# Patient Record
Sex: MALE | Race: WHITE | NOT HISPANIC OR LATINO | Employment: OTHER | URBAN - METROPOLITAN AREA
[De-identification: names, ages, dates, MRNs, and addresses within clinical notes are randomized per-mention and may not be internally consistent; named-entity substitution may affect disease eponyms.]

---

## 2017-01-24 ENCOUNTER — OFFICE VISIT (OUTPATIENT)
Dept: MEDICAL GROUP | Facility: PHYSICIAN GROUP | Age: 82
End: 2017-01-24
Payer: MEDICARE

## 2017-01-24 VITALS
SYSTOLIC BLOOD PRESSURE: 120 MMHG | WEIGHT: 163 LBS | BODY MASS INDEX: 25.58 KG/M2 | RESPIRATION RATE: 14 BRPM | DIASTOLIC BLOOD PRESSURE: 76 MMHG | TEMPERATURE: 98.6 F | HEIGHT: 67 IN | HEART RATE: 74 BPM | OXYGEN SATURATION: 95 %

## 2017-01-24 DIAGNOSIS — I95.1 ORTHOSTATIC HYPOTENSION: ICD-10-CM

## 2017-01-24 DIAGNOSIS — I48.0 PAROXYSMAL ATRIAL FIBRILLATION (HCC): ICD-10-CM

## 2017-01-24 DIAGNOSIS — E53.8 B12 DEFICIENCY: ICD-10-CM

## 2017-01-24 DIAGNOSIS — E11.9 WELL CONTROLLED TYPE 2 DIABETES MELLITUS (HCC): ICD-10-CM

## 2017-01-24 DIAGNOSIS — F51.01 PRIMARY INSOMNIA: ICD-10-CM

## 2017-01-24 PROCEDURE — 99999 PR NO CHARGE: CPT | Performed by: FAMILY MEDICINE

## 2017-01-24 PROCEDURE — 1101F PT FALLS ASSESS-DOCD LE1/YR: CPT | Performed by: FAMILY MEDICINE

## 2017-01-24 PROCEDURE — 1036F TOBACCO NON-USER: CPT | Performed by: FAMILY MEDICINE

## 2017-01-24 PROCEDURE — G8432 DEP SCR NOT DOC, RNG: HCPCS | Performed by: FAMILY MEDICINE

## 2017-01-24 PROCEDURE — 99214 OFFICE O/P EST MOD 30 MIN: CPT | Performed by: FAMILY MEDICINE

## 2017-01-24 PROCEDURE — G8420 CALC BMI NORM PARAMETERS: HCPCS | Performed by: FAMILY MEDICINE

## 2017-01-24 PROCEDURE — G8482 FLU IMMUNIZE ORDER/ADMIN: HCPCS | Performed by: FAMILY MEDICINE

## 2017-01-24 PROCEDURE — G8598 ASA/ANTIPLAT THER USED: HCPCS | Performed by: FAMILY MEDICINE

## 2017-01-24 PROCEDURE — 4040F PNEUMOC VAC/ADMIN/RCVD: CPT | Performed by: FAMILY MEDICINE

## 2017-01-24 RX ORDER — TRAZODONE HYDROCHLORIDE 50 MG/1
25-50 TABLET ORAL NIGHTLY PRN
Qty: 30 TAB | Refills: 3 | Status: SHIPPED | OUTPATIENT
Start: 2017-01-24 | End: 2017-05-02

## 2017-01-24 RX ADMIN — CYANOCOBALAMIN 1000 MCG: 1000 INJECTION, SOLUTION INTRAMUSCULAR; SUBCUTANEOUS at 14:27

## 2017-01-24 NOTE — PROGRESS NOTES
Chief Complaint   Patient presents with   • Follow-Up       HISTORY OF PRESENT ILLNESS: Patient is a 89 y.o. male established patient here today for the following concerns:      Mr. Wheeler is here today for follow-up. He was last seen in the office for hospital follow-up following a hospitalization for dehydration and orthostatic hypotension with syncopal episode. While he was inpatient there is a brief note of paroxysmal atrial fibrillation as well as some hyponatremia. It was decided to just use aspirin rather than anticoagulating him. He agrees with this plan. Both of these seem to resolve with IV hydration. He reports he is generally good job now of counting how many bottles of water his drinking, and reports at least 5 per day. He reports feeling quite well. His only complaint today is difficulty with insomnia. He reports for many months if not years he's been struggling with insomnia which is become acutely worse. He reports that he will fall asleep but awakes within a few hours. Occasionally he'll have difficulty even falling asleep. He finds that the next day and having to nap in the afternoon which propagates this problem even further.    Patient does have B12 deficiency and is on injectable B12 on a monthly basis. He is due for B12 injection today.    Lastly, he has type II diabetes controlled with metformin alone. Reports that he has not had any difficulties with hypo-glycemia. No polyuria or polydipsia. Last A1c was done over the summer which was at 6.4. No visual changes. He is due for some diabetic measures in the next few months.      Past Medical, Social, and Family history reviewed and updated in EPIC    Allergies:Review of patient's allergies indicates no known allergies.    Current Outpatient Prescriptions   Medication Sig Dispense Refill   • aspirin EC (ECOTRIN) 81 MG Tablet Delayed Response Take 81 mg by mouth every day.     • Probiotic Product (PROBIOTIC PO) Take  by mouth.     • B Complex  Vitamins (VITAMIN B COMPLEX PO) Take  by mouth.     • Multiple Vitamins-Minerals (CENTRUM SILVER PO) Take  by mouth.     • Cholecalciferol (VITAMIN D PO) Take  by mouth.     • Omega-3 Fatty Acids (FISH OIL PO) Take  by mouth.     • Multiple Vitamins-Minerals (OCUVITE) Tab Take 1 Tab by mouth every day.     • trazodone (DESYREL) 50 MG Tab Take 0.5-1 Tabs by mouth at bedtime as needed for Sleep. 30 Tab 3   • metformin (GLUCOPHAGE) 500 MG Tab TAKE TWO TABLETS BY MOUTH TWICE DAILY WITH MEALS 360 Tab 0   • atorvastatin (LIPITOR) 20 MG Tab Take 1 Tab by mouth every bedtime. 30 Tab 11   • finasteride (PROSCAR) 5 MG Tab Take 5 mg by mouth every morning.     • latanoprost (XALATAN) 0.005 % SOLN Place 1 Drop in both eyes every evening.       Current Facility-Administered Medications   Medication Dose Route Frequency Provider Last Rate Last Dose   • cyanocobalamin (VITAMIN B-12) injection 1,000 mcg  1,000 mcg Intramuscular Q30 DAYS Zahra Yañez M.D.   1,000 mcg at 10/21/16 0945         ROS:  Review of Systems   Constitutional: Negative for fever, chills, weight loss and malaise/fatigue.   HENT: Negative for ear pain, nosebleeds, congestion, sore throat and neck pain.    Eyes: Negative for blurred vision.   Respiratory: Negative for cough, sputum production, shortness of breath and wheezing.    Cardiovascular: Negative for chest pain, palpitations,  and leg swelling.   Gastrointestinal: Negative for heartburn, nausea, vomiting, diarrhea and abdominal pain.   Genitourinary: Negative for dysuria, urgency and frequency.   Musculoskeletal: Negative for myalgias, back pain and joint pain.   Skin: Negative for rash and itching.   Neurological: Negative for dizziness, tingling, tremors, sensory change, focal weakness and headaches.   Endo/Heme/Allergies: Does not bruise/bleed easily.   Psychiatric/Behavioral: Negative for depression, anxiety, suicidal ideas, insomnia and memory loss.      Exam:  Blood pressure 120/76, pulse 74,  "temperature 37 °C (98.6 °F), resp. rate 14, height 1.702 m (5' 7.01\"), weight 73.936 kg (163 lb), SpO2 95 %.    General:  Well nourished, well developed in NAD  Head is grossly normal.  Neck: Supple without JVD   Pulmonary:  Normal effort.   Cardiovascular: Regular rate  Extremities: no clubbing, cyanosis, or edema.  Psych: affect appropriate      Please note that this dictation was created using voice recognition software. I have made every reasonable attempt to correct obvious errors, but I expect that there are errors of grammar and possibly content that I did not discover before finalizing the note.    Assessment/Plan:  1. Primary insomnia  Trial of low-dose  - trazodone (DESYREL) 50 MG Tab; Take 0.5-1 Tabs by mouth at bedtime as needed for Sleep.  Dispense: 30 Tab; Refill: 3    2. Paroxysmal atrial fibrillation (CMS-HCC)  Patient could qualify for anticoagulation given history of age and type II diabetes. He struggled with difficulty with easy bruising and bleeding on full aspirin. Discussion on risks versus benefit, patient would like to continue on 81 mg of aspirin. Blood pressure is well controlled.    3. Orthostatic hypotension  Resolved. Continue hydration efforts.    4. B12 deficiency  Continue B12 injections, recheck labs in 3 months  - VITAMIN B12; Future  - CBC WITH DIFFERENTIAL; Future    5. Well controlled type 2 diabetes mellitus (CMS-HCC)  Continue metformin, continue to monitor renal function  - HEMOGLOBIN A1C; Future  - LIPID PROFILE; Future  - MICROALBUMIN CREAT RATIO URINE; Future  - COMP METABOLIC PANEL; Future    Follow up in 3 months sooner when necessary      "

## 2017-01-24 NOTE — MR AVS SNAPSHOT
"        Bulmaro Wheeler   2017 2:00 PM   Office Visit   MRN: [id number]    Department:  [facility]   Dept Phone:  [phone]    Description:  Male : 1927   Provider:  Zahra Yañez M.D.           Reason for Visit     Follow-Up           Allergies as of 2017     No Known Allergies      You were diagnosed with     Primary insomnia   [370318]       Paroxysmal atrial fibrillation (CMS-[facility])   [024506]       Orthostatic hypotension   [458.0.ICD-9-CM]       B12 deficiency   [746711]       Well controlled type 2 diabetes mellitus (CMS-[facility])   [882341]         Vital Signs     Blood Pressure Pulse Temperature Respirations Height Weight    120/76 mmHg 74 37 °C (98.6 °F) 14 1.702 m (5' 7.01\") 73.936 kg (163 lb)    Body Mass Index Oxygen Saturation Smoking Status             25.52 kg/m2 95% Never Smoker          Basic Information     Date Of Birth Sex Race Ethnicity Preferred Language    1927 Male White Non- English      Your appointments     2017  2:00 PM   Established Patient with Zahra Yañez M.D.   [address] [id number] [phone]           You will be receiving a confirmation call a few days before your appointment from our automated call confirmation system.              Problem List              ICD-10-CM Priority Class Noted - Resolved    Chronic back pain M54.9, G89.29   2012 - Present    B12 deficiency E53.8   2012 - Present    Thrombocytopenia (HCC) D69.6   3/15/2013 - Present    Renal cyst N28.1   2013 - Present    Carotid artery stenosis I65.29   2014 - Present    Type II diabetes mellitus, well controlled (HCC) E11.9   2015 - Present    Hernia of abdominal wall K43.9   2015 - Present    BPH (benign prostatic hyperplasia) N40.0 Low  3/24/2016 - Present    Spinal stenosis of lumbosacral region M48.07   2016 - Present    Vitamin D deficiency E55.9   2016 " - Present    Paroxysmal atrial fibrillation (CMS-[facility]) I48.0   11/27/2016 - Present      Health Maintenance        Date Due Completion Dates    IMM ZOSTER VACCINE 4/16/1987 ---    DIABETES MONOFILAMENT / LE EXAM 7/15/2016 7/15/2015, 6/26/2014 (N/S), 6/6/2013 (N/S), 10/4/2011 (N/S)    Override on 6/26/2014: (N/S)    Override on 6/6/2013: (N/S)    Override on 10/4/2011: (N/S)    A1C SCREENING 1/21/2017 7/21/2016, 4/20/2016, 9/11/2015, 4/14/2015, 12/29/2014, 6/23/2014, 2/18/2014, 1/2/2014, 9/4/2013, 5/31/2013, 3/1/2013, 12/20/2012, 4/4/2012, 10/4/2011, 3/30/2011, 10/7/2010, 3/29/2010, 10/9/2009, 4/22/2009, 6/23/2006    RETINAL SCREENING 3/5/2017 3/5/2016 (Done), 6/26/2014 (N/S), 5/21/2013 (N/S), 2/22/2012 (Done)    Override on 3/5/2016: Done (waiting on records)    Override on 6/26/2014: (N/S)    Override on 5/21/2013: (N/S)    Override on 2/22/2012: Done (HD Retina)    FASTING LIPID PROFILE 7/21/2017 7/21/2016, 9/11/2015, 12/29/2014, 6/23/2014, 2/18/2014, 1/2/2014, 10/7/2010, 3/29/2010, 4/22/2009    URINE ACR / MICROALBUMIN 7/21/2017 7/21/2016, 9/11/2015, 12/29/2014, 1/2/2014, 5/31/2013, 4/4/2012, 10/4/2011, 3/30/2011, 10/7/2010    SERUM CREATININE 11/27/2017 11/27/2016, 11/27/2016, 11/26/2016, 9/7/2016, 8/25/2016, 8/22/2016, 8/21/2016, 8/20/2016, 8/19/2016, 7/21/2016, 4/30/2016, 3/27/2016, 3/25/2016, 3/24/2016, 3/23/2016, 1/20/2016, 1/13/2016, 1/11/2016, 1/10/2016, 9/11/2015, 1/7/2015, 1/5/2015, 1/3/2015, 1/2/2015, 12/29/2014, 9/25/2014, 6/23/2014, 3/12/2014, 3/11/2014, 2/18/2014, 2/17/2014, 11/1/2013, 10/31/2013, 9/30/2013, 9/29/2013, 9/28/2013, 9/27/2013, 9/27/2013, 9/4/2013, 3/29/2013, 3/16/2013, 3/14/2013, 9/2/2012, 8/31/2012, 4/4/2012, 11/1/2011, 10/31/2011, 10/30/2011, 10/29/2011, 10/4/2011, 3/30/2011, 2/9/2011, 12/29/2010, 6/6/2010, 10/9/2009, 2/23/2009, 6/24/2006, 6/23/2006, 6/22/2006    IMM DTaP/Tdap/Td Vaccine (2 - Td) 4/8/2023 4/8/2013            Current Immunizations     13-VALENT PCV PREVNAR 12/21/2015     Influenza TIV (IM) 10/9/2013  5:15 PM, 9/28/2013  4:27 AM, 11/1/2011 12:20 PM    Influenza Vaccine Adult HD 10/21/2016, 11/24/2015, 10/2/2014    Pneumococcal polysaccharide vaccine (PPSV-23) 11/1/2011 12:19 PM    Tdap Vaccine 4/8/2013  3:40 PM      Below and/or attached are the medications your provider expects you to take. Review all of your home medications and newly ordered medications with your provider and/or pharmacist. Follow medication instructions as directed by your provider and/or pharmacist. Please keep your medication list with you and share with your provider. Update the information when medications are discontinued, doses are changed, or new medications (including over-the-counter products) are added; and carry medication information at all times in the event of emergency situations     Allergies:  No Known Allergies          Medications  Valid as of: January 24, 2017 -  2:16 PM    Generic Name Brand Name Tablet Size Instructions for use    Aspirin (Tablet Delayed Response) ECOTRIN 81 MG Take 81 mg by mouth every day.        Atorvastatin Calcium (Tab) LIPITOR 20 MG Take 1 Tab by mouth every bedtime.        B Complex Vitamins   Take  by mouth.        Cholecalciferol   Take  by mouth.        Finasteride (Tab) PROSCAR 5 MG Take 5 mg by mouth every morning.        Latanoprost (Solution) XALATAN 0.005 % Place 1 Drop in both eyes every evening.        MetFORMIN HCl (Tab) GLUCOPHAGE 500 MG TAKE TWO TABLETS BY MOUTH TWICE DAILY WITH MEALS        Multiple Vitamins-Minerals   Take  by mouth.        Multiple Vitamins-Minerals (Tab) OCUVITE  Take 1 Tab by mouth every day.        Omega-3 Fatty Acids   Take  by mouth.        Probiotic Product   Take  by mouth.        TraZODone HCl (Tab) DESYREL 50 MG Take 0.5-1 Tabs by mouth at bedtime as needed for Sleep.        .                 Medicines prescribed today were sent to:     [id number] - [place], [place] - [address]    [address] [place] [address]     Phone: [phone] Fax: [phone]    Open 24 Hours?: No      Medication refill instructions:       If your prescription bottle indicates you have medication refills left, it is not necessary to call your provider’s office. Please contact your pharmacy and they will refill your medication.    If your prescription bottle indicates you do not have any refills left, you may request refills at any time through one of the following ways: The online [email or web address] system (except Urgent Care), by calling your provider’s office, or by asking your pharmacy to contact your provider’s office with a refill request. Medication refills are processed only during regular business hours and may not be available until the next business day. Your provider may request additional information or to have a follow-up visit with you prior to refilling your medication.   *Please Note: Medication refills are assigned a new Rx number when refilled electronically. Your pharmacy may indicate that no refills were authorized even though a new prescription for the same medication is available at the pharmacy. Please request the medicine by name with the pharmacy before contacting your provider for a refill.        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

## 2017-03-16 NOTE — TELEPHONE ENCOUNTER
Was the patient seen in the last year in this department? Yes     Does patient have an active prescription for medications requested? No     Received Request Via: Patient      Pt met protocol?: Yes  OV 1/24/17  A1C 7/21/16

## 2017-03-27 ENCOUNTER — TELEPHONE (OUTPATIENT)
Dept: HEALTH INFORMATION MANAGEMENT | Facility: OTHER | Age: 82
End: 2017-03-27

## 2017-03-27 ENCOUNTER — PATIENT OUTREACH (OUTPATIENT)
Dept: HEALTH INFORMATION MANAGEMENT | Facility: OTHER | Age: 82
End: 2017-03-27

## 2017-03-27 DIAGNOSIS — Z13.5 SCREENING FOR DIABETIC RETINOPATHY: ICD-10-CM

## 2017-03-27 NOTE — TELEPHONE ENCOUNTER
Please review the pended orders in  to sign or make adjustments as appropriate.    Close the encounter when complete.  If declining these orders, please document a reason and route to the Outreach MA pool (p AMB  Outreach).  I will call the patient to cancel the appointment.

## 2017-04-04 ENCOUNTER — OFFICE VISIT (OUTPATIENT)
Dept: MEDICAL GROUP | Facility: PHYSICIAN GROUP | Age: 82
End: 2017-04-04
Payer: MEDICARE

## 2017-04-04 VITALS
SYSTOLIC BLOOD PRESSURE: 126 MMHG | TEMPERATURE: 98.6 F | BODY MASS INDEX: 25.58 KG/M2 | RESPIRATION RATE: 16 BRPM | DIASTOLIC BLOOD PRESSURE: 70 MMHG | HEART RATE: 74 BPM | WEIGHT: 163 LBS | OXYGEN SATURATION: 96 % | HEIGHT: 67 IN

## 2017-04-04 DIAGNOSIS — G89.29 CHRONIC LOW BACK PAIN, UNSPECIFIED BACK PAIN LATERALITY, WITH SCIATICA PRESENCE UNSPECIFIED: ICD-10-CM

## 2017-04-04 DIAGNOSIS — E55.9 VITAMIN D DEFICIENCY: ICD-10-CM

## 2017-04-04 DIAGNOSIS — M54.5 CHRONIC LOW BACK PAIN, UNSPECIFIED BACK PAIN LATERALITY, WITH SCIATICA PRESENCE UNSPECIFIED: ICD-10-CM

## 2017-04-04 DIAGNOSIS — I65.29 STENOSIS OF CAROTID ARTERY, UNSPECIFIED LATERALITY: ICD-10-CM

## 2017-04-04 DIAGNOSIS — M48.07 SPINAL STENOSIS OF LUMBOSACRAL REGION: ICD-10-CM

## 2017-04-04 DIAGNOSIS — E53.8 B12 DEFICIENCY: ICD-10-CM

## 2017-04-04 DIAGNOSIS — D69.6 THROMBOCYTOPENIA (HCC): ICD-10-CM

## 2017-04-04 DIAGNOSIS — N40.1 BENIGN PROSTATIC HYPERPLASIA WITH LOWER URINARY TRACT SYMPTOMS, UNSPECIFIED MORPHOLOGY: ICD-10-CM

## 2017-04-04 DIAGNOSIS — I48.0 PAROXYSMAL ATRIAL FIBRILLATION (HCC): ICD-10-CM

## 2017-04-04 DIAGNOSIS — E11.9 TYPE II DIABETES MELLITUS, WELL CONTROLLED (HCC): ICD-10-CM

## 2017-04-04 DIAGNOSIS — N28.1 RENAL CYST: ICD-10-CM

## 2017-04-04 PROCEDURE — G8510 SCR DEP NEG, NO PLAN REQD: HCPCS | Performed by: FAMILY MEDICINE

## 2017-04-04 PROCEDURE — 1036F TOBACCO NON-USER: CPT | Performed by: FAMILY MEDICINE

## 2017-04-04 PROCEDURE — G0439 PPPS, SUBSEQ VISIT: HCPCS | Performed by: FAMILY MEDICINE

## 2017-04-04 ASSESSMENT — PAIN SCALES - GENERAL: PAINLEVEL: NO PAIN

## 2017-04-04 ASSESSMENT — PATIENT HEALTH QUESTIONNAIRE - PHQ9: CLINICAL INTERPRETATION OF PHQ2 SCORE: 0

## 2017-04-04 NOTE — PROGRESS NOTES
Chief Complaint   Patient presents with   • Annual Wellness Visit         HPI:  Bulmaro is a 89 y.o. male here for Medicare Annual Wellness Visit        Patient Active Problem List    Diagnosis Date Noted   • BPH (benign prostatic hyperplasia) 03/24/2016     Priority: Low   • Paroxysmal atrial fibrillation (CMS-HCC) 11/27/2016   • Spinal stenosis of lumbosacral region 09/07/2016   • Vitamin D deficiency 09/07/2016   • Hernia of abdominal wall 09/21/2015   • Type II diabetes mellitus, well controlled ([facility]) 04/14/2015   • Carotid artery stenosis 02/18/2014   • Renal cyst 09/19/2013   • Thrombocytopenia ([facility]) 03/15/2013   • B12 deficiency 09/25/2012   • Chronic back pain 09/01/2012       Current Outpatient Prescriptions   Medication Sig Dispense Refill   • Ibuprofen-Diphenhydramine Cit (ADVIL PM PO) Take  by mouth.     • metformin (GLUCOPHAGE) 500 MG Tab TAKE TWO TABLETS BY MOUTH TWICE DAILY WITH MEALS 360 Tab 0   • aspirin EC (ECOTRIN) 81 MG Tablet Delayed Response Take 81 mg by mouth every day.     • Probiotic Product (PROBIOTIC PO) Take  by mouth.     • B Complex Vitamins (VITAMIN B COMPLEX PO) Take  by mouth.     • Multiple Vitamins-Minerals (CENTRUM SILVER PO) Take  by mouth.     • Cholecalciferol (VITAMIN D PO) Take  by mouth.     • Omega-3 Fatty Acids (FISH OIL PO) Take  by mouth.     • Multiple Vitamins-Minerals (OCUVITE) Tab Take 1 Tab by mouth every day.     • trazodone (DESYREL) 50 MG Tab Take 0.5-1 Tabs by mouth at bedtime as needed for Sleep. 30 Tab 3   • atorvastatin (LIPITOR) 20 MG Tab Take 1 Tab by mouth every bedtime. 30 Tab 11   • finasteride (PROSCAR) 5 MG Tab Take 5 mg by mouth every morning.     • latanoprost (XALATAN) 0.005 % SOLN Place 1 Drop in both eyes every evening.       Current Facility-Administered Medications   Medication Dose Route Frequency Provider Last Rate Last Dose   • cyanocobalamin (VITAMIN B-12) injection 1,000 mcg  1,000 mcg Intramuscular Q30 DAYS Zahra Yañez M.D.   1,000 mcg at  01/24/17 7277        Patient is taking medications as noted in medication list.  Current supplements as per medication list.   Chronic narcotic pain medicines: no    Allergies: Review of patient's allergies indicates no known allergies.    Current social contact/activities: none at this time     Is patient current with immunizations?  No, due for ZOSTAVAX (Shingles). Patient is interested in receiving NONE today.     He  reports that he has never smoked. He has never used smokeless tobacco. He reports that he does not drink alcohol or use illicit drugs.  Counseling given: Yes        DPA/Advanced Directive:  Patient does not have an Advanced Directive.  A packet and workshop information was given on Advanced Directives.    ROS:    Gait: Uses a wheelchair   Ostomy: no   Other tubes: no   Amputations: no   Chronic oxygen use no   Last eye exam 04/03/2017   Wears hearing aids: no   : Reports incontinence.       Screening:    DIABETES  1. Records requested for overdue  topics specifically for diabetes? yes      a. If yes, specifically requested: monofilament    2. Has patient ever had diabetes education? Yes      a. If so, when? 35 years ago      b. If not, is patient interested? no        Depression Screening    Little interest or pleasure in doing things?  0 - not at all  Feeling down, depressed, or hopeless?  0 - not at all  Patient Health Questionnaire Score: 0  If depressive symptoms identified deferred to follow up visit unless specifically addressed in assessment and plan.    Screening for Cognitive Impairment    Three Minute Recall (banana, sunrise, fence)  2/3    Draw clock face with all 12 numbers set to the hand to show 10 minutes past 11 o'clock  0 2/5  Due to macular degeneration   If cognitive concerns identified deferred to follow up visit unless specifically addressed in assessment and plan.    Fall Risk Assessment    Has the patient had two or more falls in the last year or any fall with injury in  the last year?  Yes  If Fall Risk identified deferred to follow up visit unless specifically addressed in assessment and plan.    Safety Assessment    Throw rugs on floor.  Yes  Handrails on all stairs.  Yes  Good lighting in all hallways.  Yes  Difficulty hearing.  Yes  Patient counseled about all safety risks that were identified.    Functional Assessment ADLs    Are there any barriers preventing you from cooking for yourself or meeting nutritional needs?  No.    Are there any barriers preventing you from driving safely or obtaining transportation?  No.    Are there any barriers preventing you from using a telephone or calling for help?  No.    Are there any barriers preventing you from shopping?  No.    Are there any barriers preventing you from taking care of your own finances?  No.    Are there any barriers preventing you from managing your medications?  No.    Are currently engaging any exercise or physical activity?  No.       Health Maintenance Summary                Annual Wellness Visit Overdue 4/16/1927     DIABETES MONOFILAMENT / LE EXAM Overdue 7/15/2016      Done 7/15/2015 AMB DIABETIC MONOFILAMENT LOWER EXTREMITY EXAM     Patient has more history with this topic...    A1C SCREENING Overdue 1/21/2017      Done 7/21/2016 HEMOGLOBIN A1C (A)     Patient has more history with this topic...    RETINAL SCREENING Overdue 3/5/2017      Done 3/5/2016 waiting on records     Patient has more history with this topic...    IMM ZOSTER VACCINE Postponed 5/25/2018 Originally 4/16/1987. Insurance/Financial    FASTING LIPID PROFILE Next Due 7/21/2017      Done 7/21/2016 LIPID PROFILE     Patient has more history with this topic...    URINE ACR / MICROALBUMIN Next Due 7/21/2017      Done 7/21/2016 MICROALBUMIN CREAT RATIO URINE (A)     Patient has more history with this topic...    SERUM CREATININE Next Due 11/27/2017      Done 11/27/2016 BASIC METABOLIC PANEL     Patient has more history with this topic...    IMM  DTaP/Tdap/Td Vaccine Next Due 4/8/2023      Done 4/8/2013 Imm Admin: Tdap Vaccine          Patient Care Team:  Zahra Yañez M.D. as PCP - General (Family Medicine)  Stew Damon M.D. as Consulting Physician (Ophthalmology)  Gertrudis Monte D.N.P. as Consulting Physician (Family Medicine)  [facility] as Consulting Physician    Social History   Substance Use Topics   • Smoking status: Never Smoker    • Smokeless tobacco: Never Used   • Alcohol Use: No      Comment: hasnt drank since 1979     Family History   Problem Relation Age of Onset   • Heart Disease Father    • Diabetes Father    • Alcohol/Drug Father    • Cancer Sister      ovarian   • Stroke Brother      age 90     He  has a past medical history of Diabetes; Hypertension; Arthritis; Pneumonia; Backpain; Glaucoma; Indigestion; CATARACT; Arrhythmia; Urinary tract infection, site not specified (9/27/2013); Sepsis (9/27/2013); UTI (lower urinary tract infection) (9/1/2012); Heart burn; Pyelonephritis (October 2010); Acute kidney failure, unspecified (CMS-HCC) (9/27/2013); Renal cyst (9/19/2013); Pyelonephritis; Carotid artery stenosis (2/18/2014); and Occlusion and stenosis of carotid artery without mention of cerebral infarction (3/11/2014). He also has no past medical history of Unspecified hemorrhagic conditions (CMS-[facility]), Psychiatric problem, Myocardial infarct (CMS-[facility]), Pacemaker, Heart murmur, ASTHMA, Jaundice, Personal history of venous thrombosis and embolism, Seizure (CMS-[facility]), Angina, Rheumatic fever, Dialysis, Cancer (CMS-HCC), Unspecified disorder of thyroid, Congestive heart failure (CMS-HCC), Heart valve disease, Bronchitis, COPD, Unspecified urinary incontinence, or Fall.   Past Surgical History   Procedure Laterality Date   • Ercp w/removal calculus  2009   • Eye surgery  1980's     cataracts OU   • Colonoscopy  1999   • Cholecystectomy  1999   • Carotid endarterectomy  3/11/2014     Performed by Bob Antonio M.D.  "at [facility]           Exam:     Blood pressure 126/70, pulse 74, temperature 37 °C (98.6 °F), resp. rate 16, height 1.702 m (5' 7\"), weight 73.936 kg (163 lb), SpO2 96 %. Body mass index is 25.52 kg/(m^2).    Hearing good.    Dentition fair  Alert, oriented in no acute distress.  Eye contact is good, speech goal directed, affect calm      Assessment and Plan. The following treatment and monitoring plan is recommended:    1. Thrombocytopenia (HCC)  Recheck.   - Annual Wellness Visit - Includes PPPS Subsequent ()  - CBC WITH DIFFERENTIAL; Future    2. Type II diabetes mellitus, well controlled ([facility])  Check labs   - Annual Wellness Visit - Includes PPPS Subsequent ()  - HEMOGLOBIN A1C; Future  - LIPID PROFILE; Future  - MICROALBUMIN CREAT RATIO URINE; Future  - COMP METABOLIC PANEL; Future  - Diabetic Monofilament Lower Extremity Exam    3. Vitamin D deficiency  Continue Vit D  - Annual Wellness Visit - Includes PPPS Subsequent ()  - VITAMIN D,25 HYDROXY; Future    4. Benign prostatic hyperplasia with lower urinary tract symptoms, unspecified morphology  Advised against OAB medications as he does retain urine, declines surgical intervention.   - Annual Wellness Visit - Includes PPPS Subsequent ()    5. Spinal stenosis of lumbosacral region  Stable with current regimen.  Appropriate medications and lab monitored routinely.    - Annual Wellness Visit - Includes PPPS Subsequent ()    6. Paroxysmal atrial fibrillation (CMS-HCC)  Stable with current regimen.  Appropriate medications and lab monitored routinely.    - Annual Wellness Visit - Includes PPPS Subsequent ()    7. Chronic low back pain, unspecified back pain laterality, with sciatica presence unspecified  Stable with current regimen.  Appropriate medications and lab monitored routinely.    - Annual Wellness Visit - Includes PPPS Subsequent ()    8. Stenosis of carotid artery, unspecified laterality  Continue statin " and BP control  - Annual Wellness Visit - Includes PPPS Subsequent ()    9. Renal cyst  Noted.   - Annual Wellness Visit - Includes PPPS Subsequent ()    10. B12 deficiency  Continue B12 replacement.   - VITAMIN B12; Future        Services suggested: No services needed at this time  Health Care Screening recommendations as per orders if indicated.  Referrals offered: PT/OT/Nutrition counseling/Behavioral Health/Smoking cessation as per orders if indicated.    Discussion today about general wellness and lifestyle habits:    · Prevent falls and reduce trip hazards; Cautioned about securing or removing rugs.  · Have a working fire alarm and carbon monoxide detector;   · Engage in regular physical activity and social activities       Follow-up: 4 weeks.

## 2017-04-04 NOTE — Clinical Note
[facility]  Zahra Yañez M.D.  [address]  [place] [address]  Fax: [phone]   Authorization for Release/Disclosure of   Protected Health Information   Name: JT WHEELER : 1927 SSN: [id number]   Address: [address] [facility] [place] [address] Phone:    [phone] (home)    I authorize the entity listed below to release/disclose the PHI below to:   [facility]/Zahra Yañez M.D. and Zahra Yañez M.D.   Provider or Entity Name:  Dr. Damon   Address   City, State, Zip   Phone:      Fax:  [phone]   Reason for request: continuity of care   Information to be released:    [  ] LAST COLONOSCOPY,  including any PATH REPORT and follow-up  [  ] LAST FIT/COLOGUARD RESULT [  ] LAST DEXA  [  ] LAST MAMMOGRAM  [  ] LAST PAP  [  ] LAST LABS [X  ] RETINA EXAM REPORT  [  ] IMMUNIZATION RECORDS  [  ] Release all info      [  ] Check here and initial the line next to each item to release ALL health information INCLUDING  _____ Care and treatment for drug and / or alcohol abuse  _____ HIV testing, infection status, or AIDS  _____ Genetic Testing    DATES OF SERVICE OR TIME PERIOD TO BE DISCLOSED: _____________  I understand and acknowledge that:  * This Authorization may be revoked at any time by you in writing, except if your health information has already been used or disclosed.  * Your health information that will be used or disclosed as a result of you signing this authorization could be re-disclosed by the recipient. If this occurs, your re-disclosed health information may no longer be protected by State or Federal laws.  * You may refuse to sign this Authorization. Your refusal will not affect your ability to obtain treatment.  * This Authorization becomes effective upon signing and will  on (date) __________.      If no date is indicated, this Authorization will  one (1) year from the signature date.    Name: Jt Wheeler    Signature:   Date:          4/4/2017       PLEASE FAX REQUESTED RECORDS BACK TO: [phone]

## 2017-04-04 NOTE — MR AVS SNAPSHOT
"        Bulmaro Wheeler   2017 1:00 PM   Office Visit   MRN: [id number]    Department:  [facility]   Dept Phone:  [phone]    Description:  Male : 1927   Provider:  Zahra Yañez M.D.; [facility]            Reason for Visit     Annual Wellness Visit           Allergies as of 2017     No Known Allergies      You were diagnosed with     Thrombocytopenia (CMS-HCC)   [620206]       Type II diabetes mellitus, well controlled (CMS-HCC)   [606876]       Vitamin D deficiency   [7408490]       Benign prostatic hyperplasia with lower urinary tract symptoms, unspecified morphology   [7342704]       Spinal stenosis of lumbosacral region   [230218]       Paroxysmal atrial fibrillation (CMS-HCC)   [789639]       Chronic low back pain, unspecified back pain laterality, with sciatica presence unspecified   [1916801]       Stenosis of carotid artery, unspecified laterality   [750800]       Renal cyst   [348667]       B12 deficiency   [576610]         Vital Signs     Blood Pressure Pulse Temperature Respirations Height Weight    126/70 mmHg 74 37 °C (98.6 °F) 16 1.702 m (5' 7\") 73.936 kg (163 lb)    Body Mass Index Oxygen Saturation Smoking Status             25.52 kg/m2 96% Never Smoker          Basic Information     Date Of Birth Sex Race Ethnicity Preferred Language    1927 Male White Non- English      Your appointments     2017  2:00 PM   Established Patient with Zahra Yañez M.D.   [address] [address]   [phone]           You will be receiving a confirmation call a few days before your appointment from our automated call confirmation system.              Problem List              ICD-10-CM Priority Class Noted - Resolved    Chronic back pain M54.9, G89.29   2012 - Present    B12 deficiency E53.8   2012 - Present    Thrombocytopenia (HCC) D69.6   3/15/2013 - Present    Renal cyst " N28.1   9/19/2013 - Present    Carotid artery stenosis I65.29   2/18/2014 - Present    Type II diabetes mellitus, well controlled (HCC) E11.9   4/14/2015 - Present    Hernia of abdominal wall K43.9   9/21/2015 - Present    BPH (benign prostatic hyperplasia) N40.0 Low  3/24/2016 - Present    Spinal stenosis of lumbosacral region M48.07   9/7/2016 - Present    Vitamin D deficiency E55.9   9/7/2016 - Present    Paroxysmal atrial fibrillation (CMS-HCC) I48.0   11/27/2016 - Present      Health Maintenance        Date Due Completion Dates    DIABETES MONOFILAMENT / LE EXAM 7/15/2016 7/15/2015, 6/26/2014 (N/S), 6/6/2013 (N/S), 10/4/2011 (N/S)    Override on 6/26/2014: (N/S)    Override on 6/6/2013: (N/S)    Override on 10/4/2011: (N/S)    A1C SCREENING 1/21/2017 7/21/2016, 4/20/2016, 9/11/2015, 4/14/2015, 12/29/2014, 6/23/2014, 2/18/2014, 1/2/2014, 9/4/2013, 5/31/2013, 3/1/2013, 12/20/2012, 4/4/2012, 10/4/2011, 3/30/2011, 10/7/2010, 3/29/2010, 10/9/2009, 4/22/2009, 6/23/2006    RETINAL SCREENING 3/5/2017 3/5/2016 (Done), 6/26/2014 (N/S), 5/21/2013 (N/S), 2/22/2012 (Done)    Override on 3/5/2016: Done (waiting on records)    Override on 6/26/2014: (N/S)    Override on 5/21/2013: (N/S)    Override on 2/22/2012: Done (HD Retina)    IMM ZOSTER VACCINE 5/25/2018 (Originally 4/16/1987) ---    FASTING LIPID PROFILE 7/21/2017 7/21/2016, 9/11/2015, 12/29/2014, 6/23/2014, 2/18/2014, 1/2/2014, 10/7/2010, 3/29/2010, 4/22/2009    URINE ACR / MICROALBUMIN 7/21/2017 7/21/2016, 9/11/2015, 12/29/2014, 1/2/2014, 5/31/2013, 4/4/2012, 10/4/2011, 3/30/2011, 10/7/2010    SERUM CREATININE 11/27/2017 11/27/2016, 11/27/2016, 11/26/2016, 9/7/2016, 8/25/2016, 8/22/2016, 8/21/2016, 8/20/2016, 8/19/2016, 7/21/2016, 4/30/2016, 3/27/2016, 3/25/2016, 3/24/2016, 3/23/2016, 1/20/2016, 1/13/2016, 1/11/2016, 1/10/2016, 9/11/2015, 1/7/2015, 1/5/2015, 1/3/2015, 1/2/2015, 12/29/2014, 9/25/2014, 6/23/2014, 3/12/2014, 3/11/2014, 2/18/2014, 2/17/2014, 11/1/2013,  10/31/2013, 9/30/2013, 9/29/2013, 9/28/2013, 9/27/2013, 9/27/2013, 9/4/2013, 3/29/2013, 3/16/2013, 3/14/2013, 9/2/2012, 8/31/2012, 4/4/2012, 11/1/2011, 10/31/2011, 10/30/2011, 10/29/2011, 10/4/2011, 3/30/2011, 2/9/2011, 12/29/2010, 6/6/2010, 10/9/2009, 2/23/2009, 6/24/2006, 6/23/2006, 6/22/2006    IMM DTaP/Tdap/Td Vaccine (2 - Td) 4/8/2023 4/8/2013            Current Immunizations     13-VALENT PCV PREVNAR 12/21/2015    Influenza TIV (IM) 10/9/2013  5:15 PM, 9/28/2013  4:27 AM, 11/1/2011 12:20 PM    Influenza Vaccine Adult HD 10/21/2016, 11/24/2015, 10/2/2014    Pneumococcal polysaccharide vaccine (PPSV-23) 11/1/2011 12:19 PM    Tdap Vaccine 4/8/2013  3:40 PM      Below and/or attached are the medications your provider expects you to take. Review all of your home medications and newly ordered medications with your provider and/or pharmacist. Follow medication instructions as directed by your provider and/or pharmacist. Please keep your medication list with you and share with your provider. Update the information when medications are discontinued, doses are changed, or new medications (including over-the-counter products) are added; and carry medication information at all times in the event of emergency situations     Allergies:  No Known Allergies          Medications  Valid as of: April 04, 2017 -  2:28 PM    Generic Name Brand Name Tablet Size Instructions for use    Aspirin (Tablet Delayed Response) ECOTRIN 81 MG Take 81 mg by mouth every day.        Atorvastatin Calcium (Tab) LIPITOR 20 MG Take 1 Tab by mouth every bedtime.        B Complex Vitamins   Take  by mouth.        Cholecalciferol   Take  by mouth.        Finasteride (Tab) PROSCAR 5 MG Take 5 mg by mouth every morning.        Ibuprofen-Diphenhydramine Cit   Take  by mouth.        Latanoprost (Solution) XALATAN 0.005 % Place 1 Drop in both eyes every evening.        MetFORMIN HCl (Tab) GLUCOPHAGE 500 MG TAKE TWO TABLETS BY MOUTH TWICE DAILY WITH MEALS         Multiple Vitamins-Minerals   Take  by mouth.        Multiple Vitamins-Minerals (Tab) OCUVITE  Take 1 Tab by mouth every day.        Omega-3 Fatty Acids   Take  by mouth.        Probiotic Product   Take  by mouth.        TraZODone HCl (Tab) DESYREL 50 MG Take 0.5-1 Tabs by mouth at bedtime as needed for Sleep.        .                 Medicines prescribed today were sent to:     [id number] - [place], [place] - [address]    [address] [place] [address]    Phone: [phone] Fax: [phone]    Open 24 Hours?: No      Medication refill instructions:       If your prescription bottle indicates you have medication refills left, it is not necessary to call your provider’s office. Please contact your pharmacy and they will refill your medication.    If your prescription bottle indicates you do not have any refills left, you may request refills at any time through one of the following ways: The online [employer] system (except Urgent Care), by calling your provider’s office, or by asking your pharmacy to contact your provider’s office with a refill request. Medication refills are processed only during regular business hours and may not be available until the next business day. Your provider may request additional information or to have a follow-up visit with you prior to refilling your medication.   *Please Note: Medication refills are assigned a new Rx number when refilled electronically. Your pharmacy may indicate that no refills were authorized even though a new prescription for the same medication is available at the pharmacy. Please request the medicine by name with the pharmacy before contacting your provider for a refill.        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

## 2017-04-05 RX ORDER — FINASTERIDE 5 MG/1
5 TABLET, FILM COATED ORAL EVERY MORNING
Qty: 90 TAB | Refills: 1 | Status: SHIPPED | OUTPATIENT
Start: 2017-04-05 | End: 2017-04-25 | Stop reason: SDUPTHER

## 2017-04-07 ENCOUNTER — HOSPITAL ENCOUNTER (OUTPATIENT)
Dept: LAB | Facility: MEDICAL CENTER | Age: 82
End: 2017-04-07
Attending: FAMILY MEDICINE
Payer: MEDICARE

## 2017-04-07 DIAGNOSIS — E53.8 B12 DEFICIENCY: ICD-10-CM

## 2017-04-07 DIAGNOSIS — E11.9 TYPE II DIABETES MELLITUS, WELL CONTROLLED (HCC): ICD-10-CM

## 2017-04-07 DIAGNOSIS — E55.9 VITAMIN D DEFICIENCY: ICD-10-CM

## 2017-04-07 DIAGNOSIS — D69.6 THROMBOCYTOPENIA (HCC): ICD-10-CM

## 2017-04-07 LAB
25(OH)D3 SERPL-MCNC: 28 NG/ML (ref 30–100)
ALBUMIN SERPL BCP-MCNC: 4.1 G/DL (ref 3.2–4.9)
ALBUMIN/GLOB SERPL: 1.4 G/DL
ALP SERPL-CCNC: 59 U/L (ref 30–99)
ALT SERPL-CCNC: 16 U/L (ref 2–50)
ANION GAP SERPL CALC-SCNC: 6 MMOL/L (ref 0–11.9)
AST SERPL-CCNC: 21 U/L (ref 12–45)
BASOPHILS # BLD AUTO: 0.6 % (ref 0–1.8)
BASOPHILS # BLD: 0.04 K/UL (ref 0–0.12)
BILIRUB SERPL-MCNC: 0.9 MG/DL (ref 0.1–1.5)
BUN SERPL-MCNC: 24 MG/DL (ref 8–22)
CALCIUM SERPL-MCNC: 9.6 MG/DL (ref 8.5–10.5)
CHLORIDE SERPL-SCNC: 100 MMOL/L (ref 96–112)
CHOLEST SERPL-MCNC: 76 MG/DL (ref 100–199)
CO2 SERPL-SCNC: 29 MMOL/L (ref 20–33)
CREAT SERPL-MCNC: 0.86 MG/DL (ref 0.5–1.4)
CREAT UR-MCNC: 24.8 MG/DL
EOSINOPHIL # BLD AUTO: 0.06 K/UL (ref 0–0.51)
EOSINOPHIL NFR BLD: 0.9 % (ref 0–6.9)
ERYTHROCYTE [DISTWIDTH] IN BLOOD BY AUTOMATED COUNT: 42.7 FL (ref 35.9–50)
EST. AVERAGE GLUCOSE BLD GHB EST-MCNC: 171 MG/DL
GFR SERPL CREATININE-BSD FRML MDRD: >60 ML/MIN/1.73 M 2
GLOBULIN SER CALC-MCNC: 2.9 G/DL (ref 1.9–3.5)
GLUCOSE SERPL-MCNC: 162 MG/DL (ref 65–99)
HBA1C MFR BLD: 7.6 % (ref 0–5.6)
HCT VFR BLD AUTO: 41.1 % (ref 42–52)
HDLC SERPL-MCNC: 39 MG/DL
HGB BLD-MCNC: 13.9 G/DL (ref 14–18)
IMM GRANULOCYTES # BLD AUTO: 0.01 K/UL (ref 0–0.11)
IMM GRANULOCYTES NFR BLD AUTO: 0.1 % (ref 0–0.9)
LDLC SERPL CALC-MCNC: 18 MG/DL
LYMPHOCYTES # BLD AUTO: 1.05 K/UL (ref 1–4.8)
LYMPHOCYTES NFR BLD: 15.3 % (ref 22–41)
MCH RBC QN AUTO: 30.6 PG (ref 27–33)
MCHC RBC AUTO-ENTMCNC: 33.8 G/DL (ref 33.7–35.3)
MCV RBC AUTO: 90.5 FL (ref 81.4–97.8)
MICROALBUMIN UR-MCNC: 15.6 MG/DL
MICROALBUMIN/CREAT UR: 629 MG/G (ref 0–30)
MONOCYTES # BLD AUTO: 0.61 K/UL (ref 0–0.85)
MONOCYTES NFR BLD AUTO: 8.9 % (ref 0–13.4)
NEUTROPHILS # BLD AUTO: 5.1 K/UL (ref 1.82–7.42)
NEUTROPHILS NFR BLD: 74.2 % (ref 44–72)
NRBC # BLD AUTO: 0 K/UL
NRBC BLD AUTO-RTO: 0 /100 WBC
PLATELET # BLD AUTO: 159 K/UL (ref 164–446)
PMV BLD AUTO: 11.1 FL (ref 9–12.9)
POTASSIUM SERPL-SCNC: 4.3 MMOL/L (ref 3.6–5.5)
PROT SERPL-MCNC: 7 G/DL (ref 6–8.2)
RBC # BLD AUTO: 4.54 M/UL (ref 4.7–6.1)
SODIUM SERPL-SCNC: 135 MMOL/L (ref 135–145)
TRIGL SERPL-MCNC: 94 MG/DL (ref 0–149)
VIT B12 SERPL-MCNC: 493 PG/ML (ref 211–911)
WBC # BLD AUTO: 6.9 K/UL (ref 4.8–10.8)

## 2017-04-07 PROCEDURE — 82043 UR ALBUMIN QUANTITATIVE: CPT

## 2017-04-07 PROCEDURE — 82306 VITAMIN D 25 HYDROXY: CPT

## 2017-04-07 PROCEDURE — 85025 COMPLETE CBC W/AUTO DIFF WBC: CPT

## 2017-04-07 PROCEDURE — 82570 ASSAY OF URINE CREATININE: CPT

## 2017-04-07 PROCEDURE — 83036 HEMOGLOBIN GLYCOSYLATED A1C: CPT

## 2017-04-07 PROCEDURE — 36415 COLL VENOUS BLD VENIPUNCTURE: CPT

## 2017-04-07 PROCEDURE — 82607 VITAMIN B-12: CPT

## 2017-04-07 PROCEDURE — 80053 COMPREHEN METABOLIC PANEL: CPT

## 2017-04-07 PROCEDURE — 80061 LIPID PANEL: CPT

## 2017-04-11 ENCOUNTER — TELEPHONE (OUTPATIENT)
Dept: MEDICAL GROUP | Facility: PHYSICIAN GROUP | Age: 82
End: 2017-04-11

## 2017-04-11 NOTE — TELEPHONE ENCOUNTER
----- Message from Zahra Yañez M.D. sent at 4/11/2017 12:08 PM PDT -----  Please call patient to ensure they received their results through My Chart.  The patient requires follow up office visit.

## 2017-04-25 ENCOUNTER — HOSPITAL ENCOUNTER (OUTPATIENT)
Facility: MEDICAL CENTER | Age: 82
End: 2017-04-25
Attending: FAMILY MEDICINE
Payer: MEDICARE

## 2017-04-25 ENCOUNTER — OFFICE VISIT (OUTPATIENT)
Dept: MEDICAL GROUP | Facility: PHYSICIAN GROUP | Age: 82
End: 2017-04-25
Payer: MEDICARE

## 2017-04-25 VITALS
BODY MASS INDEX: 25.43 KG/M2 | OXYGEN SATURATION: 93 % | WEIGHT: 162 LBS | TEMPERATURE: 97.8 F | DIASTOLIC BLOOD PRESSURE: 80 MMHG | RESPIRATION RATE: 18 BRPM | SYSTOLIC BLOOD PRESSURE: 132 MMHG | HEART RATE: 66 BPM | HEIGHT: 67 IN

## 2017-04-25 DIAGNOSIS — E11.9 TYPE II DIABETES MELLITUS, WELL CONTROLLED (HCC): ICD-10-CM

## 2017-04-25 DIAGNOSIS — N40.1 BENIGN PROSTATIC HYPERPLASIA WITH LOWER URINARY TRACT SYMPTOMS, UNSPECIFIED MORPHOLOGY: ICD-10-CM

## 2017-04-25 DIAGNOSIS — R35.0 URINARY FREQUENCY: ICD-10-CM

## 2017-04-25 DIAGNOSIS — R31.9 HEMATURIA: ICD-10-CM

## 2017-04-25 LAB
APPEARANCE UR: CLEAR
BILIRUB UR STRIP-MCNC: NORMAL MG/DL
COLOR UR AUTO: NORMAL
GLUCOSE UR STRIP.AUTO-MCNC: NORMAL MG/DL
KETONES UR STRIP.AUTO-MCNC: NORMAL MG/DL
LEUKOCYTE ESTERASE UR QL STRIP.AUTO: NORMAL
NITRITE UR QL STRIP.AUTO: NORMAL
PH UR STRIP.AUTO: 6.5 [PH] (ref 5–8)
PROT UR QL STRIP: NORMAL MG/DL
RBC UR QL AUTO: NORMAL
SP GR UR STRIP.AUTO: 1.01
UROBILINOGEN UR STRIP-MCNC: NORMAL MG/DL

## 2017-04-25 PROCEDURE — 87086 URINE CULTURE/COLONY COUNT: CPT

## 2017-04-25 PROCEDURE — 0518F FALL PLAN OF CARE DOCD: CPT | Mod: 8P | Performed by: FAMILY MEDICINE

## 2017-04-25 PROCEDURE — 1100F PTFALLS ASSESS-DOCD GE2>/YR: CPT | Performed by: FAMILY MEDICINE

## 2017-04-25 PROCEDURE — 81002 URINALYSIS NONAUTO W/O SCOPE: CPT | Performed by: FAMILY MEDICINE

## 2017-04-25 PROCEDURE — 3288F FALL RISK ASSESSMENT DOCD: CPT | Performed by: FAMILY MEDICINE

## 2017-04-25 PROCEDURE — G8598 ASA/ANTIPLAT THER USED: HCPCS | Performed by: FAMILY MEDICINE

## 2017-04-25 PROCEDURE — G8417 CALC BMI ABV UP PARAM F/U: HCPCS | Performed by: FAMILY MEDICINE

## 2017-04-25 PROCEDURE — 4040F PNEUMOC VAC/ADMIN/RCVD: CPT | Performed by: FAMILY MEDICINE

## 2017-04-25 PROCEDURE — 99214 OFFICE O/P EST MOD 30 MIN: CPT | Performed by: FAMILY MEDICINE

## 2017-04-25 PROCEDURE — G8432 DEP SCR NOT DOC, RNG: HCPCS | Performed by: FAMILY MEDICINE

## 2017-04-25 PROCEDURE — 1036F TOBACCO NON-USER: CPT | Performed by: FAMILY MEDICINE

## 2017-04-25 RX ORDER — FINASTERIDE 5 MG/1
5 TABLET, FILM COATED ORAL EVERY MORNING
Qty: 90 TAB | Refills: 3 | Status: SHIPPED | OUTPATIENT
Start: 2017-04-25 | End: 2017-05-02

## 2017-04-25 RX ORDER — ATORVASTATIN CALCIUM 20 MG/1
10 TABLET, FILM COATED ORAL
Qty: 30 TAB | Refills: 11
Start: 2017-04-25 | End: 2017-05-01

## 2017-04-25 NOTE — Clinical Note
[facility]  Zahra Yañez M.D.  [address]  [place] [address]  Fax: [phone]   Authorization for Release/Disclosure of   Protected Health Information   Name: JT WHEELER : 1927 SSN: [id number]   Address: [address] [facility] [place] [address] Phone:    [phone] (home)    I authorize the entity listed below to release/disclose the PHI below to:   [facility]/Zahra Yañez M.D. and Zahra Yañez M.D.   Provider or Entity Name:Dr. Damon     Address   City, State, [facility]   Phone:      Fax:     Reason for request: continuity of care   Information to be released:    [  ] LAST COLONOSCOPY,  including any PATH REPORT and follow-up  [  ] LAST FIT/COLOGUARD RESULT [  ] LAST DEXA  [  ] LAST MAMMOGRAM  [  ] LAST PAP  [  ] LAST LABS [ X ] RETINA EXAM REPORT  [  ] IMMUNIZATION RECORDS  [  ] Release all info        DATES OF SERVICE OR TIME PERIOD TO BE DISCLOSED: _____________  I understand and acknowledge that:  * This Authorization may be revoked at any time by you in writing, except if your health information has already been used or disclosed.  * Your health information that will be used or disclosed as a result of you signing this authorization could be re-disclosed by the recipient. If this occurs, your re-disclosed health information may no longer be protected by State or Federal laws.  * You may refuse to sign this Authorization. Your refusal will not affect your ability to obtain treatment.  * This Authorization becomes effective upon signing and will  on (date) __________.      If no date is indicated, this Authorization will  one (1) year from the signature date.    Name: Jt Wheeler Date:     2017       PLEASE FAX REQUESTED RECORDS BACK TO: [phone]

## 2017-04-25 NOTE — PROGRESS NOTES
Chief Complaint   Patient presents with   • Follow-Up     labs   • Urinary Frequency       HISTORY OF PRESENT ILLNESS: Patient is a 89 y.o. male established patient here today for the following concerns:    1. Urinary frequency  2. Hematuria  Here today with concern for possible UTI.  Most recent labs showed elevated microalbuminuria and elevated BUN.  He reports urinary frequency which has been the halmark for UTIs in the past.  He has had klebsiella, morgella and Pseudomonas infections.  Reports usually he is able to control them with hydrogen peroxide on the foreskin daily, but has not done this in a few months now.  No dizziness or light headedness.  He has been fluid restricting since he had some hyponatremia.      3. Type II diabetes mellitus, well controlled ([facility])  Continues on Metformin daily.  A1c 7.6.  Lipids are a bit over treated.  No myalgias.      4. Benign prostatic hyperplasia with lower urinary tract symptoms, unspecified morphology  Needs refill on proscar.  Not interested in surgical intervention.        Past Medical, Social, and Family history reviewed and updated in EPIC    Allergies:Review of patient's allergies indicates no known allergies.    Current Outpatient Prescriptions   Medication Sig Dispense Refill   • atorvastatin (LIPITOR) 20 MG Tab Take 1 Tab by mouth every bedtime. 30 Tab 11   • metformin (GLUCOPHAGE) 500 MG Tab Take 2 Tabs by mouth 2 times a day, with meals. 360 Tab 3   • finasteride (PROSCAR) 5 MG Tab Take 1 Tab by mouth every morning. 90 Tab 3   • Ibuprofen-Diphenhydramine Cit (ADVIL PM PO) Take  by mouth.     • aspirin EC (ECOTRIN) 81 MG Tablet Delayed Response Take 81 mg by mouth every day.     • Probiotic Product (PROBIOTIC PO) Take  by mouth.     • B Complex Vitamins (VITAMIN B COMPLEX PO) Take  by mouth.     • Multiple Vitamins-Minerals (CENTRUM SILVER PO) Take  by mouth.     • Cholecalciferol (VITAMIN D PO) Take  by mouth.     • Omega-3 Fatty Acids (FISH OIL PO) Take  by  "mouth.     • Multiple Vitamins-Minerals (OCUVITE) Tab Take 1 Tab by mouth every day.     • trazodone (DESYREL) 50 MG Tab Take 0.5-1 Tabs by mouth at bedtime as needed for Sleep. 30 Tab 3   • latanoprost (XALATAN) 0.005 % SOLN Place 1 Drop in both eyes every evening.       Current Facility-Administered Medications   Medication Dose Route Frequency Provider Last Rate Last Dose   • cyanocobalamin (VITAMIN B-12) injection 1,000 mcg  1,000 mcg Intramuscular Q30 DAYS Zahra Yañez M.D.   1,000 mcg at 01/24/17 1427         ROS:  Review of Systems   Constitutional: Negative for fever, chills, weight loss and malaise/fatigue.   HENT: Negative for ear pain, nosebleeds, congestion, sore throat and neck pain.    Eyes: Negative for blurred vision.   Respiratory: Negative for cough, sputum production, shortness of breath and wheezing.    Cardiovascular: Negative for chest pain, palpitations,  and leg swelling.   Gastrointestinal: Negative for heartburn, nausea, vomiting, diarrhea and abdominal pain.   Genitourinary: Negative for dysuria, urgency and +frequency.   Musculoskeletal: Negative for myalgias, back pain and joint pain.   Skin: Negative for rash and itching.   Neurological: Negative for dizziness, tingling, tremors, sensory change, focal weakness and headaches.   Endo/Heme/Allergies: Does not bruise/bleed easily.   Psychiatric/Behavioral: Negative for depression, anxiety, suicidal ideas, insomnia and memory loss.      Exam:  Blood pressure 132/80, pulse 66, temperature 36.6 °C (97.8 °F), resp. rate 18, height 1.702 m (5' 7.01\"), weight 73.483 kg (162 lb), SpO2 93 %.    General:  Well nourished, well developed in NAD  Head is grossly normal.  Neck: Supple without JVD   Pulmonary:  Normal effort.   Cardiovascular: Regular rate  Extremities: no clubbing, cyanosis, or edema.  Psych: affect appropriate      Please note that this dictation was created using voice recognition software. I have made every reasonable attempt to " correct obvious errors, but I expect that there are errors of grammar and possibly content that I did not discover before finalizing the note.    Assessment/Plan:  1. Urinary frequency    - POCT Urinalysis  - URINE CULTURE(NEW); Future    2. Hematuria  Follow urine culture, tailor antibiotics as needed.     3. Type II diabetes mellitus, well controlled (HCC)  Continue metformin   - HEMOGLOBIN A1C; Future  - LIPID PROFILE; Future  - COMP METABOLIC PANEL; Future  - MICROALBUMIN CREAT RATIO URINE; Future    4. Benign prostatic hyperplasia with lower urinary tract symptoms, unspecified morphology  Continue Proscar.      3 month follow up, sooner prn.

## 2017-04-25 NOTE — MR AVS SNAPSHOT
"        Bulmaro Wheeler   2017 2:00 PM   Office Visit   MRN: [id number]    Department:  [facility]   Dept Phone:  [phone]    Description:  Male : 1927   Provider:  Zahra Yañez M.D.           Reason for Visit     Follow-Up labs    Urinary Frequency           Allergies as of 2017     No Known Allergies      You were diagnosed with     Urinary frequency   [788.41.ICD-9-CM]       Hematuria   [8045223]       Type II diabetes mellitus, well controlled ([facility])   [354972]       Benign prostatic hyperplasia with lower urinary tract symptoms, unspecified morphology   [9304756]         Vital Signs     Blood Pressure Pulse Temperature Respirations Height Weight    132/80 mmHg 66 36.6 °C (97.8 °F) 18 1.702 m (5' 7.01\") 73.483 kg (162 lb)    Body Mass Index Oxygen Saturation Smoking Status             25.37 kg/m2 93% Never Smoker          Basic Information     Date Of Birth Sex Race Ethnicity Preferred Language    1927 Male White Non- English      Problem List              ICD-10-CM Priority Class Noted - Resolved    Chronic back pain M54.9, G89.29   2012 - Present    B12 deficiency E53.8   2012 - Present    Thrombocytopenia (HCC) D69.6   3/15/2013 - Present    Renal cyst N28.1   2013 - Present    Carotid artery stenosis I65.29   2014 - Present    Type II diabetes mellitus, well controlled (HCC) E11.9   2015 - Present    Hernia of abdominal wall K43.9   2015 - Present    BPH (benign prostatic hyperplasia) N40.0 Low  3/24/2016 - Present    Spinal stenosis of lumbosacral region M48.07   2016 - Present    Vitamin D deficiency E55.9   2016 - Present    Paroxysmal atrial fibrillation (CMS-HCC) I48.0   2016 - Present      Health Maintenance        Date Due Completion Dates    RETINAL SCREENING 3/5/2017 3/5/2016 (Done), 2014 (N/S), 2013 (N/S), 2012 (Done)    Override on 3/5/2016: Done (waiting on records)    Override on " 6/26/2014: (N/S)    Override on 5/21/2013: (N/S)    Override on 2/22/2012: Done (HD Retina)    IMM ZOSTER VACCINE 5/25/2018 (Originally 4/16/1987) ---    A1C SCREENING 10/7/2017 4/7/2017, 7/21/2016, 4/20/2016, 9/11/2015, 4/14/2015, 12/29/2014, 6/23/2014, 2/18/2014, 1/2/2014, 9/4/2013, 5/31/2013, 3/1/2013, 12/20/2012, 4/4/2012, 10/4/2011, 3/30/2011, 10/7/2010, 3/29/2010, 10/9/2009, 4/22/2009, 6/23/2006    DIABETES MONOFILAMENT / LE EXAM 4/4/2018 4/4/2017, 7/15/2015, 6/26/2014 (N/S), 6/6/2013 (N/S), 10/4/2011 (N/S)    Override on 6/26/2014: (N/S)    Override on 6/6/2013: (N/S)    Override on 10/4/2011: (N/S)    FASTING LIPID PROFILE 4/7/2018 4/7/2017, 7/21/2016, 9/11/2015, 12/29/2014, 6/23/2014, 2/18/2014, 1/2/2014, 10/7/2010, 3/29/2010, 4/22/2009    URINE ACR / MICROALBUMIN 4/7/2018 4/7/2017, 7/21/2016, 9/11/2015, 12/29/2014, 1/2/2014, 5/31/2013, 4/4/2012, 10/4/2011, 3/30/2011, 10/7/2010    SERUM CREATININE 4/7/2018 4/7/2017, 11/27/2016, 11/27/2016, 11/26/2016, 9/7/2016, 8/25/2016, 8/22/2016, 8/21/2016, 8/20/2016, 8/19/2016, 7/21/2016, 4/30/2016, 3/27/2016, 3/25/2016, 3/24/2016, 3/23/2016, 1/20/2016, 1/13/2016, 1/11/2016, 1/10/2016, 9/11/2015, 1/7/2015, 1/5/2015, 1/3/2015, 1/2/2015, 12/29/2014, 9/25/2014, 6/23/2014, 3/12/2014, 3/11/2014, 2/18/2014, 2/17/2014, 11/1/2013, 10/31/2013, 9/30/2013, 9/29/2013, 9/28/2013, 9/27/2013, 9/27/2013, 9/4/2013, 3/29/2013, 3/16/2013, 3/14/2013, 9/2/2012, 8/31/2012, 4/4/2012, 11/1/2011, 10/31/2011, 10/30/2011, 10/29/2011, 10/4/2011, 3/30/2011, 2/9/2011, 12/29/2010, 6/6/2010, 10/9/2009, 2/23/2009, 6/24/2006, 6/23/2006, 6/22/2006    IMM DTaP/Tdap/Td Vaccine (2 - Td) 4/8/2023 4/8/2013            Results     POCT Urinalysis      Component Value Standard Range & Units    POC Color wong Negative    POC Appearance clear Negative    POC Leukocyte Esterase neg Negative    POC Nitrites neg Negative    POC Urobiligen neg Negative (0.2) mg/dL    POC Protein trace Negative mg/dL    POC Urine PH  6.5 5.0 - 8.0    POC Blood trace Negative    POC Specific Gravity 1.010 <1.005 - >1.030    POC Ketones neg Negative mg/dL    POC Biliruben neg Negative mg/dL    POC Glucose neg Negative mg/dL                        Current Immunizations     13-VALENT PCV PREVNAR 12/21/2015    Influenza TIV (IM) 10/9/2013  5:15 PM, 9/28/2013  4:27 AM, 11/1/2011 12:20 PM    Influenza Vaccine Adult HD 10/21/2016, 11/24/2015, 10/2/2014    Pneumococcal polysaccharide vaccine (PPSV-23) 11/1/2011 12:19 PM    Tdap Vaccine 4/8/2013  3:40 PM      Below and/or attached are the medications your provider expects you to take. Review all of your home medications and newly ordered medications with your provider and/or pharmacist. Follow medication instructions as directed by your provider and/or pharmacist. Please keep your medication list with you and share with your provider. Update the information when medications are discontinued, doses are changed, or new medications (including over-the-counter products) are added; and carry medication information at all times in the event of emergency situations     Allergies:  No Known Allergies          Medications  Valid as of: April 25, 2017 -  2:29 PM    Generic Name Brand Name Tablet Size Instructions for use    Aspirin (Tablet Delayed Response) ECOTRIN 81 MG Take 81 mg by mouth every day.        Atorvastatin Calcium (Tab) LIPITOR 20 MG Take 1 Tab by mouth every bedtime.        B Complex Vitamins   Take  by mouth.        Cholecalciferol   Take  by mouth.        Finasteride (Tab) PROSCAR 5 MG Take 1 Tab by mouth every morning.        Ibuprofen-Diphenhydramine Cit   Take  by mouth.        Latanoprost (Solution) XALATAN 0.005 % Place 1 Drop in both eyes every evening.        MetFORMIN HCl (Tab) GLUCOPHAGE 500 MG Take 2 Tabs by mouth 2 times a day, with meals.        Multiple Vitamins-Minerals   Take  by mouth.        Multiple Vitamins-Minerals (Tab) OCUVITE  Take 1 Tab by mouth every day.        Omega-3  Fatty Acids   Take  by mouth.        Probiotic Product   Take  by mouth.        TraZODone HCl (Tab) DESYREL 50 MG Take 0.5-1 Tabs by mouth at bedtime as needed for Sleep.        .                 Medicines prescribed today were sent to:     [id number] - [place], [place] - [address]    [address] [place] [address]    Phone: [phone] Fax: [phone]    Open 24 Hours?: No      Medication refill instructions:       If your prescription bottle indicates you have medication refills left, it is not necessary to call your provider’s office. Please contact your pharmacy and they will refill your medication.    If your prescription bottle indicates you do not have any refills left, you may request refills at any time through one of the following ways: The online [employer] system (except Urgent Care), by calling your provider’s office, or by asking your pharmacy to contact your provider’s office with a refill request. Medication refills are processed only during regular business hours and may not be available until the next business day. Your provider may request additional information or to have a follow-up visit with you prior to refilling your medication.   *Please Note: Medication refills are assigned a new Rx number when refilled electronically. Your pharmacy may indicate that no refills were authorized even though a new prescription for the same medication is available at the pharmacy. Please request the medicine by name with the pharmacy before contacting your provider for a refill.        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

## 2017-04-27 ENCOUNTER — TELEPHONE (OUTPATIENT)
Dept: MEDICAL GROUP | Facility: PHYSICIAN GROUP | Age: 82
End: 2017-04-27

## 2017-04-27 DIAGNOSIS — R10.9 FLANK PAIN: ICD-10-CM

## 2017-04-27 LAB
BACTERIA UR CULT: NORMAL
SIGNIFICANT IND 70042: NORMAL
SOURCE SOURCE: NORMAL

## 2017-04-28 RX ORDER — NITROFURANTOIN 25; 75 MG/1; MG/1
100 CAPSULE ORAL 2 TIMES DAILY
Qty: 20 CAP | Refills: 0 | Status: SHIPPED | OUTPATIENT
Start: 2017-04-28 | End: 2017-05-02

## 2017-04-28 NOTE — TELEPHONE ENCOUNTER
Ok orders for a CBC and UA are in.  After he submits these he may start empiric antibiotics for the weekend.

## 2017-04-28 NOTE — TELEPHONE ENCOUNTER
----- Message from Zahra Yañez M.D. sent at 4/27/2017  5:38 PM PDT -----  Urine culture is negative for growth of any bacteria.  How is he feeling?

## 2017-04-28 NOTE — TELEPHONE ENCOUNTER
Pt states his back still hurts and believes he has a kidney infection.  He is asking for more blood work

## 2017-04-29 ENCOUNTER — HOSPITAL ENCOUNTER (OUTPATIENT)
Dept: LAB | Facility: MEDICAL CENTER | Age: 82
End: 2017-04-29
Attending: FAMILY MEDICINE
Payer: MEDICARE

## 2017-04-29 DIAGNOSIS — E11.9 TYPE II DIABETES MELLITUS, WELL CONTROLLED (HCC): ICD-10-CM

## 2017-04-29 DIAGNOSIS — R10.9 FLANK PAIN: ICD-10-CM

## 2017-04-29 LAB
AMORPH CRY #/AREA URNS HPF: PRESENT /HPF
APPEARANCE UR: CLEAR
BILIRUB UR QL STRIP.AUTO: NEGATIVE
COLOR UR: YELLOW
CREAT UR-MCNC: 75.6 MG/DL
CULTURE IF INDICATED INDCX: NO UA CULTURE
EPI CELLS #/AREA URNS HPF: NORMAL /HPF
GFR SERPL CREATININE-BSD FRML MDRD: >60 ML/MIN/1.73 M 2
GLUCOSE UR STRIP.AUTO-MCNC: NEGATIVE MG/DL
HYALINE CASTS #/AREA URNS LPF: NORMAL /LPF
KETONES UR STRIP.AUTO-MCNC: ABNORMAL MG/DL
LEUKOCYTE ESTERASE UR QL STRIP.AUTO: NEGATIVE
MICRO URNS: ABNORMAL
MICROALBUMIN UR-MCNC: 30.3 MG/DL
MICROALBUMIN/CREAT UR: 401 MG/G (ref 0–30)
NITRITE UR QL STRIP.AUTO: NEGATIVE
PH UR STRIP.AUTO: 7 [PH]
PROT UR QL STRIP: 50 MG/DL
RBC # URNS HPF: NORMAL /HPF
RBC UR QL AUTO: NEGATIVE
SP GR UR STRIP.AUTO: 1.01

## 2017-04-29 PROCEDURE — 99284 EMERGENCY DEPT VISIT MOD MDM: CPT

## 2017-04-29 PROCEDURE — 96361 HYDRATE IV INFUSION ADD-ON: CPT

## 2017-04-29 PROCEDURE — 96365 THER/PROPH/DIAG IV INF INIT: CPT

## 2017-04-30 ENCOUNTER — HOSPITAL ENCOUNTER (EMERGENCY)
Facility: MEDICAL CENTER | Age: 82
End: 2017-04-30
Attending: EMERGENCY MEDICINE
Payer: MEDICARE

## 2017-04-30 VITALS
WEIGHT: 161.16 LBS | SYSTOLIC BLOOD PRESSURE: 179 MMHG | OXYGEN SATURATION: 96 % | TEMPERATURE: 98 F | BODY MASS INDEX: 23.87 KG/M2 | RESPIRATION RATE: 17 BRPM | DIASTOLIC BLOOD PRESSURE: 73 MMHG | HEIGHT: 69 IN | HEART RATE: 76 BPM

## 2017-04-30 DIAGNOSIS — R33.9 URINARY RETENTION: ICD-10-CM

## 2017-04-30 LAB
ALBUMIN SERPL BCP-MCNC: 4.2 G/DL (ref 3.2–4.9)
ALBUMIN SERPL BCP-MCNC: 4.2 G/DL (ref 3.2–4.9)
ALBUMIN/GLOB SERPL: 1.6 G/DL
ALBUMIN/GLOB SERPL: 1.6 G/DL
ALP SERPL-CCNC: 74 U/L (ref 30–99)
ALP SERPL-CCNC: 84 U/L (ref 30–99)
ALT SERPL-CCNC: 19 U/L (ref 2–50)
ALT SERPL-CCNC: 22 U/L (ref 2–50)
ANION GAP SERPL CALC-SCNC: 10 MMOL/L (ref 0–11.9)
ANION GAP SERPL CALC-SCNC: 8 MMOL/L (ref 0–11.9)
APPEARANCE UR: CLEAR
AST SERPL-CCNC: 25 U/L (ref 12–45)
AST SERPL-CCNC: 27 U/L (ref 12–45)
BASOPHILS # BLD AUTO: 0.3 % (ref 0–1.8)
BASOPHILS # BLD AUTO: 0.7 % (ref 0–1.8)
BASOPHILS # BLD: 0.02 K/UL (ref 0–0.12)
BASOPHILS # BLD: 0.04 K/UL (ref 0–0.12)
BILIRUB SERPL-MCNC: 0.7 MG/DL (ref 0.1–1.5)
BILIRUB SERPL-MCNC: 0.7 MG/DL (ref 0.1–1.5)
BILIRUB UR QL STRIP.AUTO: NEGATIVE
BUN SERPL-MCNC: 20 MG/DL (ref 8–22)
BUN SERPL-MCNC: 27 MG/DL (ref 8–22)
CALCIUM SERPL-MCNC: 9.2 MG/DL (ref 8.5–10.5)
CALCIUM SERPL-MCNC: 9.4 MG/DL (ref 8.5–10.5)
CHLORIDE SERPL-SCNC: 101 MMOL/L (ref 96–112)
CHLORIDE SERPL-SCNC: 102 MMOL/L (ref 96–112)
CHOLEST SERPL-MCNC: 81 MG/DL (ref 100–199)
CO2 SERPL-SCNC: 24 MMOL/L (ref 20–33)
CO2 SERPL-SCNC: 27 MMOL/L (ref 20–33)
COLOR UR: YELLOW
CREAT SERPL-MCNC: 0.87 MG/DL (ref 0.5–1.4)
CREAT SERPL-MCNC: 0.88 MG/DL (ref 0.5–1.4)
CULTURE IF INDICATED INDCX: NO UA CULTURE
EOSINOPHIL # BLD AUTO: 0.04 K/UL (ref 0–0.51)
EOSINOPHIL # BLD AUTO: 0.06 K/UL (ref 0–0.51)
EOSINOPHIL NFR BLD: 0.5 % (ref 0–6.9)
EOSINOPHIL NFR BLD: 1 % (ref 0–6.9)
EPI CELLS #/AREA URNS HPF: ABNORMAL /HPF
ERYTHROCYTE [DISTWIDTH] IN BLOOD BY AUTOMATED COUNT: 43.1 FL (ref 35.9–50)
ERYTHROCYTE [DISTWIDTH] IN BLOOD BY AUTOMATED COUNT: 43.4 FL (ref 35.9–50)
EST. AVERAGE GLUCOSE BLD GHB EST-MCNC: 160 MG/DL
GFR SERPL CREATININE-BSD FRML MDRD: >60 ML/MIN/1.73 M 2
GLOBULIN SER CALC-MCNC: 2.7 G/DL (ref 1.9–3.5)
GLOBULIN SER CALC-MCNC: 2.7 G/DL (ref 1.9–3.5)
GLUCOSE SERPL-MCNC: 136 MG/DL (ref 65–99)
GLUCOSE SERPL-MCNC: 189 MG/DL (ref 65–99)
GLUCOSE UR STRIP.AUTO-MCNC: NEGATIVE MG/DL
HBA1C MFR BLD: 7.2 % (ref 0–5.6)
HCT VFR BLD AUTO: 36.9 % (ref 42–52)
HCT VFR BLD AUTO: 40.6 % (ref 42–52)
HDLC SERPL-MCNC: 39 MG/DL
HGB BLD-MCNC: 12.5 G/DL (ref 14–18)
HGB BLD-MCNC: 13.9 G/DL (ref 14–18)
IMM GRANULOCYTES # BLD AUTO: 0.02 K/UL (ref 0–0.11)
IMM GRANULOCYTES # BLD AUTO: 0.03 K/UL (ref 0–0.11)
IMM GRANULOCYTES NFR BLD AUTO: 0.3 % (ref 0–0.9)
IMM GRANULOCYTES NFR BLD AUTO: 0.4 % (ref 0–0.9)
KETONES UR STRIP.AUTO-MCNC: NEGATIVE MG/DL
LACTATE BLD-SCNC: 1.5 MMOL/L (ref 0.5–2)
LDLC SERPL CALC-MCNC: 26 MG/DL
LEUKOCYTE ESTERASE UR QL STRIP.AUTO: NEGATIVE
LYMPHOCYTES # BLD AUTO: 1.2 K/UL (ref 1–4.8)
LYMPHOCYTES # BLD AUTO: 1.3 K/UL (ref 1–4.8)
LYMPHOCYTES NFR BLD: 17 % (ref 22–41)
LYMPHOCYTES NFR BLD: 19.5 % (ref 22–41)
MCH RBC QN AUTO: 30.6 PG (ref 27–33)
MCH RBC QN AUTO: 31.2 PG (ref 27–33)
MCHC RBC AUTO-ENTMCNC: 33.9 G/DL (ref 33.7–35.3)
MCHC RBC AUTO-ENTMCNC: 34.2 G/DL (ref 33.7–35.3)
MCV RBC AUTO: 90.2 FL (ref 81.4–97.8)
MCV RBC AUTO: 91.2 FL (ref 81.4–97.8)
MICRO URNS: ABNORMAL
MONOCYTES # BLD AUTO: 0.64 K/UL (ref 0–0.85)
MONOCYTES # BLD AUTO: 0.77 K/UL (ref 0–0.85)
MONOCYTES NFR BLD AUTO: 10.1 % (ref 0–13.4)
MONOCYTES NFR BLD AUTO: 10.4 % (ref 0–13.4)
NEUTROPHILS # BLD AUTO: 4.19 K/UL (ref 1.82–7.42)
NEUTROPHILS # BLD AUTO: 5.48 K/UL (ref 1.82–7.42)
NEUTROPHILS NFR BLD: 68.1 % (ref 44–72)
NEUTROPHILS NFR BLD: 71.7 % (ref 44–72)
NITRITE UR QL STRIP.AUTO: NEGATIVE
NRBC # BLD AUTO: 0 K/UL
NRBC # BLD AUTO: 0 K/UL
NRBC BLD AUTO-RTO: 0 /100 WBC
NRBC BLD AUTO-RTO: 0 /100 WBC
PH UR STRIP.AUTO: 6.5 [PH]
PLATELET # BLD AUTO: 147 K/UL (ref 164–446)
PLATELET # BLD AUTO: 149 K/UL (ref 164–446)
PMV BLD AUTO: 10.1 FL (ref 9–12.9)
PMV BLD AUTO: 10.9 FL (ref 9–12.9)
POTASSIUM SERPL-SCNC: 4.1 MMOL/L (ref 3.6–5.5)
POTASSIUM SERPL-SCNC: 4.5 MMOL/L (ref 3.6–5.5)
PROT SERPL-MCNC: 6.9 G/DL (ref 6–8.2)
PROT SERPL-MCNC: 6.9 G/DL (ref 6–8.2)
PROT UR QL STRIP: 50 MG/DL
RBC # BLD AUTO: 4.09 M/UL (ref 4.7–6.1)
RBC # BLD AUTO: 4.45 M/UL (ref 4.7–6.1)
RBC # URNS HPF: ABNORMAL /HPF
RBC UR QL AUTO: NEGATIVE
SODIUM SERPL-SCNC: 136 MMOL/L (ref 135–145)
SODIUM SERPL-SCNC: 136 MMOL/L (ref 135–145)
SP GR UR STRIP.AUTO: 1.01
TRIGL SERPL-MCNC: 82 MG/DL (ref 0–149)
WBC # BLD AUTO: 6.2 K/UL (ref 4.8–10.8)
WBC # BLD AUTO: 7.6 K/UL (ref 4.8–10.8)
WBC #/AREA URNS HPF: ABNORMAL /HPF

## 2017-04-30 PROCEDURE — 83605 ASSAY OF LACTIC ACID: CPT

## 2017-04-30 PROCEDURE — 87040 BLOOD CULTURE FOR BACTERIA: CPT | Mod: 91

## 2017-04-30 PROCEDURE — 700105 HCHG RX REV CODE 258: Performed by: EMERGENCY MEDICINE

## 2017-04-30 PROCEDURE — 80053 COMPREHEN METABOLIC PANEL: CPT

## 2017-04-30 PROCEDURE — 85025 COMPLETE CBC W/AUTO DIFF WBC: CPT

## 2017-04-30 PROCEDURE — 81001 URINALYSIS AUTO W/SCOPE: CPT

## 2017-04-30 PROCEDURE — 700111 HCHG RX REV CODE 636 W/ 250 OVERRIDE (IP): Performed by: EMERGENCY MEDICINE

## 2017-04-30 RX ORDER — CIPROFLOXACIN 2 MG/ML
400 INJECTION, SOLUTION INTRAVENOUS ONCE
Status: COMPLETED | OUTPATIENT
Start: 2017-04-30 | End: 2017-04-30

## 2017-04-30 RX ORDER — SODIUM CHLORIDE 9 MG/ML
INJECTION, SOLUTION INTRAVENOUS CONTINUOUS
Status: DISCONTINUED | OUTPATIENT
Start: 2017-04-30 | End: 2017-04-30 | Stop reason: HOSPADM

## 2017-04-30 RX ADMIN — SODIUM CHLORIDE: 9 INJECTION, SOLUTION INTRAVENOUS at 01:12

## 2017-04-30 RX ADMIN — CIPROFLOXACIN 400 MG: 2 INJECTION, SOLUTION INTRAVENOUS at 02:13

## 2017-04-30 ASSESSMENT — LIFESTYLE VARIABLES: DO YOU DRINK ALCOHOL: NO

## 2017-04-30 NOTE — ED NOTES
Pt stood at edge of bed with urinal, steady gait. Urinal sample sent to lab. Warm blankets provided.

## 2017-04-30 NOTE — ED AVS SNAPSHOT
Home Care Instructions                                                                                                                Bulmaro Wheeler   MRN: [id number]    Department:  [facility], Emergency Dept   Date of Visit:  4/29/2017            [facility], Emergency Dept    [address] [facility] [address]    Phone:  [phone]      You were seen by     Wale Duke M.D.      Your Diagnosis Was     Urinary retention     R33.9       These are the medications you received during your hospitalization from 04/29/2017 2124 to 04/30/2017 0404     Date/Time Order Dose Route Action    04/30/2017 0112 NS infusion   Intravenous New Bag    04/30/2017 0213 ciprofloxacin (CIPRO) ivpb 400 mg 400 mg Intravenous New Bag      Follow-up Information     1. Follow up with [facility], Emergency Dept.    Specialty:  Emergency Medicine    Why:  for fever, increased back pain, or vomiting    Contact information    [address] [address] [phone]        2. Follow up with Caleb Rodríguez M.D. In 3 days.    Specialty:  Urology    Contact information    [address]  [address]  [place] [facility] [address] [phone]        Medication Information     Review all of your home medications and newly ordered medications with your primary doctor and/or pharmacist as soon as possible. Follow medication instructions as directed by your doctor and/or pharmacist.     Please keep your complete medication list with you and share with your physician. Update the information when medications are discontinued, doses are changed, or new medications (including over-the-counter products) are added; and carry medication information at all times in the event of emergency situations.               Medication List      ASK your doctor about these medications        Instructions    Morning Afternoon Evening Bedtime    ADVIL PM PO        Take  by mouth.                        aspirin EC 81 MG Tbec   Commonly known as:  ECOTRIN        Take 81 mg by mouth every day.   Dose:  81 mg                        atorvastatin 20 MG Tabs   Commonly known as:  LIPITOR        Take 1 Tab by mouth every bedtime.   Dose:  10 mg                        * CENTRUM SILVER PO        Take  by mouth.                        * OCUVITE Tabs        Take 1 Tab by mouth every day.   Dose:  1 Tab                        finasteride 5 MG Tabs   Commonly known as:  PROSCAR        Take 1 Tab by mouth every morning.   Dose:  5 mg                        FISH OIL PO        Take  by mouth.                        latanoprost 0.005 % Soln   Commonly known as:  XALATAN        Place 1 Drop in both eyes every evening.   Dose:  1 Drop                        metformin 500 MG Tabs   Commonly known as:  GLUCOPHAGE        Doctor's comments:  Authorization of a refill request.   Take 2 Tabs by mouth 2 times a day, with meals.   Dose:  1000 mg                        nitrofurantoin monohydr macro 100 MG Caps   Commonly known as:  MACROBID        Take 1 Cap by mouth 2 times a day for 10 days.   Dose:  100 mg                        PROBIOTIC PO        Take  by mouth.                        trazodone 50 MG Tabs   Commonly known as:  DESYREL        Take 0.5-1 Tabs by mouth at bedtime as needed for Sleep.   Dose:  25-50 mg                        VITAMIN B COMPLEX PO        Take  by mouth.                        VITAMIN D PO        Take  by mouth.                        * Notice:  This list has 2 medication(s) that are the same as other medications prescribed for you. Read the directions carefully, and ask your doctor or other care provider to review them with you.            Procedures and tests performed during your visit     Procedure/Test Number of Times Performed    BLOOD CULTURE 2    CARDIAC MONITORING 1    CBC WITH DIFFERENTIAL 1    COMP METABOLIC PANEL 1    ESTIMATED GFR 1    IV Saline Lock 1    LACTIC ACID 1    O2 Protocol 1    SALINE  LOCK 1    URINALYSIS CULTURE, IF INDICATED 1    URINE MICROSCOPIC (W/UA) 1        Discharge Instructions       Acute Urinary Retention, Male  Acute urinary retention is the temporary inability to urinate.  This is a common problem in older men. As men age their prostates become larger and block the flow of urine from the bladder. This is usually a problem that has come on gradually.   HOME CARE INSTRUCTIONS  If you are sent home with a Aj catheter and a drainage system, you will need to discuss the best course of action with your health care provider. While the catheter is in, maintain a good intake of fluids. Keep the drainage bag emptied and lower than your catheter. This is so that contaminated urine will not flow back into your bladder, which could lead to a urinary tract infection.  There are two main types of drainage bags. One is a large bag that usually is used at night. It has a good capacity that will allow you to sleep through the night without having to empty it. The second type is called a leg bag. It has a smaller capacity, so it needs to be emptied more frequently. However, the main advantage is that it can be attached by a leg strap and can go underneath your clothing, allowing you the freedom to move about or leave your home.  Only take over-the-counter or prescription medicines for pain, discomfort, or fever as directed by your health care provider.   SEEK MEDICAL CARE IF:  · You develop a low-grade fever.  · You experience spasms or leakage of urine with the spasms.  SEEK IMMEDIATE MEDICAL CARE IF:   · You develop chills or fever.  · Your catheter stops draining urine.  · Your catheter falls out.  · You start to develop increased bleeding that does not respond to rest and increased fluid intake.  MAKE SURE YOU:  · Understand these instructions.  · Will watch your condition.  · Will get help right away if you are not doing well or get worse.     This information is not intended to replace advice  given to you by your health care provider. Make sure you discuss any questions you have with your health care provider.     Document Released: 03/26/2002 Document Revised: 05/03/2016 Document Reviewed: 05/29/2014  Elsevier Interactive Patient Education ©2016 [employer] Inc[employer]            Patient Information     Patient Information    Following emergency treatment: all patient requiring follow-up care must return either to a private physician or a clinic if your condition worsens before you are able to obtain further medical attention, please return to the emergency room.     Billing Information    At [facility], we work to make the billing process streamlined for our patients.  Our Representatives are here to answer any questions you may have regarding your hospital bill.  If you have insurance coverage and have supplied your insurance information to us, we will submit a claim to your insurer on your behalf.  Should you have any questions regarding your bill, we can be reached online or by phone as follows:  Online: You are able pay your bills online or live chat with our representatives about any billing questions you may have. We are here to help Monday - Friday from 8:00am to 7:30pm and 9:00am - 12:00pm on Saturdays.  Please visit [email or web address]  for more information.   Phone:  [phone] or [phone]    Please note that your emergency physician, surgeon, pathologist, radiologist, anesthesiologist, and other specialists are not employed by [facility] and will therefore bill separately for their services.  Please contact them directly for any questions concerning their bills at the numbers below:     Emergency Physician Services:  [phone]  [facility]:  [phone]  [facility]:  [phone]  [facility]:  [phone]    1. Your final bill may vary from the amount quoted upon discharge if all procedures are not  complete at that time, or if your doctor has additional procedures of which we are not aware. You will receive an additional bill if you return to the Emergency Department at [facility] for suture removal regardless of the facility of which the sutures were placed.     2. Please arrange for settlement of this account at the emergency registration.    3. All self-pay accounts are due in full at the time of treatment.  If you are unable to meet this obligation then payment is expected within 4-5 days.     4. If you have had radiology studies (CT, X-ray, Ultrasound, MRI), you have received a preliminary result during your emergency department visit. Please contact the radiology department [phone] to receive a copy of your final result. Please discuss the Final result with your primary physician or with the follow up physician provided.     Crisis Hotline:  [place] Crisis Hotline:  2-089-MHQSEWA or 1-713.134.5359  [place] Crisis Hotline:    1-971.764.9665 or 761-897-1470         ED Discharge Follow Up Questions    1. In order to provide you with very good care, we would like to follow up with a phone call in the next few days.  May we have your permission to contact you?     YES /  NO    2. What is the best phone number to call you? (       )_____-__________    3. What is the best time to call you?      Morning  /  Afternoon  /  Evening                   Patient Signature:  ____________________________________________________________    Date:  ____________________________________________________________      Your appointments     Jul 20, 2017  2:00 PM   Established Patient with Zahra Yañez M.D.   [address] [id number] [phone]           You will be receiving a confirmation call a few days before your appointment from our automated call confirmation system.

## 2017-04-30 NOTE — ED PROVIDER NOTES
"ED Provider Note    CHIEF COMPLAINT  Chief Complaint   Patient presents with   • Flank Pain     bilateral; hx of \"kidney infections\" x3 w/ urosepsis    • Polyuria     \"every fifteen minutes\"        HPI  Bulmaro Wheeler is a 89 y.o. male who presents to the emergency department with bilateral flank discomfort. The patient states he started having urinary frequency and dysuria about 4-5 days ago. On the 20 eighth he was started on Macrobid by his primary care provider. Over the last 12-24 hours the patient is a bilateral flank pain with continued frequency and dysuria. He does not have any vomiting but has had some slight nausea. He is unaware of any fevers. The patient has a history of recurrent urinary tract infections as well as sepsis from pyelonephritis.    REVIEW OF SYSTEMS  See HPI for further details. All other systems are negative.     PAST MEDICAL HISTORY  Past Medical History   Diagnosis Date   • Diabetes    • Hypertension    • Arthritis    • Pneumonia    • Backpain    • Glaucoma      Interocular lenses placed in 1985   • Indigestion    • CATARACT      surgery in 1985   • Arrhythmia      in 80's, not recent   • Urinary tract infection, site not specified 9/27/2013   • Sepsis 9/27/2013   • UTI (lower urinary tract infection) 9/1/2012   • Heart burn    • Pyelonephritis October 2010   • Acute kidney failure, unspecified (CMS-HCC) 9/27/2013   • Renal cyst 9/19/2013   • Pyelonephritis    • Carotid artery stenosis 2/18/2014   • Occlusion and stenosis of carotid artery without mention of cerebral infarction 3/11/2014       SOCIAL HISTORY  Social History     Social History   • Marital Status:      Spouse Name: N/A   • Number of Children: N/A   • Years of Education: N/A     Social History Main Topics   • Smoking status: Never Smoker    • Smokeless tobacco: Never Used   • Alcohol Use: No      Comment: hasnt drank since 1979   • Drug Use: No   • Sexual Activity: No      Comment:  retired     Other " "Topics Concern   • Not on file     Social History Narrative           PHYSICAL EXAM  VITAL SIGNS: /73 mmHg  Pulse 84  Temp(Src) 36.7 °C (98 °F)  Resp 18  Ht 1.753 m (5' 9\")  Wt 73.1 kg (161 lb 2.5 oz)  BMI 23.79 kg/m2  SpO2 97%  Constitutional: Ill in appearance.   HENT: Normocephalic, Atraumatic, tympanic membranes are intact and nonerythematous bilaterally, Oropharynx dry without exudates or erythema, Nose normal.   Eyes: PERRLA, EOMI, Conjunctiva normal.  Neck: Supple without meningismus  Lymphatic: No lymphadenopathy noted.   Cardiovascular: Normal heart rate, Normal rhythm, No murmurs, No rubs, No gallops.   Thorax & Lungs: Normal breath sounds, No respiratory distress, No wheezing, No chest tenderness.   Abdomen: Bowel sounds normal, Soft, No tenderness, no rebound, no guarding, no distention, No masses, No pulsatile masses.   Skin: Warm, Dry, No erythema, No rash.   Back: No tenderness, No CVA tenderness.   Extremities: Atraumatic with symmetric distal pulses, No edema, No tenderness, No cyanosis, No clubbing.   Neurologic: Alert & oriented x 3, cranial nerves II through XII are intact, Normal motor function, Normal sensory function, No focal deficits noted.   Psychiatric: Affect normal, Judgment normal, Mood normal.     COURSE & MEDICAL DECISION MAKING  Pertinent Labs & Imaging studies reviewed. (See chart for details)  This a 89-year-old male who presents the emergency department with urinary frequency and flank pain. The patient is unable to decompress his bladder before for a catheter replaced. The patient will be discharged with a leg bag. He'll continue his antibiotics and follow-up with urology. The blood work does not show any evidence of a leukocytosis therefore do not suspect he has pyelonephritis. The patient's urine appears to be treated effectively with the Macrobid he'll continue this antibiotic.    FINAL IMPRESSION  1. Urinary retention       Disposition  The patient will be " discharged in stable condition    Electronically signed by: Wale Duke, 4/30/2017 12:40 AM

## 2017-04-30 NOTE — ED NOTES
Indwelling allen placed MD request. Complete linen and gown change provided. Antibiotics infusing. Pt updated on POC.

## 2017-04-30 NOTE — ED NOTES
Pt other son arrived to pick him up. Pt discharged home. Assessment complete. Pt ambulates self, pt wheeled out by wheelchair. VS stable. Pt verbalized discharge instructions. Pt to follow up with urology. Pt educated on allen care.

## 2017-04-30 NOTE — ED NOTES
"Chief Complaint   Patient presents with   • Flank Pain     bilateral; hx of \"kidney infections\" x3 w/ urosepsis    • Polyuria     \"every fifteen minutes\"      Pt states his symptoms are similar to when he has had kidney and bladder infections in the past, denies hematuria at this time. Pt states in the past he has had episodes of hyponatremia w/ his kidney and bladder infections. Pt also describes lower abdominal pain that is centrally located. Pt does not appear to be in distress. Pt placed back in lobby at this time, provided w/ sample cup, dysuria protocol ordered.     /73 mmHg  Pulse 84  Temp(Src) 36.7 °C (98 °F)  Resp 18  Ht 1.753 m (5' 9\")  Wt 73.1 kg (161 lb 2.5 oz)  BMI 23.79 kg/m2  SpO2 97%    "

## 2017-04-30 NOTE — ED AVS SNAPSHOT
Zulahoo Access Code: Activation code not generated  Current Zulahoo Status: Active    FitStar[employer]  A secure, online tool to manage your health information     [employer]’s Zulahoo® is a secure, online tool that connects you to your personalized health information from the privacy of your home -- day or night - making it very easy for you to manage your healthcare. Once the activation process is completed, you can even access your medical information using the Zulahoo miriam, which is available for free in the Apple Miriam store or Google Play store.     Zulahoo provides the following levels of access (as shown below):   My Chart Features   [facility] Primary Care Doctor [facility]  Specialists [facility]  Urgent  Care Non-[facility]  Primary Care  Doctor   Email your healthcare team securely and privately 24/7 X X X X   Manage appointments: schedule your next appointment; view details of past/upcoming appointments X      Request prescription refills. X      View recent personal medical records, including lab and immunizations X X X X   View health record, including health history, allergies, medications X X X X   Read reports about your outpatient visits, procedures, consult and ER notes X X X X   See your discharge summary, which is a recap of your hospital and/or ER visit that includes your diagnosis, lab results, and care plan. X X       How to register for Zulahoo:  1. Go to  [email or web address].  2. Click on the Sign Up Now box, which takes you to the New Member Sign Up page. You will need to provide the following information:  a. Enter your Zulahoo Access Code exactly as it appears at the top of this page. (You will not need to use this code after you’ve completed the sign-up process. If you do not sign up before the expiration date, you must request a new code.)   b. Enter your date of birth.   c. Enter your home email address.   d. Click Submit, and follow the next screen’s instructions.  3. Create a Zulahoo ID. This will  be your OfferIQ login ID and cannot be changed, so think of one that is secure and easy to remember.  4. Create a OfferIQ password. You can change your password at any time.  5. Enter your Password Reset Question and Answer. This can be used at a later time if you forget your password.   6. Enter your e-mail address. This allows you to receive e-mail notifications when new information is available in OfferIQ.  7. Click Sign Up. You can now view your health information.    For assistance activating your OfferIQ account, call [phone]

## 2017-04-30 NOTE — ED AVS SNAPSHOT
4/30/2017    Bulmaro Wheeler  [address] [place] [address]    Dear Bulmaro:    [facility] wants to ensure your discharge home is safe and you or your loved ones have had all of your questions answered regarding your care after you leave the hospital.    Below is a list of resources and contact information should you have any questions regarding your hospital stay, follow-up instructions, or active medical symptoms.    Questions or Concerns Regarding… Contact   Medical Questions Related to Your Discharge  (7 days a week, 8am-5pm) Contact a Nurse Care Coordinator   [phone]   Medical Questions Not Related to Your Discharge  (24 hours a day / 7 days a week)  Contact the Nurse Health Line   [phone]    Medications or Discharge Instructions Refer to your discharge packet   or contact your [facility] Primary Care Provider   [phone]   Follow-up Appointment(s) Schedule your appointment via Whitepages   or contact Scheduling [phone]   Billing Review your statement via Whitepages  or contact Billing [phone]   Medical Records Review your records via Whitepages   or contact Medical Records [phone]     You may receive a telephone call within two days of discharge. This call is to make certain you understand your discharge instructions and have the opportunity to have any questions answered. You can also easily access your medical information, test results and upcoming appointments via the Whitepages free online health management tool. You can learn more and sign up at Suso/Whitepages. For assistance setting up your Whitepages account, please call [phone].    Once again, we want to ensure your discharge home is safe and that you have a clear understanding of any next steps in your care. If you have any questions or concerns, please do not hesitate to contact us, we are here for you. Thank you for choosing [facility] for your healthcare needs.    Sincerely,    Your [facility] Healthcare Team

## 2017-04-30 NOTE — DISCHARGE INSTRUCTIONS
Acute Urinary Retention, Male  Acute urinary retention is the temporary inability to urinate.  This is a common problem in older men. As men age their prostates become larger and block the flow of urine from the bladder. This is usually a problem that has come on gradually.   HOME CARE INSTRUCTIONS  If you are sent home with a Aj catheter and a drainage system, you will need to discuss the best course of action with your health care provider. While the catheter is in, maintain a good intake of fluids. Keep the drainage bag emptied and lower than your catheter. This is so that contaminated urine will not flow back into your bladder, which could lead to a urinary tract infection.  There are two main types of drainage bags. One is a large bag that usually is used at night. It has a good capacity that will allow you to sleep through the night without having to empty it. The second type is called a leg bag. It has a smaller capacity, so it needs to be emptied more frequently. However, the main advantage is that it can be attached by a leg strap and can go underneath your clothing, allowing you the freedom to move about or leave your home.  Only take over-the-counter or prescription medicines for pain, discomfort, or fever as directed by your health care provider.   SEEK MEDICAL CARE IF:  · You develop a low-grade fever.  · You experience spasms or leakage of urine with the spasms.  SEEK IMMEDIATE MEDICAL CARE IF:   · You develop chills or fever.  · Your catheter stops draining urine.  · Your catheter falls out.  · You start to develop increased bleeding that does not respond to rest and increased fluid intake.  MAKE SURE YOU:  · Understand these instructions.  · Will watch your condition.  · Will get help right away if you are not doing well or get worse.     This information is not intended to replace advice given to you by your health care provider. Make sure you discuss any questions you have with your health care  provider.     Document Released: 03/26/2002 Document Revised: 05/03/2016 Document Reviewed: 05/29/2014  [employer] Interactive Patient Education ©2016 [employer]

## 2017-05-01 ENCOUNTER — TELEPHONE (OUTPATIENT)
Dept: MEDICAL GROUP | Facility: PHYSICIAN GROUP | Age: 82
End: 2017-05-01

## 2017-05-01 DIAGNOSIS — E78.2 MIXED HYPERLIPIDEMIA: ICD-10-CM

## 2017-05-01 RX ORDER — ATORVASTATIN CALCIUM 10 MG/1
10 TABLET, FILM COATED ORAL DAILY
Qty: 90 TAB | Refills: 3 | Status: SHIPPED | OUTPATIENT
Start: 2017-05-01 | End: 2017-05-02

## 2017-05-01 NOTE — TELEPHONE ENCOUNTER
1. Caller Name: Bulmaro Wheeler                                           Call Back Number: [phone] (home)         Patient approves a detailed voicemail message: N\A    Pt states he just got out of the hospital for kidney infection.  He states hospital did not give him anything and is asking of 400 mg of Macrobid sent to pharmacy.  He also states he cannot cut atorvastatin in half so would like 10 mg sent to pharmacy

## 2017-05-01 NOTE — TELEPHONE ENCOUNTER
Records show that he was treated for urinary retention.  Macrobid is dosed 100 mg twice daily, which is what was sent.  He should follow up with urology about the urinary retention and catheter.  I will send the 10 mg of atorvastatin.

## 2017-05-02 ENCOUNTER — APPOINTMENT (OUTPATIENT)
Dept: RADIOLOGY | Facility: MEDICAL CENTER | Age: 82
End: 2017-05-02
Attending: EMERGENCY MEDICINE
Payer: MEDICARE

## 2017-05-02 ENCOUNTER — RESOLUTE PROFESSIONAL BILLING HOSPITAL PROF FEE (OUTPATIENT)
Dept: HOSPITALIST | Facility: MEDICAL CENTER | Age: 82
End: 2017-05-02
Payer: COMMERCIAL

## 2017-05-02 ENCOUNTER — APPOINTMENT (OUTPATIENT)
Dept: RADIOLOGY | Facility: MEDICAL CENTER | Age: 82
End: 2017-05-02
Attending: INTERNAL MEDICINE
Payer: MEDICARE

## 2017-05-02 ENCOUNTER — HOSPITAL ENCOUNTER (OUTPATIENT)
Facility: MEDICAL CENTER | Age: 82
End: 2017-05-03
Attending: EMERGENCY MEDICINE | Admitting: INTERNAL MEDICINE
Payer: MEDICARE

## 2017-05-02 DIAGNOSIS — I95.1 ORTHOSTASIS: ICD-10-CM

## 2017-05-02 PROBLEM — R55 SYNCOPE: Status: ACTIVE | Noted: 2017-05-02

## 2017-05-02 LAB
ALBUMIN SERPL BCP-MCNC: 4.2 G/DL (ref 3.2–4.9)
ALBUMIN/GLOB SERPL: 1.6 G/DL
ALP SERPL-CCNC: 66 U/L (ref 30–99)
ALT SERPL-CCNC: 16 U/L (ref 2–50)
AMORPH CRY #/AREA URNS HPF: PRESENT /HPF
AMPHET UR QL SCN: NEGATIVE
ANION GAP SERPL CALC-SCNC: 9 MMOL/L (ref 0–11.9)
APPEARANCE UR: CLEAR
APTT PPP: 28.1 SEC (ref 24.7–36)
AST SERPL-CCNC: 23 U/L (ref 12–45)
BACTERIA #/AREA URNS HPF: ABNORMAL /HPF
BARBITURATES UR QL SCN: NEGATIVE
BASOPHILS # BLD AUTO: 0.1 % (ref 0–1.8)
BASOPHILS # BLD: 0.01 K/UL (ref 0–0.12)
BENZODIAZ UR QL SCN: NEGATIVE
BILIRUB SERPL-MCNC: 1 MG/DL (ref 0.1–1.5)
BILIRUB UR QL STRIP.AUTO: NEGATIVE
BUN SERPL-MCNC: 25 MG/DL (ref 8–22)
BZE UR QL SCN: NEGATIVE
C DIFF DNA SPEC QL NAA+PROBE: NEGATIVE
C DIFF TOX GENS STL QL NAA+PROBE: NEGATIVE
CALCIUM SERPL-MCNC: 9.4 MG/DL (ref 8.5–10.5)
CANNABINOIDS UR QL SCN: NEGATIVE
CHLORIDE SERPL-SCNC: 103 MMOL/L (ref 96–112)
CO2 SERPL-SCNC: 21 MMOL/L (ref 20–33)
COLOR UR: YELLOW
CREAT SERPL-MCNC: 1.06 MG/DL (ref 0.5–1.4)
CULTURE IF INDICATED INDCX: YES UA CULTURE
EKG IMPRESSION: NORMAL
EOSINOPHIL # BLD AUTO: 0.03 K/UL (ref 0–0.51)
EOSINOPHIL NFR BLD: 0.3 % (ref 0–6.9)
ERYTHROCYTE [DISTWIDTH] IN BLOOD BY AUTOMATED COUNT: 44.5 FL (ref 35.9–50)
EST. AVERAGE GLUCOSE BLD GHB EST-MCNC: 166 MG/DL
GFR SERPL CREATININE-BSD FRML MDRD: >60 ML/MIN/1.73 M 2
GLOBULIN SER CALC-MCNC: 2.7 G/DL (ref 1.9–3.5)
GLUCOSE BLD-MCNC: 149 MG/DL (ref 65–99)
GLUCOSE BLD-MCNC: 153 MG/DL (ref 65–99)
GLUCOSE SERPL-MCNC: 197 MG/DL (ref 65–99)
GLUCOSE UR STRIP.AUTO-MCNC: ABNORMAL MG/DL
HBA1C MFR BLD: 7.4 % (ref 0–5.6)
HCT VFR BLD AUTO: 40.6 % (ref 42–52)
HGB BLD-MCNC: 13.4 G/DL (ref 14–18)
HYALINE CASTS #/AREA URNS LPF: ABNORMAL /LPF
IMM GRANULOCYTES # BLD AUTO: 0.03 K/UL (ref 0–0.11)
IMM GRANULOCYTES NFR BLD AUTO: 0.3 % (ref 0–0.9)
INR PPP: 1.05 (ref 0.87–1.13)
KETONES UR STRIP.AUTO-MCNC: 20 MG/DL
LEUKOCYTE ESTERASE UR QL STRIP.AUTO: NEGATIVE
LIPASE SERPL-CCNC: 15 U/L (ref 11–82)
LYMPHOCYTES # BLD AUTO: 1 K/UL (ref 1–4.8)
LYMPHOCYTES NFR BLD: 11.6 % (ref 22–41)
MAGNESIUM SERPL-MCNC: 1.7 MG/DL (ref 1.5–2.5)
MCH RBC QN AUTO: 30.7 PG (ref 27–33)
MCHC RBC AUTO-ENTMCNC: 33 G/DL (ref 33.7–35.3)
MCV RBC AUTO: 92.9 FL (ref 81.4–97.8)
MDMA UR QL SCN: NEGATIVE
METHADONE UR QL SCN: NEGATIVE
MICRO URNS: ABNORMAL
MONOCYTES # BLD AUTO: 0.67 K/UL (ref 0–0.85)
MONOCYTES NFR BLD AUTO: 7.8 % (ref 0–13.4)
MUCOUS THREADS #/AREA URNS HPF: ABNORMAL /HPF
NEUTROPHILS # BLD AUTO: 6.89 K/UL (ref 1.82–7.42)
NEUTROPHILS NFR BLD: 79.9 % (ref 44–72)
NITRITE UR QL STRIP.AUTO: NEGATIVE
NRBC # BLD AUTO: 0 K/UL
NRBC BLD AUTO-RTO: 0 /100 WBC
OPIATES UR QL SCN: NEGATIVE
OXYCODONE UR QL SCN: NEGATIVE
PCP UR QL SCN: NEGATIVE
PH UR STRIP.AUTO: 7.5 [PH]
PHOSPHATE SERPL-MCNC: 3.3 MG/DL (ref 2.5–4.5)
PLATELET # BLD AUTO: 146 K/UL (ref 164–446)
PMV BLD AUTO: 10 FL (ref 9–12.9)
POTASSIUM SERPL-SCNC: 4 MMOL/L (ref 3.6–5.5)
PROPOXYPH UR QL SCN: NEGATIVE
PROT SERPL-MCNC: 6.9 G/DL (ref 6–8.2)
PROT UR QL STRIP: NEGATIVE MG/DL
PROTHROMBIN TIME: 14 SEC (ref 12–14.6)
RBC # BLD AUTO: 4.37 M/UL (ref 4.7–6.1)
RBC # URNS HPF: ABNORMAL /HPF
RBC UR QL AUTO: ABNORMAL
SODIUM SERPL-SCNC: 133 MMOL/L (ref 135–145)
SP GR UR STRIP.AUTO: 1.01
TROPONIN I SERPL-MCNC: 0.1 NG/ML (ref 0–0.04)
TROPONIN I SERPL-MCNC: 0.74 NG/ML (ref 0–0.04)
TROPONIN I SERPL-MCNC: 0.9 NG/ML (ref 0–0.04)
TSH SERPL DL<=0.005 MIU/L-ACNC: 1.64 UIU/ML (ref 0.3–3.7)
WBC # BLD AUTO: 8.6 K/UL (ref 4.8–10.8)
WBC #/AREA URNS HPF: ABNORMAL /HPF

## 2017-05-02 PROCEDURE — 93005 ELECTROCARDIOGRAM TRACING: CPT | Performed by: INTERNAL MEDICINE

## 2017-05-02 PROCEDURE — 85610 PROTHROMBIN TIME: CPT

## 2017-05-02 PROCEDURE — 84484 ASSAY OF TROPONIN QUANT: CPT

## 2017-05-02 PROCEDURE — 36415 COLL VENOUS BLD VENIPUNCTURE: CPT

## 2017-05-02 PROCEDURE — 84100 ASSAY OF PHOSPHORUS: CPT

## 2017-05-02 PROCEDURE — 87086 URINE CULTURE/COLONY COUNT: CPT

## 2017-05-02 PROCEDURE — 83036 HEMOGLOBIN GLYCOSYLATED A1C: CPT

## 2017-05-02 PROCEDURE — 96361 HYDRATE IV INFUSION ADD-ON: CPT

## 2017-05-02 PROCEDURE — 82962 GLUCOSE BLOOD TEST: CPT | Mod: 91

## 2017-05-02 PROCEDURE — 87899 AGENT NOS ASSAY W/OPTIC: CPT

## 2017-05-02 PROCEDURE — 71010 DX-CHEST-PORTABLE (1 VIEW): CPT

## 2017-05-02 PROCEDURE — 99220 PR INITIAL OBSERVATION CARE,LEVL III: CPT | Performed by: INTERNAL MEDICINE

## 2017-05-02 PROCEDURE — 700105 HCHG RX REV CODE 258: Performed by: EMERGENCY MEDICINE

## 2017-05-02 PROCEDURE — 80053 COMPREHEN METABOLIC PANEL: CPT

## 2017-05-02 PROCEDURE — G0378 HOSPITAL OBSERVATION PER HR: HCPCS

## 2017-05-02 PROCEDURE — A9270 NON-COVERED ITEM OR SERVICE: HCPCS | Performed by: INTERNAL MEDICINE

## 2017-05-02 PROCEDURE — 99285 EMERGENCY DEPT VISIT HI MDM: CPT

## 2017-05-02 PROCEDURE — 96360 HYDRATION IV INFUSION INIT: CPT

## 2017-05-02 PROCEDURE — 84443 ASSAY THYROID STIM HORMONE: CPT

## 2017-05-02 PROCEDURE — 87045 FECES CULTURE AEROBIC BACT: CPT

## 2017-05-02 PROCEDURE — 700105 HCHG RX REV CODE 258: Performed by: INTERNAL MEDICINE

## 2017-05-02 PROCEDURE — 96372 THER/PROPH/DIAG INJ SC/IM: CPT | Mod: XU

## 2017-05-02 PROCEDURE — 87046 STOOL CULTR AEROBIC BACT EA: CPT

## 2017-05-02 PROCEDURE — 93010 ELECTROCARDIOGRAM REPORT: CPT | Performed by: INTERNAL MEDICINE

## 2017-05-02 PROCEDURE — 74176 CT ABD & PELVIS W/O CONTRAST: CPT

## 2017-05-02 PROCEDURE — 81001 URINALYSIS AUTO W/SCOPE: CPT | Mod: 59

## 2017-05-02 PROCEDURE — 80307 DRUG TEST PRSMV CHEM ANLYZR: CPT

## 2017-05-02 PROCEDURE — 70450 CT HEAD/BRAIN W/O DYE: CPT

## 2017-05-02 PROCEDURE — 85025 COMPLETE CBC W/AUTO DIFF WBC: CPT

## 2017-05-02 PROCEDURE — 85730 THROMBOPLASTIN TIME PARTIAL: CPT

## 2017-05-02 PROCEDURE — 93005 ELECTROCARDIOGRAM TRACING: CPT | Performed by: EMERGENCY MEDICINE

## 2017-05-02 PROCEDURE — 700111 HCHG RX REV CODE 636 W/ 250 OVERRIDE (IP): Performed by: INTERNAL MEDICINE

## 2017-05-02 PROCEDURE — 87493 C DIFF AMPLIFIED PROBE: CPT

## 2017-05-02 PROCEDURE — 83735 ASSAY OF MAGNESIUM: CPT

## 2017-05-02 PROCEDURE — 700102 HCHG RX REV CODE 250 W/ 637 OVERRIDE(OP): Performed by: INTERNAL MEDICINE

## 2017-05-02 PROCEDURE — 83690 ASSAY OF LIPASE: CPT

## 2017-05-02 RX ORDER — ATORVASTATIN CALCIUM 10 MG/1
10 TABLET, FILM COATED ORAL NIGHTLY
COMMUNITY
End: 2017-07-20 | Stop reason: SDUPTHER

## 2017-05-02 RX ORDER — DEXTROSE MONOHYDRATE 25 G/50ML
25 INJECTION, SOLUTION INTRAVENOUS
Status: DISCONTINUED | OUTPATIENT
Start: 2017-05-02 | End: 2017-05-03 | Stop reason: HOSPADM

## 2017-05-02 RX ORDER — SODIUM CHLORIDE 9 MG/ML
INJECTION, SOLUTION INTRAVENOUS CONTINUOUS
Status: ACTIVE | OUTPATIENT
Start: 2017-05-02 | End: 2017-05-03

## 2017-05-02 RX ORDER — LABETALOL HYDROCHLORIDE 5 MG/ML
10 INJECTION, SOLUTION INTRAVENOUS EVERY 4 HOURS PRN
Status: DISCONTINUED | OUTPATIENT
Start: 2017-05-02 | End: 2017-05-03 | Stop reason: HOSPADM

## 2017-05-02 RX ORDER — FINASTERIDE 5 MG/1
5 TABLET, FILM COATED ORAL EVERY EVENING
Status: DISCONTINUED | OUTPATIENT
Start: 2017-05-02 | End: 2017-05-03 | Stop reason: HOSPADM

## 2017-05-02 RX ORDER — LATANOPROST 50 UG/ML
1 SOLUTION/ DROPS OPHTHALMIC NIGHTLY
Status: DISCONTINUED | OUTPATIENT
Start: 2017-05-02 | End: 2017-05-03 | Stop reason: HOSPADM

## 2017-05-02 RX ORDER — ONDANSETRON 4 MG/1
4 TABLET, ORALLY DISINTEGRATING ORAL EVERY 4 HOURS PRN
Status: DISCONTINUED | OUTPATIENT
Start: 2017-05-02 | End: 2017-05-03 | Stop reason: HOSPADM

## 2017-05-02 RX ORDER — TRAZODONE HYDROCHLORIDE 50 MG/1
50 TABLET ORAL NIGHTLY
Status: DISCONTINUED | OUTPATIENT
Start: 2017-05-02 | End: 2017-05-03 | Stop reason: HOSPADM

## 2017-05-02 RX ORDER — FINASTERIDE 5 MG/1
5 TABLET, FILM COATED ORAL EVERY EVENING
COMMUNITY
End: 2017-09-21 | Stop reason: SDUPTHER

## 2017-05-02 RX ORDER — TRAZODONE HYDROCHLORIDE 50 MG/1
50 TABLET ORAL NIGHTLY
COMMUNITY
End: 2017-10-19 | Stop reason: SDUPTHER

## 2017-05-02 RX ORDER — SODIUM CHLORIDE 9 MG/ML
INJECTION, SOLUTION INTRAVENOUS CONTINUOUS
Status: DISCONTINUED | OUTPATIENT
Start: 2017-05-02 | End: 2017-05-02

## 2017-05-02 RX ORDER — ATORVASTATIN CALCIUM 10 MG/1
10 TABLET, FILM COATED ORAL NIGHTLY
Status: DISCONTINUED | OUTPATIENT
Start: 2017-05-02 | End: 2017-05-03 | Stop reason: HOSPADM

## 2017-05-02 RX ORDER — ACETAMINOPHEN 325 MG/1
650 TABLET ORAL EVERY 6 HOURS PRN
Status: DISCONTINUED | OUTPATIENT
Start: 2017-05-02 | End: 2017-05-03 | Stop reason: HOSPADM

## 2017-05-02 RX ORDER — ONDANSETRON 2 MG/ML
4 INJECTION INTRAMUSCULAR; INTRAVENOUS EVERY 4 HOURS PRN
Status: DISCONTINUED | OUTPATIENT
Start: 2017-05-02 | End: 2017-05-03 | Stop reason: HOSPADM

## 2017-05-02 RX ORDER — SODIUM CHLORIDE 9 MG/ML
1000 INJECTION, SOLUTION INTRAVENOUS ONCE
Status: ACTIVE | OUTPATIENT
Start: 2017-05-02 | End: 2017-05-03

## 2017-05-02 RX ORDER — NITROFURANTOIN 25; 75 MG/1; MG/1
100 CAPSULE ORAL 2 TIMES DAILY
COMMUNITY
Start: 2017-04-28 | End: 2017-07-20

## 2017-05-02 RX ADMIN — SODIUM CHLORIDE: 9 INJECTION, SOLUTION INTRAVENOUS at 23:59

## 2017-05-02 RX ADMIN — SODIUM CHLORIDE: 9 INJECTION, SOLUTION INTRAVENOUS at 12:58

## 2017-05-02 RX ADMIN — FINASTERIDE 5 MG: 5 TABLET, FILM COATED ORAL at 20:18

## 2017-05-02 RX ADMIN — ENOXAPARIN SODIUM 40 MG: 100 INJECTION SUBCUTANEOUS at 13:02

## 2017-05-02 RX ADMIN — INSULIN LISPRO 2 UNITS: 100 INJECTION, SOLUTION INTRAVENOUS; SUBCUTANEOUS at 17:36

## 2017-05-02 RX ADMIN — ASPIRIN 81 MG: 81 TABLET ORAL at 13:02

## 2017-05-02 RX ADMIN — SODIUM CHLORIDE: 9 INJECTION, SOLUTION INTRAVENOUS at 09:47

## 2017-05-02 RX ADMIN — LATANOPROST 1 DROP: 50 SOLUTION OPHTHALMIC at 20:20

## 2017-05-02 RX ADMIN — ATORVASTATIN CALCIUM 10 MG: 10 TABLET, FILM COATED ORAL at 20:19

## 2017-05-02 RX ADMIN — TRAZODONE HYDROCHLORIDE 50 MG: 50 TABLET ORAL at 20:18

## 2017-05-02 ASSESSMENT — PAIN SCALES - GENERAL
PAINLEVEL_OUTOF10: 0

## 2017-05-02 ASSESSMENT — COPD QUESTIONNAIRES
COPD SCREENING SCORE: 2
DURING THE PAST 4 WEEKS HOW MUCH DID YOU FEEL SHORT OF BREATH: NONE/LITTLE OF THE TIME
DO YOU EVER COUGH UP ANY MUCUS OR PHLEGM?: NO/ONLY WITH OCCASIONAL COLDS OR INFECTIONS
HAVE YOU SMOKED AT LEAST 100 CIGARETTES IN YOUR ENTIRE LIFE: NO/DON'T KNOW

## 2017-05-02 ASSESSMENT — LIFESTYLE VARIABLES
EVER_SMOKED: NEVER
EVER_SMOKED: NEVER

## 2017-05-02 NOTE — PROGRESS NOTES
Received Pt from ED.  Pt is A/Ox4.  Respirations are even and unlabored on RA.  Pt denies any dizziness at this time.  Orthostatics completed and placed in chart.  Pt skin is delicate has skin tear on right forearm.  Pt has allen cath draining to gravity.  POC reviewed, verbalized understanding.  Will continue to closely monitor. Call light within reach.

## 2017-05-02 NOTE — PROGRESS NOTES
Pt was incontinent of a large amount of loose stool.  Pt cleaned and linen changed.  Will continue to monitor.  Call light within reach.

## 2017-05-02 NOTE — ED PROVIDER NOTES
ED Provider Note    CHIEF COMPLAINT  Chief Complaint   Patient presents with   • Syncope     BIB EMS, per EMS pt stood up from his chair this morning and had a witnessed syncope. has had diarrhea last few days. initially hypotensive on scene.    • Diarrhea       HPI  Bulmaro Wheeler is a 89 y.o. male who presents to the emergency room and after witnessed syncopal episode. Patient has a history of syncope and has been told that he has low blood pressure primarily in the morning. He has had syncope generally in the morning when he sits up. When he woke up and he did have a mild headache that resolved. This was not terribly remarkable. He was sitting in a chair went to stand up and then slumped down in his chair. He says he just felt tremendously weak. He told the nursing staff he had had diarrhea for the last few days although tells me that he has had a soft stool once each day over the last few days. No overt watery stool. No bloody stool. Denies abdominal pain. Denies chest pain, shortness of breath or palpitations. He has not had fever. Patient has been seen recently in the emergency department for urinary retention and has a Aj catheter in place. He denies having any flank pain or blood in the urine. No testicular pain or swelling. Normal bowel movements. He states all of his urinary symptoms have improved since having the catheter placed. He is currently on Macrobid for possible urinary tract infection. His chart is reviewed. He has had negative urine cultures.    REVIEW OF SYSTEMS  As per HPI, otherwise a 10 point review of systems is negative    PAST MEDICAL HISTORY  Past Medical History   Diagnosis Date   • Diabetes    • Hypertension    • Arthritis    • Pneumonia    • Backpain    • Glaucoma      Interocular lenses placed in 1985   • Indigestion    • CATARACT      surgery in 1985   • Arrhythmia      in 80's, not recent   • Urinary tract infection, site not specified 9/27/2013   • Sepsis 9/27/2013   •  UTI (lower urinary tract infection) 9/1/2012   • Heart burn    • Pyelonephritis October 2010   • Acute kidney failure, unspecified (CMS-HCC) 9/27/2013   • Renal cyst 9/19/2013   • Pyelonephritis    • Carotid artery stenosis 2/18/2014   • Occlusion and stenosis of carotid artery without mention of cerebral infarction 3/11/2014       SOCIAL HISTORY  Social History   Substance Use Topics   • Smoking status: Never Smoker    • Smokeless tobacco: Never Used   • Alcohol Use: No      Comment: hasnt drank since 1979       SURGICAL HISTORY  Past Surgical History   Procedure Laterality Date   • Ercp w/removal calculus  2009   • Eye surgery  1980's     cataracts OU   • Colonoscopy  1999   • Cholecystectomy  1999   • Carotid endarterectomy  3/11/2014     Performed by Bob Antonio M.D. at SURGERY [facility]       CURRENT MEDICATIONS  Home Medications     Reviewed by Jovanny Lackey (Pharmacy Tech) on 05/02/17 at 0959  Med List Status: Complete    Medication Last Dose Status    aspirin EC (ECOTRIN) 81 MG Tablet Delayed Response 5/1/2017 Active    atorvastatin (LIPITOR) 10 MG Tab 5/1/2017 Active    B Complex Vitamins (VITAMIN B COMPLEX PO) 5/1/2017 Active    Cholecalciferol (VITAMIN D PO) 5/1/2017 Active    cyanocobalamin (VITAMIN B-12) injection 1,000 mcg  Active    finasteride (PROSCAR) 5 MG Tab 5/1/2017 Active    Ibuprofen-Diphenhydramine Cit (ADVIL PM PO) 5/1/2017 Active    latanoprost (XALATAN) 0.005 % SOLN 5/1/2017 Active    metformin (GLUCOPHAGE) 500 MG Tab 5/1/2017 Active    Multiple Vitamins-Minerals (CENTRUM SILVER PO) > 2 days Active    Multiple Vitamins-Minerals (OCUVITE) Tab 5/1/2017 Active    nitrofurantoin monohydr macro (MACROBID) 100 MG Cap 5/1/2017 Active    Omega-3 Fatty Acids (FISH OIL PO) 5/1/2017 Active    Probiotic Product (PROBIOTIC PO) 5/1/2017 Active    trazodone (DESYREL) 50 MG Tab 5/1/2017 Active                ALLERGIES  No Known Allergies    PHYSICAL EXAM  VITAL SIGNS: /55 mmHg  " Pulse 60  Temp(Src) 36.4 °C (97.5 °F)  Resp 16  Ht 1.753 m (5' 9.02\")  Wt 72.576 kg (160 lb)  BMI 23.62 kg/m2  SpO2 93%   Constitutional: Awake and alert. Elderly male  HENT:  Atraumatic, Normocephalic.Oropharynx moist mucus membranes, Nose normal inspection.   Eyes: Normal inspection  Neck: Supple  Cardiovascular: Normal heart rate, Normal rhythm.  Symmetric peripheral pulses.   Thorax & Lungs: No respiratory distress, No wheezing, No rales, No rhonchi, No chest tenderness.   Abdomen: Bowel sounds normal, soft, non-distended, nontender, left lower abdominal hernia. No pulsatile mass.. Aj catheter in place Skin: Warm, Dry, No rash.   Back: No tenderness, No CVA tenderness.   Extremities: No clubbing, cyanosis, edema, no Homans or cords   Neurologic: Alert & oriented x 3, Normal motor function, Normal sensory function, No focal deficits noted. Normal reflexes.  Normal Cranial Nerves.    RADIOLOGY/PROCEDURES  CT-RENAL COLIC EVALUATION(A/P W/O)   Final Result      1.  No hydronephrosis or urolithiasis.   2.  No evidence of small bowel obstruction or acute appendicitis. No free fluid.   3.  Left lower quadrant abdominal wall hernia containing obstructed colon.   4.  Diverticulosis of the colon without evidence diverticulitis.   5.  Atherosclerotic changes.      CT-HEAD W/O   Final Result      1.  Cerebral atrophy.      2.  White matter lucencies most consistent with small vessel ischemic change versus demyelination or gliosis.      3.  Otherwise, Head CT without contrast with no acute findings. No evidence of acute cerebral  hemorrhage or mass lesion.      DX-CHEST-PORTABLE (1 VIEW)   Final Result      Probable nipple shadow projecting over the right lung base. No airspace consolidation. Interval follow-up chest x-rays using nipple markers a consideration.         Imaging is interpreted by radiologist    Labs:  Results for orders placed or performed during the hospital encounter of 05/02/17   CBC WITH " DIFFERENTIAL   Result Value Ref Range    WBC 8.6 4.8 - 10.8 K/uL    RBC 4.37 (L) 4.70 - 6.10 M/uL    Hemoglobin 13.4 (L) 14.0 - 18.0 g/dL    Hematocrit 40.6 (L) 42.0 - 52.0 %    MCV 92.9 81.4 - 97.8 fL    MCH 30.7 27.0 - 33.0 pg    MCHC 33.0 (L) 33.7 - 35.3 g/dL    RDW 44.5 35.9 - 50.0 fL    Platelet Count 146 (L) 164 - 446 K/uL    MPV 10.0 9.0 - 12.9 fL    Neutrophils-Polys 79.90 (H) 44.00 - 72.00 %    Lymphocytes 11.60 (L) 22.00 - 41.00 %    Monocytes 7.80 0.00 - 13.40 %    Eosinophils 0.30 0.00 - 6.90 %    Basophils 0.10 0.00 - 1.80 %    Immature Granulocytes 0.30 0.00 - 0.90 %    Nucleated RBC 0.00 /100 WBC    Neutrophils (Absolute) 6.89 1.82 - 7.42 K/uL    Lymphs (Absolute) 1.00 1.00 - 4.80 K/uL    Monos (Absolute) 0.67 0.00 - 0.85 K/uL    Eos (Absolute) 0.03 0.00 - 0.51 K/uL    Baso (Absolute) 0.01 0.00 - 0.12 K/uL    Immature Granulocytes (abs) 0.03 0.00 - 0.11 K/uL    NRBC (Absolute) 0.00 K/uL   COMP METABOLIC PANEL   Result Value Ref Range    Sodium 133 (L) 135 - 145 mmol/L    Potassium 4.0 3.6 - 5.5 mmol/L    Chloride 103 96 - 112 mmol/L    Co2 21 20 - 33 mmol/L    Anion Gap 9.0 0.0 - 11.9    Glucose 197 (H) 65 - 99 mg/dL    Bun 25 (H) 8 - 22 mg/dL    Creatinine 1.06 0.50 - 1.40 mg/dL    Calcium 9.4 8.5 - 10.5 mg/dL    AST(SGOT) 23 12 - 45 U/L    ALT(SGPT) 16 2 - 50 U/L    Alkaline Phosphatase 66 30 - 99 U/L    Total Bilirubin 1.0 0.1 - 1.5 mg/dL    Albumin 4.2 3.2 - 4.9 g/dL    Total Protein 6.9 6.0 - 8.2 g/dL    Globulin 2.7 1.9 - 3.5 g/dL    A-G Ratio 1.6 g/dL   LIPASE   Result Value Ref Range    Lipase 15 11 - 82 U/L   URINALYSIS,CULTURE IF INDICATED   Result Value Ref Range    Micro Urine Req Microscopic     Color Yellow     Character Clear     Specific Gravity 1.008 <1.035    Ph 7.5 5.0-8.0    Glucose Trace (A) Negative mg/dL    Ketones 20 (A) Negative mg/dL    Protein Negative Negative mg/dL    Bilirubin Negative Negative    Nitrite Negative Negative    Leukocyte Esterase Negative Negative    Occult  Blood Trace (A) Negative    Culture Indicated Yes UA Culture   URINE MICROSCOPIC (W/UA)   Result Value Ref Range    WBC 0-2 (A) /hpf    RBC 5-10 (A) /hpf    Bacteria Few (A) None /hpf    Mucous Threads Few /hpf    Amorphous Crystal Present /hpf    Hyaline Cast 6-10 (A) /lpf   ESTIMATED GFR   Result Value Ref Range    GFR If African American >60 >60 mL/min/1.73 m 2    GFR If Non African American >60 >60 mL/min/1.73 m 2   TSH (Thyroid Stimulating Hormone)   Result Value Ref Range    TSH 1.640 0.300 - 3.700 uIU/mL   Prothrombin time (INR)   Result Value Ref Range    PT 14.0 12.0 - 14.6 sec    INR 1.05 0.87 - 1.13   APTT (PTT)   Result Value Ref Range    APTT 28.1 24.7 - 36.0 sec   Urine drug screen   Result Value Ref Range    Amphetamines Urine Negative Negative    Barbiturates Negative Negative    Benzodiazepines Negative Negative    Cocaine Metabolite Negative Negative    Methadone Negative Negative    Ecstasy Negative Negative    Opiates Negative Negative    Oxycodone Negative Negative    Phencyclidine -Pcp Negative Negative    Propoxyphene Negative Negative    Cannabinoid Metab Negative Negative   PHOSPHORUS   Result Value Ref Range    Phosphorus 3.3 2.5 - 4.5 mg/dL   TROPONIN   Result Value Ref Range    Troponin I 0.10 (H) 0.00 - 0.04 ng/mL   MAGNESIUM   Result Value Ref Range    Magnesium 1.7 1.5 - 2.5 mg/dL   EKG (ER)   Result Value Ref Range    Report       [facility] Emergency Dept.    Test Date:  2017  Pt Name:    JT MARIA               Department: ER  MRN:        [id number]                      Room:        08  Gender:     M                            Technician: [id number]  :        [id number]                   Requested By:ER TRIAGE PROTOCOL  Order #:    [id number]                    Blanche BENNETT: kellie    Measurements  Intervals                                Axis  Rate:       59                           P:          77  NJ:         168                          QRS:         69  QRSD:       92                           T:          41  QT:         432  QTc:        428    Interpretive Statements  SINUS BRADYCARDIA  Compared to ECG 11/26/2016 12:45:19  Sinus rhythm no longer present         COURSE & MEDICAL DECISION MAKING  Patient presents to the ER after syncopal episode. It sounds orthostatic. His EKG does not show arrhythmia or preexcitation. He does have bradycardia. Orthostatics were checked and were positive and he was symptomatic. He was given 1 L of normal saline. Laboratory data returned as above. CT scan of the head was obtained given her reports of headache this morning although he has no neurologic symptoms. This was negative. No evidence of GI blood loss. Benign abdominal exam although he does have a palpable hernia on the left abdomen. I consulted Dr. Rodgers for admission for hydration and further evaluation and treatment    FINAL IMPRESSION  1. Syncope  2. Orthostasis      This dictation was created using voice recognition software. The accuracy of the dictation is limited to the abilities of the software.  The nursing notes were reviewed and certain aspects of this information were incorporated into this note.      Electronically signed by: Fam Ayers, 5/2/2017 10:01 AM

## 2017-05-02 NOTE — ED NOTES
Med rec updated and complete  Allergies reviewed  Called [facility] @ [phone] to verify all prescription medications.  Asked pt last time he took his medications.

## 2017-05-02 NOTE — PROGRESS NOTES
Dr. Rodgers updated about pt positive troponin of 0.74.  Pt denies any CP, or dizziness.  Tele monitor showing SR.  VSS.  Per MD orders continue to trend troponins and have a repeat ekg completed.

## 2017-05-02 NOTE — IP AVS SNAPSHOT
" Home Care Instructions                                                                                                                  Name:Bulmaro Wheeler  Medical Record Number:[id number]  CSN: [id number]    YOB: 1927   Age: 89 y.o.  Sex: male  HT:1.753 m (5' 9.02\") WT: 72.576 kg (160 lb)          Admit Date: 5/2/2017     Discharge Date:   Today's Date: 5/3/2017  Attending Doctor:  Vaughn Moreno M.D.                  Allergies:  Review of patient's allergies indicates no known allergies.            Discharge Instructions       Discharge Instructions    Discharged to home by car with relative. Discharged via wheelchair, hospital escort: Yes.  Special equipment needed: Not Applicable    Be sure to schedule a follow-up appointment with your primary care doctor or any specialists as instructed.     Discharge Plan:   Diet Plan: Discussed  Activity Level: Discussed  Confirmed Follow up Appointment: Patient to Call and Schedule Appointment  Confirmed Symptoms Management: Discussed  Medication Reconciliation Updated: Yes  Influenza Vaccine Indication: Not indicated: Previously immunized this influenza season and > 8 years of age    I understand that a diet low in cholesterol, fat, and sodium is recommended for good health. Unless I have been given specific instructions below for another diet, I accept this instruction as my diet prescription.   Other diet: LOW FAT    Special Instructions: None    · Is patient discharged on Warfarin / Coumadin?   No     · Is patient Post Blood Transfusion?  [facility]  A Ja catheter is a soft, flexible tube. This tube is placed into your bladder to drain pee (urine). If you go home with this catheter in place, follow the instructions below.  TAKING CARE OF THE CATHETER  1. Wash your hands with soap and water.  2. Put soap and water on a clean washcloth.  ¨ Clean the skin where the tube goes into your body.  § Clean away from the tube site.  § Never " wipe toward the tube.  § Clean the area using a circular motion.  ¨ Remove all the soap. Pat the area dry with a clean towel. For males, reposition the skin that covers the end of the penis (foreskin).  3. Attach the tube to your leg with tape or a leg strap. Do not stretch the tube tight. If you are using tape, remove any stickiness left behind by past tape you used.  4. Keep the drainage bag below your hips. Keep it off the floor.  5. Check your tube during the day. Make sure it is working and draining. Make sure the tube does not curl, twist, or bend.  6. Do not pull on the tube or try to take it out.  TAKING CARE OF THE DRAINAGE BAGS  You will have a large overnight drainage bag and a small leg bag. You may wear the overnight bag any time. Never wear the small bag at night. Follow the directions below.  Emptying the Drainage Bag  Empty your drainage bag when it is  -½ full or at least 2-3 times a day.  1. Wash your hands with soap and water.  2. Keep the drainage bag below your hips.  3. Hold the dirty bag over the toilet or clean container.  4. Open the pour spout at the bottom of the bag. Empty the pee into the toilet or container. Do not let the pour spout touch anything.  5. Clean the pour spout with a gauze pad or cotton ball that has rubbing alcohol on it.  6. Close the pour spout.  7. Attach the bag to your leg with tape or a leg strap.  8. Wash your hands well.  Changing the Drainage Bag  Change your bag once a month or sooner if it starts to smell or look dirty.   1. Wash your hands with soap and water.  2. Pinch the rubber tube so that pee does not spill out.  3. Disconnect the catheter tube from the drainage tube at the connection valve. Do not let the tubes touch anything.  4. Clean the end of the catheter tube with an alcohol wipe. Clean the end of a the drainage tube with a different alcohol wipe.  5. Connect the catheter tube to the drainage tube of the clean drainage bag.  6. Attach the new bag  to the leg with tape or a leg strap. Avoid attaching the new bag too tightly.  7. Wash your hands well.  Cleaning the Drainage Bag  2. Wash your hands with soap and water.  3. Wash the bag in warm, soapy water.  4. Rinse the bag with warm water.  5. Fill the bag with a mixture of white vinegar and water (1 cup vinegar to 1 quart warm water [.2 liter vinegar to 1 liter warm water]). Close the bag and soak it for 30 minutes in the solution.  6. Rinse the bag with warm water.  7. Hang the bag to dry with the pour spout open and hanging downward.  8. Store the clean bag (once it is dry) in a clean plastic bag.  9. Wash your hands well.  PREVENT INFECTION  · Wash your hands before and after touching your tube.  · Take showers every day. Wash the skin where the tube enters your body. Do not take baths. Replace wet leg straps with dry ones, if this applies.  · Do not use powders, sprays, or lotions on the genital area. Only use creams, lotions, or ointments as told by your doctor.  · For females, wipe from front to back after going to the bathroom.  · Drink enough fluids to keep your pee clear or pale yellow unless you are told not to have too much fluid (fluid restriction).  · Do not let the drainage bag or tubing touch or lie on the floor.  · Wear cotton underwear to keep the area dry.  GET HELP IF:  · Your pee is cloudy or smells unusually bad.  · Your tube becomes clogged.  · You are not draining pee into the bag or your bladder feels full.  · Your tube starts to leak.  GET HELP RIGHT AWAY IF:  · You have pain, puffiness (swelling), redness, or yellowish-white fluid (pus) where the tube enters the body.  · You have pain in the belly (abdomen), legs, lower back, or bladder.  · You have a fever.  · You see blood fill the tube, or your pee is pink or red.  · You feel sick to your stomach (nauseous), throw up (vomit), or have chills.  · Your tube gets pulled out.  MAKE SURE YOU:   · Understand these instructions.  · Will  watch your condition.  · Will get help right away if you are not doing well or get worse.     This information is not intended to replace advice given to you by your health care provider. Make sure you discuss any questions you have with your health care provider.     Document Released: 04/14/2014 Document Revised: 01/08/2016 Document Reviewed: 04/14/2014  [employer] Interactive Patient Education ©2016 [employer]      Depression / Suicide Risk    As you are discharged from this [facility] facility, it is important to learn how to keep safe from harming yourself.    Recognize the warning signs:  · Abrupt changes in personality, positive or negative- including increase in energy   · Giving away possessions  · Change in eating patterns- significant weight changes-  positive or negative  · Change in sleeping patterns- unable to sleep or sleeping all the time   · Unwillingness or inability to communicate  · Depression  · Unusual sadness, discouragement and loneliness  · Talk of wanting to die  · Neglect of personal appearance   · Rebelliousness- reckless behavior  · Withdrawal from people/activities they love  · Confusion- inability to concentrate     If you or a loved one observes any of these behaviors or has concerns about self-harm, here's what you can do:  · Talk about it- your feelings and reasons for harming yourself  · Remove any means that you might use to hurt yourself (examples: pills, rope, extension cords, firearm)  · Get professional help from the community (Mental Health, Substance Abuse, psychological counseling)  · Do not be alone:Call your Safe Contact- someone whom you trust who will be there for you.  · Call your local CRISIS HOTLINE 384-3317 or 445-757-7815  · Call your local Children's Mobile Crisis Response Team [place] (832) 442-0489 or www.[employer]  · Call the toll free National Suicide Prevention Hotlines   · National Suicide Prevention Lifeline 025-788-INLN (6654)  · National  [facility] 800-SUICIDE (169-8383)        Your appointments     Jul 20, 2017  2:00 PM   Established Patient with Zahra Yañez M.D.   [facility] [place]    [address] [id number] [phone]           You will be receiving a confirmation call a few days before your appointment from our automated call confirmation system.              Follow-up Information     1. Call Zahra Yañez M.D..    Specialty:  Family Medicine    Contact information    [address]  [address]  [facility] [id number] [phone]           Discharge Medication Instructions:    Below are the medications your physician expects you to take upon discharge:    Review all your home medications and newly ordered medications with your doctor and/or pharmacist. Follow medication instructions as directed by your doctor and/or pharmacist.    Please keep your medication list with you and share with your physician.               Medication List      CONTINUE taking these medications        Instructions    Morning Afternoon Evening Bedtime    ADVIL PM PO        Take 1 Tab by mouth every bedtime.   Dose:  1 Tab                        aspirin EC 81 MG Tbec   Last time this was given:  81 mg on 5/3/2017  9:44 AM   Commonly known as:  ECOTRIN        Take 81 mg by mouth every day.   Dose:  81 mg                        atorvastatin 10 MG Tabs   Last time this was given:  10 mg on 5/2/2017  8:19 PM   Commonly known as:  LIPITOR        Take 10 mg by mouth every evening.   Dose:  10 mg                        * CENTRUM SILVER PO        Take 1 Tab by mouth every day.   Dose:  1 Tab                        * OCUVITE Tabs        Take 1 Tab by mouth every evening.   Dose:  1 Tab                        finasteride 5 MG Tabs   Last time this was given:  5 mg on 5/2/2017  8:18 PM   Commonly known as:  PROSCAR        Take 5 mg by mouth every evening.   Dose:  5 mg                        FISH OIL PO        Take 1 Cap by  mouth every day.   Dose:  1 Cap                        latanoprost 0.005 % Soln   Last time this was given:  1 Drop on 5/2/2017  8:20 PM   Commonly known as:  XALATAN        Place 1 Drop in both eyes every evening.   Dose:  1 Drop                        metformin 500 MG Tabs   Commonly known as:  GLUCOPHAGE        Doctor's comments:  Authorization of a refill request.   Take 2 Tabs by mouth 2 times a day, with meals.   Dose:  1000 mg                        nitrofurantoin monohydr macro 100 MG Caps   Commonly known as:  MACROBID        Take 100 mg by mouth 2 times a day. Pt started on 4/28/2017 for 10 day course for kidney infection.   Dose:  100 mg                        PROBIOTIC PO        Take 1 Cap by mouth every evening.   Dose:  1 Cap                        trazodone 50 MG Tabs   Last time this was given:  50 mg on 5/2/2017  8:18 PM   Commonly known as:  DESYREL        Take 50 mg by mouth every evening.   Dose:  50 mg                        VITAMIN B COMPLEX PO        Take 1 Tab by mouth every evening.   Dose:  1 Tab                        VITAMIN D PO        Take 1 Cap by mouth every day.   Dose:  1 Cap                        * Notice:  This list has 2 medication(s) that are the same as other medications prescribed for you. Read the directions carefully, and ask your doctor or other care provider to review them with you.            Orders for after discharge     REFERRAL TO HOME HEALTH    Complete by:  As directed    Home health will create and establish a plan of care             Instructions           Diet / Nutrition:    Follow any diet instructions given to you by your doctor or the dietician, including how much salt (sodium) you are allowed each day.    If you are overweight, talk to your doctor about a weight reduction plan.    Activity:    Remain physically active following your doctor's instructions about exercise and activity.    Rest often.     Any time you become even a little tired or short of  breath, SIT DOWN and rest.    Worsening Symptoms:    Report any of the following signs and symptoms to the doctor's office immediately:    *Pain of jaw, arm, or neck  *Chest pain not relieved by medication                               *Dizziness or loss of consciousness  *Difficulty breathing even when at rest   *More tired than usual                                       *Bleeding drainage or swelling of surgical site  *Swelling of feet, ankles, legs or stomach                 *Fever (>100ºF)  *Pink or blood tinged sputum  *Weight gain (3lbs/day or 5lbs /week)           *Shock from internal defibrillator (if applicable)  *Palpitations or irregular heartbeats                *Cool and/or numb extremities    Stroke Awareness    Common Risk Factors for Stroke include:    Age  Atrial Fibrillation  Carotid Artery Stenosis  Diabetes Mellitus  Excessive alcohol consumption  High blood pressure  Overweight   Physical inactivity  Smoking    Warning signs and symptoms of a stroke include:    *Sudden numbness or weakness of the face, arm or leg (especially on one side of the body).  *Sudden confusion, trouble speaking or understanding.  *Sudden trouble seeing in one or both eyes.  *Sudden trouble walking, dizziness, loss of balance or coordination.Sudden severe headache with no known cause.    It is very important to get treatment quickly when a stroke occurs. If you experience any of the above warning signs, call 911 immediately.                   Disclaimer         Quit Smoking / Tobacco Use:    I understand the use of any tobacco products increases my chance of suffering from future heart disease or stroke and could cause other illnesses which may shorten my life. Quitting the use of tobacco products is the single most important thing I can do to improve my health. For further information on smoking / tobacco cessation call a Toll Free Quit Line at 1-541.449.9869 (*[employer] [place]) or 1-202.585.4839 (American  Lung Association) or you can access the web based program at www.lungusa.org.    [place] Tobacco Users Help Line:  (984) 799-2965       Toll Free: 1-300.249.5679  Quit Tobacco Program [facility] (078)497-9478    Crisis Hotline:    [place] Crisis Hotline:  4-180-CHREANV or 1-255.462.7642    [place] Crisis Hotline:    1-180.305.8085 or 171-761-7278    Discharge Survey:   Thank you for choosing [facility]. We hope we did everything we could to make your hospital stay a pleasant one. You may be receiving a phone survey and we would appreciate your time and participation in answering the questions. Your input is very valuable to us in our efforts to improve our service to our patients and their families.        My signature on this form indicates that:    1. I have reviewed and understand the above information.  2. My questions regarding this information have been answered to my satisfaction.  3. I have formulated a plan with my discharge nurse to obtain my prescribed medications for home.                  Disclaimer         __________________________________                     __________       ________                       Patient Signature                                                 Date                    Time

## 2017-05-03 ENCOUNTER — HOME HEALTH ADMISSION (OUTPATIENT)
Dept: HOME HEALTH SERVICES | Facility: HOME HEALTHCARE | Age: 82
End: 2017-05-03
Payer: MEDICARE

## 2017-05-03 VITALS
SYSTOLIC BLOOD PRESSURE: 145 MMHG | TEMPERATURE: 97.6 F | DIASTOLIC BLOOD PRESSURE: 65 MMHG | OXYGEN SATURATION: 93 % | RESPIRATION RATE: 16 BRPM | HEART RATE: 63 BPM | HEIGHT: 69 IN | WEIGHT: 160 LBS | BODY MASS INDEX: 23.7 KG/M2

## 2017-05-03 PROBLEM — R55 SYNCOPE: Status: RESOLVED | Noted: 2017-05-02 | Resolved: 2017-05-03

## 2017-05-03 LAB
ALBUMIN SERPL BCP-MCNC: 3.1 G/DL (ref 3.2–4.9)
ALBUMIN/GLOB SERPL: 1.3 G/DL
ALP SERPL-CCNC: 55 U/L (ref 30–99)
ALT SERPL-CCNC: 12 U/L (ref 2–50)
ANION GAP SERPL CALC-SCNC: 7 MMOL/L (ref 0–11.9)
AST SERPL-CCNC: 21 U/L (ref 12–45)
BASOPHILS # BLD AUTO: 0.3 % (ref 0–1.8)
BASOPHILS # BLD: 0.02 K/UL (ref 0–0.12)
BILIRUB SERPL-MCNC: 0.5 MG/DL (ref 0.1–1.5)
BUN SERPL-MCNC: 22 MG/DL (ref 8–22)
CALCIUM SERPL-MCNC: 8.2 MG/DL (ref 8.5–10.5)
CHLORIDE SERPL-SCNC: 109 MMOL/L (ref 96–112)
CHOLEST SERPL-MCNC: 60 MG/DL (ref 100–199)
CO2 SERPL-SCNC: 22 MMOL/L (ref 20–33)
CREAT SERPL-MCNC: 0.88 MG/DL (ref 0.5–1.4)
E COLI SXT1+2 STL IA: NORMAL
EOSINOPHIL # BLD AUTO: 0.05 K/UL (ref 0–0.51)
EOSINOPHIL NFR BLD: 0.7 % (ref 0–6.9)
ERYTHROCYTE [DISTWIDTH] IN BLOOD BY AUTOMATED COUNT: 44 FL (ref 35.9–50)
GFR SERPL CREATININE-BSD FRML MDRD: >60 ML/MIN/1.73 M 2
GLOBULIN SER CALC-MCNC: 2.3 G/DL (ref 1.9–3.5)
GLUCOSE BLD-MCNC: 124 MG/DL (ref 65–99)
GLUCOSE SERPL-MCNC: 131 MG/DL (ref 65–99)
HCT VFR BLD AUTO: 31.9 % (ref 42–52)
HDLC SERPL-MCNC: 26 MG/DL
HGB BLD-MCNC: 10.6 G/DL (ref 14–18)
IMM GRANULOCYTES # BLD AUTO: 0.01 K/UL (ref 0–0.11)
IMM GRANULOCYTES NFR BLD AUTO: 0.1 % (ref 0–0.9)
LDLC SERPL CALC-MCNC: 17 MG/DL
LYMPHOCYTES # BLD AUTO: 1.15 K/UL (ref 1–4.8)
LYMPHOCYTES NFR BLD: 17.2 % (ref 22–41)
MCH RBC QN AUTO: 30.4 PG (ref 27–33)
MCHC RBC AUTO-ENTMCNC: 33.2 G/DL (ref 33.7–35.3)
MCV RBC AUTO: 91.4 FL (ref 81.4–97.8)
MONOCYTES # BLD AUTO: 0.54 K/UL (ref 0–0.85)
MONOCYTES NFR BLD AUTO: 8.1 % (ref 0–13.4)
NEUTROPHILS # BLD AUTO: 4.92 K/UL (ref 1.82–7.42)
NEUTROPHILS NFR BLD: 73.6 % (ref 44–72)
NRBC # BLD AUTO: 0 K/UL
NRBC BLD AUTO-RTO: 0 /100 WBC
PLATELET # BLD AUTO: 133 K/UL (ref 164–446)
PMV BLD AUTO: 10.3 FL (ref 9–12.9)
POTASSIUM SERPL-SCNC: 3.8 MMOL/L (ref 3.6–5.5)
PROT SERPL-MCNC: 5.4 G/DL (ref 6–8.2)
RBC # BLD AUTO: 3.49 M/UL (ref 4.7–6.1)
SIGNIFICANT IND 70042: NORMAL
SITE SITE: NORMAL
SODIUM SERPL-SCNC: 138 MMOL/L (ref 135–145)
SOURCE SOURCE: NORMAL
TRIGL SERPL-MCNC: 84 MG/DL (ref 0–149)
TROPONIN I SERPL-MCNC: 0.78 NG/ML (ref 0–0.04)
WBC # BLD AUTO: 6.7 K/UL (ref 4.8–10.8)

## 2017-05-03 PROCEDURE — G0378 HOSPITAL OBSERVATION PER HR: HCPCS

## 2017-05-03 PROCEDURE — 80061 LIPID PANEL: CPT

## 2017-05-03 PROCEDURE — 84484 ASSAY OF TROPONIN QUANT: CPT

## 2017-05-03 PROCEDURE — G8979 MOBILITY GOAL STATUS: HCPCS | Mod: CH

## 2017-05-03 PROCEDURE — 700102 HCHG RX REV CODE 250 W/ 637 OVERRIDE(OP): Performed by: INTERNAL MEDICINE

## 2017-05-03 PROCEDURE — 85025 COMPLETE CBC W/AUTO DIFF WBC: CPT

## 2017-05-03 PROCEDURE — A9270 NON-COVERED ITEM OR SERVICE: HCPCS | Performed by: INTERNAL MEDICINE

## 2017-05-03 PROCEDURE — 97166 OT EVAL MOD COMPLEX 45 MIN: CPT

## 2017-05-03 PROCEDURE — 99217 PR OBSERVATION CARE DISCHARGE: CPT | Performed by: HOSPITALIST

## 2017-05-03 PROCEDURE — G8978 MOBILITY CURRENT STATUS: HCPCS | Mod: CI

## 2017-05-03 PROCEDURE — 36415 COLL VENOUS BLD VENIPUNCTURE: CPT

## 2017-05-03 PROCEDURE — 82962 GLUCOSE BLOOD TEST: CPT

## 2017-05-03 PROCEDURE — 80053 COMPREHEN METABOLIC PANEL: CPT

## 2017-05-03 PROCEDURE — G8987 SELF CARE CURRENT STATUS: HCPCS | Mod: CJ

## 2017-05-03 PROCEDURE — 96361 HYDRATE IV INFUSION ADD-ON: CPT

## 2017-05-03 PROCEDURE — A4314 CATH W/DRAINAGE 2-WAY LATEX: HCPCS | Performed by: INTERNAL MEDICINE

## 2017-05-03 PROCEDURE — 97162 PT EVAL MOD COMPLEX 30 MIN: CPT

## 2017-05-03 PROCEDURE — G8988 SELF CARE GOAL STATUS: HCPCS | Mod: CI

## 2017-05-03 RX ADMIN — ASPIRIN 81 MG: 81 TABLET ORAL at 09:44

## 2017-05-03 ASSESSMENT — PAIN SCALES - GENERAL
PAINLEVEL_OUTOF10: 0
PAINLEVEL_OUTOF10: 0

## 2017-05-03 ASSESSMENT — GAIT ASSESSMENTS
DISTANCE (FEET): 150
GAIT LEVEL OF ASSIST: STAND BY ASSIST
ASSISTIVE DEVICE: FRONT WHEEL WALKER
DEVIATION: SHUFFLED GAIT

## 2017-05-03 ASSESSMENT — ACTIVITIES OF DAILY LIVING (ADL): TOILETING: INDEPENDENT

## 2017-05-03 NOTE — PROGRESS NOTES
Carolann from Lab called with critical result of troponin at 2130. Critical lab result read back to Carolann.   Ellen GOVEA  notified of critical lab result at 2132.  Critical lab result read back by Ellen GOVEA.

## 2017-05-03 NOTE — PROGRESS NOTES
A//o,assessment completed per CDU,poc discussed,verbalized understanding,tolerating po well,denies cp,n/v,sob,allen to dd,call button within reach,needs attended,will continue to monitor.

## 2017-05-03 NOTE — DISCHARGE INSTRUCTIONS
Discharge Instructions    Discharged to home by car with relative. Discharged via wheelchair, hospital escort: Yes.  Special equipment needed: Not Applicable    Be sure to schedule a follow-up appointment with your primary care doctor or any specialists as instructed.     Discharge Plan:   Diet Plan: Discussed  Activity Level: Discussed  Confirmed Follow up Appointment: Patient to Call and Schedule Appointment  Confirmed Symptoms Management: Discussed  Medication Reconciliation Updated: Yes  Influenza Vaccine Indication: Not indicated: Previously immunized this influenza season and > 8 years of age    I understand that a diet low in cholesterol, fat, and sodium is recommended for good health. Unless I have been given specific instructions below for another diet, I accept this instruction as my diet prescription.   Other diet: LOW FAT    Special Instructions: None    · Is patient discharged on Warfarin / Coumadin?   No     · Is patient Post Blood Transfusion?  [facility] Catheter Care, Adult  A Aj catheter is a soft, flexible tube. This tube is placed into your bladder to drain pee (urine). If you go home with this catheter in place, follow the instructions below.  TAKING CARE OF THE CATHETER  1. Wash your hands with soap and water.  2. Put soap and water on a clean washcloth.  ¨ Clean the skin where the tube goes into your body.  § Clean away from the tube site.  § Never wipe toward the tube.  § Clean the area using a circular motion.  ¨ Remove all the soap. Pat the area dry with a clean towel. For males, reposition the skin that covers the end of the penis (foreskin).  3. Attach the tube to your leg with tape or a leg strap. Do not stretch the tube tight. If you are using tape, remove any stickiness left behind by past tape you used.  4. Keep the drainage bag below your hips. Keep it off the floor.  5. Check your tube during the day. Make sure it is working and draining. Make sure the tube does not curl, twist, or  bend.  6. Do not pull on the tube or try to take it out.  TAKING CARE OF THE DRAINAGE BAGS  You will have a large overnight drainage bag and a small leg bag. You may wear the overnight bag any time. Never wear the small bag at night. Follow the directions below.  Emptying the Drainage Bag  Empty your drainage bag when it is  -½ full or at least 2-3 times a day.  1. Wash your hands with soap and water.  2. Keep the drainage bag below your hips.  3. Hold the dirty bag over the toilet or clean container.  4. Open the pour spout at the bottom of the bag. Empty the pee into the toilet or container. Do not let the pour spout touch anything.  5. Clean the pour spout with a gauze pad or cotton ball that has rubbing alcohol on it.  6. Close the pour spout.  7. Attach the bag to your leg with tape or a leg strap.  8. Wash your hands well.  Changing the Drainage Bag  Change your bag once a month or sooner if it starts to smell or look dirty.   1. Wash your hands with soap and water.  2. Pinch the rubber tube so that pee does not spill out.  3. Disconnect the catheter tube from the drainage tube at the connection valve. Do not let the tubes touch anything.  4. Clean the end of the catheter tube with an alcohol wipe. Clean the end of a the drainage tube with a different alcohol wipe.  5. Connect the catheter tube to the drainage tube of the clean drainage bag.  6. Attach the new bag to the leg with tape or a leg strap. Avoid attaching the new bag too tightly.  7. Wash your hands well.  Cleaning the Drainage Bag  2. Wash your hands with soap and water.  3. Wash the bag in warm, soapy water.  4. Rinse the bag with warm water.  5. Fill the bag with a mixture of white vinegar and water (1 cup vinegar to 1 quart warm water [.2 liter vinegar to 1 liter warm water]). Close the bag and soak it for 30 minutes in the solution.  6. Rinse the bag with warm water.  7. Hang the bag to dry with the pour spout open and hanging  downward.  8. Store the clean bag (once it is dry) in a clean plastic bag.  9. Wash your hands well.  PREVENT INFECTION  · Wash your hands before and after touching your tube.  · Take showers every day. Wash the skin where the tube enters your body. Do not take baths. Replace wet leg straps with dry ones, if this applies.  · Do not use powders, sprays, or lotions on the genital area. Only use creams, lotions, or ointments as told by your doctor.  · For females, wipe from front to back after going to the bathroom.  · Drink enough fluids to keep your pee clear or pale yellow unless you are told not to have too much fluid (fluid restriction).  · Do not let the drainage bag or tubing touch or lie on the floor.  · Wear cotton underwear to keep the area dry.  GET HELP IF:  · Your pee is cloudy or smells unusually bad.  · Your tube becomes clogged.  · You are not draining pee into the bag or your bladder feels full.  · Your tube starts to leak.  GET HELP RIGHT AWAY IF:  · You have pain, puffiness (swelling), redness, or yellowish-white fluid (pus) where the tube enters the body.  · You have pain in the belly (abdomen), legs, lower back, or bladder.  · You have a fever.  · You see blood fill the tube, or your pee is pink or red.  · You feel sick to your stomach (nauseous), throw up (vomit), or have chills.  · Your tube gets pulled out.  MAKE SURE YOU:   · Understand these instructions.  · Will watch your condition.  · Will get help right away if you are not doing well or get worse.     This information is not intended to replace advice given to you by your health care provider. Make sure you discuss any questions you have with your health care provider.     Document Released: 04/14/2014 Document Revised: 01/08/2016 Document Reviewed: 04/14/2014  [employer] Interactive Patient Education ©2016 [employer]      Depression / Suicide Risk    As you are discharged from this [facility] facility, it is important to learn how to  keep safe from harming yourself.    Recognize the warning signs:  · Abrupt changes in personality, positive or negative- including increase in energy   · Giving away possessions  · Change in eating patterns- significant weight changes-  positive or negative  · Change in sleeping patterns- unable to sleep or sleeping all the time   · Unwillingness or inability to communicate  · Depression  · Unusual sadness, discouragement and loneliness  · Talk of wanting to die  · Neglect of personal appearance   · Rebelliousness- reckless behavior  · Withdrawal from people/activities they love  · Confusion- inability to concentrate     If you or a loved one observes any of these behaviors or has concerns about self-harm, here's what you can do:  · Talk about it- your feelings and reasons for harming yourself  · Remove any means that you might use to hurt yourself (examples: pills, rope, extension cords, firearm)  · Get professional help from the community (Mental Health, Substance Abuse, psychological counseling)  · Do not be alone:Call your Safe Contact- someone whom you trust who will be there for you.  · Call your local CRISIS HOTLINE 944-3051 or 206-442-3715  · Call your local Children's Mobile Crisis Response Team [place] (269) 792-2166 or www.[employer]  · Call the toll free National Suicide Prevention Hotlines   · National Suicide Prevention Lifeline 008-581-TGFR (5294)  · National Hope Line Network 800-SUICIDE (551-8848)

## 2017-05-03 NOTE — THERAPY
"Occupational Therapy Evaluation completed.   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 Upon completion of grooming, pt was so fatigued that he required min assist to turn the FWW and walk back to bed. With fatigue, pt puts so much pressure down through the FWW using his UEs that he is not effectively able to maneuver the FWW.   Plan of Care: Will benefit from Occupational Therapy 3 times per week  Discharge Recommendations:  Equipment: No Equipment Needed. Post-acute therapy Discharge to home with outpatient or home health for additional skilled therapy services as long as family is able to provide 24/7 assist until pt returns to his baseline.     See \"Rehab Therapy-Acute\" Patient Summary Report for complete documentation.    "

## 2017-05-03 NOTE — PROGRESS NOTES
PT trop still trending up trop 0.90 , patient denies cp and sob,Ellen GOVEA ,notified,no new orders at this time,stated Dr. Rodgers  And Dr. Liz cardiologist  Aware.

## 2017-05-03 NOTE — THERAPY
"Physical Therapy Evaluation completed.   Bed Mobility:  Supine to Sit: Minimal Assist  Transfers: Sit to Stand: Stand by Assist  Gait: Level Of Assist: Stand by Assist with Front-Wheel Walker       Plan of Care: Will benefit from Physical Therapy 3 times per week  Discharge Recommendations: Equipment: No Equipment Needed. Post-acute therapy Discharge to a transitional care facility for continued skilled therapy services. and Discharge to home with outpatient or home health for additional skilled therapy services.    See \"Rehab Therapy-Acute\" Patient Summary Report for complete documentation.   Pt initally moving well w/ PT and able to tolerate higher level balcne w/o LOB w/ FWW. when pt is fatiogue pt puts more weight through B UEs into FWW leading to icnreased speed of FWW during fatigue at end of gait. pt left with OT at sink, however OT had to call this PT to return to help pt turn due ot pt too tired from walk and standing that he was putting too much weight through FWW that he then could not move walk for turning around. pt was then a min A to turn walking and finish short distance to BS chair. pt very adamant that he wants to go home. Pt reprots he has 24/7 help at home from son and that when son is at dialysis his daughter comes over. recommend home w/ family with 24/7 help and HHPT but if family can not provide 24/7 help thten recommend short stay in post acute placement for further therapy prior to d/c home. pt left in chair after session. Discussed w/ PA and RN. asked Rn to clarify w/ son how much help he can provide pt and if pt has 24/7 help at home. PT will follow pt while in hosptial for increased function.   "

## 2017-05-03 NOTE — H&P
PRIMARY CARE PHYSICIAN:  Zahra Yañez MD    CHIEF COMPLAINT:  Syncope.    HISTORY OF PRESENTING ILLNESS:  This is a pleasant 89 years old male, who   presented to the emergency room after having a syncopal episode at home.  Upon   evaluation by me, the patient is aware that he is at [facility] that the month is May, year is 2017 and Noah Rivera is the president   of [place].  The patient recently presented to the emergency room with   urinary retention and concern for urinary tract infections.  He has a Aj   catheter in place.  He was initiated on nitrofurantoin therapy with plans to   follow up with urology in the outpatient setting.  He informs me that he was   in his baseline level of health until last night.  He reports that following   Aj catheter placement, he has been doing well.  He informs me that he has   sudden development of diarrhea with 2 massive loose bowel movements yesterday.    He informed me that when he woke up this morning, he was severely weak to   the point that he could not get out of his bed.  He informs me that he crawl   towards his wheelchair and simultaneously in the process he had an episode of   fainting.  There were no seizure-like episodes or any unilateral/focal   neurological symptoms reported during this time.  Otherwise, patient denies   having any headache.  He denies any chest pain or shortness of breath.  He   denies any cough.  He denies any abdominal pain.  He reports he has not had   any further diarrhea since this morning.  Denies any blood in his bowel   movements or melena.  He denies that he currently has a Aj catheter in   place for underlying dysuria.  Denies any lower extremity swelling,   palpitations, orthopnea, or paroxysmal nocturnal dyspnea.  He informs me that   he walks with a walker or a cane.  Upon evaluation by me he feels that he is   back to his baseline level of health, now his weakness and flash symptoms have    completely resolved.  Upon evaluation in the emergency room, the patient was   noted to be severely orthostatic with greater than 30 point drop in his   systolic blood pressures with standing.  The patient has a known history of   syncope in the past.    HOME MEDICATIONS:  1.  Atorvastatin 10 mg daily.  2.  Finasteride 5 mg daily.  3.  Macrobid 100 mg twice a day.  4.  Trazodone 50 mg every evening.  5.  Metformin 1000 mg twice a day.  6.  Aspirin 81 mg daily.  7.  Probiotics.  8.  Vitamin B complex.  9.  Multivitamin.  10.  Vitamin D.  11.  Fish oil.  12.  Vitamin B12.  13.  Latanoprost eye drops.    PAST MEDICAL HISTORY:  1.  Syncope.  2.  Orthostatic hypotension.  3.  Paroxysmal atrial fibrillation.  4.  Chronic hyponatremia.  5.  Benign prostatic hypertrophy.  6.  Chronic back pain.  7.  Carotid artery stenosis with subsequent carotid endarterectomy.  8.  Diabetes mellitus type 2.  9.  Glaucoma.  10.  Cataract.  11.  Gastroesophageal reflux disease.  12.  History of pyelonephritis.    PAST SURGICAL HISTORY:  1.  ERCP.  2.  Colonoscopy.  3.  Cholecystectomy.  4.  Carotid endarterectomy.  5.  Cataract surgery.    FAMILY HISTORY:  Father with history of coronary artery disease and diabetes   mellitus.  Mother passed away from old age, sister had a history of cancer.    SOCIAL HISTORY:  The patient reports that he never smoked in his life.  Denies   use of alcohol or drugs of abuse.    ALLERGIES:  Patient has no known allergies.    REVIEW OF SYSTEMS:  Detailed review of system was reviewed with the patient   and negative other than as listed in the history of presenting illness and   past medical history.    PHYSICAL EXAMINATION:  VITAL SIGNS:  On presentation, temperature of 36.4 degrees Celsius, pulse of   65, respiratory rate of 16, blood pressure 114/55, weight of 72.5 kg, and   oxygen saturation of 94% on room air.  GENERAL:  The patient is alert and oriented x4 in no acute distress.  HEENT:  Head is  normocephalic.  Extraocular movements are intact.  Bilateral   pupils are equal and responsive to light.  No conjunctival pallor or scleral   icterus.  Dry mucous membranes are noted.  NECK:  No jugular venous distention.  CARDIOVASCULAR:  Regular rate and rhythm.  S1 plus S2.  No murmurs, rubs, or   gallops are noted.  RESPIRATORY:  Clear to auscultation bilaterally.  No wheezing or rhonchi.  ABDOMEN:  Soft, nontender, nondistended.  Bowel sounds positive and normal in   frequency.  GENITOURINARY:  Not examined.  MUSCULOSKELETAL:  No joint swelling or tenderness on examination.  SKIN:  No rashes, erythema, or wounds.  NEUROLOGICAL:  Cranial nerves without any gross deficit.  Motor strength of   5/5 in bilateral upper and lower extremities.  No gross sensory deficits.  EXTREMITIES:  Pulses 1+ in bilateral lower extremity.  Mild bilateral lower   extremity edema, no cyanosis.    LABORATORY STUDIES:  White blood cell count of 8.6, hemoglobin of 13.4,   platelet count of 146.  Sodium of 133, potassium of 4.0, BUN of 25, creatinine   of 1.06.  Liver function tests within normal limits.  Troponin I of 0.1.  INR   of 1.05.  Urinalysis negative for any evidence of infection.  TSH of 1.6.    IMAGING STUDIES:  1.  Chest x-ray obtained on presentation reveals no acute cardiopulmonary   pathologies, there is a noncontrasted CT of the head reveals cerebral atrophy,   otherwise no acute pathologies are noted on this study.  2.  CT renal colic protocol reveals no hydronephrosis or urolithiasis noted.    No evidence of small-bowel obstruction or acute appendicitis, left lower   quadrant abdominal wall hernia containing obstructing colon, diverticulosis of   the colon without evidence of diverticulitis, atherosclerotic changes are   noted.  3.  EKG obtained on presentation, and personally reviewed by me reveals the   patient to be in sinus bradycardia.  Otherwise, there are no ST-T wave changes   concerning for acute  ischemia.    ASSESSMENT AND PLAN:  1.  Syncope.  The patient's presentation with syncope is secondary to   underlying orthostatic hypotension secondary to profound diarrhea evident   prior to presentation.  The patient does have a history of syncope in the past   and he has had an extensive workup in November of 2016, which was negative.    Recommend admission to the telemetry unit and ongoing cycling of his troponin.    Recommend repeating orthostatic vital signs in the morning after ongoing IV   fluid hydration and obtaining a physical therapy and occupational therapy   evaluation for disposition needs if any.  2.  Elevated troponins noted on presentation with slight uptrending noted.    This is most likely secondary to this patient's syncopal episode/profound   orthostatic hypotension with his advanced age.  At this point in time,   recommend continuing his baseline regimen of aspirin and atorvastatin.    Recommend cycling of troponins.  EKG is negative for any acute ischemic stroke   evidence.  If persistent ongoing elevation is noted, then cardiology   consultation may be considered.  I would recommend obtaining an echocardiogram   to assess for any wall motion abnormalities.  This has been ordered by me.    At this point in time, I would not recommend ongoing aggressive management   unless and until evidence of ischemia is evident.  There is ongoing elevation   of his troponin.  This has been discussed with the patient.  3.  Diarrhea, after recent antibiotic use, it is concerning for Clostridium   difficile colitis further evaluation with a C. diff PCR and stool cultures   have been requested by me.  4.  Urinary retention with ongoing Aj catheter decompression.  Continue   Aj catheter and consider outpatient urology followup given his underlying   syncope alpha blockers are relatively contraindicated.  5.  Hyponatremia secondary to profound dehydration.  Recommend assessing   response to IV fluid  hydration.  6.  Acute kidney injury, prerenal in etiology with profound azotemia secondary   to underlying dehydration and diarrhea.  Recommend assessing response to IV   fluid hydration.  7.  Hyperlipidemia.  Continue statin therapy.  8.  Suspected urinary tract infection.  Upon review of his urinalysis, which   has been recently obtained, these have remained persistently negative for   nitrites and leukocyte esterase.  Mild if any pyuria was noted, his cultures   have also remained negative.  His antibiotic therapy has already been   complicated by development of diarrhea, which is concerning for Clostridium   difficile diarrhea.  At this point in time, I would withhold antibiotic   therapy given there is no indication to proceed with antibiotic therapy at   this point in time.  9.  Diabetes mellitus type 2, withholding oral hypoglycemic agents and using   sliding scale insulin therapy during hospital stay.  10.  Glaucoma, continue baseline eye drops.  11.  Insomnia, continue baseline regimen of trazodone.  12.  Chronic thrombocytopenia.  Recommend ongoing monitoring.  13.  Preventive prophylaxis.  Deep venous thrombosis preventive prophylaxis   with sequential compression devices and subcutaneous Lovenox has been ordered.    Stress ulcer prophylaxis are not indicated.  13.  Code status was personally discussed by me with the patient and patient   wishes to maintain a full code status.       ____________________________________     MD ROHIT ALFREDO / ALLAN    DD:  05/02/2017 19:49:05  DT:  05/02/2017 20:38:24    D#:  [id number]  Job#:  [id number]

## 2017-05-03 NOTE — CONSULTS
Cardiology consultation     Patient ID:  Bulmaro Wheeler  [id number]  89 y.o. male  4/16/1927     asking for consultation: Dr. Rodgers   Indication for consultation: Abnormal troponin     HPI:    History obtained from the chart and from the patient interview and as well as conversation with the consult doctor, Dr. Rodgers.     Pleasant 89-year-old gentleman who is alert and oriented ×3, admitted through the emergency room this morning brought in by EMS because of diarrhea. Has been weak because of this but no focal symptoms    He admits to being down and out because of this. He has not had fevers but he has been tired. He was told not to drink water because he has kidney problems. He has had diarrhea for several days. He was not febrile at home but emergency medical services noted that his home blood pressures were in the 70s and 80s. He received several liters of fluid in the emergency room and here today    Admitted to the decision unit for observation because of possible syncope at home  No fevers or chest pains, no prodrome prior to this    He wears a catheter at home for urinary retention since he was last seen in the emergency room          Past Medical History:  has a past medical history of Diabetes; Hypertension; Arthritis; Pneumonia; Backpain; Glaucoma; Indigestion; CATARACT; Urinary tract infection, site not specified (9/27/2013); Sepsis (9/27/2013); UTI (lower urinary tract infection) (9/1/2012); Heart burn; Pyelonephritis (October 2010); Acute kidney failure, unspecified (CMS-HCC) (9/27/2013); Renal cyst (9/19/2013); Pyelonephritis; Carotid artery stenosis (2/18/2014); and Occlusion and stenosis of carotid artery without mention of cerebral infarction (3/11/2014). He also has no past medical history of Unspecified hemorrhagic conditions, Psychiatric problem, Myocardial infarct (CMS-HCC), Pacemaker, Heart murmur, ASTHMA, Jaundice, Personal history of venous thrombosis and embolism, Seizure  (CMS-HCC), Angina, Rheumatic fever, Dialysis, Cancer (CMS-HCC), Unspecified disorder of thyroid, Congestive heart failure (CMS-HCC), Heart valve disease, Bronchitis, COPD, Unspecified urinary incontinence, or Fall.  Past Surgical History:  has past surgical history that includes ercp w/removal calculus (2009); eye surgery (1980's); colonoscopy (1999); cholecystectomy (1999); and carotid endarterectomy (3/11/2014).  Past Social History:  reports that he has never smoked. He has never used smokeless tobacco. He reports that he does not drink alcohol or use illicit drugs.  Past Family History:   Family History   Problem Relation Age of Onset   • Heart Disease Father    • Diabetes Father    • Alcohol/Drug Father    • Cancer Sister      ovarian   • Stroke Brother      age 90     Allergies: Review of patient's allergies indicates no known allergies.    Current Medications:  Prior to Admission medications    Medication Sig Start Date End Date Taking? Authorizing Provider   atorvastatin (LIPITOR) 10 MG Tab Take 10 mg by mouth every evening.   Yes Not In System Provider   finasteride (PROSCAR) 5 MG Tab Take 5 mg by mouth every evening.   Yes Not In System Provider   nitrofurantoin monohydr macro (MACROBID) 100 MG Cap Take 100 mg by mouth 2 times a day. Pt started on 4/28/2017 for 10 day course for kidney infection. 4/28/17  Yes Not In System Provider   trazodone (DESYREL) 50 MG Tab Take 50 mg by mouth every evening.   Yes Not In System Provider   metformin (GLUCOPHAGE) 500 MG Tab Take 2 Tabs by mouth 2 times a day, with meals. 4/25/17   Zahra Yañez M.D.   Ibuprofen-Diphenhydramine Cit (ADVIL PM PO) Take 1 Tab by mouth every bedtime.    Not In System Provider   aspirin EC (ECOTRIN) 81 MG Tablet Delayed Response Take 81 mg by mouth every day.    Not In System Provider   Probiotic Product (PROBIOTIC PO) Take 1 Cap by mouth every evening.    Not In System Provider   B Complex Vitamins (VITAMIN B COMPLEX PO) Take 1 Tab by  "mouth every evening.    Not In System Provider   Multiple Vitamins-Minerals (CENTRUM SILVER PO) Take 1 Tab by mouth every day.    Not In System Provider   Cholecalciferol (VITAMIN D PO) Take 1 Cap by mouth every day.    Not In System Provider   Omega-3 Fatty Acids (FISH OIL PO) Take 1 Cap by mouth every day.    Not In System Provider   Multiple Vitamins-Minerals (OCUVITE) Tab Take 1 Tab by mouth every evening.    Not In System Provider   latanoprost (XALATAN) 0.005 % SOLN Place 1 Drop in both eyes every evening.    Not In System Provider       Review of Systems:  Review of Systems   Constitutional: Positive for malaise/fatigue. Negative for fever and weight loss.   Eyes: Negative.    Respiratory: Negative for cough, shortness of breath and wheezing.    Cardiovascular: Negative for chest pain, palpitations, leg swelling and PND.   Gastrointestinal: Negative for heartburn and nausea.   Musculoskeletal: Negative.    Skin: Negative for rash.   Neurological: Negative for tingling, tremors, sensory change, focal weakness, loss of consciousness and headaches.   Endo/Heme/Allergies: Does not bruise/bleed easily.   Psychiatric/Behavioral: Negative for depression. The patient is not nervous/anxious and does not have insomnia.    All other systems reviewed and are negative.    Blood pressure 150/71, pulse 60, temperature 36.3 °C (97.4 °F), resp. rate 18, height 1.753 m (5' 9.02\"), weight 72.576 kg (160 lb), SpO2 98 %.    Physical Examination:  Physical Exam   Constitutional: He is oriented to person, place, and time. He appears well-nourished.   HENT:   Head: Normocephalic and atraumatic.   Mouth/Throat: No oropharyngeal exudate.   Eyes: EOM are normal. Pupils are equal, round, and reactive to light. No scleral icterus.   Neck: No JVD present. No thyromegaly present.   Cardiovascular: Normal rate, regular rhythm and intact distal pulses.    No murmur heard.  Pulmonary/Chest: Breath sounds normal.   Abdominal: Soft. Bowel " sounds are normal. He exhibits no distension.   Neurological: He is alert and oriented to person, place, and time. No cranial nerve deficit. He exhibits normal muscle tone.   Skin: Skin is warm and dry. No rash noted.   Psychiatric: He has a normal mood and affect. His behavior is normal.       Data:     Twelve-lead ECG reviewed by me, sinus rhythm without ischemic changes. No arrhythmias noted on monitor. Troponin currently 0.9 up from 0.7. Echocardiogram images reviewed from me from last November, no significant valve disease moderate diastolic dysfunction but normal systolic function with ejection fraction more than 60%. Currently BUN is 25, creatinine 1.1 GFR more than 60 LFTs normal lactic acid 1.5 in the emergency room the other day     CBC normal with a hemoglobin of 13.4, normal MCV white count 8.6 with a normal differential  Chest x-ray reviewed by me, no infiltrate or effusion        Impression:  Syncope in the setting of dehydration/volume depletion  Diarrhea  Probable vasovagal dysfunction in the setting of advanced age and the above diarrhea and recent urinary retention/urinary tract infection  Indeterminate troponin without evidence for acute coronary syndrome by clinical exam and history and chart review. Supply demand ischemia may be present, no high risk features    Plan:  I agree with Dr. Rodgers. Conservative management is warranted. There is no indication that aggressive management, nuclear perfusion imaging will change the course her prognosis at this point    If he has concerns or questions please don't hesitate to call  We'll follow along the results of the echo tomorrow  I would not add a heparin drip or even an aspirin at this point unless the situation changes  Fluid resuscitation also seems prudent measure    Thanks again, will follow as needed

## 2017-05-03 NOTE — FACE TO FACE
Face to Face Supporting Documentation - Home Health    The encounter with this patient was in whole or in part the primary reason for home health admission.    Date of encounter:   Patient:                    MRN:                       YOB: 2017  Bulmaro Wheeler  [id number]  4/16/1927     Home health to see patient for:  Skilled Nursing care for assessment, interventions & education, Physical Therapy evaluation and treatment and Occupational therapy evaluation and treatment    Skilled need for:  Medication Management CAD, recent syncopal event, Paroxysmal Afib    Skilled nursing interventions to include:  Comment: Medication management for Afib, CAD    Homebound status evidenced by:  Needs the assistance of another person in order to leave the home. Leaving home requires a considerable and taxing effort. There is a normal inability to leave the home.    Community Physician to provide follow up care: Zahra Yañez M.D.     Optional Interventions? No      I certify the face to face encounter for this home health care referral meets the CMS requirements and the encounter/clinical assessment with the patient was, in whole, or in part, for the medical condition(s) listed above, which is the primary reason for home health care. Based on my clinical findings: the service(s) are medically necessary, support the need for home health care, and the homebound criteria are met.  I certify that this patient has had a face to face encounter by myself.  FARNAZ Santana. - NPI: [id number]

## 2017-05-03 NOTE — PROGRESS NOTES
Case discussed with Dr Liz from cardiology who will evaluate for elevated troponins.   Transfer per cardiology recommendations.     Kath Rodgers M.D.

## 2017-05-03 NOTE — DISCHARGE PLANNING
Received Home Health choice from [facility]) at 1500.  Home Health referral sent to [facility] at 1514.

## 2017-05-03 NOTE — DISCHARGE PLANNING
CM received order for HH. Met with pt and son at bedside to discuss Choice. Pt has been on service with [facility] in the past and would like to go with them again. Choice signed by son with pt's permission. Faxed to [facility] at [phone] with original placed in medical record.

## 2017-05-04 ENCOUNTER — PATIENT OUTREACH (OUTPATIENT)
Dept: HEALTH INFORMATION MANAGEMENT | Facility: OTHER | Age: 82
End: 2017-05-04

## 2017-05-04 LAB
BACTERIA UR CULT: NORMAL
SIGNIFICANT IND 70042: NORMAL
SITE SITE: NORMAL
SOURCE SOURCE: NORMAL

## 2017-05-04 NOTE — DISCHARGE SUMMARY
DATE OF ADMISSION:  05/02/2017    DATE OF DISCHARGE:  05/03/2017    CODE STATUS:  Full code.    PRIMARY CARE PROVIDER:  Dr. Zahra Yañez.    DISCHARGE DIAGNOSIS:  Syncopal event.    CHRONIC MEDICAL PROBLEMS:  1.  Chronic back pain.  2.  Type 2 diabetes mellitus, well controlled.  3.  Paroxysmal atrial fibrillation.  4.  Thrombocytopenia.  5.  Benign prostatic hypertrophy.    STUDIES:  Hematology:  WBC is 6.7, hemoglobin and hematocrit are 10.6 and   31.9, platelets of 133.  Chemistry panel:  Sodium 138, potassium 3.8, albumin   of 3.1 with total protein of 5.4; otherwise, unremarkable chemistry panel.    Total cholesterol of 60, triglycerides 84.  Troponin initially elevated at 0.9,   decreased to 0.78 prior to discharge.  TSH 1.640.    IMAGING STUDIES:  1.  Chest x-ray indicates probable nipple shadow projecting over the right   lung base.  No airspace consolidation.  Interval followup chest x-rays using   nipple marker is a consideration.  2.  CT head without contrast indicates cerebral atrophy; white matter lucency   is most consistent with small-vessel ischemic change versus demyelination or   gliosis.  Otherwise, head CT without contrast with no acute findings.  No   evidence of acute cerebral hemorrhage or mass lesion.  3.  CT of renal colic indicates no hydronephrosis or urolithiasis.  No   evidence of small-bowel obstruction or acute appendicitis.  No free fluid,   left lower quadrant abdominal wall hernia containing obstructive colon.    Diverticulosis of the colon without evidence of diverticulitis.    Atherosclerotic changes.  4.  EKG, sinus rhythm with a rate of 61.  Echocardiogram was ordered, but the   patient refused to do the exam.    CONSULTS:  Dr. Akosua Liz.    PROCEDURES:  None.    HOSPITAL COURSE:  H and P on admission done by Dr. Rodgers.  Please see his full   H and P for further details.  Briefly, this is a 89-year-old gentleman who   presented to the emergency department after a syncopal  episode at home.    Patient reports that he was recently seen for a urinary retention and urinary   tract infection, he has a Aj catheter in place and was started on   antibiotics.  He reports that he was feeling well prior to arrival; however,   that he had developed several episodes of diarrhea and shortly after he was   reporting weakness which led to an episode of fainting while at home.  Patient   presented to the emergency department for further evaluation.  There was a   concern that he did have a slightly elevated troponin, although his EKG   remained negative for any acute issues.  We did consult cardiology Dr. Akosua Liz who saw the patient.  Given his recent antibiotic use and diarrhea   episodes, we also ruled out C. diff which was negative.  Given his advanced   age and his history of syncopal event, we did obtain a physical therapy and   occupational therapy evaluation.  It was determined that the patient would   benefit from home health and this was ordered prior to discharge.  Patient   definitively refused any sort of skilled nursing facility or rehab facility at   discharge.  He reports that he would like to go home.  He has plenty of   family to help and family was at bedside and agreeable to this plan.  He will   be receiving home health and he is appreciative of this effort.  Patient will   be discharged home in stable condition.  He is eating and drinking without any   difficulty, his vital signs are stable, there has been no ectopy or   abnormalities on the monitor and he has been cleared for discharge.    DISPOSITION:  To home.    CONDITION:  Stable.    ACTIVITY:  As tolerated.    DIET:  Continue healthy cardiac diet.    MEDICATIONS:  1.  Advil PM p.o. take 1 by mouth every bedtime as needed for sleep.  2.  Aspirin 81 mg tablet, take 1 by mouth every day.  3.  Lipitor 10 mg tablets, take 1 by mouth every evening.  4.  Centrum Silver p.o. take 1 by mouth every day.  5.  Ocuvite  tablets, take 1 by mouth every evening.  6.  Proscar 5 mg tablet, take 1 by mouth every evening.  7.  Fish oil p.o., take 1 by mouth every day.  8.  Xalatan drops, place 1 drop in both eyes every evening.  9.  Glucophage 500 mg tablet, take 2 by mouth twice a day with meals.  10.  Macrobid 100 mg capsule, take 1 by mouth 2 times a day for a total of a   10-day course.  11.  Probiotic p.o. take 1 by mouth every evening.  12.  Trazodone 50 mg tablets, take 1 by mouth every evening as needed for   sleep.  13.  Vitamin B complex, take 1 tablet by mouth every evening.  14.  Vitamin D p.o. take 1 by mouth every day.    FOLLOWUP:  The patient is advised to follow up with his primary care provider   Dr. Zahra Yañez.  He is advised to call on Monday to discuss recent   hospitalization and the need for followup.    INSTRUCTIONS:  Advised to return to the emergency department with any   worsening or concerning symptoms and also strongly advised to follow up as   discussed with his primary care provider.    Time spent on discharge is 37minutes.       ____________________________________     XUAN Santana / ALLAN    DD:  05/03/2017 15:37:29  DT:  05/04/2017 01:45:21    D#:  [id number]  Job#:  [id number]    cc: ZAHRA YAÑEZ MD, JOY MILAN MD

## 2017-05-05 ENCOUNTER — HOME CARE VISIT (OUTPATIENT)
Dept: HOME HEALTH SERVICES | Facility: HOME HEALTHCARE | Age: 82
End: 2017-05-05
Payer: MEDICARE

## 2017-05-05 VITALS
RESPIRATION RATE: 14 BRPM | HEART RATE: 75 BPM | DIASTOLIC BLOOD PRESSURE: 58 MMHG | SYSTOLIC BLOOD PRESSURE: 124 MMHG | BODY MASS INDEX: 23.46 KG/M2 | WEIGHT: 158.38 LBS | TEMPERATURE: 98.5 F | HEIGHT: 69 IN

## 2017-05-05 LAB
BACTERIA BLD CULT: NORMAL
BACTERIA BLD CULT: NORMAL
BACTERIA STL CULT: NORMAL
E COLI SXT1+2 STL IA: NORMAL
SIGNIFICANT IND 70042: NORMAL
SITE SITE: NORMAL
SOURCE SOURCE: NORMAL

## 2017-05-05 PROCEDURE — A4333 URINARY CATH ANCHOR DEVICE: HCPCS

## 2017-05-05 PROCEDURE — G0162 HHC RN E&M PLAN SVS, 15 MIN: HCPCS

## 2017-05-05 PROCEDURE — 665001 SOC-HOME HEALTH

## 2017-05-05 SDOH — ECONOMIC STABILITY: HOUSING INSECURITY: UNSAFE APPLIANCES: 0

## 2017-05-05 SDOH — ECONOMIC STABILITY: HOUSING INSECURITY: UNSAFE COOKING RANGE AREA: 0

## 2017-05-05 ASSESSMENT — ACTIVITIES OF DAILY LIVING (ADL)
OASIS_M1830: 03
HOME_HEALTH_OASIS: 01

## 2017-05-05 ASSESSMENT — PATIENT HEALTH QUESTIONNAIRE - PHQ9
2. FEELING DOWN, DEPRESSED, IRRITABLE, OR HOPELESS: 00
1. LITTLE INTEREST OR PLEASURE IN DOING THINGS: 00

## 2017-05-08 ENCOUNTER — HOME CARE VISIT (OUTPATIENT)
Dept: HOME HEALTH SERVICES | Facility: HOME HEALTHCARE | Age: 82
End: 2017-05-08
Payer: MEDICARE

## 2017-05-08 VITALS — DIASTOLIC BLOOD PRESSURE: 60 MMHG | RESPIRATION RATE: 16 BRPM | SYSTOLIC BLOOD PRESSURE: 130 MMHG | TEMPERATURE: 98.3 F

## 2017-05-08 PROCEDURE — G0162 HHC RN E&M PLAN SVS, 15 MIN: HCPCS

## 2017-05-11 ENCOUNTER — HOME CARE VISIT (OUTPATIENT)
Dept: HOME HEALTH SERVICES | Facility: HOME HEALTHCARE | Age: 82
End: 2017-05-11
Payer: MEDICARE

## 2017-05-11 PROCEDURE — G0152 HHCP-SERV OF OT,EA 15 MIN: HCPCS

## 2017-05-11 PROCEDURE — G0495 RN CARE TRAIN/EDU IN HH: HCPCS

## 2017-05-11 SDOH — ECONOMIC STABILITY: HOUSING INSECURITY: UNSAFE APPLIANCES: 0

## 2017-05-11 SDOH — ECONOMIC STABILITY: HOUSING INSECURITY: UNSAFE COOKING RANGE AREA: 0

## 2017-05-11 ASSESSMENT — ENCOUNTER SYMPTOMS
NAUSEA: DENIES ANY
VOMITING: DENIES ANY

## 2017-05-12 ENCOUNTER — HOSPITAL ENCOUNTER (INPATIENT)
Facility: MEDICAL CENTER | Age: 82
LOS: 1 days | DRG: 683 | End: 2017-05-13
Attending: EMERGENCY MEDICINE | Admitting: INTERNAL MEDICINE
Payer: MEDICARE

## 2017-05-12 ENCOUNTER — APPOINTMENT (OUTPATIENT)
Dept: RADIOLOGY | Facility: MEDICAL CENTER | Age: 82
DRG: 683 | End: 2017-05-12
Attending: EMERGENCY MEDICINE
Payer: MEDICARE

## 2017-05-12 ENCOUNTER — RESOLUTE PROFESSIONAL BILLING HOSPITAL PROF FEE (OUTPATIENT)
Dept: HOSPITALIST | Facility: MEDICAL CENTER | Age: 82
End: 2017-05-12
Payer: COMMERCIAL

## 2017-05-12 ENCOUNTER — APPOINTMENT (OUTPATIENT)
Dept: RADIOLOGY | Facility: MEDICAL CENTER | Age: 82
DRG: 683 | End: 2017-05-12
Attending: INTERNAL MEDICINE
Payer: MEDICARE

## 2017-05-12 VITALS
RESPIRATION RATE: 20 BRPM | HEART RATE: 67 BPM | SYSTOLIC BLOOD PRESSURE: 146 MMHG | DIASTOLIC BLOOD PRESSURE: 62 MMHG | TEMPERATURE: 98.2 F

## 2017-05-12 DIAGNOSIS — E53.8 B12 DEFICIENCY: ICD-10-CM

## 2017-05-12 DIAGNOSIS — E86.1 HYPOVOLEMIA: ICD-10-CM

## 2017-05-12 PROBLEM — R55 SYNCOPE: Status: ACTIVE | Noted: 2017-05-12

## 2017-05-12 PROBLEM — I95.9 HYPOTENSION: Status: ACTIVE | Noted: 2017-05-12

## 2017-05-12 PROBLEM — E86.0 DEHYDRATION: Status: ACTIVE | Noted: 2017-05-12

## 2017-05-12 LAB
ALBUMIN SERPL BCP-MCNC: 3.7 G/DL (ref 3.2–4.9)
ALBUMIN/GLOB SERPL: 1.4 G/DL
ALP SERPL-CCNC: 69 U/L (ref 30–99)
ALT SERPL-CCNC: 14 U/L (ref 2–50)
ANION GAP SERPL CALC-SCNC: 8 MMOL/L (ref 0–11.9)
AST SERPL-CCNC: 16 U/L (ref 12–45)
BASOPHILS # BLD AUTO: 0.2 % (ref 0–1.8)
BASOPHILS # BLD: 0.02 K/UL (ref 0–0.12)
BILIRUB SERPL-MCNC: 0.6 MG/DL (ref 0.1–1.5)
BUN SERPL-MCNC: 25 MG/DL (ref 8–22)
CALCIUM SERPL-MCNC: 8.8 MG/DL (ref 8.5–10.5)
CHLORIDE SERPL-SCNC: 100 MMOL/L (ref 96–112)
CO2 SERPL-SCNC: 21 MMOL/L (ref 20–33)
CREAT SERPL-MCNC: 0.86 MG/DL (ref 0.5–1.4)
EKG IMPRESSION: NORMAL
EKG IMPRESSION: NORMAL
EOSINOPHIL # BLD AUTO: 0.06 K/UL (ref 0–0.51)
EOSINOPHIL NFR BLD: 0.6 % (ref 0–6.9)
ERYTHROCYTE [DISTWIDTH] IN BLOOD BY AUTOMATED COUNT: 43 FL (ref 35.9–50)
GFR SERPL CREATININE-BSD FRML MDRD: >60 ML/MIN/1.73 M 2
GLOBULIN SER CALC-MCNC: 2.7 G/DL (ref 1.9–3.5)
GLUCOSE BLD-MCNC: 124 MG/DL (ref 65–99)
GLUCOSE BLD-MCNC: 148 MG/DL (ref 65–99)
GLUCOSE BLD-MCNC: 150 MG/DL (ref 65–99)
GLUCOSE BLD-MCNC: 176 MG/DL (ref 65–99)
GLUCOSE SERPL-MCNC: 227 MG/DL (ref 65–99)
HCT VFR BLD AUTO: 37.2 % (ref 42–52)
HGB BLD-MCNC: 12.7 G/DL (ref 14–18)
IMM GRANULOCYTES # BLD AUTO: 0.04 K/UL (ref 0–0.11)
IMM GRANULOCYTES NFR BLD AUTO: 0.4 % (ref 0–0.9)
LYMPHOCYTES # BLD AUTO: 0.88 K/UL (ref 1–4.8)
LYMPHOCYTES NFR BLD: 9.1 % (ref 22–41)
MCH RBC QN AUTO: 30.9 PG (ref 27–33)
MCHC RBC AUTO-ENTMCNC: 34.1 G/DL (ref 33.7–35.3)
MCV RBC AUTO: 90.5 FL (ref 81.4–97.8)
MONOCYTES # BLD AUTO: 0.69 K/UL (ref 0–0.85)
MONOCYTES NFR BLD AUTO: 7.2 % (ref 0–13.4)
NEUTROPHILS # BLD AUTO: 7.94 K/UL (ref 1.82–7.42)
NEUTROPHILS NFR BLD: 82.5 % (ref 44–72)
NRBC # BLD AUTO: 0 K/UL
NRBC BLD AUTO-RTO: 0 /100 WBC
PLATELET # BLD AUTO: 183 K/UL (ref 164–446)
PMV BLD AUTO: 10.3 FL (ref 9–12.9)
POTASSIUM SERPL-SCNC: 4 MMOL/L (ref 3.6–5.5)
PROT SERPL-MCNC: 6.4 G/DL (ref 6–8.2)
RBC # BLD AUTO: 4.11 M/UL (ref 4.7–6.1)
SODIUM SERPL-SCNC: 129 MMOL/L (ref 135–145)
TROPONIN I SERPL-MCNC: 0.01 NG/ML (ref 0–0.04)
WBC # BLD AUTO: 9.6 K/UL (ref 4.8–10.8)

## 2017-05-12 PROCEDURE — 99285 EMERGENCY DEPT VISIT HI MDM: CPT

## 2017-05-12 PROCEDURE — 82962 GLUCOSE BLOOD TEST: CPT

## 2017-05-12 PROCEDURE — A9270 NON-COVERED ITEM OR SERVICE: HCPCS | Performed by: INTERNAL MEDICINE

## 2017-05-12 PROCEDURE — 99223 1ST HOSP IP/OBS HIGH 75: CPT | Performed by: INTERNAL MEDICINE

## 2017-05-12 PROCEDURE — 700105 HCHG RX REV CODE 258: Performed by: INTERNAL MEDICINE

## 2017-05-12 PROCEDURE — 96361 HYDRATE IV INFUSION ADD-ON: CPT

## 2017-05-12 PROCEDURE — 93005 ELECTROCARDIOGRAM TRACING: CPT | Performed by: EMERGENCY MEDICINE

## 2017-05-12 PROCEDURE — 71010 DX-CHEST-PORTABLE (1 VIEW): CPT

## 2017-05-12 PROCEDURE — 85025 COMPLETE CBC W/AUTO DIFF WBC: CPT

## 2017-05-12 PROCEDURE — 94760 N-INVAS EAR/PLS OXIMETRY 1: CPT

## 2017-05-12 PROCEDURE — 70551 MRI BRAIN STEM W/O DYE: CPT

## 2017-05-12 PROCEDURE — 302128 INFUSION PUMP W/POLE: Performed by: INTERNAL MEDICINE

## 2017-05-12 PROCEDURE — 80053 COMPREHEN METABOLIC PANEL: CPT

## 2017-05-12 PROCEDURE — 84484 ASSAY OF TROPONIN QUANT: CPT

## 2017-05-12 PROCEDURE — 96360 HYDRATION IV INFUSION INIT: CPT

## 2017-05-12 PROCEDURE — 700102 HCHG RX REV CODE 250 W/ 637 OVERRIDE(OP): Performed by: INTERNAL MEDICINE

## 2017-05-12 PROCEDURE — 36415 COLL VENOUS BLD VENIPUNCTURE: CPT

## 2017-05-12 PROCEDURE — 700105 HCHG RX REV CODE 258: Performed by: EMERGENCY MEDICINE

## 2017-05-12 PROCEDURE — 770006 HCHG ROOM/CARE - MED/SURG/GYN SEMI*

## 2017-05-12 RX ORDER — SODIUM CHLORIDE 9 MG/ML
1000 INJECTION, SOLUTION INTRAVENOUS ONCE
Status: COMPLETED | OUTPATIENT
Start: 2017-05-12 | End: 2017-05-12

## 2017-05-12 RX ORDER — BISACODYL 10 MG
10 SUPPOSITORY, RECTAL RECTAL
Status: DISCONTINUED | OUTPATIENT
Start: 2017-05-12 | End: 2017-05-13 | Stop reason: HOSPADM

## 2017-05-12 RX ORDER — ATORVASTATIN CALCIUM 10 MG/1
10 TABLET, FILM COATED ORAL NIGHTLY
Status: DISCONTINUED | OUTPATIENT
Start: 2017-05-12 | End: 2017-05-13 | Stop reason: HOSPADM

## 2017-05-12 RX ORDER — LATANOPROST 50 UG/ML
1 SOLUTION/ DROPS OPHTHALMIC NIGHTLY
Status: DISCONTINUED | OUTPATIENT
Start: 2017-05-12 | End: 2017-05-13 | Stop reason: HOSPADM

## 2017-05-12 RX ORDER — DEXTROSE MONOHYDRATE 25 G/50ML
25 INJECTION, SOLUTION INTRAVENOUS
Status: DISCONTINUED | OUTPATIENT
Start: 2017-05-12 | End: 2017-05-13 | Stop reason: HOSPADM

## 2017-05-12 RX ORDER — SODIUM CHLORIDE 9 MG/ML
500 INJECTION, SOLUTION INTRAVENOUS ONCE
Status: DISPENSED | OUTPATIENT
Start: 2017-05-12 | End: 2017-05-13

## 2017-05-12 RX ORDER — FINASTERIDE 5 MG/1
5 TABLET, FILM COATED ORAL EVERY EVENING
Status: DISCONTINUED | OUTPATIENT
Start: 2017-05-12 | End: 2017-05-13 | Stop reason: HOSPADM

## 2017-05-12 RX ORDER — AMOXICILLIN 250 MG
2 CAPSULE ORAL 2 TIMES DAILY
Status: DISCONTINUED | OUTPATIENT
Start: 2017-05-12 | End: 2017-05-13 | Stop reason: HOSPADM

## 2017-05-12 RX ORDER — CYANOCOBALAMIN 1000 UG/ML
1000 INJECTION, SOLUTION INTRAMUSCULAR; SUBCUTANEOUS
Status: DISCONTINUED | OUTPATIENT
Start: 2017-06-09 | End: 2017-05-13 | Stop reason: HOSPADM

## 2017-05-12 RX ORDER — POLYETHYLENE GLYCOL 3350 17 G/17G
1 POWDER, FOR SOLUTION ORAL
Status: DISCONTINUED | OUTPATIENT
Start: 2017-05-12 | End: 2017-05-13 | Stop reason: HOSPADM

## 2017-05-12 RX ORDER — SODIUM CHLORIDE 9 MG/ML
INJECTION, SOLUTION INTRAVENOUS CONTINUOUS
Status: DISCONTINUED | OUTPATIENT
Start: 2017-05-12 | End: 2017-05-13 | Stop reason: HOSPADM

## 2017-05-12 RX ADMIN — INSULIN LISPRO 2 UNITS: 100 INJECTION, SOLUTION INTRAVENOUS; SUBCUTANEOUS at 12:37

## 2017-05-12 RX ADMIN — SODIUM CHLORIDE: 9 INJECTION, SOLUTION INTRAVENOUS at 19:33

## 2017-05-12 RX ADMIN — SODIUM CHLORIDE 1000 ML: 9 INJECTION, SOLUTION INTRAVENOUS at 05:18

## 2017-05-12 RX ADMIN — ASPIRIN 81 MG: 81 TABLET ORAL at 08:23

## 2017-05-12 RX ADMIN — ATORVASTATIN CALCIUM 10 MG: 10 TABLET, FILM COATED ORAL at 22:11

## 2017-05-12 RX ADMIN — STANDARDIZED SENNA CONCENTRATE AND DOCUSATE SODIUM 2 TABLET: 8.6; 5 TABLET, FILM COATED ORAL at 08:23

## 2017-05-12 RX ADMIN — FINASTERIDE 5 MG: 5 TABLET, FILM COATED ORAL at 22:10

## 2017-05-12 RX ADMIN — LATANOPROST 1 DROP: 50 SOLUTION OPHTHALMIC at 22:47

## 2017-05-12 RX ADMIN — SODIUM CHLORIDE: 9 INJECTION, SOLUTION INTRAVENOUS at 06:00

## 2017-05-12 ASSESSMENT — ACTIVITIES OF DAILY LIVING (ADL)
TOILETING_EQUIPMENT_USED: HIGH RISE TOILET
TRANSPORTATION_ASSISTANCE: 6
MEAL_PREP_ASSISTANCE: 1
BATHING_ASSISTANCE: 1
TOILETING_ASSISTANCE: 0
HOUSEKEEPING_ASSISTANCE: 6
LAUNDRY_ASSISTANCE: 1
SHOPPING_ASSISTANCE: 6
TOILETING_EQUIPMENT_NEEDED: GRAB BARS
ORAL_CARE_ASSISTANCE: 0
DRESSING_UB_ASSISTANCE: 0
BATHING_ASSISTIVE_EQUIPMENT_USED: SHOWER CHAIR, GRAB BARS, HANDHELD SHOWER
TELEPHONE_ASSISTANCE: 0
EATING_ASSISTANCE: 0
GROOMING_ASSISTANCE: 0
DRESSING_LB_ASSISTANCE: 0

## 2017-05-12 ASSESSMENT — PAIN SCALES - GENERAL
PAINLEVEL_OUTOF10: 0

## 2017-05-12 ASSESSMENT — COPD QUESTIONNAIRES
HAVE YOU SMOKED AT LEAST 100 CIGARETTES IN YOUR ENTIRE LIFE: NO/DON'T KNOW
COPD SCREENING SCORE: 2
DURING THE PAST 4 WEEKS HOW MUCH DID YOU FEEL SHORT OF BREATH: NONE/LITTLE OF THE TIME
DO YOU EVER COUGH UP ANY MUCUS OR PHLEGM?: NO/ONLY WITH OCCASIONAL COLDS OR INFECTIONS

## 2017-05-12 ASSESSMENT — ENCOUNTER SYMPTOMS: SHORTNESS OF BREATH: T

## 2017-05-12 ASSESSMENT — LIFESTYLE VARIABLES
DO YOU DRINK ALCOHOL: NO
EVER_SMOKED: NEVER

## 2017-05-12 NOTE — H&P
DATE OF ADMISSION:  05/12/2017    CHIEF COMPLAINT:  It felt like I had a stroke.    HISTORY OF PRESENT ILLNESS:  Patient is a 89-year-old male who presents to the   ER today stating that he was feeling dizzy, weak since 2:00 a.m. in the   morning.  Patient was actually recently here at the beginning of the month for   a syncopal episode and found to be orthostatic at that time, which was felt   to be the cause of the syncope.  Again, patient at this time has positive   orthostatics.  Patient denies any headaches.  No nausea or   vomiting.  He said he had 2 episodes of diarrhea, actually yesterday.  No   fever or chills.    REVIEW OF SYSTEMS:  All negative except as per HPI.    PAST MEDICAL HISTORY:  History of orthostatic hypertension, paroxysmal AFib,   chronic hyponatremia, BPH, chronic back pain, carotid artery stenosis with   subsequent carotid endarterectomy, diabetes mellitus type 2, glaucoma,   cataract.    PAST SURGICAL HISTORY:  ERCP, colonoscopy, cholecystectomy, carotid   endarterectomy, and cataract surgery.    FAMILY HISTORY:  Father with coronary artery disease and diabetes.    SOCIAL HISTORY:  Denies any alcohol, tobacco, or drug use.    ALLERGIES:  No known allergies.    PHYSICAL EXAMINATION:  VITAL SIGNS:  Blood pressure 137/57, respirations 18, pulse 77, and   temperature 97.2.  GENERAL:  No acute distress, nontoxic appearance.  HEENT:  Normocephalic, atraumatic.  Pupils are equal, round and reactive to   light.  NECK:  Supple, no adenopathy, no JVD.  CARDIOVASCULAR:  S1, S2 normal.  No murmur or gallops appreciated.  LUNGS:  Clear to auscultation bilaterally.  No rales, rhonchi or wheezing.  ABDOMEN:  Soft, nontender.  Positive bowel sounds.  EXTREMITIES:  No edema, cyanosis, or clubbing.  NEUROLOGIC:  No focal deficit.  Alert and oriented x4.  Cranial nerves II-XII   grossly intact.    LABORATORY WORK:  WBC 9.6, hemoglobin 12.7, hematocrit 37.2, platelets 183.    Sodium 129, potassium 4, chloride  100, glucose 227, BUN 25, creatinine 0.86.    Troponin 0.01.    IMAGING:  Chest x-ray, no acute cardiopulmonary abnormalities identified.    ASSESSMENT AND PLAN:  1.  Presyncopal episode.  Patient was feeling weak and dizzy, and felt like   passing out.  He was recently here a week ago for the same episode due to at   the time orthostatic hypotension, which looks probably to be again the cause   at this time.  We will start patient on hydration as he looks also dehydrated.    We will monitor on tele.  We will not repeat a CT head, which was actually   just done on the 2nd of May, but we will do an MRI of the brain.  2.  MARLI.  Will start patient on IVF and continue to monitor.  3.  Dehydration, most likely due to diarrhea.  He had a couple episodes the   day before being admitted.  We will start him on IV fluid.  4.  Diabetes.  We will hold off on the metformin, insulin sliding scale and   Accu-Cheks.  5.  Hyponatremia, probably due to dehydration.  Continue IV fluid, continue to   monitor.  6.  Prophylactic deep vein thrombosis.  We will start patient on subcutaneous   heparin.  7.  Code status:  Patient is a full code.       ____________________________________     MD RICHA NATION / ALLAN    DD:  05/12/2017 05:42:46  DT:  05/12/2017 06:37:28    D#:  [id number]  Job#:  [id number]

## 2017-05-12 NOTE — PROGRESS NOTES
Med rec completed per pt at bedside  Allergies reviewed - LISANDRO  Pt was on a 10 day course of Macrobid when he was   Last admitted on 5/2/17. States he did not finish this after D/C

## 2017-05-12 NOTE — IP AVS SNAPSHOT
YouSticker Access Code: Activation code not generated  Current YouSticker Status: Active    Hema[employer]  A secure, online tool to manage your health information     [employer]’s YouSticker® is a secure, online tool that connects you to your personalized health information from the privacy of your home -- day or night - making it very easy for you to manage your healthcare. Once the activation process is completed, you can even access your medical information using the YouSticker miriam, which is available for free in the Apple Miriam store or Google Play store.     YouSticker provides the following levels of access (as shown below):   My Chart Features   [facility] Primary Care Doctor [facility]  Specialists [facility]  Urgent  Care Non-[facility]  Primary Care  Doctor   Email your healthcare team securely and privately 24/7 X X X X   Manage appointments: schedule your next appointment; view details of past/upcoming appointments X      Request prescription refills. X      View recent personal medical records, including lab and immunizations X X X X   View health record, including health history, allergies, medications X X X X   Read reports about your outpatient visits, procedures, consult and ER notes X X X X   See your discharge summary, which is a recap of your hospital and/or ER visit that includes your diagnosis, lab results, and care plan. X X       How to register for YouSticker:  1. Go to  [email or web address].  2. Click on the Sign Up Now box, which takes you to the New Member Sign Up page. You will need to provide the following information:  a. Enter your YouSticker Access Code exactly as it appears at the top of this page. (You will not need to use this code after you’ve completed the sign-up process. If you do not sign up before the expiration date, you must request a new code.)   b. Enter your date of birth.   c. Enter your home email address.   d. Click Submit, and follow the next screen’s instructions.  3. Create a YouSticker ID. This will  be your Paltalk login ID and cannot be changed, so think of one that is secure and easy to remember.  4. Create a Paltalk password. You can change your password at any time.  5. Enter your Password Reset Question and Answer. This can be used at a later time if you forget your password.   6. Enter your e-mail address. This allows you to receive e-mail notifications when new information is available in Paltalk.  7. Click Sign Up. You can now view your health information.    For assistance activating your Paltalk account, call [phone]

## 2017-05-12 NOTE — ED PROVIDER NOTES
"ED Provider Note    CHIEF COMPLAINT  Chief Complaint   Patient presents with   • Weakness     reports feeling weak, since 2 am this morning, pt states, \"feel like i was having a stroke\"    • Dizziness     pt reports dizziness since 2 am.        HPI  Bulmaro Wheeler is a 89 y.o. male who presents to the emergency with weakness. Woke up this morning feeling weak and lightheaded sweaty symptoms or persistent since this morning at about 2 AM. Patient was recently admitted to the hospital with syncope. He states the symptoms feel similar. He was worried that he was having a stroke however denies any focal weakness or numbness. He was generally weak. He denies headache, slurred speech or confusion. He had diarrhea prior to his previous hospitalization. He reports his diarrhea continues. He did not have C. diff previously. He has been limiting his water intake because his sodium was low. Denies pain. No fever. No cough or difficulty breathing.    REVIEW OF SYSTEMS  As per HPI, otherwise a 10 point review of systems is negative    PAST MEDICAL HISTORY  Past Medical History   Diagnosis Date   • Diabetes    • Hypertension    • Arthritis    • Pneumonia    • Backpain    • Glaucoma      Interocular lenses placed in 1985   • Indigestion    • CATARACT      surgery in 1985   • Arrhythmia      in 80's, not recent   • Urinary tract infection, site not specified 9/27/2013   • Sepsis 9/27/2013   • UTI (lower urinary tract infection) 9/1/2012   • Heart burn    • Pyelonephritis October 2010   • Acute kidney failure, unspecified (CMS-HCC) 9/27/2013   • Renal cyst 9/19/2013   • Pyelonephritis    • Carotid artery stenosis 2/18/2014   • Occlusion and stenosis of carotid artery without mention of cerebral infarction 3/11/2014       SOCIAL HISTORY  Social History   Substance Use Topics   • Smoking status: Never Smoker    • Smokeless tobacco: Never Used   • Alcohol Use: No      Comment: hasnt drank since 1979       SURGICAL HISTORY  Past " "Surgical History   Procedure Laterality Date   • Ercp w/removal calculus  2009   • Eye surgery  1980's     cataracts [facility]   • Colonoscopy  1999   • Cholecystectomy  1999   • Carotid endarterectomy  3/11/2014     Performed by Bob Antonio M.D. at SURGERY [facility]       CURRENT MEDICATIONS  Home Medications     **Home medications have not yet been reviewed for this encounter**          ALLERGIES  No Known Allergies    PHYSICAL EXAM  VITAL SIGNS: /75 mmHg  Pulse 61  Temp(Src) 36.5 °C (97.7 °F)  Resp 17  Ht 1.753 m (5' 9\")  Wt 71.668 kg (158 lb)  BMI 23.32 kg/m2  SpO2 92%   Constitutional: Awake and alert. Elderly male  HENT:  Atraumatic, Normocephalic.Oropharynx dry mucus membranes, Nose normal inspection.   Eyes: Normal inspection  Neck: Supple  Cardiovascular: Normal heart rate, Normal rhythm.  Symmetric peripheral pulses.   Thorax & Lungs: No respiratory distress, No wheezing, No rales, No rhonchi, No chest tenderness.   Abdomen: Bowel sounds normal, soft, non-distended, nontender, no mass. Aj catheter  Skin: Pathologic rash. A few skin tears.  Back: No tenderness, No CVA tenderness.   Extremities: No asymmetric swelling  Neurologic: Alert & oriented x 3, Normal motor function, Normal sensory function, No focal deficits noted. Normal reflexes.  Normal Cranial Nerves.      RADIOLOGY/PROCEDURES                             DX-CHEST-PORTABLE (1 VIEW)   Final Result         1. No acute cardiopulmonary abnormalities are identified.              Imaging is interpreted by radiologist    Labs:  Results for orders placed or performed during the hospital encounter of 05/12/17   CBC w/ Differential   Result Value Ref Range    WBC 9.6 4.8 - 10.8 K/uL    RBC 4.11 (L) 4.70 - 6.10 M/uL    Hemoglobin 12.7 (L) 14.0 - 18.0 g/dL    Hematocrit 37.2 (L) 42.0 - 52.0 %    MCV 90.5 81.4 - 97.8 fL    MCH 30.9 27.0 - 33.0 pg    MCHC 34.1 33.7 - 35.3 g/dL    RDW 43.0 35.9 - 50.0 fL    Platelet Count 183 164 - 446 " K/uL    MPV 10.3 9.0 - 12.9 fL    Neutrophils-Polys 82.50 (H) 44.00 - 72.00 %    Lymphocytes 9.10 (L) 22.00 - 41.00 %    Monocytes 7.20 0.00 - 13.40 %    Eosinophils 0.60 0.00 - 6.90 %    Basophils 0.20 0.00 - 1.80 %    Immature Granulocytes 0.40 0.00 - 0.90 %    Nucleated RBC 0.00 /100 WBC    Neutrophils (Absolute) 7.94 (H) 1.82 - 7.42 K/uL    Lymphs (Absolute) 0.88 (L) 1.00 - 4.80 K/uL    Monos (Absolute) 0.69 0.00 - 0.85 K/uL    Eos (Absolute) 0.06 0.00 - 0.51 K/uL    Baso (Absolute) 0.02 0.00 - 0.12 K/uL    Immature Granulocytes (abs) 0.04 0.00 - 0.11 K/uL    NRBC (Absolute) 0.00 K/uL   Complete Metabolic Panel (CMP)   Result Value Ref Range    Sodium 129 (L) 135 - 145 mmol/L    Potassium 4.0 3.6 - 5.5 mmol/L    Chloride 100 96 - 112 mmol/L    Co2 21 20 - 33 mmol/L    Anion Gap 8.0 0.0 - 11.9    Glucose 227 (H) 65 - 99 mg/dL    Bun 25 (H) 8 - 22 mg/dL    Creatinine 0.86 0.50 - 1.40 mg/dL    Calcium 8.8 8.5 - 10.5 mg/dL    AST(SGOT) 16 12 - 45 U/L    ALT(SGPT) 14 2 - 50 U/L    Alkaline Phosphatase 69 30 - 99 U/L    Total Bilirubin 0.6 0.1 - 1.5 mg/dL    Albumin 3.7 3.2 - 4.9 g/dL    Total Protein 6.4 6.0 - 8.2 g/dL    Globulin 2.7 1.9 - 3.5 g/dL    A-G Ratio 1.4 g/dL   Troponin STAT   Result Value Ref Range    Troponin I 0.01 0.00 - 0.04 ng/mL   ESTIMATED GFR   Result Value Ref Range    GFR If African American >60 >60 mL/min/1.73 m 2    GFR If Non African American >60 >60 mL/min/1.73 m 2   ACCU-CHEK GLUCOSE   Result Value Ref Range    Glucose - Accu-Ck 148 (H) 65 - 99 mg/dL   EKG (ER)   Result Value Ref Range    Report       [facility] Emergency Dept.    Test Date:  2017  Pt Name:    JT MARIA               Department: ER  MRN:        [id number]                      Room:       [facility]  Gender:     M                            Technician: [id number]  :        [id number]                   Requested By:ER TRIAGE PROTOCOL  Order #:    [id number]                    Reading MD: BRYAN JOHNSON,  MD    Measurements  Intervals                                Axis  Rate:       69                           P:          72  SC:         156                          QRS:        42  QRSD:       90                           T:          60  QT:         428  QTc:        459    Interpretive Statements  SINUS RHYTHM  ATRIAL PREMATURE COMPLEX  Compared to ECG 05/02/2017 16:34:14  Atrial premature complex(es) now present    Electronically Signed On 5- 4:54:36 PDT by BRYAN JOHNSON MD         COURSE & MEDICAL DECISION MAKING  Patient presents for generalized weakness. He has diarrhea. He has water restriction because of hyponatremia. He does not have any focal findings on his exam. He does not have suggestion of acute stroke. Laboratory data was obtained as above. He appears mildly dehydrated. His blood pressure dropped while here without associated tachycardia. He was given 1 L of saline. Will be admitted to the hospital for further evaluation. Dr. Petersen was consulted.      FINAL IMPRESSION  1. Generalized weakness  2. Hypovolemia  3. Hyponatremia  4. Diarrhea      This dictation was created using voice recognition software. The accuracy of the dictation is limited to the abilities of the software.  The nursing notes were reviewed and certain aspects of this information were incorporated into this note.      Electronically signed by: Bryan Johnson, 5/12/2017 11:14 AM

## 2017-05-12 NOTE — IP AVS SNAPSHOT
5/13/2017    Bulmaro Wheeler  [address] [place] [address]    Dear Bulmaro:    [facility] wants to ensure your discharge home is safe and you or your loved ones have had all of your questions answered regarding your care after you leave the hospital.    Below is a list of resources and contact information should you have any questions regarding your hospital stay, follow-up instructions, or active medical symptoms.    Questions or Concerns Regarding… Contact   Medical Questions Related to Your Discharge  (7 days a week, 8am-5pm) Contact a Nurse Care Coordinator   [phone]   Medical Questions Not Related to Your Discharge  (24 hours a day / 7 days a week)  Contact the Nurse Health Line   [phone]    Medications or Discharge Instructions Refer to your discharge packet   or contact your [facility] Primary Care Provider   [phone]   Follow-up Appointment(s) Schedule your appointment via Fairphone   or contact Scheduling [phone]   Billing Review your statement via Fairphone  or contact Billing [phone]   Medical Records Review your records via Fairphone   or contact Medical Records [phone]     You may receive a telephone call within two days of discharge. This call is to make certain you understand your discharge instructions and have the opportunity to have any questions answered. You can also easily access your medical information, test results and upcoming appointments via the Fairphone free online health management tool. You can learn more and sign up at [facility]/Fairphone. For assistance setting up your Fairphone account, please call [phone].    Once again, we want to ensure your discharge home is safe and that you have a clear understanding of any next steps in your care. If you have any questions or concerns, please do not hesitate to contact us, we are here for you. Thank you for choosing [facility] for your healthcare needs.    Sincerely,    Your [facility] Healthcare Team

## 2017-05-12 NOTE — IP AVS SNAPSHOT
" <p align=\"LEFT\"><IMG SRC=\"[email or web address]\" alt=\"Image\" WIDTH=\"50%\" HEIGHT=\"200\" BORDER=\"\"></p>                   Name:Bulmaro Wheeler  Medical Record Number:[id number]  CSN: [id number]    YOB: 1927   Age: 89 y.o.  Sex: male  HT:1.753 m (5' 9\") WT: 71.9 kg (158 lb 8.2 oz)          Admit Date: 5/12/2017     Discharge Date:   Today's Date: 5/13/2017  Attending Doctor:  Lion Petersen M.D.                  Allergies:  Review of patient's allergies indicates no known allergies.          Your appointments     May 15, 2017 To Be Determined   SN-HH-HOME VISIT with [facility] POLINA Hopson   [facility] (--)    [address]  [place] [address]   [phone]            May 18, 2017 To Be Determined   SN-HH-HOME VISIT with [facility] POLINA Hopson   [facility] (--)    [address]  [place] [address]   [phone]            May 22, 2017 To Be Determined   SN-HH-HOME VISIT with [facility] POLINA Hopson   [facility] (--)    [address]  [place] [address]   [phone]            May 25, 2017 To Be Determined   SN-HH-HOME VISIT with [facility] (--)    [address]  [place] [address]   [phone]            May 29, 2017 To Be Determined   SN-HH-HOME VISIT with [facility] POLINA Hopson   [facility] (--)    [address]  [place] [address]   [phone]            Jun 05, 2017 To Be Determined   SN-HH-HOME VISIT with [facility] POLINA Hopson   [facility] (--)    [address] [address]   [phone]            Jun 07, 2017  9:00 AM   Established Patient with Zahra Yañez M.D.   [facility])    [address] [place] [id number] [phone]           You will be receiving a confirmation call a few days before your appointment from our automated call confirmation system.            Jun 12, 2017 To Be Determined   SN-HH-HOME VISIT with Nirali Hopson R.N.   "   [facility] (--)    [address]  [place] [address]   [phone]            Jun 19, 2017 To Be Determined   SN-HH-HOME VISIT with Nirali Hopson R.N.   [facility] (--)    [address]  [place] [address]   [phone]            Jun 26, 2017 To Be Determined   SN-HH-HOME VISIT with POLINA [facility] (--)    [address]  [facility] [address]   [phone]            Jul 20, 2017  2:00 PM   Established Patient with Zahra Yañez M.D.   [facility])    [address] [facility] [address]   [phone]           You will be receiving a confirmation call a few days before your appointment from our automated call confirmation system.                 Medication List      Take these Medications        Instructions    aspirin EC 81 MG Tbec   Commonly known as:  ECOTRIN    Take 81 mg by mouth every day.   Dose:  81 mg       atorvastatin 10 MG Tabs   Commonly known as:  LIPITOR    Take 10 mg by mouth every evening.   Dose:  10 mg       * CENTRUM SILVER PO    Take 1 Tab by mouth every day.   Dose:  1 Tab       * OCUVITE Tabs    Take 1 Tab by mouth every evening.   Dose:  1 Tab       finasteride 5 MG Tabs   Commonly known as:  PROSCAR    Take 5 mg by mouth every evening.   Dose:  5 mg       fish oil 1000 MG Caps capsule    Take 1,000 mg by mouth every day.   Dose:  1000 mg       latanoprost 0.005 % Soln   Commonly known as:  XALATAN    Place 1 Drop in both eyes every evening.   Dose:  1 Drop       metformin 500 MG Tabs   Commonly known as:  GLUCOPHAGE    Doctor's comments:  Authorization of a refill request.   Take 2 Tabs by mouth 2 times a day, with meals.   Dose:  1000 mg       nitrofurantoin monohydr macro 100 MG Caps   Commonly known as:  MACROBID    Take 100 mg by mouth 2 times a day. Pt started on 4/28/2017 for 10 day course for kidney infection. Stopped 5/1/17   Dose:  100 mg       PROBIOTIC PO    Take 1 Cap by mouth every  evening.   Dose:  1 Cap       trazodone 50 MG Tabs   Commonly known as:  DESYREL    Take 50 mg by mouth every evening.   Dose:  50 mg       VITAMIN B COMPLEX PO    Take 1 Tab by mouth every evening.   Dose:  1 Tab       VITAMIN D PO    Take 1 Cap by mouth every day.   Dose:  1 Cap       * Notice:  This list has 2 medication(s) that are the same as other medications prescribed for you. Read the directions carefully, and ask your doctor or other care provider to review them with you.

## 2017-05-12 NOTE — PROGRESS NOTES
Pt arrived to unit.  A&Ox4. Pt was transferred from stretcher to In no distress.  Denies any pain, requesting food.  Has allen present on arrival.  Pt placed on tele monitor, in sinus rhythm verified with monitor tech.  No further complaints.  VSS.  Bed locked in lowest position, call light within reach, and bed alarm on.  Will continue to monitor and follow plan of care.

## 2017-05-12 NOTE — IP AVS SNAPSHOT
" Home Care Instructions                                                                                                                  Name:Bulmaro Wheeler  Medical Record Number:[id number]  CSN: [id number]    YOB: 1927   Age: 89 y.o.  Sex: male  HT:1.753 m (5' 9\") WT: 71.9 kg (158 lb 8.2 oz)          Admit Date: 5/12/2017     Discharge Date:   Today's Date: 5/13/2017  Attending Doctor:  Lion Petersen M.D.                  Allergies:  Review of patient's allergies indicates no known allergies.            Discharge Instructions       Discharge Instructions    Discharged to home by car with relative. Discharged via wheelchair, hospital escort: Yes.  Special equipment needed: Not Applicable    Be sure to schedule a follow-up appointment with your primary care doctor or any specialists as instructed.     Discharge Plan:   Diet Plan: Discussed  Confirmed Follow up Appointment: Appointment Scheduled  Confirmed Symptoms Management: Discussed  Medication Reconciliation Updated: Yes  Influenza Vaccine Indication: Patient Refuses    I understand that a diet low in cholesterol, fat, and sodium is recommended for good health. Unless I have been given specific instructions below for another diet, I accept this instruction as my diet prescription.   Other diet: Diabetic with 5- 8 oz glasses of electrolyte fluids a day    Special Instructions: None    · Is patient discharged on Warfarin / Coumadin?   No     · Is patient Post Blood Transfusion?  No    Depression / Suicide Risk    As [facility] are [facility], it is important to learn how to keep safe from harming yourself.    Recognize the warning signs:  · Abrupt changes in personality, positive or negative- including increase in energy   · Giving away possessions  · Change in eating patterns- significant weight changes-  positive or negative  · Change in sleeping patterns- unable to sleep or sleeping all the time   · Unwillingness or " inability to communicate  · Depression  · Unusual sadness, discouragement and loneliness  · Talk of wanting to die  · Neglect of personal appearance   · Rebelliousness- reckless behavior  · Withdrawal from people/activities they love  · Confusion- inability to concentrate     If you or a loved one observes any of these behaviors or has concerns about self-harm, here's what you can do:  · Talk about it- your feelings and reasons for harming yourself  · Remove any means that you might use to hurt yourself (examples: pills, rope, extension cords, firearm)  · Get professional help from the community (Mental Health, Substance Abuse, psychological counseling)  · Do not be alone:Call your Safe Contact- someone whom you trust who will be there for you.  · Call your local CRISIS HOTLINE 911-7958 or 501-811-4520  · Call your local Children's Mobile Crisis Response Team [place] (762) 830-8807 or www.[employer]Aff[employer].com  · Call the toll free National Suicide Prevention Hotlines   · National Suicide Prevention Lifeline 302-113-FSRR (9229)  · National Hope Line Network 800-SUICIDE (605-0299)        Your appointments     May 15, 2017 To Be Determined   SN-HH-HOME VISIT with [facility] (--)    [address]  [facility] [address]   [phone]            May 18, 2017 To Be Determined   SN-HH-HOME VISIT with [facility] (--)    [address]  [place] [address]   [phone]            May 22, 2017 To Be Determined   SN-HH-HOME VISIT with rubi Hopson R.N.   [facility] (--)    [address]  [place] [address]   [phone]            May 25, 2017 To Be Determined   SN-HH-HOME VISIT with [facility]   [facility] (--)    [address]  [place] [address]   [phone]            May 29, 2017 To Be Determined   SN-HH-HOME VISIT with POL[facility] (--)    [address]  [facility] [address]      [phone]            Jun 05, 2017 To Be Determined   SN-HH-HOME VISIT with Nirali Hopson R.N.   [facility] (--)    [address] [facility]  [facility] [address]   [phone]            Jun 07, 2017  9:00 AM   Established Patient with Zahra Yañez M.D.   [facility])    [address] [facility] [place] [address]   [phone]           You will be receiving a confirmation call a few days before your appointment from our automated call confirmation system.            Jun 12, 2017 To Be Determined   SN-HH-HOME VISIT with rubi Hopson R.N.   [facility] (--)    [address]  [facility] [address]   [phone]            Jun 19, 2017 To Be Determined   [facility]    [address]  [facility] [address]   [phone]            Jun 26, 2017 To Be Determined   [facility]   [facility] (--)    [address] [facility]  [facility] [address]   [phone]            Jul 20, 2017  2:00 PM   Established Patient with Zahra Yañez M.D.   [facility])    [address] [facility] [place] [address]   [phone]           You will be receiving a confirmation call a few days before your appointment from our automated call confirmation system.                 Discharge Medication Instructions:    Below are the medications your physician expects you to take upon discharge:    Review all your home medications and newly ordered medications with your doctor and/or pharmacist. Follow medication instructions as directed by your doctor and/or pharmacist.    Please keep your medication list with you and share with your physician.               Medication List      CONTINUE taking these medications        Instructions    Morning Afternoon Evening Bedtime    aspirin EC 81 MG Tbec   Last time this was given:  81 mg on 5/13/2017  8:10 AM   Commonly known as:   ECOTRIN   Next Dose Due:  HAD TODAY          Take 81 mg by mouth every day.   Dose:  81 mg                        atorvastatin 10 MG Tabs   Last time this was given:  10 mg on 5/12/2017 10:11 PM   Commonly known as:  LIPITOR   Next Dose Due:  NEED          Take 10 mg by mouth every evening.   Dose:  10 mg                        * CENTRUM SILVER PO   Next Dose Due:  NEED          Take 1 Tab by mouth every day.   Dose:  1 Tab                        * OCUVITE Tabs   Next Dose Due:  NEED          Take 1 Tab by mouth every evening.   Dose:  1 Tab                        finasteride 5 MG Tabs   Last time this was given:  5 mg on 5/12/2017 10:10 PM   Commonly known as:  PROSCAR   Next Dose Due:  NEED          Take 5 mg by mouth every evening.   Dose:  5 mg                        fish oil 1000 MG Caps capsule   Next Dose Due:  NEED          Take 1,000 mg by mouth every day.   Dose:  1000 mg                        latanoprost 0.005 % Soln   Last time this was given:  1 Drop on 5/12/2017 10:47 PM   Commonly known as:  XALATAN   Next Dose Due:  NEED          Place 1 Drop in both eyes every evening.   Dose:  1 Drop                        metformin 500 MG Tabs   Commonly known as:  GLUCOPHAGE   Next Dose Due:  NEED          Doctor's comments:  Authorization of a refill request.   Take 2 Tabs by mouth 2 times a day, with meals.   Dose:  1000 mg                        nitrofurantoin monohydr macro 100 MG Caps   Commonly known as:  MACROBID   Next Dose Due:  NO LONGER TAKING          Take 100 mg by mouth 2 times a day. Pt started on 4/28/2017 for 10 day course for kidney infection. Stopped 5/1/17   Dose:  100 mg                        PROBIOTIC PO   Next Dose Due:  NEED          Take 1 Cap by mouth every evening.   Dose:  1 Cap                        trazodone 50 MG Tabs   Commonly known as:  DESYREL   Next Dose Due:  NEED          Take 50 mg by mouth every evening.   Dose:  50 mg                        VITAMIN B COMPLEX PO   Next  Dose Due:  NEED          Take 1 Tab by mouth every evening.   Dose:  1 Tab                        VITAMIN D PO   Next Dose Due:  NEED          Take 1 Cap by mouth every day.   Dose:  1 Cap                        * Notice:  This list has 2 medication(s) that are the same as other medications prescribed for you. Read the directions carefully, and ask your doctor or other care provider to review them with you.            Instructions           Diet / Nutrition:    Follow any diet instructions given to you by your doctor or the dietician, including how much salt (sodium) you are allowed each day.    If you are overweight, talk to your doctor about a weight reduction plan.    Activity:    Remain physically active following your doctor's instructions about exercise and activity.    Rest often.     Any time you become even a little tired or short of breath, SIT DOWN and rest.    Worsening Symptoms:    Report any of the following signs and symptoms to the doctor's office immediately:    *Pain of jaw, arm, or neck  *Chest pain not relieved by medication                               *Dizziness or loss of consciousness  *Difficulty breathing even when at rest   *More tired than usual                                       *Bleeding drainage or swelling of surgical site  *Swelling of feet, ankles, legs or stomach                 *Fever (>100ºF)  *Pink or blood tinged sputum  *Weight gain (3lbs/day or 5lbs /week)           *Shock from internal defibrillator (if applicable)  *Palpitations or irregular heartbeats                *Cool and/or numb extremities    Stroke Awareness    Common Risk Factors for Stroke include:    Age  Atrial Fibrillation  Carotid Artery Stenosis  Diabetes Mellitus  Excessive alcohol consumption  High blood pressure  Overweight   Physical inactivity  Smoking    Warning signs and symptoms of a stroke include:    *Sudden numbness or weakness of the face, arm or leg (especially on one side of the  body).  *Sudden confusion, trouble speaking or understanding.  *Sudden trouble seeing in one or both eyes.  *Sudden trouble walking, dizziness, loss of balance or coordination.Sudden severe headache with no known cause.    It is very important to get treatment quickly when a stroke occurs. If you experience any of the above warning signs, call 911 immediately.                   Disclaimer         Quit Smoking / Tobacco Use:    I understand the use of any tobacco products increases my chance of suffering from future heart disease or stroke and could cause other illnesses which may shorten my life. Quitting the use of tobacco products is the single most important thing I can do to improve my health. For further information on smoking / tobacco cessation call a Toll Free Quit Line at 1-988.721.9807 (*National Cancer [place]) or 1-634.351.2859 (American Lung Association) or you can access the web based program at www.lungusa.org.    [place] Tobacco Users Help Line:  (927) 849-6641       Toll Free: 1-539.630.6385  Quit Tobacco Program [facility] ([phone]    Crisis Hotline:    [place] Crisis Hotline:  8-256-FAWDRNU or 1-121.436.5374    [place] Crisis Hotline:    1-250.446.6082 or 234-820-4511    Discharge Survey:   Thank you for choosing [facility]. We hope we did everything we could to make your hospital stay a pleasant one. You may be receiving a phone survey and we would appreciate your time and participation in answering the questions. Your input is very valuable to us in our efforts to improve our service to our patients and their families.        My signature on this form indicates that:    1. I have reviewed and understand the above information.  2. My questions regarding this information have been answered to my satisfaction.  3. I have formulated a plan with my discharge nurse to obtain my prescribed medications for home.                  Disclaimer          __________________________________                     __________       ________                       Patient Signature                                                 Date                    Time

## 2017-05-12 NOTE — ED NOTES
"BIB EMS from home    Chief Complaint   Patient presents with   • Weakness     reports feeling weak, since 2 am this morning, pt states, \"feel like i was having a stroke\"    • Dizziness     pt reports dizziness since 2 am.      Pt given 700 ml NS by EMS PTA    Pt reports being discharged from hospital recently    Pt in gown, on monitor, chart up for ERP    "

## 2017-05-12 NOTE — PROGRESS NOTES
2RN Skin check:  Right forearm, pt has a skin tear that is scabbed over  Red but blanching buttocks   Boggy heels

## 2017-05-13 VITALS
OXYGEN SATURATION: 96 % | SYSTOLIC BLOOD PRESSURE: 174 MMHG | RESPIRATION RATE: 18 BRPM | DIASTOLIC BLOOD PRESSURE: 85 MMHG | WEIGHT: 158.51 LBS | BODY MASS INDEX: 23.48 KG/M2 | HEART RATE: 67 BPM | HEIGHT: 69 IN | TEMPERATURE: 96.7 F

## 2017-05-13 PROBLEM — E11.9 DM (DIABETES MELLITUS) (HCC): Status: ACTIVE | Noted: 2017-05-13

## 2017-05-13 PROBLEM — N17.9 AKI (ACUTE KIDNEY INJURY) (HCC): Status: ACTIVE | Noted: 2017-05-13

## 2017-05-13 LAB
ALBUMIN SERPL BCP-MCNC: 3.2 G/DL (ref 3.2–4.9)
ALBUMIN/GLOB SERPL: 1.2 G/DL
ALP SERPL-CCNC: 60 U/L (ref 30–99)
ALT SERPL-CCNC: 11 U/L (ref 2–50)
ANION GAP SERPL CALC-SCNC: 7 MMOL/L (ref 0–11.9)
AST SERPL-CCNC: 14 U/L (ref 12–45)
BILIRUB SERPL-MCNC: 0.4 MG/DL (ref 0.1–1.5)
BUN SERPL-MCNC: 17 MG/DL (ref 8–22)
CALCIUM SERPL-MCNC: 8.7 MG/DL (ref 8.5–10.5)
CHLORIDE SERPL-SCNC: 106 MMOL/L (ref 96–112)
CO2 SERPL-SCNC: 22 MMOL/L (ref 20–33)
CREAT SERPL-MCNC: 0.68 MG/DL (ref 0.5–1.4)
ERYTHROCYTE [DISTWIDTH] IN BLOOD BY AUTOMATED COUNT: 42.5 FL (ref 35.9–50)
GFR SERPL CREATININE-BSD FRML MDRD: >60 ML/MIN/1.73 M 2
GLOBULIN SER CALC-MCNC: 2.6 G/DL (ref 1.9–3.5)
GLUCOSE BLD-MCNC: 108 MG/DL (ref 65–99)
GLUCOSE BLD-MCNC: 262 MG/DL (ref 65–99)
GLUCOSE SERPL-MCNC: 116 MG/DL (ref 65–99)
HCT VFR BLD AUTO: 34.2 % (ref 42–52)
HGB BLD-MCNC: 11.5 G/DL (ref 14–18)
MCH RBC QN AUTO: 30.2 PG (ref 27–33)
MCHC RBC AUTO-ENTMCNC: 33.6 G/DL (ref 33.7–35.3)
MCV RBC AUTO: 89.8 FL (ref 81.4–97.8)
PLATELET # BLD AUTO: 174 K/UL (ref 164–446)
PMV BLD AUTO: 10.2 FL (ref 9–12.9)
POTASSIUM SERPL-SCNC: 3.6 MMOL/L (ref 3.6–5.5)
PROT SERPL-MCNC: 5.8 G/DL (ref 6–8.2)
RBC # BLD AUTO: 3.81 M/UL (ref 4.7–6.1)
SODIUM SERPL-SCNC: 135 MMOL/L (ref 135–145)
WBC # BLD AUTO: 9.5 K/UL (ref 4.8–10.8)

## 2017-05-13 PROCEDURE — A9270 NON-COVERED ITEM OR SERVICE: HCPCS | Performed by: INTERNAL MEDICINE

## 2017-05-13 PROCEDURE — 36415 COLL VENOUS BLD VENIPUNCTURE: CPT

## 2017-05-13 PROCEDURE — 700105 HCHG RX REV CODE 258: Performed by: NURSE PRACTITIONER

## 2017-05-13 PROCEDURE — 80053 COMPREHEN METABOLIC PANEL: CPT

## 2017-05-13 PROCEDURE — 82962 GLUCOSE BLOOD TEST: CPT

## 2017-05-13 PROCEDURE — 700102 HCHG RX REV CODE 250 W/ 637 OVERRIDE(OP): Performed by: INTERNAL MEDICINE

## 2017-05-13 PROCEDURE — 85027 COMPLETE CBC AUTOMATED: CPT

## 2017-05-13 PROCEDURE — 99239 HOSP IP/OBS DSCHRG MGMT >30: CPT | Performed by: HOSPITALIST

## 2017-05-13 RX ADMIN — SODIUM CHLORIDE: 9 INJECTION, SOLUTION INTRAVENOUS at 08:10

## 2017-05-13 RX ADMIN — INSULIN LISPRO 5 UNITS: 100 INJECTION, SOLUTION INTRAVENOUS; SUBCUTANEOUS at 11:50

## 2017-05-13 RX ADMIN — ASPIRIN 81 MG: 81 TABLET ORAL at 08:10

## 2017-05-13 ASSESSMENT — PAIN SCALES - GENERAL
PAINLEVEL_OUTOF10: 0
PAINLEVEL_OUTOF10: 0

## 2017-05-13 ASSESSMENT — LIFESTYLE VARIABLES: EVER_SMOKED: NEVER

## 2017-05-13 NOTE — DISCHARGE INSTRUCTIONS
Discharge Instructions    Discharged to home by car with relative. Discharged via wheelchair, hospital escort: Yes.  Special equipment needed: Not Applicable    Be sure to schedule a follow-up appointment with your primary care doctor or any specialists as instructed.     Discharge Plan:   Diet Plan: Discussed  Confirmed Follow up Appointment: Appointment Scheduled  Confirmed Symptoms Management: Discussed  Medication Reconciliation Updated: Yes  Influenza Vaccine Indication: Patient Refuses    I understand that a diet low in cholesterol, fat, and sodium is recommended for good health. Unless I have been given specific instructions below for another diet, I accept this instruction as my diet prescription.   Other diet: Diabetic with 5- 8 oz glasses of electrolyte fluids a day    Special Instructions: None    · Is patient discharged on Warfarin / Coumadin?   No     · Is patient Post Blood Transfusion?  No    Depression / Suicide Risk    As [facility] are [facility], it is important to learn how to keep safe from harming yourself.    Recognize the warning signs:  · Abrupt changes in personality, positive or negative- including increase in energy   · Giving away possessions  · Change in eating patterns- significant weight changes-  positive or negative  · Change in sleeping patterns- unable to sleep or sleeping all the time   · Unwillingness or inability to communicate  · Depression  · Unusual sadness, discouragement and loneliness  · Talk of wanting to die  · Neglect of personal appearance   · Rebelliousness- reckless behavior  · Withdrawal from people/activities they love  · Confusion- inability to concentrate     If you or a loved one observes any of these behaviors or has concerns about self-harm, here's what you can do:  · Talk about it- your feelings and reasons for harming yourself  · Remove any means that you might use to hurt yourself (examples: pills, rope, extension cords,  firearm)  · Get professional help from the community (Mental Health, Substance Abuse, psychological counseling)  · Do not be alone:Call your Safe Contact- someone whom you trust who will be there for you.  · Call your local CRISIS HOTLINE 020-9371 or 724-121-7753  · Call your local Children's Mobile Crisis Response Team [place] (172) 641-3390 or www.Breaker  · Call the toll free National Suicide Prevention Hotlines   · National Suicide Prevention Lifeline 995-244-AFDK (8349)  · National Hope Line Network 800-SUICIDE (240-5646)

## 2017-05-13 NOTE — CARE PLAN
Problem: Safety  Goal: Will remain free from injury  Outcome: PROGRESSING AS EXPECTED  Pt to use call light for assistance and will be assisted x1 w/ walker to BR. Bed alarm is in place for pt safety.     Problem: Bowel/Gastric:  Goal: Will not experience complications related to bowel motility  Outcome: PROGRESSING SLOWER THAN EXPECTED  Pt has been noted to be Inc of BM at times. Plan to assist pt to BR for BM. Skin monitoring for complications r/t stool inc.

## 2017-05-13 NOTE — PROGRESS NOTES
Bedside report completed. Pt resting in bed, family at bedside. Pt denies complaints. Bed in low locked position, treaded socks on, call bell in reach.

## 2017-05-13 NOTE — PROGRESS NOTES
Pt is lying in bed w/ eyes closed. Pt arouses easily to stimuli. Pt assisted up on edge of bed for breakfast.  Patient's chart and MAR reviewed. Pt denies pain at this time. Pt is A & O x4. Patient was updated on plan of care for the day. Questions answered and concerns addressed.  Pt denies any additional needs at this time. White board updated. Call light, phone and personal belongings within reach.

## 2017-05-13 NOTE — PROGRESS NOTES
Pt sustaining SBPs in 180s, 190s. Even after standing and ambulating, pt's . Contacted APRN on call, NS rate reduced from 150/hr to 75/hr.   Pt has had two incontinent stools this shift, but at this time denies complaints. Bed in low locked position, call bell in reach.

## 2017-05-13 NOTE — CARE PLAN
Problem: Safety  Goal: Will remain free from injury  Outcome: PROGRESSING AS EXPECTED  Pt ambulates with one assist plus front wheel walker. Assisted pt to bathroom without difficulty.    Problem: Bowel/Gastric:  Goal: Normal bowel function is maintained or improved  Outcome: PROGRESSING SLOWER THAN EXPECTED  Pt has had two incontinent stools this shift.

## 2017-05-13 NOTE — PROGRESS NOTES
Discharge instructions given to pt w/ pt stating an understanding. Aj bad to leg bag for discharge. Pt to private transport via w/c by this nurse. No medication changes were made during this admission. Personal belongings w/ pt. No c/o voiced. No s/s of distress.

## 2017-05-13 NOTE — PROGRESS NOTES
Pt sitting on edge of bed eating lunch. Dr. Petersen has been in to see pt has written a discharge order. Pt denies dizziness and states he is ready to go home. No s/s of distress.

## 2017-05-14 ENCOUNTER — PATIENT OUTREACH (OUTPATIENT)
Dept: HEALTH INFORMATION MANAGEMENT | Facility: OTHER | Age: 82
End: 2017-05-14

## 2017-05-14 VITALS
WEIGHT: 158 LBS | RESPIRATION RATE: 20 BRPM | TEMPERATURE: 98.1 F | DIASTOLIC BLOOD PRESSURE: 70 MMHG | BODY MASS INDEX: 23.32 KG/M2 | SYSTOLIC BLOOD PRESSURE: 140 MMHG | HEART RATE: 67 BPM

## 2017-05-14 SDOH — ECONOMIC STABILITY: HOUSING INSECURITY: UNSAFE COOKING RANGE AREA: 1

## 2017-05-14 SDOH — ECONOMIC STABILITY: HOUSING INSECURITY: UNSAFE APPLIANCES: 0

## 2017-05-14 ASSESSMENT — ENCOUNTER SYMPTOMS: RESPIRATORY SYMPTOMS COMMENTS: NO S/S OF RESP DISTRESS

## 2017-05-14 NOTE — DISCHARGE SUMMARY
DATE OF ADMISSION:  2017    DATE OF DISCHARGE:  2017    ADMISSION DIAGNOSES:  1.  Near syncope.  2.  Acute kidney injury, likely dehydration.  3.  Dehydration, likely from diarrhea.  4.  Diabetes.  5.  Hyponatremia, hypovolemic.    DISCHARGE DIAGNOSES:  1.  Near syncope.  2.  Dehydration, resolved.  3.  Hyponatremia, corrected.  4.  Anemia, stable without symptoms of bleed, likely chronic disease.  5.  Prior history as above.    IMAGIN.  MRI on 2017, revealed no evidence of acute cerebral infarction,   acute hemorrhage or mass lesion.  Mild cerebral atrophy with _____   ventriculomegaly.  2.  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    HOSPITAL COURSE:  Patient is a 89-year-old male with history of diabetes   mellitus, paroxysmal AFib, chronic back pain, BPH, carotid artery disease with   carotid endarterectomy, glaucoma, cataracts, chronic hyponatremia, diabetes,   who was admitted with symptoms of nearly passing out.  Patient had   orthostatics that were positive.  Examination was nonfocal.  He is   appropriately oriented.  He had a workup with MRI revealing no acute   intracranial findings.  He was treated with IV fluids, dehydration corrected   with sodium normalized at 135.  He has no acute symptoms to suggest infection.    The following day, patient was feeling well, has been in sinus rhythm, blood   pressure was stable, eating well.  We felt that the symptoms initially is   likely from dehydration, had resolved.    DISCHARGE INSTRUCTIONS:  Discharge home.    DIET:  As tolerated.    DISCHARGE MEDICATIONS:  1.  _____ aspirin 81 mg daily.  2.  Lipitor 10 daily.  3.  Multivitamin daily.  4.  Ocuvite 1 tab q. evening.  5.  Proscar 5 daily.  6.  Fish oil 1000 daily.  7.  Xalatan eyedrop, both eyes q. evening.  8.  Metformin 500 mg b.i.d.  9.  May continue Macrobid as prescribed.  10.  Probiotic one tablet daily.  11.  Trazodone 50 mg q.  evening.  12.  Vitamin B and vitamin D supplements as previously recommended.    FOLLOWUP:  With primary care provider, Dr. Zahra Yañez in approximately 2   weeks.    Total discharge time approximately 40 minutes.    Patient was admitted and discharged the following day markedly improved   greater than the expected recovery.       ____________________________________     MD ALVARO SIMMONS / ALLAN    DD:  05/13/2017 16:20:28  DT:  05/14/2017 07:40:18    D#:  [id number]  Job#:  [id number]

## 2017-05-15 ENCOUNTER — HOME CARE VISIT (OUTPATIENT)
Dept: HOME HEALTH SERVICES | Facility: HOME HEALTHCARE | Age: 82
End: 2017-05-15
Payer: MEDICARE

## 2017-05-15 PROCEDURE — G0299 HHS/HOSPICE OF RN EA 15 MIN: HCPCS

## 2017-05-16 VITALS
BODY MASS INDEX: 22.78 KG/M2 | WEIGHT: 154.32 LBS | TEMPERATURE: 98 F | RESPIRATION RATE: 16 BRPM | SYSTOLIC BLOOD PRESSURE: 130 MMHG | DIASTOLIC BLOOD PRESSURE: 62 MMHG

## 2017-05-16 PROCEDURE — G0180 MD CERTIFICATION HHA PATIENT: HCPCS | Performed by: FAMILY MEDICINE

## 2017-05-16 SDOH — ECONOMIC STABILITY: HOUSING INSECURITY: UNSAFE COOKING RANGE AREA: 0

## 2017-05-16 SDOH — ECONOMIC STABILITY: HOUSING INSECURITY: UNSAFE APPLIANCES: 0

## 2017-05-16 ASSESSMENT — ENCOUNTER SYMPTOMS
VOMITING: DENIES ANY
NAUSEA: DENIES ANY

## 2017-05-18 ENCOUNTER — HOME CARE VISIT (OUTPATIENT)
Dept: HOME HEALTH SERVICES | Facility: HOME HEALTHCARE | Age: 82
End: 2017-05-18
Payer: MEDICARE

## 2017-05-18 PROCEDURE — G0299 HHS/HOSPICE OF RN EA 15 MIN: HCPCS

## 2017-05-21 VITALS
DIASTOLIC BLOOD PRESSURE: 50 MMHG | WEIGHT: 154 LBS | TEMPERATURE: 97.6 F | SYSTOLIC BLOOD PRESSURE: 140 MMHG | RESPIRATION RATE: 16 BRPM | BODY MASS INDEX: 22.73 KG/M2

## 2017-05-21 SDOH — ECONOMIC STABILITY: HOUSING INSECURITY: UNSAFE APPLIANCES: 0

## 2017-05-21 SDOH — ECONOMIC STABILITY: HOUSING INSECURITY: UNSAFE COOKING RANGE AREA: 0

## 2017-05-21 ASSESSMENT — ENCOUNTER SYMPTOMS
VOMITING: DENIES ANY
NAUSEA: DENIES ANY

## 2017-05-22 ENCOUNTER — HOME CARE VISIT (OUTPATIENT)
Dept: HOME HEALTH SERVICES | Facility: HOME HEALTHCARE | Age: 82
End: 2017-05-22
Payer: MEDICARE

## 2017-05-22 VITALS
RESPIRATION RATE: 15 BRPM | SYSTOLIC BLOOD PRESSURE: 122 MMHG | WEIGHT: 155.65 LBS | BODY MASS INDEX: 22.97 KG/M2 | DIASTOLIC BLOOD PRESSURE: 70 MMHG | TEMPERATURE: 97.4 F

## 2017-05-22 PROCEDURE — G0299 HHS/HOSPICE OF RN EA 15 MIN: HCPCS

## 2017-05-22 PROCEDURE — G0162 HHC RN E&M PLAN SVS, 15 MIN: HCPCS

## 2017-05-23 SDOH — ECONOMIC STABILITY: HOUSING INSECURITY: UNSAFE APPLIANCES: 0

## 2017-05-23 SDOH — ECONOMIC STABILITY: HOUSING INSECURITY: UNSAFE COOKING RANGE AREA: 0

## 2017-05-23 ASSESSMENT — ENCOUNTER SYMPTOMS
VOMITING: DENIES ANY
NAUSEA: DENIES ANY

## 2017-05-30 NOTE — TELEPHONE ENCOUNTER
Was the patient seen in the last year in this department? Yes 04/25/2017    Does patient have an active prescription for medications requested? No     Received Request Via: Patient

## 2017-05-31 NOTE — TELEPHONE ENCOUNTER
.  Patient has recently been seen by PCP within the last 6 months per protocol (4/17). Will refill medications for 6 months.  Lab Results   Component Value Date/Time    GLYCOHEMOGLOBIN 7.4* 05/02/2017 09:32 AM      Lab Results   Component Value Date/Time    MICRO ALB CREAT RATIO 401* 04/29/2017 08:24 AM    MICROALBUMIN, URINE RANDOM 30.3 04/29/2017 08:24 AM      Lab Results   Component Value Date/Time    ALKALINE PHOSPHATASE 60 05/13/2017 02:10 AM    AST(SGOT) 14 05/13/2017 02:10 AM    ALT(SGPT) 11 05/13/2017 02:10 AM    TOTAL BILIRUBIN 0.4 05/13/2017 02:10 AM

## 2017-05-31 NOTE — TELEPHONE ENCOUNTER
Was the patient seen in the last year in this department? Yes     Does patient have an active prescription for medications requested? No     Received Request Via: Patient      Pt met protocol?: Yes, LABS 5/17 A1C 262H, OV 4/17, LIPID LABS ALSO 5/17

## 2017-06-05 ENCOUNTER — TELEPHONE (OUTPATIENT)
Dept: MEDICAL GROUP | Facility: PHYSICIAN GROUP | Age: 82
End: 2017-06-05

## 2017-06-05 NOTE — TELEPHONE ENCOUNTER
----- Message from Shantelle Del Cid sent at 6/5/2017  1:00 PM PDT -----  Regarding: prescription for wheelchair  Pt's wheelchair is broken needs a new prescription for a new chair.  Please call son when ready to come and pick it up.  [phone]    Shantelle Ulloa :)

## 2017-06-05 NOTE — TELEPHONE ENCOUNTER
Contacted son, Christiano.  I advised that we would be able to fax over the orders for him.  He will be sending us a [employer] message with information of the company.  He stated he had a number for us to call.

## 2017-06-27 ASSESSMENT — ACTIVITIES OF DAILY LIVING (ADL)
HOME_HEALTH_OASIS: 00
OASIS_M1830: 01
HOME_HEALTH_OASIS: 01

## 2017-07-19 ENCOUNTER — TELEPHONE (OUTPATIENT)
Dept: MEDICAL GROUP | Facility: PHYSICIAN GROUP | Age: 82
End: 2017-07-19

## 2017-07-19 NOTE — TELEPHONE ENCOUNTER
ESTABLISHED PATIENT PRE-VISIT PLANNING     Note: Patient will not be contacted if there is no indication to call.     1.  Reviewed notes from the last few office visits within the medical group: Yes    2.  If any orders were placed at last visit or intended to be done for this visit (i.e. 6 mos follow-up), do we have Results/Consult Notes?        •  Labs - Labs ordered, completed and results are in chart.       •  Imaging - Imaging ordered, completed and results are in chart.       •  Referrals - Referral ordered, patient was seen and consult notes are in chart. Care Teams updated  NO.    3. Is this appointment scheduled as a Hospital Follow-Up? No    4.  Immunizations were updated in Epic using WebIZ?: Epic matches WebIZ       •  Web Iz Recommendations: ZOSTAVAX (Shingles)    5.  Patient is due for the following Health Maintenance Topics:   Health Maintenance Due   Topic Date Due   • Annual Wellness Visit  04/16/1927   • COLON CANCER SCREENING ANNUAL FIT  04/16/1977   • RETINAL SCREENING  03/05/2017           6.  Patient was informed to arrive 15 min prior to their scheduled appointment and bring in their medication bottles.

## 2017-07-20 ENCOUNTER — OFFICE VISIT (OUTPATIENT)
Dept: MEDICAL GROUP | Facility: PHYSICIAN GROUP | Age: 82
End: 2017-07-20
Payer: MEDICARE

## 2017-07-20 VITALS
TEMPERATURE: 97.3 F | WEIGHT: 160 LBS | DIASTOLIC BLOOD PRESSURE: 84 MMHG | HEIGHT: 67 IN | SYSTOLIC BLOOD PRESSURE: 120 MMHG | RESPIRATION RATE: 18 BRPM | OXYGEN SATURATION: 97 % | BODY MASS INDEX: 25.11 KG/M2 | HEART RATE: 70 BPM

## 2017-07-20 DIAGNOSIS — I65.29 CAROTID ATHEROSCLEROSIS, UNSPECIFIED LATERALITY: ICD-10-CM

## 2017-07-20 DIAGNOSIS — S40.811A ABRASION OF RIGHT ARM, INITIAL ENCOUNTER: ICD-10-CM

## 2017-07-20 DIAGNOSIS — E11.59 TYPE 2 DIABETES MELLITUS WITH OTHER CIRCULATORY COMPLICATION, WITHOUT LONG-TERM CURRENT USE OF INSULIN (HCC): ICD-10-CM

## 2017-07-20 DIAGNOSIS — E78.5 HYPERLIPIDEMIA ASSOCIATED WITH TYPE 2 DIABETES MELLITUS (HCC): ICD-10-CM

## 2017-07-20 DIAGNOSIS — E11.69 HYPERLIPIDEMIA ASSOCIATED WITH TYPE 2 DIABETES MELLITUS (HCC): ICD-10-CM

## 2017-07-20 PROCEDURE — 99214 OFFICE O/P EST MOD 30 MIN: CPT | Performed by: FAMILY MEDICINE

## 2017-07-20 RX ORDER — ATORVASTATIN CALCIUM 10 MG/1
5 TABLET, FILM COATED ORAL DAILY
Qty: 90 TAB | Refills: 3 | Status: SHIPPED | OUTPATIENT
Start: 2017-07-20 | End: 2017-10-19 | Stop reason: SDUPTHER

## 2017-07-20 NOTE — PROGRESS NOTES
Chief Complaint   Patient presents with   • Fall     10 days ago   • Shoulder Injury     rt shoulder pain from fall       HISTORY OF PRESENT ILLNESS: Patient is a 89 y.o. male established patient here today for the following concerns:      This is a pleasant 89 year old gentleman who was admitted to the hospital in May for dehydration and MARLI and letter transitioned to rehab.  He is now out back living with his son.  Reports doing well, but did have a mechanical fall 10 days ago and skinned the arm up.  Christiano, his son has been monitoring it and bandaging it with gauze and antibacterial cream.  No further oozing.  No fevers, swelling or redness.      It does appear his lipitor dose was reduced while in the hospital and lipids were quite suppressed.  He reports taking 5 mg daily now.      Past Medical, Social, and Family history reviewed and updated in EPIC    Allergies:Review of patient's allergies indicates no known allergies.    Current Outpatient Prescriptions   Medication Sig Dispense Refill   • atorvastatin (LIPITOR) 10 MG Tab Take 0.5 Tabs by mouth every day. 90 Tab 3   • ASCENSIA MICROFILL TEST strip Test every day as directed 100 Strip 11   • Omega-3 Fatty Acids (FISH OIL) 1000 MG Cap capsule Take 1,000 mg by mouth every day.     • finasteride (PROSCAR) 5 MG Tab Take 5 mg by mouth every evening.     • trazodone (DESYREL) 50 MG Tab Take 50 mg by mouth every evening.     • metformin (GLUCOPHAGE) 500 MG Tab Take 2 Tabs by mouth 2 times a day, with meals. 360 Tab 3   • aspirin EC (ECOTRIN) 81 MG Tablet Delayed Response Take 81 mg by mouth every day.     • Probiotic Product (PROBIOTIC PO) Take 1 Cap by mouth every evening.     • B Complex Vitamins (VITAMIN B COMPLEX PO) Take 1 Tab by mouth every evening.     • Multiple Vitamins-Minerals (CENTRUM SILVER PO) Take 1 Tab by mouth every day.     • Cholecalciferol (VITAMIN D PO) Take 1 Cap by mouth every day.     • Multiple Vitamins-Minerals (OCUVITE) Tab Take 1 Tab by  "mouth every evening.     • latanoprost (XALATAN) 0.005 % SOLN Place 1 Drop in both eyes every evening.       Current Facility-Administered Medications   Medication Dose Route Frequency Provider Last Rate Last Dose   • cyanocobalamin (VITAMIN B-12) injection 1,000 mcg  1,000 mcg Intramuscular Q30 DAYS Zahra Yañez M.D.   1,000 mcg at 01/24/17 1427         ROS:  Review of Systems   Constitutional: Negative for fever, chills, weight loss and malaise/fatigue.   HENT: Negative for ear pain, nosebleeds, congestion, sore throat and neck pain.    Eyes: Negative for blurred vision.   Respiratory: Negative for cough, sputum production, shortness of breath and wheezing.    Cardiovascular: Negative for chest pain, palpitations,  and leg swelling.   Gastrointestinal: Negative for heartburn, nausea, vomiting, diarrhea and abdominal pain.   Genitourinary: Negative for dysuria, urgency and frequency.   Musculoskeletal: Negative for myalgias, back pain and joint pain.   Skin: Negative for rash and itching.   Neurological: Negative for dizziness, tingling, tremors, sensory change, focal weakness and headaches.   Endo/Heme/Allergies: Does not bruise/bleed easily.   Psychiatric/Behavioral: Negative for depression, anxiety, suicidal ideas, insomnia and memory loss.      Exam:  Blood pressure 120/84, pulse 70, temperature 36.3 °C (97.3 °F), resp. rate 18, height 1.702 m (5' 7.01\"), weight 72.576 kg (160 lb), SpO2 97 %.    General:  Well nourished, well developed in NAD  Head is grossly normal.  Neck: Supple without JVD   Pulmonary:  Normal effort.   Cardiovascular: Regular rate  Extremities: no clubbing, cyanosis, or edema.  Psych: affect appropriate  Skin: large abrasion with beefy red granulation tissue and no exudate or discharge.     Please note that this dictation was created using voice recognition software. I have made every reasonable attempt to correct obvious errors, but I expect that there are errors of grammar and possibly " content that I did not discover before finalizing the note.    Assessment/Plan:  1. Type 2 diabetes mellitus with other circulatory complication, without long-term current use of insulin (CMS-HCC)  - atorvastatin (LIPITOR) 10 MG Tab; Take 0.5 Tabs by mouth every day.  Dispense: 90 Tab; Refill: 3  - HEMOGLOBIN A1C; Future  - LIPID PROFILE; Future  - MICROALBUMIN CREAT RATIO URINE; Future  - COMP METABOLIC PANEL; Future    2. Hyperlipidemia associated with type 2 diabetes mellitus (CMS-HCC)  - atorvastatin (LIPITOR) 10 MG Tab; Take 0.5 Tabs by mouth every day.  Dispense: 90 Tab; Refill: 3  - LIPID PROFILE; Future  - COMP METABOLIC PANEL; Future    3. Carotid atherosclerosis, unspecified laterality  - atorvastatin (LIPITOR) 10 MG Tab; Take 0.5 Tabs by mouth every day.  Dispense: 90 Tab; Refill: 3    4. Abrasion of right arm, initial encounter  Continue frequent dressing changes, monitor for signs of infection.  If needed we can get home wound care, but Christiano is doing a fantastic job at this time.  If needed may call for re-evaluation at any time.    3 month follow up

## 2017-07-20 NOTE — MR AVS SNAPSHOT
"        Bulmaro Wheeler   2017 2:00 PM   Office Visit   MRN: [id number]    Department:  [facility]   Dept Phone:  [phone]    Description:  Male : 1927   Provider:  Zahra Yañez M.D.           Reason for Visit     Fall 10 days ago    Shoulder Injury rt shoulder pain from fall      Allergies as of 2017     No Known Allergies      You were diagnosed with     Type 2 diabetes mellitus with other circulatory complication, without long-term current use of insulin (CMS-HCC)   [9369116]       Hyperlipidemia associated with type 2 diabetes mellitus (CMS-HCC)   [4441399]       Carotid atherosclerosis, unspecified laterality   [636118]       Abrasion of right arm, initial encounter   [[id number]]         Vital Signs     Blood Pressure Pulse Temperature Respirations Height Weight    120/84 mmHg 70 36.3 °C (97.3 °F) 18 1.702 m (5' 7.01\") 72.576 kg (160 lb)    Body Mass Index Oxygen Saturation Smoking Status             25.05 kg/m2 97% Never Smoker          Basic Information     Date Of Birth Sex Race Ethnicity Preferred Language    1927 Male White Non- English      Your appointments     Oct 04, 2017 10:20 AM   Established Patient with Zahra Yañez M.D.   [address] [address] [phone]           You will be receiving a confirmation call a few days before your appointment from our automated call confirmation system.              Problem List              ICD-10-CM Priority Class Noted - Resolved    Chronic back pain M54.9, G89.29   2012 - Present    B12 deficiency E53.8   2012 - Present    Thrombocytopenia ([facility]) D69.6   3/15/2013 - Present    Renal cyst N28.1   2013 - Present    Carotid artery stenosis I65.29   2014 - Present    Type II diabetes mellitus, well controlled (HCC) E11.9   2015 - Present    Hernia of abdominal wall K43.9   2015 - Present    BPH (benign prostatic " hyperplasia) N40.0 Low  3/24/2016 - Present    Spinal stenosis of lumbosacral region M48.07   9/7/2016 - Present    Vitamin D deficiency E55.9   9/7/2016 - Present    Paroxysmal atrial fibrillation (CMS-HCC) I48.0   11/27/2016 - Present    Dehydration E86.0   5/12/2017 - Present    Syncope R55   5/12/2017 - Present    Hypotension I95.9   5/12/2017 - Present    DM (diabetes mellitus) (CMS-HCC) E11.9   5/13/2017 - Present    MARLI (acute kidney injury) (CMS-HCC) N17.9   5/13/2017 - Present      Health Maintenance        Date Due Completion Dates    RETINAL SCREENING 3/5/2017 3/5/2016 (Done), 6/26/2014 (N/S), 5/21/2013 (N/S), 2/22/2012 (Done)    Override on 3/5/2016: Done (waiting on records)    Override on 6/26/2014: (N/S)    Override on 5/21/2013: (N/S)    Override on 2/22/2012: Done (HD Retina)    IMM ZOSTER VACCINE 5/25/2018 (Originally 4/16/1987) ---    IMM INFLUENZA (1) 9/1/2017 10/21/2016, 11/24/2015, 10/2/2014, 10/9/2013, 9/28/2013, 11/1/2011    A1C SCREENING 11/2/2017 5/2/2017, 4/29/2017, 4/7/2017, 7/21/2016, 4/20/2016, 9/11/2015, 4/14/2015, 12/29/2014, 6/23/2014, 2/18/2014, 1/2/2014, 9/4/2013, 5/31/2013, 3/1/2013, 12/20/2012, 4/4/2012, 10/4/2011, 3/30/2011, 10/7/2010, 3/29/2010, 10/9/2009, 4/22/2009, 6/23/2006    DIABETES MONOFILAMENT / LE EXAM 4/4/2018 4/4/2017, 7/15/2015, 6/26/2014 (N/S), 6/6/2013 (N/S), 10/4/2011 (N/S)    Override on 6/26/2014: (N/S)    Override on 6/6/2013: (N/S)    Override on 10/4/2011: (N/S)    URINE ACR / MICROALBUMIN 4/29/2018 4/29/2017, 4/7/2017, 7/21/2016, 9/11/2015, 12/29/2014, 1/2/2014, 5/31/2013, 4/4/2012, 10/4/2011, 3/30/2011, 10/7/2010    FASTING LIPID PROFILE 5/3/2018 5/3/2017, 4/29/2017, 4/7/2017, 7/21/2016, 9/11/2015, 12/29/2014, 6/23/2014, 2/18/2014, 1/2/2014, 10/7/2010, 3/29/2010, 4/22/2009    SERUM CREATININE 5/13/2018 5/13/2017, 5/12/2017, 5/3/2017, 5/2/2017, 4/30/2017, 4/29/2017, 4/7/2017, 11/27/2016, 11/27/2016, 11/26/2016, 9/7/2016, 8/25/2016, 8/22/2016, 8/21/2016,  8/20/2016, 8/19/2016, 7/21/2016, 4/30/2016, 3/27/2016, 3/25/2016, 3/24/2016, 3/23/2016, 1/20/2016, 1/13/2016, 1/11/2016, 1/10/2016, 9/11/2015, 1/7/2015, 1/5/2015, 1/3/2015, 1/2/2015, 12/29/2014, 9/25/2014, 6/23/2014, 3/12/2014, 3/11/2014, 2/18/2014, 2/17/2014, 11/1/2013, 10/31/2013, 9/30/2013, 9/29/2013, 9/28/2013, 9/27/2013, 9/27/2013, 9/4/2013, 3/29/2013, 3/16/2013, 3/14/2013, 9/2/2012, 8/31/2012, 4/4/2012, 11/1/2011, 10/31/2011, 10/30/2011, 10/29/2011, 10/4/2011, 3/30/2011, 2/9/2011, 12/29/2010, 6/6/2010, 10/9/2009, 2/23/2009, 6/24/2006, 6/23/2006, 6/22/2006    IMM DTaP/Tdap/Td Vaccine (2 - Td) 4/8/2023 4/8/2013            Current Immunizations     13-VALENT PCV PREVNAR 12/21/2015    Influenza TIV (IM) 10/9/2013  5:15 PM, 9/28/2013  4:27 AM, 11/1/2011 12:20 PM    Influenza Vaccine Adult HD 10/21/2016, 11/24/2015, 10/2/2014    Pneumococcal polysaccharide vaccine (PPSV-23) 11/1/2011 12:19 PM    Tdap Vaccine 4/8/2013  3:40 PM      Below and/or attached are the medications your provider expects you to take. Review all of your home medications and newly ordered medications with your provider and/or pharmacist. Follow medication instructions as directed by your provider and/or pharmacist. Please keep your medication list with you and share with your provider. Update the information when medications are discontinued, doses are changed, or new medications (including over-the-counter products) are added; and carry medication information at all times in the event of emergency situations     Allergies:  No Known Allergies          Medications  Valid as of: July 20, 2017 -  2:19 PM    Generic Name Brand Name Tablet Size Instructions for use    Aspirin (Tablet Delayed Response) ECOTRIN 81 MG Take 81 mg by mouth every day.        Atorvastatin Calcium (Tab) LIPITOR 10 MG Take 0.5 Tabs by mouth every day.        B Complex Vitamins   Take 1 Tab by mouth every evening.        Cholecalciferol   Take 1 Cap by mouth every day.         Finasteride (Tab) PROSCAR 5 MG Take 5 mg by mouth every evening.        Glucose Blood (Strip) ASCENSIA MICROFILL TEST  Test every day as directed        Latanoprost (Solution) XALATAN 0.005 % Place 1 Drop in both eyes every evening.        MetFORMIN HCl (Tab) GLUCOPHAGE 500 MG Take 2 Tabs by mouth 2 times a day, with meals.        Multiple Vitamins-Minerals   Take 1 Tab by mouth every day.        Multiple Vitamins-Minerals (Tab) OCUVITE  Take 1 Tab by mouth every evening.        Omega-3 Fatty Acids (Cap) fish oil 1000 MG Take 1,000 mg by mouth every day.        Probiotic Product   Take 1 Cap by mouth every evening.        TraZODone HCl (Tab) DESYREL 50 MG Take 50 mg by mouth every evening.        .                 Medicines prescribed today were sent to:     [id number] - [place], [place] - [address]    [address] [facility] [place] [address]    Phone: [phone] Fax: [phone]    Open 24 Hours?: No      Medication refill instructions:       If your prescription bottle indicates you have medication refills left, it is not necessary to call your provider’s office. Please contact your pharmacy and they will refill your medication.    If your prescription bottle indicates you do not have any refills left, you may request refills at any time through one of the following ways: The online [employer] system (except Urgent Care), by calling your provider’s office, or by asking your pharmacy to contact your provider’s office with a refill request. Medication refills are processed only during regular business hours and may not be available until the next business day. Your provider may request additional information or to have a follow-up visit with you prior to refilling your medication.   *Please Note: Medication refills are assigned a new Rx number when refilled electronically. Your pharmacy may indicate that no refills were authorized even though a new prescription for the same medication is available at the  pharmacy. Please request the medicine by name with the pharmacy before contacting your provider for a refill.        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

## 2017-08-09 ENCOUNTER — OFFICE VISIT (OUTPATIENT)
Dept: URGENT CARE | Facility: PHYSICIAN GROUP | Age: 82
End: 2017-08-09
Payer: MEDICARE

## 2017-08-09 VITALS
HEIGHT: 68 IN | WEIGHT: 160 LBS | TEMPERATURE: 98.8 F | DIASTOLIC BLOOD PRESSURE: 62 MMHG | RESPIRATION RATE: 20 BRPM | OXYGEN SATURATION: 97 % | HEART RATE: 64 BPM | SYSTOLIC BLOOD PRESSURE: 130 MMHG | BODY MASS INDEX: 24.25 KG/M2

## 2017-08-09 DIAGNOSIS — M79.622 AXILLARY PAIN, LEFT: ICD-10-CM

## 2017-08-09 PROCEDURE — 99214 OFFICE O/P EST MOD 30 MIN: CPT | Performed by: FAMILY MEDICINE

## 2017-08-09 RX ORDER — CEPHALEXIN 500 MG/1
500 CAPSULE ORAL 3 TIMES DAILY
Qty: 21 CAP | Refills: 0 | Status: SHIPPED | OUTPATIENT
Start: 2017-08-09 | End: 2017-08-16

## 2017-08-09 ASSESSMENT — ENCOUNTER SYMPTOMS
FOCAL WEAKNESS: 0
DIZZINESS: 0
FEVER: 0
CHILLS: 0

## 2017-08-09 NOTE — MR AVS SNAPSHOT
"        Bulmaro Wheeler   2017 3:15 PM   Office Visit   MRN: [id number]    Department:  [facility]   Dept Phone:  [phone]    Description:  Male : 1927   Provider:  Maxwell Diego M.D.           Reason for Visit     Arm Pain pain in left armpit x3-4 days      Allergies as of 2017     No Known Allergies      You were diagnosed with     Axillary pain, left   [197740]         Vital Signs     Blood Pressure Pulse Temperature Respirations Height Weight    130/62 mmHg 64 37.1 °C (98.8 °F) 20 1.727 m (5' 7.99\") 72.576 kg (160 lb)    Body Mass Index Oxygen Saturation Smoking Status             24.33 kg/m2 97% Never Smoker          Basic Information     Date Of Birth Sex Race Ethnicity Preferred Language    1927 Male White Non- English      Your appointments     Oct 04, 2017 10:20 AM   Established Patient with Zahra Yañez M.D.   [address] [address] [phone]           You will be receiving a confirmation call a few days before your appointment from our automated call confirmation system.              Problem List              ICD-10-CM Priority Class Noted - Resolved    Chronic back pain M54.9, G89.29   2012 - Present    B12 deficiency E53.8   2012 - Present    Thrombocytopenia (HCC) D69.6   3/15/2013 - Present    Renal cyst N28.1   2013 - Present    Carotid artery stenosis I65.29   2014 - Present    Type II diabetes mellitus, well controlled (HCC) E11.9   2015 - Present    Hernia of abdominal wall K43.9   2015 - Present    BPH (benign prostatic hyperplasia) N40.0 Low  3/24/2016 - Present    Spinal stenosis of lumbosacral region M48.07   2016 - Present    Vitamin D deficiency E55.9   2016 - Present    Paroxysmal atrial fibrillation (CMS-HCC) I48.0   2016 - Present    Dehydration E86.0   2017 - Present    Syncope R55   2017 - Present    Hypotension " I95.9   5/12/2017 - Present    DM (diabetes mellitus) (CMS-HCC) E11.9   5/13/2017 - Present    MARLI (acute kidney injury) (CMS-HCC) N17.9   5/13/2017 - Present      Health Maintenance        Date Due Completion Dates    RETINAL SCREENING 3/5/2017 3/5/2016 (Done), 6/26/2014 (N/S), 5/21/2013 (N/S), 2/22/2012 (Done)    Override on 3/5/2016: Done (waiting on records)    Override on 6/26/2014: (N/S)    Override on 5/21/2013: (N/S)    Override on 2/22/2012: Done (HD Retina)    IMM ZOSTER VACCINE 5/25/2018 (Originally 4/16/1987) ---    IMM INFLUENZA (1) 9/1/2017 10/21/2016, 11/24/2015, 10/2/2014, 10/9/2013, 9/28/2013, 11/1/2011    A1C SCREENING 11/2/2017 5/2/2017, 4/29/2017, 4/7/2017, 7/21/2016, 4/20/2016, 9/11/2015, 4/14/2015, 12/29/2014, 6/23/2014, 2/18/2014, 1/2/2014, 9/4/2013, 5/31/2013, 3/1/2013, 12/20/2012, 4/4/2012, 10/4/2011, 3/30/2011, 10/7/2010, 3/29/2010, 10/9/2009, 4/22/2009, 6/23/2006    DIABETES MONOFILAMENT / LE EXAM 4/4/2018 4/4/2017, 7/15/2015, 6/26/2014 (N/S), 6/6/2013 (N/S), 10/4/2011 (N/S)    Override on 6/26/2014: (N/S)    Override on 6/6/2013: (N/S)    Override on 10/4/2011: (N/S)    URINE ACR / MICROALBUMIN 4/29/2018 4/29/2017, 4/7/2017, 7/21/2016, 9/11/2015, 12/29/2014, 1/2/2014, 5/31/2013, 4/4/2012, 10/4/2011, 3/30/2011, 10/7/2010    FASTING LIPID PROFILE 5/3/2018 5/3/2017, 4/29/2017, 4/7/2017, 7/21/2016, 9/11/2015, 12/29/2014, 6/23/2014, 2/18/2014, 1/2/2014, 10/7/2010, 3/29/2010, 4/22/2009    SERUM CREATININE 5/13/2018 5/13/2017, 5/12/2017, 5/3/2017, 5/2/2017, 4/30/2017, 4/29/2017, 4/7/2017, 11/27/2016, 11/27/2016, 11/26/2016, 9/7/2016, 8/25/2016, 8/22/2016, 8/21/2016, 8/20/2016, 8/19/2016, 7/21/2016, 4/30/2016, 3/27/2016, 3/25/2016, 3/24/2016, 3/23/2016, 1/20/2016, 1/13/2016, 1/11/2016, 1/10/2016, 9/11/2015, 1/7/2015, 1/5/2015, 1/3/2015, 1/2/2015, 12/29/2014, 9/25/2014, 6/23/2014, 3/12/2014, 3/11/2014, 2/18/2014, 2/17/2014, 11/1/2013, 10/31/2013, 9/30/2013, 9/29/2013, 9/28/2013, 9/27/2013,  9/27/2013, 9/4/2013, 3/29/2013, 3/16/2013, 3/14/2013, 9/2/2012, 8/31/2012, 4/4/2012, 11/1/2011, 10/31/2011, 10/30/2011, 10/29/2011, 10/4/2011, 3/30/2011, 2/9/2011, 12/29/2010, 6/6/2010, 10/9/2009, 2/23/2009, 6/24/2006, 6/23/2006, 6/22/2006    IMM DTaP/Tdap/Td Vaccine (2 - Td) 4/8/2023 4/8/2013            Current Immunizations     13-VALENT PCV PREVNAR 12/21/2015    Influenza TIV (IM) 10/9/2013  5:15 PM, 9/28/2013  4:27 AM, 11/1/2011 12:20 PM    Influenza Vaccine Adult HD 10/21/2016, 11/24/2015, 10/2/2014    Pneumococcal polysaccharide vaccine (PPSV-23) 11/1/2011 12:19 PM    Tdap Vaccine 4/8/2013  3:40 PM      Below and/or attached are the medications your provider expects you to take. Review all of your home medications and newly ordered medications with your provider and/or pharmacist. Follow medication instructions as directed by your provider and/or pharmacist. Please keep your medication list with you and share with your provider. Update the information when medications are discontinued, doses are changed, or new medications (including over-the-counter products) are added; and carry medication information at all times in the event of emergency situations     Allergies:  No Known Allergies          Medications  Valid as of: August 09, 2017 -  4:28 PM    Generic Name Brand Name Tablet Size Instructions for use    Aspirin (Tablet Delayed Response) ECOTRIN 81 MG Take 81 mg by mouth every day.        Atorvastatin Calcium (Tab) LIPITOR 10 MG Take 0.5 Tabs by mouth every day.        B Complex Vitamins   Take 1 Tab by mouth every evening.        Cephalexin (Cap) KEFLEX 500 MG Take 1 Cap by mouth 3 times a day for 7 days.        Cholecalciferol   Take 1 Cap by mouth every day.        Finasteride (Tab) PROSCAR 5 MG Take 5 mg by mouth every evening.        Glucose Blood (Strip) ASCENSIA MICROFILL TEST  Test every day as directed        Latanoprost (Solution) XALATAN 0.005 % Place 1 Drop in both eyes every evening.         MetFORMIN HCl (Tab) GLUCOPHAGE 500 MG Take 2 Tabs by mouth 2 times a day, with meals.        Multiple Vitamins-Minerals   Take 1 Tab by mouth every day.        Multiple Vitamins-Minerals (Tab) OCUVITE  Take 1 Tab by mouth every evening.        Omega-3 Fatty Acids (Cap) fish oil 1000 MG Take 1,000 mg by mouth every day.        Probiotic Product   Take 1 Cap by mouth every evening.        TraZODone HCl (Tab) DESYREL 50 MG Take 50 mg by mouth every evening.        .                 Medicines prescribed today were sent to:     [id number] - [place], [place] - [address]    [address] [place] [address]    Phone: [phone] Fax: [phone]    Open 24 Hours?: No      Medication refill instructions:       If your prescription bottle indicates you have medication refills left, it is not necessary to call your provider’s office. Please contact your pharmacy and they will refill your medication.    If your prescription bottle indicates you do not have any refills left, you may request refills at any time through one of the following ways: The online [employer] system (except Urgent Care), by calling your provider’s office, or by asking your pharmacy to contact your provider’s office with a refill request. Medication refills are processed only during regular business hours and may not be available until the next business day. Your provider may request additional information or to have a follow-up visit with you prior to refilling your medication.   *Please Note: Medication refills are assigned a new Rx number when refilled electronically. Your pharmacy may indicate that no refills were authorized even though a new prescription for the same medication is available at the pharmacy. Please request the medicine by name with the pharmacy before contacting your provider for a refill.        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

## 2017-08-09 NOTE — PROGRESS NOTES
Subjective:      Bulmaro Wheeler is a 89 y.o. male who presents with Arm Pain    Chief Complaint   Patient presents with   • Arm Pain     pain in left armpit x3-4 days        - This is a very pleasant 89 y.o. male with complaints of pain Lt axilla x 3-4 days. He is worried about a sweat gland infected. No trauma/NVFC          ALLERGIES:  Review of patient's allergies indicates no known allergies.     PMH:  Past Medical History   Diagnosis Date   • Diabetes    • Hypertension    • Arthritis    • Pneumonia    • Backpain    • Glaucoma      Interocular lenses placed in 1985   • Indigestion    • CATARACT      surgery in 1985   • Arrhythmia      in 80's, not recent   • Urinary tract infection, site not specified 9/27/2013   • Sepsis 9/27/2013   • UTI (lower urinary tract infection) 9/1/2012   • Heart burn    • Pyelonephritis October 2010   • Acute kidney failure, unspecified (CMS-HCC) 9/27/2013   • Renal cyst 9/19/2013   • Pyelonephritis    • Carotid artery stenosis 2/18/2014   • Occlusion and stenosis of carotid artery without mention of cerebral infarction 3/11/2014        MEDS:    Current outpatient prescriptions:   •  cephALEXin (KEFLEX) 500 MG Cap, Take 1 Cap by mouth 3 times a day for 7 days., Disp: 21 Cap, Rfl: 0  •  atorvastatin (LIPITOR) 10 MG Tab, Take 0.5 Tabs by mouth every day., Disp: 90 Tab, Rfl: 3  •  ASCENSIA MICROFILL TEST strip, Test every day as directed, Disp: 100 Strip, Rfl: 11  •  Omega-3 Fatty Acids (FISH OIL) 1000 MG Cap capsule, Take 1,000 mg by mouth every day., Disp: , Rfl:   •  finasteride (PROSCAR) 5 MG Tab, Take 5 mg by mouth every evening., Disp: , Rfl:   •  trazodone (DESYREL) 50 MG Tab, Take 50 mg by mouth every evening., Disp: , Rfl:   •  metformin (GLUCOPHAGE) 500 MG Tab, Take 2 Tabs by mouth 2 times a day, with meals., Disp: 360 Tab, Rfl: 3  •  aspirin EC (ECOTRIN) 81 MG Tablet Delayed Response, Take 81 mg by mouth every day., Disp: , Rfl:   •  Probiotic Product (PROBIOTIC PO),  "Take 1 Cap by mouth every evening., Disp: , Rfl:   •  B Complex Vitamins (VITAMIN B COMPLEX PO), Take 1 Tab by mouth every evening., Disp: , Rfl:   •  Multiple Vitamins-Minerals (CENTRUM SILVER PO), Take 1 Tab by mouth every day., Disp: , Rfl:   •  Cholecalciferol (VITAMIN D PO), Take 1 Cap by mouth every day., Disp: , Rfl:   •  Multiple Vitamins-Minerals (OCUVITE) Tab, Take 1 Tab by mouth every evening., Disp: , Rfl:   •  latanoprost (XALATAN) 0.005 % SOLN, Place 1 Drop in both eyes every evening., Disp: , Rfl:     ** I have documented what I find to be significant in regards to past medical, social, family and surgical history  in my HPI or under PMH/PSH/FH review section, otherwise it is contributory **           Arm Pain         Review of Systems   Constitutional: Negative for fever and chills.   Neurological: Negative for dizziness and focal weakness.          Objective:     /62 mmHg  Pulse 64  Temp(Src) 37.1 °C (98.8 °F)  Resp 20  Ht 1.727 m (5' 7.99\")  Wt 72.576 kg (160 lb)  BMI 24.33 kg/m2  SpO2 97%     Physical Exam   Constitutional: He appears well-developed. No distress.   HENT:   Head: Normocephalic and atraumatic.   Eyes: Conjunctivae are normal.   Neck: Neck supple.   Cardiovascular: Regular rhythm.    No murmur heard.  Pulmonary/Chest: Effort normal. No respiratory distress.   Neurological: He is alert. He exhibits normal muscle tone.   Skin: Skin is warm and dry.   Psychiatric: He has a normal mood and affect. Judgment normal.   Nursing note and vitals reviewed.  Lt axilla: looks unremarkable, reports some tenderness to deep palp, no masses felt though             Assessment/Plan:         1. Axillary pain, left  cephALEXin (KEFLEX) 500 MG Cap             Dx & d/c instructions discussed w/ patient and/or family members. Follow up w/ Prvt Dr or here in 3-4 days if not getting better, sooner if needed,  ER if worse and UC/PCP unavailable.        Possible side effects (i.e. Rash, GI " upset/constipation, sedation, elevation of BP or sugars) of any medications given discussed.

## 2017-08-14 ENCOUNTER — OFFICE VISIT (OUTPATIENT)
Dept: URGENT CARE | Facility: PHYSICIAN GROUP | Age: 82
End: 2017-08-14
Payer: MEDICARE

## 2017-08-14 VITALS
BODY MASS INDEX: 24.25 KG/M2 | DIASTOLIC BLOOD PRESSURE: 86 MMHG | OXYGEN SATURATION: 96 % | WEIGHT: 160 LBS | TEMPERATURE: 98 F | RESPIRATION RATE: 18 BRPM | SYSTOLIC BLOOD PRESSURE: 150 MMHG | HEIGHT: 68 IN | HEART RATE: 64 BPM

## 2017-08-14 DIAGNOSIS — M70.22 OLECRANON BURSITIS OF LEFT ELBOW: ICD-10-CM

## 2017-08-14 PROCEDURE — 99213 OFFICE O/P EST LOW 20 MIN: CPT | Performed by: FAMILY MEDICINE

## 2017-08-14 PROCEDURE — A6448 LT COMPRES BAND <3"/YD: HCPCS | Performed by: FAMILY MEDICINE

## 2017-08-14 ASSESSMENT — ENCOUNTER SYMPTOMS
SENSORY CHANGE: 0
FOCAL WEAKNESS: 0

## 2017-08-14 NOTE — MR AVS SNAPSHOT
"        Bulmaro Wheeler   2017 1:00 PM   Office Visit   MRN: [id number]    Department:  [facility]   Dept Phone:  [phone]    Description:  Male : 1927   Provider:  Tristin Sanchez M.D.           Reason for Visit     Elbow Pain fell, hit his elbow on concrete      Allergies as of 2017     No Known Allergies      You were diagnosed with     Olecranon bursitis of left elbow   [546230]         Vital Signs     Blood Pressure Pulse Temperature Respirations Height Weight    150/86 mmHg 64 36.7 °C (98 °F) 18 1.727 m (5' 8\") 72.576 kg (160 lb)    Body Mass Index Oxygen Saturation Smoking Status             24.33 kg/m2 96% Never Smoker          Basic Information     Date Of Birth Sex Race Ethnicity Preferred Language    1927 Male White Non- English      Your appointments     Oct 04, 2017 10:20 AM   Established Patient with Zahra Yañez M.D.   [address] [address] [phone]           You will be receiving a confirmation call a few days before your appointment from our automated call confirmation system.              Problem List              ICD-10-CM Priority Class Noted - Resolved    Chronic back pain M54.9, G89.29   2012 - Present    B12 deficiency E53.8   2012 - Present    Thrombocytopenia (HCC) D69.6   3/15/2013 - Present    Renal cyst N28.1   2013 - Present    Carotid artery stenosis I65.29   2014 - Present    Type II diabetes mellitus, well controlled (HCC) E11.9   2015 - Present    Hernia of abdominal wall K43.9   2015 - Present    BPH (benign prostatic hyperplasia) N40.0 Low  3/24/2016 - Present    Spinal stenosis of lumbosacral region M48.07   2016 - Present    Vitamin D deficiency E55.9   2016 - Present    Paroxysmal atrial fibrillation (CMS-HCC) I48.0   2016 - Present    Dehydration E86.0   2017 - Present    Syncope R55   2017 - Present   " Hypotension I95.9   5/12/2017 - Present    DM (diabetes mellitus) (CMS-HCC) E11.9   5/13/2017 - Present    MARLI (acute kidney injury) (CMS-HCC) N17.9   5/13/2017 - Present      Health Maintenance        Date Due Completion Dates    RETINAL SCREENING 3/5/2017 3/5/2016 (Done), 6/26/2014 (N/S), 5/21/2013 (N/S), 2/22/2012 (Done)    Override on 3/5/2016: Done (waiting on records)    Override on 6/26/2014: (N/S)    Override on 5/21/2013: (N/S)    Override on 2/22/2012: Done (HD Retina)    IMM ZOSTER VACCINE 5/25/2018 (Originally 4/16/1987) ---    IMM INFLUENZA (1) 9/1/2017 10/21/2016, 11/24/2015, 10/2/2014, 10/9/2013, 9/28/2013, 11/1/2011    A1C SCREENING 11/2/2017 5/2/2017, 4/29/2017, 4/7/2017, 7/21/2016, 4/20/2016, 9/11/2015, 4/14/2015, 12/29/2014, 6/23/2014, 2/18/2014, 1/2/2014, 9/4/2013, 5/31/2013, 3/1/2013, 12/20/2012, 4/4/2012, 10/4/2011, 3/30/2011, 10/7/2010, 3/29/2010, 10/9/2009, 4/22/2009, 6/23/2006    DIABETES MONOFILAMENT / LE EXAM 4/4/2018 4/4/2017, 7/15/2015, 6/26/2014 (N/S), 6/6/2013 (N/S), 10/4/2011 (N/S)    Override on 6/26/2014: (N/S)    Override on 6/6/2013: (N/S)    Override on 10/4/2011: (N/S)    URINE ACR / MICROALBUMIN 4/29/2018 4/29/2017, 4/7/2017, 7/21/2016, 9/11/2015, 12/29/2014, 1/2/2014, 5/31/2013, 4/4/2012, 10/4/2011, 3/30/2011, 10/7/2010    FASTING LIPID PROFILE 5/3/2018 5/3/2017, 4/29/2017, 4/7/2017, 7/21/2016, 9/11/2015, 12/29/2014, 6/23/2014, 2/18/2014, 1/2/2014, 10/7/2010, 3/29/2010, 4/22/2009    SERUM CREATININE 5/13/2018 5/13/2017, 5/12/2017, 5/3/2017, 5/2/2017, 4/30/2017, 4/29/2017, 4/7/2017, 11/27/2016, 11/27/2016, 11/26/2016, 9/7/2016, 8/25/2016, 8/22/2016, 8/21/2016, 8/20/2016, 8/19/2016, 7/21/2016, 4/30/2016, 3/27/2016, 3/25/2016, 3/24/2016, 3/23/2016, 1/20/2016, 1/13/2016, 1/11/2016, 1/10/2016, 9/11/2015, 1/7/2015, 1/5/2015, 1/3/2015, 1/2/2015, 12/29/2014, 9/25/2014, 6/23/2014, 3/12/2014, 3/11/2014, 2/18/2014, 2/17/2014, 11/1/2013, 10/31/2013, 9/30/2013, 9/29/2013, 9/28/2013,  9/27/2013, 9/27/2013, 9/4/2013, 3/29/2013, 3/16/2013, 3/14/2013, 9/2/2012, 8/31/2012, 4/4/2012, 11/1/2011, 10/31/2011, 10/30/2011, 10/29/2011, 10/4/2011, 3/30/2011, 2/9/2011, 12/29/2010, 6/6/2010, 10/9/2009, 2/23/2009, 6/24/2006, 6/23/2006, 6/22/2006    IMM DTaP/Tdap/Td Vaccine (2 - Td) 4/8/2023 4/8/2013            Current Immunizations     13-VALENT PCV PREVNAR 12/21/2015    Influenza TIV (IM) 10/9/2013  5:15 PM, 9/28/2013  4:27 AM, 11/1/2011 12:20 PM    Influenza Vaccine Adult HD 10/21/2016, 11/24/2015, 10/2/2014    Pneumococcal polysaccharide vaccine (PPSV-23) 11/1/2011 12:19 PM    Tdap Vaccine 4/8/2013  3:40 PM      Below and/or attached are the medications your provider expects you to take. Review all of your home medications and newly ordered medications with your provider and/or pharmacist. Follow medication instructions as directed by your provider and/or pharmacist. Please keep your medication list with you and share with your provider. Update the information when medications are discontinued, doses are changed, or new medications (including over-the-counter products) are added; and carry medication information at all times in the event of emergency situations     Allergies:  No Known Allergies          Medications  Valid as of: August 14, 2017 -  1:49 PM    Generic Name Brand Name Tablet Size Instructions for use    Aspirin (Tablet Delayed Response) ECOTRIN 81 MG Take 81 mg by mouth every day.        Atorvastatin Calcium (Tab) LIPITOR 10 MG Take 0.5 Tabs by mouth every day.        B Complex Vitamins   Take 1 Tab by mouth every evening.        Cephalexin (Cap) KEFLEX 500 MG Take 1 Cap by mouth 3 times a day for 7 days.        Cholecalciferol   Take 1 Cap by mouth every day.        Finasteride (Tab) PROSCAR 5 MG Take 5 mg by mouth every evening.        Glucose Blood (Strip) ASCENSIA MICROFILL TEST  Test every day as directed        Latanoprost (Solution) XALATAN 0.005 % Place 1 Drop in both eyes every  evening.        MetFORMIN HCl (Tab) GLUCOPHAGE 500 MG Take 2 Tabs by mouth 2 times a day, with meals.        Multiple Vitamins-Minerals   Take 1 Tab by mouth every day.        Multiple Vitamins-Minerals (Tab) OCUVITE  Take 1 Tab by mouth every evening.        Omega-3 Fatty Acids (Cap) fish oil 1000 MG Take 1,000 mg by mouth every day.        Probiotic Product   Take 1 Cap by mouth every evening.        TraZODone HCl (Tab) DESYREL 50 MG Take 50 mg by mouth every evening.        .                 Medicines prescribed today were sent to:     [id number] - [place], [place] - [address]    [address] [place] [address]    Phone: [phone] Fax: [phone]    Open 24 Hours?: No      Medication refill instructions:       If your prescription bottle indicates you have medication refills left, it is not necessary to call your provider’s office. Please contact your pharmacy and they will refill your medication.    If your prescription bottle indicates you do not have any refills left, you may request refills at any time through one of the following ways: The online [employer] system (except Urgent Care), by calling your provider’s office, or by asking your pharmacy to contact your provider’s office with a refill request. Medication refills are processed only during regular business hours and may not be available until the next business day. Your provider may request additional information or to have a follow-up visit with you prior to refilling your medication.   *Please Note: Medication refills are assigned a new Rx number when refilled electronically. Your pharmacy may indicate that no refills were authorized even though a new prescription for the same medication is available at the pharmacy. Please request the medicine by name with the pharmacy before contacting your provider for a refill.        Referral     A referral request has been sent to our patient care coordination department. Please allow 3-5  business days for us to process this request and contact you either by phone or mail. If you do not hear from us by the 5th business day, please call us at [phone].        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

## 2017-08-14 NOTE — PROGRESS NOTES
"Subjective:      Bulmaro Wheeler is a 89 y.o. male who presents with Elbow Pain            HPI  Onset 2 days left posterior elbow fluid collection triggered by local trauma. No current pain. Full function of elbow. Right hand dominant. No prior injury or surgery. Skin intact. No fever. No drainage.     Review of Systems   Neurological: Negative for sensory change and focal weakness.          Objective:     /86 mmHg  Pulse 64  Temp(Src) 36.7 °C (98 °F)  Resp 18  Ht 1.727 m (5' 8\")  Wt 72.576 kg (160 lb)  BMI 24.33 kg/m2  SpO2 96%     Physical Exam   Constitutional: He appears well-developed and well-nourished. No distress.   Musculoskeletal:   Left elbow: posterior fluid collection c/w olecranon bursitis. No redness. No warmth. No drainage. No point bony tenderness. FROM. Distal neuro/vascular intact.      Skin: Skin is warm and dry. No rash noted.               Assessment/Plan:     1. Olecranon bursitis of left elbow    - REFERRAL TO SPORTS MEDICINE  - nsaid, compression  - f/u Dr. Fabian.      "

## 2017-09-03 ENCOUNTER — APPOINTMENT (OUTPATIENT)
Dept: RADIOLOGY | Facility: MEDICAL CENTER | Age: 82
End: 2017-09-03
Attending: EMERGENCY MEDICINE
Payer: MEDICARE

## 2017-09-03 ENCOUNTER — HOSPITAL ENCOUNTER (EMERGENCY)
Facility: MEDICAL CENTER | Age: 82
End: 2017-09-03
Attending: EMERGENCY MEDICINE
Payer: MEDICARE

## 2017-09-03 VITALS
HEART RATE: 74 BPM | DIASTOLIC BLOOD PRESSURE: 77 MMHG | TEMPERATURE: 98.4 F | HEIGHT: 68 IN | BODY MASS INDEX: 24.25 KG/M2 | WEIGHT: 160 LBS | SYSTOLIC BLOOD PRESSURE: 158 MMHG | OXYGEN SATURATION: 93 % | RESPIRATION RATE: 21 BRPM

## 2017-09-03 DIAGNOSIS — R19.7 DIARRHEA, UNSPECIFIED TYPE: ICD-10-CM

## 2017-09-03 LAB
ALBUMIN SERPL BCP-MCNC: 3.8 G/DL (ref 3.2–4.9)
ALBUMIN/GLOB SERPL: 1.3 G/DL
ALP SERPL-CCNC: 74 U/L (ref 30–99)
ALT SERPL-CCNC: 16 U/L (ref 2–50)
ANION GAP SERPL CALC-SCNC: 9 MMOL/L (ref 0–11.9)
APPEARANCE UR: CLEAR
AST SERPL-CCNC: 19 U/L (ref 12–45)
BASOPHILS # BLD AUTO: 0.3 % (ref 0–1.8)
BASOPHILS # BLD: 0.02 K/UL (ref 0–0.12)
BILIRUB SERPL-MCNC: 0.5 MG/DL (ref 0.1–1.5)
BILIRUB UR QL STRIP.AUTO: NEGATIVE
BUN SERPL-MCNC: 18 MG/DL (ref 8–22)
CALCIUM SERPL-MCNC: 9 MG/DL (ref 8.5–10.5)
CHLORIDE SERPL-SCNC: 96 MMOL/L (ref 96–112)
CO2 SERPL-SCNC: 23 MMOL/L (ref 20–33)
COLOR UR: YELLOW
CREAT SERPL-MCNC: 0.78 MG/DL (ref 0.5–1.4)
CULTURE IF INDICATED INDCX: NO UA CULTURE
EKG IMPRESSION: NORMAL
EOSINOPHIL # BLD AUTO: 0.04 K/UL (ref 0–0.51)
EOSINOPHIL NFR BLD: 0.5 % (ref 0–6.9)
ERYTHROCYTE [DISTWIDTH] IN BLOOD BY AUTOMATED COUNT: 41.6 FL (ref 35.9–50)
GFR SERPL CREATININE-BSD FRML MDRD: >60 ML/MIN/1.73 M 2
GLOBULIN SER CALC-MCNC: 2.9 G/DL (ref 1.9–3.5)
GLUCOSE SERPL-MCNC: 224 MG/DL (ref 65–99)
GLUCOSE UR STRIP.AUTO-MCNC: 500 MG/DL
HCT VFR BLD AUTO: 35.9 % (ref 42–52)
HGB BLD-MCNC: 12.3 G/DL (ref 14–18)
IMM GRANULOCYTES # BLD AUTO: 0.04 K/UL (ref 0–0.11)
IMM GRANULOCYTES NFR BLD AUTO: 0.5 % (ref 0–0.9)
KETONES UR STRIP.AUTO-MCNC: NEGATIVE MG/DL
LEUKOCYTE ESTERASE UR QL STRIP.AUTO: NEGATIVE
LIPASE SERPL-CCNC: 21 U/L (ref 11–82)
LYMPHOCYTES # BLD AUTO: 1.01 K/UL (ref 1–4.8)
LYMPHOCYTES NFR BLD: 13.5 % (ref 22–41)
MCH RBC QN AUTO: 30.4 PG (ref 27–33)
MCHC RBC AUTO-ENTMCNC: 34.3 G/DL (ref 33.7–35.3)
MCV RBC AUTO: 88.6 FL (ref 81.4–97.8)
MICRO URNS: ABNORMAL
MONOCYTES # BLD AUTO: 0.56 K/UL (ref 0–0.85)
MONOCYTES NFR BLD AUTO: 7.5 % (ref 0–13.4)
NEUTROPHILS # BLD AUTO: 5.8 K/UL (ref 1.82–7.42)
NEUTROPHILS NFR BLD: 77.7 % (ref 44–72)
NITRITE UR QL STRIP.AUTO: NEGATIVE
NRBC # BLD AUTO: 0 K/UL
NRBC BLD AUTO-RTO: 0 /100 WBC
PH UR STRIP.AUTO: 7.5 [PH]
PLATELET # BLD AUTO: 204 K/UL (ref 164–446)
PMV BLD AUTO: 9.9 FL (ref 9–12.9)
POTASSIUM SERPL-SCNC: 4.1 MMOL/L (ref 3.6–5.5)
PROT SERPL-MCNC: 6.7 G/DL (ref 6–8.2)
PROT UR QL STRIP: NEGATIVE MG/DL
RBC # BLD AUTO: 4.05 M/UL (ref 4.7–6.1)
RBC UR QL AUTO: NEGATIVE
SODIUM SERPL-SCNC: 128 MMOL/L (ref 135–145)
SP GR UR STRIP.AUTO: 1.01
UROBILINOGEN UR STRIP.AUTO-MCNC: 0.2 MG/DL
WBC # BLD AUTO: 7.5 K/UL (ref 4.8–10.8)

## 2017-09-03 PROCEDURE — 36415 COLL VENOUS BLD VENIPUNCTURE: CPT

## 2017-09-03 PROCEDURE — 80053 COMPREHEN METABOLIC PANEL: CPT

## 2017-09-03 PROCEDURE — 99284 EMERGENCY DEPT VISIT MOD MDM: CPT

## 2017-09-03 PROCEDURE — 81003 URINALYSIS AUTO W/O SCOPE: CPT

## 2017-09-03 PROCEDURE — 74000 DX-ABDOMEN-1 VIEW: CPT

## 2017-09-03 PROCEDURE — 700105 HCHG RX REV CODE 258: Performed by: EMERGENCY MEDICINE

## 2017-09-03 PROCEDURE — 83690 ASSAY OF LIPASE: CPT

## 2017-09-03 PROCEDURE — A9270 NON-COVERED ITEM OR SERVICE: HCPCS | Performed by: EMERGENCY MEDICINE

## 2017-09-03 PROCEDURE — 85025 COMPLETE CBC W/AUTO DIFF WBC: CPT

## 2017-09-03 PROCEDURE — 93005 ELECTROCARDIOGRAM TRACING: CPT | Performed by: EMERGENCY MEDICINE

## 2017-09-03 PROCEDURE — 700102 HCHG RX REV CODE 250 W/ 637 OVERRIDE(OP): Performed by: EMERGENCY MEDICINE

## 2017-09-03 RX ORDER — SODIUM CHLORIDE 9 MG/ML
1000 INJECTION, SOLUTION INTRAVENOUS ONCE
Status: COMPLETED | OUTPATIENT
Start: 2017-09-03 | End: 2017-09-03

## 2017-09-03 RX ORDER — DIPHENOXYLATE HYDROCHLORIDE AND ATROPINE SULFATE 2.5; .025 MG/1; MG/1
1 TABLET ORAL ONCE
Status: COMPLETED | OUTPATIENT
Start: 2017-09-03 | End: 2017-09-03

## 2017-09-03 RX ADMIN — SODIUM CHLORIDE 1000 ML: 9 INJECTION, SOLUTION INTRAVENOUS at 16:39

## 2017-09-03 RX ADMIN — DIPHENOXYLATE HYDROCHLORIDE AND ATROPINE SULFATE 1 TABLET: 2.5; .025 TABLET ORAL at 16:39

## 2017-09-03 NOTE — ED PROVIDER NOTES
"ED Provider Note    CHIEF COMPLAINT  Chief Complaint   Patient presents with   • GI Problem     bib AMB from restaurant. \"i got sick to my stomach and shit my pants.\" c/o weakness diarrhea and nausea    • Nausea   • Diarrhea   • Weakness       HPI  Bulmaro Wheeler is a 89 y.o. male who presents For evaluation of diarrhea over the past few days, he comes in my ambulance today with diarrhea that occurred while eating at a restaurant. He describes generalized cramping in the lower portion of his abdomen. He's had some nausea but no vomiting, he describes generalized weakness sensation that has been progressive over the past couple days. No chest pain, no shortness of breath, no fever. He has a past medical history significant for renal disease as well as diabetes.     REVIEW OF SYSTEMS  Negative for fever, rash, chest pain, dyspnea, vomiting, headache, focal weakness, focal numbness, focal tingling, back pain. All other systems are negative.     PAST MEDICAL HISTORY  Past Medical History:   Diagnosis Date   • Occlusion and stenosis of carotid artery without mention of cerebral infarction 3/11/2014   • Carotid artery stenosis 2/18/2014   • Urinary tract infection, site not specified 9/27/2013   • Sepsis 9/27/2013   • Acute kidney failure, unspecified (CMS-HCC) 9/27/2013   • Renal cyst 9/19/2013   • UTI (lower urinary tract infection) 9/1/2012   • Pyelonephritis October 2010   • Arrhythmia     in 80's, not recent   • Arthritis    • Backpain    • CATARACT     surgery in 1985   • Diabetes    • Glaucoma     Interocular lenses placed in 1985   • Heart burn    • Hypertension    • Indigestion    • Pneumonia    • Pyelonephritis        FAMILY HISTORY  Family History   Problem Relation Age of Onset   • Heart Disease Father    • Diabetes Father    • Alcohol/Drug Father    • Cancer Sister      ovarian   • Stroke Brother      age 90       SOCIAL HISTORY  Social History   Substance Use Topics   • Smoking status: Never Smoker " "  • Smokeless tobacco: Never Used   • Alcohol use No      Comment: hasnt drank since 1979       SURGICAL HISTORY  Past Surgical History:   Procedure Laterality Date   • CAROTID ENDARTERECTOMY  3/11/2014    Performed by Bob Antonio M.D. at SURGERY [facility]   • ERCP W/REMOVAL CALCULUS  2009   • COLONOSCOPY  1999   • CHOLECYSTECTOMY  1999   • EYE SURGERY  1980's    cataracts OU       CURRENT MEDICATIONS  I personally reviewed the medication list in the charting documentation.     ALLERGIES  No Known Allergies    MEDICAL RECORD  I have reviewed patient's medical record and pertinent results are listed above.      PHYSICAL EXAM  VITAL SIGNS: /77   Pulse 70   Temp 36.9 °C (98.4 °F)   Resp 18   Ht 1.727 m (5' 8\")   Wt 72.6 kg (160 lb)   BMI 24.33 kg/m²    Constitutional: Elderly, not acutely toxic  HENT: Mucus membranes moist.    Eyes: No scleral icterus. Normal conjunctiva   Neck: Supple, comfortable, nonpainful range of motion.   Cardiovascular: Regular heart rate and rhythm.   Thorax & Lungs: Chest is nontender.  Lungs are clear to auscultation with good air movement bilaterally.  No wheeze, rhonchi, nor rales.   Abdomen: Soft, minimal distention, generalized tenderness with no rebound or guarding, hyperactive bowel sounds throughout all quadrants  Skin: Warm, dry. No rash appreciated  Extremities/Musculoskeletal: No sign of trauma. No asymmetric calf tenderness, erythema or edema. Normal range of motion   Neurologic: Alert & oriented. No focal deficits observed.   Psychiatric: Normal affect appropriate for the clinical situation.    DIAGNOSTIC STUDIES / PROCEDURES    EKG  12 Lead EKG interpreted by me to show:    Rate 75  Rhythm: Normal sinus rhythm  Axis: Normal  MD and QRS Intervals: Normal  T waves: No acute changes  ST segments: No acute changes  Ectopy: PVC and PAC    My impression of this EKG: Does not indicate ischemia or arrythmia at this time.      LABS    Results for orders placed or " performed during the hospital encounter of 09/03/17   CBC WITH DIFFERENTIAL   Result Value Ref Range    WBC 7.5 4.8 - 10.8 K/uL    RBC 4.05 (L) 4.70 - 6.10 M/uL    Hemoglobin 12.3 (L) 14.0 - 18.0 g/dL    Hematocrit 35.9 (L) 42.0 - 52.0 %    MCV 88.6 81.4 - 97.8 fL    MCH 30.4 27.0 - 33.0 pg    MCHC 34.3 33.7 - 35.3 g/dL    RDW 41.6 35.9 - 50.0 fL    Platelet Count 204 164 - 446 K/uL    MPV 9.9 9.0 - 12.9 fL    Neutrophils-Polys 77.70 (H) 44.00 - 72.00 %    Lymphocytes 13.50 (L) 22.00 - 41.00 %    Monocytes 7.50 0.00 - 13.40 %    Eosinophils 0.50 0.00 - 6.90 %    Basophils 0.30 0.00 - 1.80 %    Immature Granulocytes 0.50 0.00 - 0.90 %    Nucleated RBC 0.00 /100 WBC    Neutrophils (Absolute) 5.80 1.82 - 7.42 K/uL    Lymphs (Absolute) 1.01 1.00 - 4.80 K/uL    Monos (Absolute) 0.56 0.00 - 0.85 K/uL    Eos (Absolute) 0.04 0.00 - 0.51 K/uL    Baso (Absolute) 0.02 0.00 - 0.12 K/uL    Immature Granulocytes (abs) 0.04 0.00 - 0.11 K/uL    NRBC (Absolute) 0.00 K/uL   COMP METABOLIC PANEL   Result Value Ref Range    Sodium 128 (L) 135 - 145 mmol/L    Potassium 4.1 3.6 - 5.5 mmol/L    Chloride 96 96 - 112 mmol/L    Co2 23 20 - 33 mmol/L    Anion Gap 9.0 0.0 - 11.9    Glucose 224 (H) 65 - 99 mg/dL    Bun 18 8 - 22 mg/dL    Creatinine 0.78 0.50 - 1.40 mg/dL    Calcium 9.0 8.5 - 10.5 mg/dL    AST(SGOT) 19 12 - 45 U/L    ALT(SGPT) 16 2 - 50 U/L    Alkaline Phosphatase 74 30 - 99 U/L    Total Bilirubin 0.5 0.1 - 1.5 mg/dL    Albumin 3.8 3.2 - 4.9 g/dL    Total Protein 6.7 6.0 - 8.2 g/dL    Globulin 2.9 1.9 - 3.5 g/dL    A-G Ratio 1.3 g/dL   LIPASE   Result Value Ref Range    Lipase 21 11 - 82 U/L   URINALYSIS CULTURE, IF INDICATED   Result Value Ref Range    Color Yellow     Character Clear     Specific Gravity 1.011 <1.035    Ph 7.5 5.0 - 8.0    Glucose 500 (A) Negative mg/dL    Ketones Negative Negative mg/dL    Protein Negative Negative mg/dL    Bilirubin Negative Negative    Urobilinogen, Urine 0.2 Negative    Nitrite Negative  Negative    Leukocyte Esterase Negative Negative    Occult Blood Negative Negative    Micro Urine Req see below     Culture Indicated No UA Culture   ESTIMATED GFR   Result Value Ref Range    GFR If African American >60 >60 mL/min/1.73 m 2    GFR If Non African American >60 >60 mL/min/1.73 m 2         RADIOLOGY  [id number] VIEW   Final Result      No evidence of bowel obstruction.                        COURSE & MEDICAL DECISION MAKING  I have reviewed any medical record information, laboratory studies and radiographic results as noted above.  Differential diagnoses includes: Gastroenteritis, dehydration, renal insufficiency, anemia, obstruction    Encounter Summary: This is a 89 y.o. male with diarrhea and nausea and abdominal cramping, this going on for a few days, he presents with no evidence of tachycardia or fever and generally appears well, he does have some tenderness of his abdomen and little bit of distention but no evidence of peritonitis. Will obtain a KUB and blood work, administer some Lomotil and IV fluids and then reevaluate ------- blood work is largely unremarkable as is the x-ray, the patient tells me he feels better and at this point I don't think he needs acute hospitalization the patient is relieved and would like to go home. He will follow-up with his primary care physician and strict return instructions have been discussed. At this time he is stable and appropriate for discharge      DISPOSITION: Discharge home in stable condition      FINAL IMPRESSION  1. Diarrhea, unspecified type           This dictation was created using voice recognition software. The accuracy of the dictation is limited to the abilities of the software. I expect there may be some errors of grammar and possibly content. The nursing notes were reviewed and certain aspects of this information were incorporated into this note.    Electronically signed by: Dax Aguilar, 9/3/2017 4:29 PM

## 2017-09-03 NOTE — ED NOTES
"Chief Complaint   Patient presents with   • GI Problem     bib AMB from restaurant. \"i got sick to my stomach and shit my pants.\" c/o weakness diarrhea and nausea    • Nausea   • Diarrhea   • Weakness     500cc of fluids given by ems  "

## 2017-09-04 NOTE — DISCHARGE INSTRUCTIONS
Return immediately to the Emergency Department if you experience discomfort in your abdomen, fever, chest pain, difficulty breathing, back pain, or any other new or worsening symptoms.       Diarrhea  Diarrhea is frequent loose and watery bowel movements. It can cause you to feel weak and dehydrated. Dehydration can cause you to become tired and thirsty, have a dry mouth, and have decreased urination that often is dark yellow. Diarrhea is a sign of another problem, most often an infection that will not last long. In most cases, diarrhea typically lasts 2-3 days. However, it can last longer if it is a sign of something more serious. It is important to treat your diarrhea as directed by your caregiver to lessen or prevent future episodes of diarrhea.  CAUSES   Some common causes include:  · Gastrointestinal infections caused by viruses, bacteria, or parasites.  · Food poisoning or food allergies.  · Certain medicines, such as antibiotics, chemotherapy, and laxatives.  · Artificial sweeteners and fructose.  · Digestive disorders.  HOME CARE INSTRUCTIONS  · Ensure adequate fluid intake (hydration): Have 1 cup (8 oz) of fluid for each diarrhea episode. Avoid fluids that contain simple sugars or sports drinks, fruit juices, whole milk products, and sodas. Your urine should be clear or pale yellow if you are drinking enough fluids. Hydrate with an oral rehydration solution that you can purchase at pharmacies, retail stores, and online. You can prepare an oral rehydration solution at home by mixing the following ingredients together:  ¨  - tsp table salt.  ¨ ¾ tsp baking soda.  ¨  tsp salt substitute containing potassium chloride.  ¨ 1  tablespoons sugar.  ¨ 1 L (34 oz) of water.  · Certain foods and beverages may increase the speed at which food moves through the gastrointestinal (GI) tract. These foods and beverages should be avoided and include:  ¨ Caffeinated and alcoholic beverages.  ¨ High-fiber foods, such as raw  fruits and vegetables, nuts, seeds, and whole grain breads and cereals.  ¨ Foods and beverages sweetened with sugar alcohols, such as xylitol, sorbitol, and mannitol.  · Some foods may be well tolerated and may help thicken stool including:  ¨ Starchy foods, such as rice, toast, pasta, low-sugar cereal, oatmeal, grits, baked potatoes, crackers, and bagels.  ¨ Bananas.  ¨ Applesauce.  · Add probiotic-rich foods to help increase healthy bacteria in the GI tract, such as yogurt and fermented milk products.  · Wash your hands well after each diarrhea episode.  · Only take over-the-counter or prescription medicines as directed by your caregiver.  · Take a warm bath to relieve any burning or pain from frequent diarrhea episodes.  SEEK IMMEDIATE MEDICAL CARE IF:   · You are unable to keep fluids down.  · You have persistent vomiting.  · You have blood in your stool, or your stools are black and tarry.  · You do not urinate in 6-8 hours, or there is only a small amount of very dark urine.  · You have abdominal pain that increases or localizes.  · You have weakness, dizziness, confusion, or light-headedness.  · You have a severe headache.  · Your diarrhea gets worse or does not get better.  · You have a fever or persistent symptoms for more than 2-3 days.  · You have a fever and your symptoms suddenly get worse.  MAKE SURE YOU:   · Understand these instructions.  · Will watch your condition.  · Will get help right away if you are not doing well or get worse.     This information is not intended to replace advice given to you by your health care provider. Make sure you discuss any questions you have with your health care provider.     Document Released: 12/08/2003 Document Revised: 01/08/2016 Document Reviewed: 08/25/2013  [employer] Interactive Patient Education ©2016 [employer]

## 2017-09-05 ENCOUNTER — TELEPHONE (OUTPATIENT)
Dept: MEDICAL GROUP | Facility: PHYSICIAN GROUP | Age: 82
End: 2017-09-05

## 2017-09-05 ENCOUNTER — PATIENT OUTREACH (OUTPATIENT)
Dept: HEALTH INFORMATION MANAGEMENT | Facility: OTHER | Age: 82
End: 2017-09-05

## 2017-09-05 NOTE — TELEPHONE ENCOUNTER
ESTABLISHED PATIENT PRE-VISIT PLANNING     Note: Patient will not be contacted if there is no indication to call.     1.  Reviewed notes from the last few office visits within the medical group: Yes    2.  If any orders were placed at last visit or intended to be done for this visit (i.e. 6 mos follow-up), do we have Results/Consult Notes?        •  Labs - Labs ordered, completed and results are in chart.       •  Imaging - Imaging ordered, completed and results are in chart.       •  Referrals - Referral ordered, patient has NOT been seen.    3. Is this appointment scheduled as a Hospital Follow-Up? No    4.  Immunizations were updated in Epic using WebIZ?: Epic matches WebIZ       •  Web Iz Recommendations: FLU    5.  Patient is due for the following Health Maintenance Topics:   Health Maintenance Due   Topic Date Due   • Annual Wellness Visit  04/16/1927   • RETINAL SCREENING  03/05/2017   • IMM INFLUENZA (1) 09/01/2017           6.  Patient was informed to arrive 15 min prior to their scheduled appointment and bring in their medication bottles.

## 2017-09-06 ENCOUNTER — HOSPITAL ENCOUNTER (OUTPATIENT)
Dept: RADIOLOGY | Facility: MEDICAL CENTER | Age: 82
End: 2017-09-06
Attending: FAMILY MEDICINE
Payer: MEDICARE

## 2017-09-06 ENCOUNTER — TELEPHONE (OUTPATIENT)
Dept: MEDICAL GROUP | Facility: PHYSICIAN GROUP | Age: 82
End: 2017-09-06

## 2017-09-06 ENCOUNTER — OFFICE VISIT (OUTPATIENT)
Dept: MEDICAL GROUP | Facility: PHYSICIAN GROUP | Age: 82
End: 2017-09-06
Payer: MEDICARE

## 2017-09-06 VITALS
WEIGHT: 164 LBS | HEIGHT: 68 IN | RESPIRATION RATE: 16 BRPM | HEART RATE: 54 BPM | OXYGEN SATURATION: 95 % | BODY MASS INDEX: 24.86 KG/M2 | TEMPERATURE: 97.5 F | DIASTOLIC BLOOD PRESSURE: 68 MMHG | SYSTOLIC BLOOD PRESSURE: 122 MMHG

## 2017-09-06 DIAGNOSIS — M71.50 TRAUMATIC BURSITIS: ICD-10-CM

## 2017-09-06 DIAGNOSIS — M25.572 ACUTE LEFT ANKLE PAIN: ICD-10-CM

## 2017-09-06 PROCEDURE — 99214 OFFICE O/P EST MOD 30 MIN: CPT | Performed by: FAMILY MEDICINE

## 2017-09-06 PROCEDURE — 73610 X-RAY EXAM OF ANKLE: CPT | Mod: LT

## 2017-09-06 NOTE — PROGRESS NOTES
Chief Complaint   Patient presents with   • Bursitis     lft elbow   • Ankle Injury     lft ankle pain       HISTORY OF PRESENT ILLNESS: Patient is a 89 y.o. male established patient here today for the following concerns:    1. Traumatic bursitis  2. Acute left ankle pain  Here today with concerns over left elbow swelling after getting his foot caught next to his motorized scooter.  He was seen in  and had the elbow wrapped.  He reports that it is already starting to improve.     He also reports that during this episode he twisted his left ankle and has not been able to bear weight on it.       Past Medical, Social, and Family history reviewed and updated in EPIC    Allergies:Review of patient's allergies indicates no known allergies.    Current Outpatient Prescriptions   Medication Sig Dispense Refill   • atorvastatin (LIPITOR) 10 MG Tab Take 0.5 Tabs by mouth every day. 90 Tab 3   • ASCENSIA MICROFILL TEST strip Test every day as directed 100 Strip 11   • Omega-3 Fatty Acids (FISH OIL) 1000 MG Cap capsule Take 1,000 mg by mouth every day.     • finasteride (PROSCAR) 5 MG Tab Take 5 mg by mouth every evening.     • trazodone (DESYREL) 50 MG Tab Take 50 mg by mouth every evening.     • metformin (GLUCOPHAGE) 500 MG Tab Take 2 Tabs by mouth 2 times a day, with meals. 360 Tab 3   • aspirin EC (ECOTRIN) 81 MG Tablet Delayed Response Take 81 mg by mouth every day.     • Probiotic Product (PROBIOTIC PO) Take 1 Cap by mouth every evening.     • B Complex Vitamins (VITAMIN B COMPLEX PO) Take 1 Tab by mouth every evening.     • Multiple Vitamins-Minerals (CENTRUM SILVER PO) Take 1 Tab by mouth every day.     • Cholecalciferol (VITAMIN D PO) Take 1 Cap by mouth every day.     • Multiple Vitamins-Minerals (OCUVITE) Tab Take 1 Tab by mouth every evening.     • latanoprost (XALATAN) 0.005 % SOLN Place 1 Drop in both eyes every evening.       No current facility-administered medications for this visit.          ROS:  Review of  "Systems   Constitutional: Negative for fever, chills, weight loss and malaise/fatigue.   HENT: Negative for ear pain, nosebleeds, congestion, sore throat and neck pain.    Eyes: Negative for blurred vision.   Respiratory: Negative for cough, sputum production, shortness of breath and wheezing.    Cardiovascular: Negative for chest pain, palpitations,  and leg swelling.   Gastrointestinal: Negative for heartburn, nausea, vomiting, diarrhea and abdominal pain.   Genitourinary: Negative for dysuria, urgency and frequency.   Musculoskeletal: Negative for myalgias, back pain and joint pain.   Skin: Negative for rash and itching.   Neurological: Negative for dizziness, tingling, tremors, sensory change, focal weakness and headaches.   Endo/Heme/Allergies: Does not bruise/bleed easily.   Psychiatric/Behavioral: Negative for depression, anxiety, suicidal ideas, insomnia and memory loss.      Exam:  Blood pressure 122/68, pulse (!) 54, temperature 36.4 °C (97.5 °F), resp. rate 16, height 1.727 m (5' 8\"), weight 74.4 kg (164 lb), SpO2 95 %.    General:  Well nourished, well developed in NAD  Head is grossly normal.  Neck: Supple without JVD   Pulmonary:  Normal effort.   Cardiovascular: Regular rate  Extremities: no clubbing, cyanosis, or edema.  Psych: affect appropriate  MSK: large olecranon bursa, no redness  Left ankle with lateral malleolus point tenderness.      Please note that this dictation was created using voice recognition software. I have made every reasonable attempt to correct obvious errors, but I expect that there are errors of grammar and possibly content that I did not discover before finalizing the note.    Assessment/Plan:  1. Traumatic bursitis  Continue to apply pressure wrap.   Tylenol as needed for pain    2. Acute left ankle pain  R/o fracture  - DX-ANKLE 3+ VIEWS LEFT; Future  If negative will brace, work on ROM, ICE    Follow up prn.         "

## 2017-09-06 NOTE — TELEPHONE ENCOUNTER
----- Message from Zahra Yañez M.D. sent at 9/6/2017  3:15 PM PDT -----  There is no evidence of fracture at the site that is concerned in the ankle.  Recommend ankle brace, rest ice.

## 2017-09-18 ENCOUNTER — NON-PROVIDER VISIT (OUTPATIENT)
Dept: MEDICAL GROUP | Facility: PHYSICIAN GROUP | Age: 82
End: 2017-09-18
Payer: MEDICARE

## 2017-09-18 ENCOUNTER — TELEPHONE (OUTPATIENT)
Dept: MEDICAL GROUP | Facility: PHYSICIAN GROUP | Age: 82
End: 2017-09-18

## 2017-09-18 DIAGNOSIS — Z23 NEED FOR VACCINATION: ICD-10-CM

## 2017-09-18 DIAGNOSIS — Z23 NEED FOR INFLUENZA VACCINATION: ICD-10-CM

## 2017-09-18 PROCEDURE — 90662 IIV NO PRSV INCREASED AG IM: CPT | Performed by: FAMILY MEDICINE

## 2017-09-18 PROCEDURE — G0008 ADMIN INFLUENZA VIRUS VAC: HCPCS | Performed by: FAMILY MEDICINE

## 2017-09-18 NOTE — NON-PROVIDER
"Bulmaro Wheeler is a 89 y.o. male here for a non-provider visit for:   FLU    Reason for immunization: Annual Flu Vaccine  Immunization records indicate need for vaccine: Yes, confirmed with [place] [employer]  Minimum interval has been met for this vaccine: Yes  ABN completed: No    Order and dose verified by: RT  VIS Dated  08/07/15  was given to patient: Yes  All IAC Questionnaire questions were answered \"No.\"    Patient tolerated injection and no adverse effects were observed or reported: Yes    Pt scheduled for next dose in series: No    "

## 2017-09-22 NOTE — TELEPHONE ENCOUNTER
*HISTORICAL MEDICATION*  Was the patient seen in the last year in this department? Yes     Does patient have an active prescription for medications requested? No     Received Request Via: Pharmacy      Pt met protocol?: Yes    LAST OV 09/06/2017

## 2017-09-24 RX ORDER — FINASTERIDE 5 MG/1
TABLET, FILM COATED ORAL
Qty: 90 TAB | Refills: 0 | Status: SHIPPED | OUTPATIENT
Start: 2017-09-24 | End: 2017-11-14

## 2017-10-04 ENCOUNTER — TELEPHONE (OUTPATIENT)
Dept: MEDICAL GROUP | Facility: PHYSICIAN GROUP | Age: 82
End: 2017-10-04

## 2017-10-04 ENCOUNTER — HOSPITAL ENCOUNTER (OUTPATIENT)
Dept: LAB | Facility: MEDICAL CENTER | Age: 82
End: 2017-10-04
Attending: FAMILY MEDICINE
Payer: MEDICARE

## 2017-10-04 ENCOUNTER — OFFICE VISIT (OUTPATIENT)
Dept: MEDICAL GROUP | Facility: PHYSICIAN GROUP | Age: 82
End: 2017-10-04
Payer: MEDICARE

## 2017-10-04 VITALS
BODY MASS INDEX: 24.55 KG/M2 | HEART RATE: 80 BPM | RESPIRATION RATE: 14 BRPM | OXYGEN SATURATION: 95 % | SYSTOLIC BLOOD PRESSURE: 126 MMHG | DIASTOLIC BLOOD PRESSURE: 84 MMHG | HEIGHT: 68 IN | TEMPERATURE: 97.3 F | WEIGHT: 162 LBS

## 2017-10-04 DIAGNOSIS — R80.9 PROTEINURIA, UNSPECIFIED TYPE: ICD-10-CM

## 2017-10-04 DIAGNOSIS — E11.319 DIABETIC RETINOPATHY ASSOCIATED WITH TYPE 2 DIABETES MELLITUS, MACULAR EDEMA PRESENCE UNSPECIFIED, UNSPECIFIED LATERALITY, UNSPECIFIED RETINOPATHY SEVERITY (HCC): ICD-10-CM

## 2017-10-04 DIAGNOSIS — E11.9 TYPE II DIABETES MELLITUS, WELL CONTROLLED (HCC): ICD-10-CM

## 2017-10-04 DIAGNOSIS — E11.69 HYPERLIPIDEMIA ASSOCIATED WITH TYPE 2 DIABETES MELLITUS (HCC): ICD-10-CM

## 2017-10-04 DIAGNOSIS — E78.5 HYPERLIPIDEMIA ASSOCIATED WITH TYPE 2 DIABETES MELLITUS (HCC): ICD-10-CM

## 2017-10-04 DIAGNOSIS — H40.9 GLAUCOMA, UNSPECIFIED GLAUCOMA TYPE, UNSPECIFIED LATERALITY: ICD-10-CM

## 2017-10-04 DIAGNOSIS — E11.59 TYPE 2 DIABETES MELLITUS WITH OTHER CIRCULATORY COMPLICATION, WITHOUT LONG-TERM CURRENT USE OF INSULIN (HCC): ICD-10-CM

## 2017-10-04 PROBLEM — N17.9 AKI (ACUTE KIDNEY INJURY) (HCC): Status: RESOLVED | Noted: 2017-05-13 | Resolved: 2017-10-04

## 2017-10-04 LAB
ALBUMIN SERPL BCP-MCNC: 4.2 G/DL (ref 3.2–4.9)
ALBUMIN/GLOB SERPL: 1.1 G/DL
ALP SERPL-CCNC: 95 U/L (ref 30–99)
ALT SERPL-CCNC: 20 U/L (ref 2–50)
ANION GAP SERPL CALC-SCNC: 11 MMOL/L (ref 0–11.9)
AST SERPL-CCNC: 24 U/L (ref 12–45)
BILIRUB SERPL-MCNC: 0.8 MG/DL (ref 0.1–1.5)
BUN SERPL-MCNC: 15 MG/DL (ref 8–22)
CALCIUM SERPL-MCNC: 10.1 MG/DL (ref 8.5–10.5)
CHLORIDE SERPL-SCNC: 101 MMOL/L (ref 96–112)
CHOLEST SERPL-MCNC: 90 MG/DL (ref 100–199)
CO2 SERPL-SCNC: 26 MMOL/L (ref 20–33)
CREAT SERPL-MCNC: 0.84 MG/DL (ref 0.5–1.4)
CREAT UR-MCNC: 36 MG/DL
EST. AVERAGE GLUCOSE BLD GHB EST-MCNC: 180 MG/DL
GFR SERPL CREATININE-BSD FRML MDRD: >60 ML/MIN/1.73 M 2
GLOBULIN SER CALC-MCNC: 3.8 G/DL (ref 1.9–3.5)
GLUCOSE SERPL-MCNC: 139 MG/DL (ref 65–99)
HBA1C MFR BLD: 7.9 % (ref 0–5.6)
HDLC SERPL-MCNC: 39 MG/DL
LDLC SERPL CALC-MCNC: 30 MG/DL
MICROALBUMIN UR-MCNC: 89.8 MG/DL
MICROALBUMIN/CREAT UR: 2494 MG/G (ref 0–30)
POTASSIUM SERPL-SCNC: 4.2 MMOL/L (ref 3.6–5.5)
PROT SERPL-MCNC: 8 G/DL (ref 6–8.2)
SODIUM SERPL-SCNC: 138 MMOL/L (ref 135–145)
TRIGL SERPL-MCNC: 103 MG/DL (ref 0–149)

## 2017-10-04 PROCEDURE — 82570 ASSAY OF URINE CREATININE: CPT

## 2017-10-04 PROCEDURE — 83036 HEMOGLOBIN GLYCOSYLATED A1C: CPT

## 2017-10-04 PROCEDURE — 80053 COMPREHEN METABOLIC PANEL: CPT

## 2017-10-04 PROCEDURE — 82043 UR ALBUMIN QUANTITATIVE: CPT

## 2017-10-04 PROCEDURE — 36415 COLL VENOUS BLD VENIPUNCTURE: CPT

## 2017-10-04 PROCEDURE — 80061 LIPID PANEL: CPT

## 2017-10-04 PROCEDURE — 99214 OFFICE O/P EST MOD 30 MIN: CPT | Performed by: FAMILY MEDICINE

## 2017-10-04 RX ORDER — LATANOPROST 50 UG/ML
1 SOLUTION/ DROPS OPHTHALMIC DAILY
Qty: 1 BOTTLE | Refills: 0 | Status: SHIPPED | OUTPATIENT
Start: 2017-10-04 | End: 2017-10-19 | Stop reason: SDUPTHER

## 2017-10-04 NOTE — TELEPHONE ENCOUNTER
----- Message from Zahra Yañez M.D. sent at 10/4/2017  4:06 PM PDT -----  The blood tests are all normal, but the urine test showed a lot of protein being spilled.  Is he having any symptoms of UTI??  I recommend he come back in and get a regular UA to evaluate.

## 2017-10-04 NOTE — PROGRESS NOTES
Chief Complaint   Patient presents with   • Referral Needed     eye doctor       HISTORY OF PRESENT ILLNESS: Patient is a 89 y.o. male established patient here today for the following concerns:    1. 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 Type II diabetes mellitus, well controlled ([facility])  3. Glaucoma, unspecified glaucoma type, unspecified laterality  Here for follow up on DM2.  Reports that he just had his labs done this am as he forgot prior to the appointment.  He did see his eye doctor and was told he was being referred elsewhere due to the diabetic retinopathy.  Bulmaro requests referral to his son's ophthalmologist, Dr. Owen.  He needs refill on his glaucoma drops as well.  Continues on metformin alone without any symptoms of hypoglycemia.  No polyuria or polydipsia.        Past Medical, Social, and Family history reviewed and updated in EPIC    Allergies:Review of patient's allergies indicates no known allergies.    Current Outpatient Prescriptions   Medication Sig Dispense Refill   • latanoprost (XALATAN) 0.005 % Solution Place 1 Drop in both eyes every day. 1 Bottle 0   • finasteride (PROSCAR) 5 MG Tab TAKE  ONE TABLET BY MOUTH EVERY MORNING 90 Tab 0   • atorvastatin (LIPITOR) 10 MG Tab Take 0.5 Tabs by mouth every day. 90 Tab 3   • [employer]ENSIA MICROFILL TEST strip Test every day as directed 100 Strip 11   • Omega-3 Fatty Acids (FISH OIL) 1000 MG Cap capsule Take 1,000 mg by mouth every day.     • trazodone (DESYREL) 50 MG Tab Take 50 mg by mouth every evening.     • metformin (GLUCOPHAGE) 500 MG Tab Take 2 Tabs by mouth 2 times a day, with meals. 360 Tab 3   • aspirin EC (ECOTRIN) 81 MG Tablet Delayed Response Take 81 mg by mouth every day.     • Probiotic Product (PROBIOTIC PO) Take 1 Cap by mouth every evening.     • B Complex Vitamins (VITAMIN B COMPLEX PO) Take 1 Tab by mouth  "every evening.     • Multiple Vitamins-Minerals (CENTRUM SILVER PO) Take 1 Tab by mouth every day.     • Cholecalciferol (VITAMIN D PO) Take 1 Cap by mouth every day.     • Multiple Vitamins-Minerals (OCUVITE) Tab Take 1 Tab by mouth every evening.       No current facility-administered medications for this visit.          ROS:  Review of Systems   Constitutional: Negative for fever, chills, weight loss and malaise/fatigue.   HENT: Negative for ear pain, nosebleeds, congestion, sore throat and neck pain.    Eyes: Negative for blurred vision.   Respiratory: Negative for cough, sputum production, shortness of breath and wheezing.    Cardiovascular: Negative for chest pain, palpitations,  and leg swelling.   Gastrointestinal: Negative for heartburn, nausea, vomiting, diarrhea and abdominal pain.   Genitourinary: Negative for dysuria, urgency and frequency.   Musculoskeletal: Negative for myalgias, back pain and joint pain.   Skin: Negative for rash and itching.   Neurological: Negative for dizziness, tingling, tremors, sensory change, focal weakness and headaches.   Endo/Heme/Allergies: Does not bruise/bleed easily.   Psychiatric/Behavioral: Negative for depression, anxiety, suicidal ideas, insomnia and memory loss.      Exam:  Blood pressure 126/84, pulse 80, temperature 36.3 °C (97.3 °F), resp. rate 14, height 1.727 m (5' 8\"), weight 73.5 kg (162 lb), SpO2 95 %.    General:  Well nourished, well developed in NAD  Head is grossly normal.  Neck: Supple without JVD   Pulmonary:  Normal effort.   Cardiovascular: Regular rate  Extremities: no clubbing, cyanosis, or edema.  Psych: affect appropriate      Please note that this dictation was created using voice recognition software. I have made every reasonable attempt to correct obvious errors, but I expect that there are errors of grammar and possibly content that I did not discover before finalizing the note.    Assessment/Plan:  1. Diabetic retinopathy associated with " type 2 diabetes mellitus, macular edema presence unspecified, unspecified laterality, unspecified retinopathy severity (CMS-HCC)  - REFERRAL TO OPHTHALMOLOGY    2. Type II diabetes mellitus, well controlled ([facility])  Continue current medication with metformin, awaiting lab results.     3. Glaucoma, unspecified glaucoma type, unspecified laterality  - latanoprost (XALATAN) 0.005 % Solution; Place 1 Drop in both eyes every day.  Dispense: 1 Bottle; Refill: 0    3 month follow up

## 2017-10-05 ENCOUNTER — HOSPITAL ENCOUNTER (OUTPATIENT)
Dept: LAB | Facility: MEDICAL CENTER | Age: 82
End: 2017-10-05
Attending: FAMILY MEDICINE
Payer: MEDICARE

## 2017-10-05 DIAGNOSIS — R80.9 PROTEINURIA, UNSPECIFIED TYPE: ICD-10-CM

## 2017-10-05 LAB
APPEARANCE UR: CLEAR
BACTERIA #/AREA URNS HPF: NEGATIVE /HPF
BILIRUB UR QL STRIP.AUTO: NEGATIVE
COLOR UR: YELLOW
EPI CELLS #/AREA URNS HPF: NEGATIVE /HPF
GLUCOSE UR STRIP.AUTO-MCNC: 250 MG/DL
HYALINE CASTS #/AREA URNS LPF: ABNORMAL /LPF
KETONES UR STRIP.AUTO-MCNC: NEGATIVE MG/DL
LEUKOCYTE ESTERASE UR QL STRIP.AUTO: ABNORMAL
MICRO URNS: ABNORMAL
NITRITE UR QL STRIP.AUTO: NEGATIVE
PH UR STRIP.AUTO: 6.5 [PH]
PROT UR QL STRIP: 30 MG/DL
RBC # URNS HPF: ABNORMAL /HPF
RBC UR QL AUTO: NEGATIVE
SP GR UR STRIP.AUTO: 1.02
UROBILINOGEN UR STRIP.AUTO-MCNC: 0.2 MG/DL
WBC #/AREA URNS HPF: ABNORMAL /HPF

## 2017-10-05 PROCEDURE — 81001 URINALYSIS AUTO W/SCOPE: CPT

## 2017-10-05 PROCEDURE — 87086 URINE CULTURE/COLONY COUNT: CPT

## 2017-10-07 LAB
BACTERIA UR CULT: NORMAL
SIGNIFICANT IND 70042: NORMAL
SOURCE SOURCE: NORMAL

## 2017-10-10 ENCOUNTER — TELEPHONE (OUTPATIENT)
Dept: MEDICAL GROUP | Facility: PHYSICIAN GROUP | Age: 82
End: 2017-10-10

## 2017-10-10 NOTE — TELEPHONE ENCOUNTER
Possibly.  The protein in the urine is usually a strain from the kidneys.  No infection was found.  We will continue to monitor at this time.

## 2017-10-10 NOTE — TELEPHONE ENCOUNTER
1. Caller Name: Bulmaro Wheeler                                           Call Back Number: [phone] (home)         Patient approves a detailed voicemail message: N\A    Pt had a UA on 10/05/17 and is asking what he should do about the protein that was found.  He states he drank 2 tbs of red beet juice the night before and wonders if that may have contributed to results.

## 2017-10-19 ENCOUNTER — OFFICE VISIT (OUTPATIENT)
Dept: MEDICAL GROUP | Facility: PHYSICIAN GROUP | Age: 82
End: 2017-10-19
Payer: MEDICARE

## 2017-10-19 VITALS
SYSTOLIC BLOOD PRESSURE: 122 MMHG | WEIGHT: 163 LBS | RESPIRATION RATE: 16 BRPM | HEART RATE: 64 BPM | OXYGEN SATURATION: 97 % | TEMPERATURE: 97.5 F | HEIGHT: 68 IN | BODY MASS INDEX: 24.71 KG/M2 | DIASTOLIC BLOOD PRESSURE: 64 MMHG

## 2017-10-19 DIAGNOSIS — M48.07 SPINAL STENOSIS OF LUMBOSACRAL REGION: ICD-10-CM

## 2017-10-19 DIAGNOSIS — K43.9 HERNIA OF ABDOMINAL WALL: ICD-10-CM

## 2017-10-19 DIAGNOSIS — N28.1 RENAL CYST: ICD-10-CM

## 2017-10-19 DIAGNOSIS — E53.8 B12 DEFICIENCY: ICD-10-CM

## 2017-10-19 DIAGNOSIS — H40.9 GLAUCOMA, UNSPECIFIED GLAUCOMA TYPE, UNSPECIFIED LATERALITY: ICD-10-CM

## 2017-10-19 DIAGNOSIS — E11.59 TYPE 2 DIABETES MELLITUS WITH OTHER CIRCULATORY COMPLICATION, WITHOUT LONG-TERM CURRENT USE OF INSULIN (HCC): ICD-10-CM

## 2017-10-19 DIAGNOSIS — D69.6 THROMBOCYTOPENIA (HCC): ICD-10-CM

## 2017-10-19 DIAGNOSIS — N40.1 BENIGN PROSTATIC HYPERPLASIA WITH LOWER URINARY TRACT SYMPTOMS, SYMPTOM DETAILS UNSPECIFIED: ICD-10-CM

## 2017-10-19 DIAGNOSIS — M54.5 CHRONIC LOW BACK PAIN, UNSPECIFIED BACK PAIN LATERALITY, WITH SCIATICA PRESENCE UNSPECIFIED: ICD-10-CM

## 2017-10-19 DIAGNOSIS — I65.29 CAROTID ATHEROSCLEROSIS, UNSPECIFIED LATERALITY: ICD-10-CM

## 2017-10-19 DIAGNOSIS — I48.0 PAROXYSMAL ATRIAL FIBRILLATION (HCC): ICD-10-CM

## 2017-10-19 DIAGNOSIS — E55.9 VITAMIN D DEFICIENCY: ICD-10-CM

## 2017-10-19 DIAGNOSIS — E11.69 HYPERLIPIDEMIA ASSOCIATED WITH TYPE 2 DIABETES MELLITUS (HCC): ICD-10-CM

## 2017-10-19 DIAGNOSIS — I65.29 STENOSIS OF CAROTID ARTERY, UNSPECIFIED LATERALITY: ICD-10-CM

## 2017-10-19 DIAGNOSIS — G89.29 CHRONIC LOW BACK PAIN, UNSPECIFIED BACK PAIN LATERALITY, WITH SCIATICA PRESENCE UNSPECIFIED: ICD-10-CM

## 2017-10-19 DIAGNOSIS — E78.5 HYPERLIPIDEMIA ASSOCIATED WITH TYPE 2 DIABETES MELLITUS (HCC): ICD-10-CM

## 2017-10-19 PROBLEM — I95.9 HYPOTENSION: Status: RESOLVED | Noted: 2017-05-12 | Resolved: 2017-10-19

## 2017-10-19 PROBLEM — E86.0 DEHYDRATION: Status: RESOLVED | Noted: 2017-05-12 | Resolved: 2017-10-19

## 2017-10-19 PROBLEM — R55 SYNCOPE: Status: RESOLVED | Noted: 2017-05-12 | Resolved: 2017-10-19

## 2017-10-19 PROCEDURE — G0439 PPPS, SUBSEQ VISIT: HCPCS | Performed by: FAMILY MEDICINE

## 2017-10-19 RX ORDER — ATORVASTATIN CALCIUM 10 MG/1
5 TABLET, FILM COATED ORAL DAILY
Qty: 45 TAB | Refills: 3 | Status: SHIPPED | OUTPATIENT
Start: 2017-10-19

## 2017-10-19 RX ORDER — TRAZODONE HYDROCHLORIDE 50 MG/1
50 TABLET ORAL
Qty: 90 TAB | Refills: 3 | Status: SHIPPED | OUTPATIENT
Start: 2017-10-19 | End: 2017-11-14

## 2017-10-19 RX ORDER — LATANOPROST 50 UG/ML
1 SOLUTION/ DROPS OPHTHALMIC DAILY
Qty: 1 BOTTLE | Refills: 0 | Status: SHIPPED | OUTPATIENT
Start: 2017-10-19 | End: 2018-03-28

## 2017-10-19 ASSESSMENT — PATIENT HEALTH QUESTIONNAIRE - PHQ9: CLINICAL INTERPRETATION OF PHQ2 SCORE: 0

## 2017-10-19 NOTE — PROGRESS NOTES
Chief Complaint   Patient presents with   • Annual Exam   • Medication Refill         HPI:  Bulmaro is a 89 y.o. here for Medicare Annual Wellness Visit        Patient Active Problem List    Diagnosis Date Noted   • BPH (benign prostatic hyperplasia) 03/24/2016     Priority: Low   • DM (diabetes mellitus) (CMS-HCC) 05/13/2017   • Dehydration 05/12/2017   • Syncope 05/12/2017   • Hypotension 05/12/2017   • Paroxysmal atrial fibrillation (CMS-HCC) 11/27/2016   • Spinal stenosis of lumbosacral region 09/07/2016   • Vitamin D deficiency 09/07/2016   • Hernia of abdominal wall 09/21/2015   • Type II diabetes mellitus, well controlled ([facility]) 04/14/2015   • Carotid artery stenosis 02/18/2014   • Renal cyst 09/19/2013   • Thrombocytopenia ([facility]) 03/15/2013   • B12 deficiency 09/25/2012   • Chronic back pain 09/01/2012       Current Outpatient Prescriptions   Medication Sig Dispense Refill   • latanoprost (XALATAN) 0.005 % Solution Place 1 Drop in both eyes every day. 1 Bottle 0   • finasteride (PROSCAR) 5 MG Tab TAKE  ONE TABLET BY MOUTH EVERY MORNING 90 Tab 0   • atorvastatin (LIPITOR) 10 MG Tab Take 0.5 Tabs by mouth every day. 90 Tab 3   • ASCENSIA MICROFILL TEST strip Test every day as directed 100 Strip 11   • Omega-3 Fatty Acids (FISH OIL) 1000 MG Cap capsule Take 1,000 mg by mouth every day.     • trazodone (DESYREL) 50 MG Tab Take 50 mg by mouth every evening.     • metformin (GLUCOPHAGE) 500 MG Tab Take 2 Tabs by mouth 2 times a day, with meals. 360 Tab 3   • aspirin EC (ECOTRIN) 81 MG Tablet Delayed Response Take 81 mg by mouth every day.     • Probiotic Product (PROBIOTIC PO) Take 1 Cap by mouth every evening.     • B Complex Vitamins (VITAMIN B COMPLEX PO) Take 1 Tab by mouth every evening.     • Multiple Vitamins-Minerals (CENTRUM SILVER PO) Take 1 Tab by mouth every day.     • Cholecalciferol (VITAMIN D PO) Take 1 Cap by mouth every day.     • Multiple Vitamins-Minerals (OCUVITE) Tab Take 1 Tab by mouth every  evening.       No current facility-administered medications for this visit.         Patient is taking medications as noted in medication list.  Current supplements as per medication list.     Allergies: Review of patient's allergies indicates no known allergies.    Current social contact/activities: Metal detectors club,      Is patient current with immunizations? Yes.    He  reports that he has never smoked. He has never used smokeless tobacco. He reports that he does not drink alcohol or use drugs.  Counseling given: Not Answered        DPA/Advanced directive: Patient has Durable Power of , but it is not on file. Instructed to bring in a copy to scan into their chart.    ROS:    Gait: Uses a power wheelchair   Ostomy: no   Other tubes: no   Amputations: no   Chronic oxygen use no   Last eye exam 11/2017   Wears hearing aids: no   : Denies incontinence.       Screening:        Depression Screening    Little interest or pleasure in doing things?  0 - not at all  Feeling down, depressed, or hopeless? 0 - not at all  Patient Health Questionnaire Score: 0    If depressive symptoms identified deferred to follow up visit unless specifically addressed in assessment and plan.    Interpretation of PHQ-9 Total Score   Score Severity   1-4 No Depression   5-9 Mild Depression   10-14 Moderate Depression   15-19 Moderately Severe Depression   20-27 Severe Depression    Screening for Cognitive Impairment    Three Minute Recall (apple, watch, dudley)  0 /3    Draw clock face with all 12 numbers set to the hand to show 10 minutes past 11 o'clock  1    If cognitive concerns identified, deferred for follow up unless specifically addressed in assessment and plan.    Fall Risk Assessment    Has the patient had two or more falls in the last year or any fall with injury in the last year?  No  If fall risk identified, deferred for follow up unless specifically addressed in assessment and plan.    Safety Assessment    Throw rugs  on floor.  Yes  Handrails on all stairs.  Yes  Good lighting in all hallways.  Yes  Difficulty hearing.  No  Patient counseled about all safety risks that were identified.    Functional Assessment ADLs    Are there any barriers preventing you from cooking for yourself or meeting nutritional needs?  No.    Are there any barriers preventing you from driving safely or obtaining transportation?  No.    Are there any barriers preventing you from using a telephone or calling for help?  No.    Are there any barriers preventing you from shopping?  No.    Are there any barriers preventing you from taking care of your own finances?  No.    Are there any barriers preventing you from managing your medications?  No.    Are you currently engaging any exercise or physical activity?  Yes.  Walking, exercise arms, push ups,     Health Maintenance Summary                Annual Wellness Visit Overdue 4/16/1927     RETINAL SCREENING Overdue 3/5/2017      Done 3/5/2016 waiting on records     Patient has more history with this topic...    IMM ZOSTER VACCINE Postponed 5/25/2018 Originally 4/16/1987. Insurance/Financial    A1C SCREENING Next Due 4/4/2018      Done 10/4/2017 HEMOGLOBIN A1C (A)     Patient has more history with this topic...    DIABETES MONOFILAMENT / LE EXAM Next Due 4/4/2018      Done 4/4/2017 AMB DIABETIC MONOFILAMENT LOWER EXTREMITY EXAM     Patient has more history with this topic...    FASTING LIPID PROFILE Next Due 10/4/2018      Done 10/4/2017 LIPID PROFILE (A)     Patient has more history with this topic...    URINE ACR / MICROALBUMIN Next Due 10/4/2018      Done 10/4/2017 MICROALBUMIN CREAT RATIO URINE (A)     Patient has more history with this topic...    SERUM CREATININE Next Due 10/4/2018      Done 10/4/2017 COMP METABOLIC PANEL (A)     Patient has more history with this topic...    IMM DTaP/Tdap/Td Vaccine Next Due 4/8/2023      Done 4/8/2013 Imm Admin: Tdap Vaccine          Patient Care Team:  Zahra Yañez,  M.D. as PCP - General (Family Medicine)  Stew Damon M.D. (Inactive) as Consulting Physician (Ophthalmology)  Gertrudis Monte D.N.P. as Consulting Physician (Family Medicine)  [facility] as Consulting Physician  [facility] as Home Health Provider    Social History   Substance Use Topics   • Smoking status: Never Smoker   • Smokeless tobacco: Never Used   • Alcohol use No      Comment: hasnt drank since 1979     Family History   Problem Relation Age of Onset   • Heart Disease Father    • Diabetes Father    • Alcohol/Drug Father    • Cancer Sister      ovarian   • Stroke Brother      age 90     He  has a past medical history of Acute kidney failure, unspecified (9/27/2013); Arrhythmia; Arthritis; Backpain; Carotid artery stenosis (2/18/2014); CATARACT; Diabetes; Glaucoma; Heart burn; Hypertension; Indigestion; Occlusion and stenosis of carotid artery without mention of cerebral infarction (3/11/2014); Pneumonia; Pyelonephritis (October 2010); Pyelonephritis; Renal cyst (9/19/2013); Sepsis (9/27/2013); Urinary tract infection, site not specified (9/27/2013); and UTI (lower urinary tract infection) (9/1/2012). He also has no past medical history of Angina; ASTHMA; Bronchitis; Cancer (CMS-HCC); Congestive heart failure (CMS-HCC); COPD; Dialysis; Fall; Heart murmur; Heart valve disease; Jaundice; Myocardial infarct; Pacemaker; Personal history of venous thrombosis and embolism; Psychiatric problem; Rheumatic fever; Seizure (CMS-HCC); Unspecified disorder of thyroid; Unspecified hemorrhagic conditions; or Unspecified urinary incontinence.   Past Surgical History:   Procedure Laterality Date   • CAROTID ENDARTERECTOMY  3/11/2014    Performed by Bob Antonio M.D. at SURGERY [facility] ORS   • ERCP W/REMOVAL CALCULUS  2009   • COLONOSCOPY  1999   • CHOLECYSTECTOMY  1999   • EYE SURGERY  1980's    cataracts [facility]           Exam:     Blood pressure 122/64, pulse 64, temperature 36.4 °C  "(97.5 °F), resp. rate 16, height 1.727 m (5' 8\"), weight 73.9 kg (163 lb), SpO2 97 %. Body mass index is 24.78 kg/m².    Hearing fair.    Dentition fair  Alert, oriented in no acute distress.  Eye contact is good, speech goal directed, affect calm      Assessment and Plan. The following treatment and monitoring plan is recommended:    Encounter Diagnoses   Name Primary?   • Glaucoma, unspecified glaucoma type, unspecified laterality    • B12 deficiency    • Benign prostatic hyperplasia with lower urinary tract symptoms, symptom details unspecified    • Stenosis of carotid artery, unspecified laterality    • Chronic low back pain, unspecified back pain laterality, with sciatica presence unspecified    • Type 2 diabetes mellitus with other circulatory complication, without long-term current use of insulin (CMS-[facility])    • Hernia of abdominal wall    • Hyperlipidemia associated with type 2 diabetes mellitus (CMS-[facility])    • Carotid atherosclerosis, unspecified laterality    • Paroxysmal atrial fibrillation (CMS-[facility])    • Renal cyst    • Spinal stenosis of lumbosacral region    • Thrombocytopenia ([facility])    • Vitamin D deficiency      Glycemic control has worsened slightly, still under 8 barely.  He will work on intake of sugary foods and we will recheck in the next 3 months, if not improving will add glipizide back.  Continue metformin.    Continue remainder of current medications  Encouraged to keep eye evaluation    Services suggested: No services needed at this time  Health Care Screening recommendations as per orders if indicated.  Referrals offered: PT/OT/Nutrition counseling/Behavioral Health/Smoking cessation as per orders if indicated.    Discussion today about general wellness and lifestyle habits:    · Prevent falls and reduce trip hazards; Cautioned about securing or removing rugs.  · Have a working fire alarm and carbon monoxide detector;   · Engage in regular physical activity and social activities "       Follow-up: 3 month follow up

## 2017-10-23 ENCOUNTER — APPOINTMENT (OUTPATIENT)
Dept: RADIOLOGY | Facility: MEDICAL CENTER | Age: 82
DRG: 871 | End: 2017-10-23
Attending: INTERNAL MEDICINE
Payer: MEDICARE

## 2017-10-23 ENCOUNTER — HOSPITAL ENCOUNTER (INPATIENT)
Facility: MEDICAL CENTER | Age: 82
LOS: 7 days | DRG: 871 | End: 2017-10-30
Attending: EMERGENCY MEDICINE | Admitting: INTERNAL MEDICINE
Payer: MEDICARE

## 2017-10-23 ENCOUNTER — APPOINTMENT (OUTPATIENT)
Dept: RADIOLOGY | Facility: MEDICAL CENTER | Age: 82
DRG: 871 | End: 2017-10-23
Attending: EMERGENCY MEDICINE
Payer: MEDICARE

## 2017-10-23 ENCOUNTER — RESOLUTE PROFESSIONAL BILLING HOSPITAL PROF FEE (OUTPATIENT)
Dept: HOSPITALIST | Facility: MEDICAL CENTER | Age: 82
End: 2017-10-23
Payer: MEDICARE

## 2017-10-23 DIAGNOSIS — R53.1 GENERALIZED WEAKNESS: ICD-10-CM

## 2017-10-23 DIAGNOSIS — M48.07 SPINAL STENOSIS OF LUMBOSACRAL REGION: ICD-10-CM

## 2017-10-23 DIAGNOSIS — I21.4 NSTEMI (NON-ST ELEVATED MYOCARDIAL INFARCTION) (HCC): ICD-10-CM

## 2017-10-23 DIAGNOSIS — I48.91 ATRIAL FIBRILLATION WITH RVR (HCC): ICD-10-CM

## 2017-10-23 DIAGNOSIS — R79.89 ELEVATED TROPONIN: ICD-10-CM
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
## 2017-10-23 LAB
ANION GAP SERPL CALC-SCNC: 14 MMOL/L (ref 0–11.9)
APPEARANCE UR: CLEAR
APTT PPP: 32.2 SEC (ref 24.7–36)
BACTERIA #/AREA URNS HPF: NEGATIVE /HPF
BASOPHILS # BLD AUTO: 0.1 % (ref 0–1.8)
BASOPHILS # BLD: 0.01 K/UL (ref 0–0.12)
BILIRUB UR QL STRIP.AUTO: NEGATIVE
BNP SERPL-MCNC: 190 PG/ML (ref 0–100)
BUN SERPL-MCNC: 29 MG/DL (ref 8–22)
CALCIUM SERPL-MCNC: 9.3 MG/DL (ref 8.5–10.5)
CHLORIDE SERPL-SCNC: 97 MMOL/L (ref 96–112)
CO2 SERPL-SCNC: 21 MMOL/L (ref 20–33)
COLOR UR: YELLOW
CREAT SERPL-MCNC: 1.03 MG/DL (ref 0.5–1.4)
DEPRECATED D DIMER PPP IA-ACNC: 807 NG/ML(D-DU)
EKG IMPRESSION: NORMAL
EOSINOPHIL # BLD AUTO: 0 K/UL (ref 0–0.51)
EOSINOPHIL NFR BLD: 0 % (ref 0–6.9)
EPI CELLS #/AREA URNS HPF: NEGATIVE /HPF
ERYTHROCYTE [DISTWIDTH] IN BLOOD BY AUTOMATED COUNT: 45.1 FL (ref 35.9–50)
EST. AVERAGE GLUCOSE BLD GHB EST-MCNC: 180 MG/DL
FLUAV H1 2009 PAND RNA SPEC QL NAA+PROBE: NOT DETECTED
FLUAV RNA SPEC QL NAA+PROBE: NEGATIVE
FLUBV RNA SPEC QL NAA+PROBE: NEGATIVE
GFR SERPL CREATININE-BSD FRML MDRD: >60 ML/MIN/1.73 M 2
GLUCOSE BLD-MCNC: 173 MG/DL (ref 65–99)
GLUCOSE BLD-MCNC: 183 MG/DL (ref 65–99)
GLUCOSE BLD-MCNC: 184 MG/DL (ref 65–99)
GLUCOSE BLD-MCNC: 217 MG/DL (ref 65–99)
GLUCOSE SERPL-MCNC: 199 MG/DL (ref 65–99)
GLUCOSE UR STRIP.AUTO-MCNC: 500 MG/DL
HBA1C MFR BLD: 7.9 % (ref 0–5.6)
HCT VFR BLD AUTO: 35.7 % (ref 42–52)
HGB BLD-MCNC: 12.1 G/DL (ref 14–18)
HYALINE CASTS #/AREA URNS LPF: ABNORMAL /LPF
IMM GRANULOCYTES # BLD AUTO: 0.05 K/UL (ref 0–0.11)
IMM GRANULOCYTES NFR BLD AUTO: 0.4 % (ref 0–0.9)
INR PPP: 1.27 (ref 0.87–1.13)
KETONES UR STRIP.AUTO-MCNC: ABNORMAL MG/DL
LACTATE BLD-SCNC: 1.2 MMOL/L (ref 0.5–2)
LACTATE BLD-SCNC: 1.9 MMOL/L (ref 0.5–2)
LEUKOCYTE ESTERASE UR QL STRIP.AUTO: NEGATIVE
LYMPHOCYTES # BLD AUTO: 0.67 K/UL (ref 1–4.8)
LYMPHOCYTES NFR BLD: 4.9 % (ref 22–41)
MCH RBC QN AUTO: 30.5 PG (ref 27–33)
MCHC RBC AUTO-ENTMCNC: 33.9 G/DL (ref 33.7–35.3)
MCV RBC AUTO: 89.9 FL (ref 81.4–97.8)
MICRO URNS: ABNORMAL
MONOCYTES # BLD AUTO: 1.09 K/UL (ref 0–0.85)
MONOCYTES NFR BLD AUTO: 8 % (ref 0–13.4)
NEUTROPHILS # BLD AUTO: 11.88 K/UL (ref 1.82–7.42)
NEUTROPHILS NFR BLD: 86.6 % (ref 44–72)
NITRITE UR QL STRIP.AUTO: NEGATIVE
NRBC # BLD AUTO: 0 K/UL
NRBC BLD AUTO-RTO: 0 /100 WBC
PH UR STRIP.AUTO: 6.5 [PH]
PLATELET # BLD AUTO: 145 K/UL (ref 164–446)
PMV BLD AUTO: 10.2 FL (ref 9–12.9)
POTASSIUM SERPL-SCNC: 3.8 MMOL/L (ref 3.6–5.5)
PROT UR QL STRIP: 100 MG/DL
PROTHROMBIN TIME: 16.3 SEC (ref 12–14.6)
RBC # BLD AUTO: 3.97 M/UL (ref 4.7–6.1)
RBC # URNS HPF: ABNORMAL /HPF
RBC UR QL AUTO: ABNORMAL
SODIUM SERPL-SCNC: 132 MMOL/L (ref 135–145)
SP GR UR STRIP.AUTO: 1.04
T4 FREE SERPL-MCNC: 1.38 NG/DL (ref 0.53–1.43)
TROPONIN I SERPL-MCNC: 0.05 NG/ML (ref 0–0.04)
TROPONIN I SERPL-MCNC: 0.13 NG/ML (ref 0–0.04)
TROPONIN I SERPL-MCNC: 0.55 NG/ML (ref 0–0.04)
TROPONIN I SERPL-MCNC: 4 NG/ML (ref 0–0.04)
TSH SERPL DL<=0.005 MIU/L-ACNC: 1.25 UIU/ML (ref 0.3–3.7)
UROBILINOGEN UR STRIP.AUTO-MCNC: 1 MG/DL
WBC # BLD AUTO: 13.7 K/UL (ref 4.8–10.8)
WBC #/AREA URNS HPF: ABNORMAL /HPF

## 2017-10-23 PROCEDURE — 71010 DX-CHEST-PORTABLE (1 VIEW): CPT

## 2017-10-23 PROCEDURE — 85730 THROMBOPLASTIN TIME PARTIAL: CPT

## 2017-10-23 PROCEDURE — 85610 PROTHROMBIN TIME: CPT

## 2017-10-23 PROCEDURE — 700111 HCHG RX REV CODE 636 W/ 250 OVERRIDE (IP): Performed by: HOSPITALIST

## 2017-10-23 PROCEDURE — 85025 COMPLETE CBC W/AUTO DIFF WBC: CPT

## 2017-10-23 PROCEDURE — A9270 NON-COVERED ITEM OR SERVICE: HCPCS | Performed by: INTERNAL MEDICINE

## 2017-10-23 PROCEDURE — 83605 ASSAY OF LACTIC ACID: CPT | Mod: 91

## 2017-10-23 PROCEDURE — 81001 URINALYSIS AUTO W/SCOPE: CPT

## 2017-10-23 PROCEDURE — 84484 ASSAY OF TROPONIN QUANT: CPT

## 2017-10-23 PROCEDURE — 700117 HCHG RX CONTRAST REV CODE 255: Performed by: INTERNAL MEDICINE

## 2017-10-23 PROCEDURE — 36415 COLL VENOUS BLD VENIPUNCTURE: CPT

## 2017-10-23 PROCEDURE — 85379 FIBRIN DEGRADATION QUANT: CPT

## 2017-10-23 PROCEDURE — 83880 ASSAY OF NATRIURETIC PEPTIDE: CPT

## 2017-10-23 PROCEDURE — 99223 1ST HOSP IP/OBS HIGH 75: CPT | Mod: AI | Performed by: INTERNAL MEDICINE

## 2017-10-23 PROCEDURE — 700102 HCHG RX REV CODE 250 W/ 637 OVERRIDE(OP): Performed by: INTERNAL MEDICINE

## 2017-10-23 PROCEDURE — 87186 SC STD MICRODIL/AGAR DIL: CPT | Mod: 91

## 2017-10-23 PROCEDURE — 700111 HCHG RX REV CODE 636 W/ 250 OVERRIDE (IP): Performed by: INTERNAL MEDICINE

## 2017-10-23 PROCEDURE — 84443 ASSAY THYROID STIM HORMONE: CPT

## 2017-10-23 PROCEDURE — 700111 HCHG RX REV CODE 636 W/ 250 OVERRIDE (IP): Performed by: EMERGENCY MEDICINE

## 2017-10-23 PROCEDURE — 70496 CT ANGIOGRAPHY HEAD: CPT

## 2017-10-23 PROCEDURE — 87503 INFLUENZA DNA AMP PROB ADDL: CPT

## 2017-10-23 PROCEDURE — 96360 HYDRATION IV INFUSION INIT: CPT

## 2017-10-23 PROCEDURE — 93005 ELECTROCARDIOGRAM TRACING: CPT | Performed by: HOSPITALIST

## 2017-10-23 PROCEDURE — 93010 ELECTROCARDIOGRAM REPORT: CPT | Performed by: INTERNAL MEDICINE

## 2017-10-23 PROCEDURE — 70551 MRI BRAIN STEM W/O DYE: CPT

## 2017-10-23 PROCEDURE — 82962 GLUCOSE BLOOD TEST: CPT

## 2017-10-23 PROCEDURE — 70450 CT HEAD/BRAIN W/O DYE: CPT

## 2017-10-23 PROCEDURE — 700105 HCHG RX REV CODE 258: Performed by: INTERNAL MEDICINE

## 2017-10-23 PROCEDURE — 770020 HCHG ROOM/CARE - TELE (206)

## 2017-10-23 PROCEDURE — 87077 CULTURE AEROBIC IDENTIFY: CPT | Mod: 91

## 2017-10-23 PROCEDURE — 99285 EMERGENCY DEPT VISIT HI MDM: CPT

## 2017-10-23 PROCEDURE — 94760 N-INVAS EAR/PLS OXIMETRY 1: CPT

## 2017-10-23 PROCEDURE — 70498 CT ANGIOGRAPHY NECK: CPT

## 2017-10-23 PROCEDURE — 84439 ASSAY OF FREE THYROXINE: CPT

## 2017-10-23 PROCEDURE — 80048 BASIC METABOLIC PNL TOTAL CA: CPT

## 2017-10-23 PROCEDURE — 87502 INFLUENZA DNA AMP PROBE: CPT

## 2017-10-23 PROCEDURE — 87040 BLOOD CULTURE FOR BACTERIA: CPT

## 2017-10-23 PROCEDURE — 93005 ELECTROCARDIOGRAM TRACING: CPT | Performed by: EMERGENCY MEDICINE

## 2017-10-23 PROCEDURE — 700105 HCHG RX REV CODE 258: Performed by: HOSPITALIST

## 2017-10-23 PROCEDURE — 700105 HCHG RX REV CODE 258: Performed by: EMERGENCY MEDICINE

## 2017-10-23 PROCEDURE — 83036 HEMOGLOBIN GLYCOSYLATED A1C: CPT

## 2017-10-23 PROCEDURE — 700111 HCHG RX REV CODE 636 W/ 250 OVERRIDE (IP)

## 2017-10-23 RX ORDER — SODIUM CHLORIDE 9 MG/ML
INJECTION, SOLUTION INTRAVENOUS CONTINUOUS
Status: DISCONTINUED | OUTPATIENT
Start: 2017-10-23 | End: 2017-10-25

## 2017-10-23 RX ORDER — DILTIAZEM HYDROCHLORIDE 5 MG/ML
10 INJECTION INTRAVENOUS ONCE
Status: ACTIVE | OUTPATIENT
Start: 2017-10-23 | End: 2017-10-24

## 2017-10-23 RX ORDER — SODIUM CHLORIDE 9 MG/ML
500 INJECTION, SOLUTION INTRAVENOUS
Status: DISCONTINUED | OUTPATIENT
Start: 2017-10-23 | End: 2017-10-30 | Stop reason: HOSPADM

## 2017-10-23 RX ORDER — MORPHINE SULFATE 4 MG/ML
4 INJECTION, SOLUTION INTRAMUSCULAR; INTRAVENOUS
Status: DISCONTINUED | OUTPATIENT
Start: 2017-10-23 | End: 2017-10-30 | Stop reason: HOSPADM

## 2017-10-23 RX ORDER — LATANOPROST 50 UG/ML
1 SOLUTION/ DROPS OPHTHALMIC DAILY
Status: DISCONTINUED | OUTPATIENT
Start: 2017-10-23 | End: 2017-10-30 | Stop reason: HOSPADM

## 2017-10-23 RX ORDER — AMOXICILLIN 250 MG
2 CAPSULE ORAL 2 TIMES DAILY
Status: DISCONTINUED | OUTPATIENT
Start: 2017-10-23 | End: 2017-10-30 | Stop reason: HOSPADM

## 2017-10-23 RX ORDER — CHLORAL HYDRATE 500 MG
1000 CAPSULE ORAL DAILY
Status: DISCONTINUED | OUTPATIENT
Start: 2017-10-23 | End: 2017-10-30 | Stop reason: HOSPADM

## 2017-10-23 RX ORDER — DILTIAZEM HYDROCHLORIDE 5 MG/ML
10 INJECTION INTRAVENOUS EVERY 6 HOURS PRN
Status: DISCONTINUED | OUTPATIENT
Start: 2017-10-23 | End: 2017-10-30 | Stop reason: HOSPADM

## 2017-10-23 RX ORDER — ONDANSETRON 2 MG/ML
4 INJECTION INTRAMUSCULAR; INTRAVENOUS EVERY 4 HOURS PRN
Status: DISCONTINUED | OUTPATIENT
Start: 2017-10-23 | End: 2017-10-30 | Stop reason: HOSPADM

## 2017-10-23 RX ORDER — ATORVASTATIN CALCIUM 10 MG/1
5 TABLET, FILM COATED ORAL DAILY
Status: DISCONTINUED | OUTPATIENT
Start: 2017-10-23 | End: 2017-10-30 | Stop reason: HOSPADM

## 2017-10-23 RX ORDER — SODIUM CHLORIDE 9 MG/ML
30 INJECTION, SOLUTION INTRAVENOUS
Status: COMPLETED | OUTPATIENT
Start: 2017-10-23 | End: 2017-10-23

## 2017-10-23 RX ORDER — LABETALOL HYDROCHLORIDE 5 MG/ML
10 INJECTION, SOLUTION INTRAVENOUS EVERY 4 HOURS PRN
Status: DISCONTINUED | OUTPATIENT
Start: 2017-10-23 | End: 2017-10-30 | Stop reason: HOSPADM

## 2017-10-23 RX ORDER — OXYCODONE HYDROCHLORIDE 10 MG/1
10 TABLET ORAL
Status: DISCONTINUED | OUTPATIENT
Start: 2017-10-23 | End: 2017-10-30 | Stop reason: HOSPADM

## 2017-10-23 RX ORDER — HEPARIN SODIUM 1000 [USP'U]/ML
6000 INJECTION, SOLUTION INTRAVENOUS; SUBCUTANEOUS ONCE
Status: COMPLETED | OUTPATIENT
Start: 2017-10-23 | End: 2017-10-23

## 2017-10-23 RX ORDER — SODIUM CHLORIDE 9 MG/ML
500 INJECTION, SOLUTION INTRAVENOUS ONCE
Status: COMPLETED | OUTPATIENT
Start: 2017-10-23 | End: 2017-10-23

## 2017-10-23 RX ORDER — ONDANSETRON 4 MG/1
4 TABLET, ORALLY DISINTEGRATING ORAL EVERY 4 HOURS PRN
Status: DISCONTINUED | OUTPATIENT
Start: 2017-10-23 | End: 2017-10-30 | Stop reason: HOSPADM

## 2017-10-23 RX ORDER — OXYCODONE HYDROCHLORIDE 5 MG/1
5 TABLET ORAL
Status: DISCONTINUED | OUTPATIENT
Start: 2017-10-23 | End: 2017-10-30 | Stop reason: HOSPADM

## 2017-10-23 RX ORDER — ACETAMINOPHEN 325 MG/1
650 TABLET ORAL EVERY 6 HOURS PRN
Status: DISCONTINUED | OUTPATIENT
Start: 2017-10-23 | End: 2017-10-30 | Stop reason: HOSPADM

## 2017-10-23 RX ORDER — DEXTROSE MONOHYDRATE 25 G/50ML
25 INJECTION, SOLUTION INTRAVENOUS
Status: DISCONTINUED | OUTPATIENT
Start: 2017-10-23 | End: 2017-10-30 | Stop reason: HOSPADM

## 2017-10-23 RX ORDER — HEPARIN SODIUM 1000 [USP'U]/ML
3200 INJECTION, SOLUTION INTRAVENOUS; SUBCUTANEOUS PRN
Status: DISCONTINUED | OUTPATIENT
Start: 2017-10-23 | End: 2017-10-25

## 2017-10-23 RX ORDER — BISACODYL 10 MG
10 SUPPOSITORY, RECTAL RECTAL
Status: DISCONTINUED | OUTPATIENT
Start: 2017-10-23 | End: 2017-10-30 | Stop reason: HOSPADM

## 2017-10-23 RX ORDER — POLYETHYLENE GLYCOL 3350 17 G/17G
1 POWDER, FOR SOLUTION ORAL
Status: DISCONTINUED | OUTPATIENT
Start: 2017-10-23 | End: 2017-10-30 | Stop reason: HOSPADM

## 2017-10-23 RX ORDER — FINASTERIDE 5 MG/1
5 TABLET, FILM COATED ORAL DAILY
Status: DISCONTINUED | OUTPATIENT
Start: 2017-10-23 | End: 2017-10-30 | Stop reason: HOSPADM

## 2017-10-23 RX ADMIN — ACETAMINOPHEN 650 MG: 325 TABLET, FILM COATED ORAL at 15:48

## 2017-10-23 RX ADMIN — STANDARDIZED SENNA CONCENTRATE AND DOCUSATE SODIUM 2 TABLET: 8.6; 5 TABLET, FILM COATED ORAL at 21:21

## 2017-10-23 RX ADMIN — OMEGA-3 FATTY ACIDS CAP 1000 MG 1000 MG: 1000 CAP at 08:20

## 2017-10-23 RX ADMIN — INSULIN LISPRO 2 UNITS: 100 INJECTION, SOLUTION INTRAVENOUS; SUBCUTANEOUS at 12:41

## 2017-10-23 RX ADMIN — LATANOPROST 1 DROP: 50 SOLUTION OPHTHALMIC at 08:43

## 2017-10-23 RX ADMIN — ATORVASTATIN CALCIUM 5 MG: 10 TABLET, FILM COATED ORAL at 08:20

## 2017-10-23 RX ADMIN — ENOXAPARIN SODIUM 80 MG: 100 INJECTION SUBCUTANEOUS at 08:19

## 2017-10-23 RX ADMIN — HEPARIN SODIUM 1200 UNITS/HR: 5000 INJECTION, SOLUTION INTRAVENOUS at 23:06

## 2017-10-23 RX ADMIN — STANDARDIZED SENNA CONCENTRATE AND DOCUSATE SODIUM 2 TABLET: 8.6; 5 TABLET, FILM COATED ORAL at 08:19

## 2017-10-23 RX ADMIN — ENOXAPARIN SODIUM 80 MG: 100 INJECTION SUBCUTANEOUS at 21:21

## 2017-10-23 RX ADMIN — SODIUM CHLORIDE 2178 ML: 9 INJECTION, SOLUTION INTRAVENOUS at 15:49

## 2017-10-23 RX ADMIN — ASPIRIN 81 MG: 81 TABLET, COATED ORAL at 08:20

## 2017-10-23 RX ADMIN — CEFTRIAXONE 2 G: 2 INJECTION, SOLUTION INTRAVENOUS at 15:05

## 2017-10-23 RX ADMIN — FINASTERIDE 5 MG: 5 TABLET, FILM COATED ORAL at 08:20

## 2017-10-23 RX ADMIN — SODIUM CHLORIDE: 9 INJECTION, SOLUTION INTRAVENOUS at 08:21

## 2017-10-23 RX ADMIN — ACETAMINOPHEN 650 MG: 325 TABLET, FILM COATED ORAL at 12:24

## 2017-10-23 RX ADMIN — HEPARIN SODIUM 6000 UNITS: 1000 INJECTION, SOLUTION INTRAVENOUS; SUBCUTANEOUS at 23:05

## 2017-10-23 RX ADMIN — IOHEXOL 100 ML: 350 INJECTION, SOLUTION INTRAVENOUS at 12:26

## 2017-10-23 RX ADMIN — INSULIN LISPRO 1 UNITS: 100 INJECTION, SOLUTION INTRAVENOUS; SUBCUTANEOUS at 21:25

## 2017-10-23 RX ADMIN — SODIUM CHLORIDE: 9 INJECTION, SOLUTION INTRAVENOUS at 17:22

## 2017-10-23 RX ADMIN — SODIUM CHLORIDE 500 ML: 9 INJECTION, SOLUTION INTRAVENOUS at 05:27

## 2017-10-23 ASSESSMENT — ENCOUNTER SYMPTOMS
NAUSEA: 0
CHILLS: 0
SPEECH CHANGE: 0
TREMORS: 0
SHORTNESS OF BREATH: 0
DIZZINESS: 0
COUGH: 0
WEAKNESS: 1
BLURRED VISION: 0
PALPITATIONS: 0
HEADACHES: 0
DIARRHEA: 0
NERVOUS/ANXIOUS: 0
BACK PAIN: 0
FEVER: 0
MEMORY LOSS: 0
VOMITING: 0
LOSS OF CONSCIOUSNESS: 0
TINGLING: 0
DIAPHORESIS: 0
MYALGIAS: 0
SENSORY CHANGE: 0
FOCAL WEAKNESS: 0
DEPRESSION: 0
ABDOMINAL PAIN: 0
FLANK PAIN: 0
DOUBLE VISION: 0
SEIZURES: 0

## 2017-10-23 ASSESSMENT — COGNITIVE AND FUNCTIONAL STATUS - GENERAL
DRESSING REGULAR LOWER BODY CLOTHING: A LOT
PERSONAL GROOMING: A LITTLE
CLIMB 3 TO 5 STEPS WITH RAILING: A LOT
SUGGESTED CMS G CODE MODIFIER MOBILITY: CL
MOVING FROM LYING ON BACK TO SITTING ON SIDE OF FLAT BED: A LOT
WALKING IN HOSPITAL ROOM: A LOT
TURNING FROM BACK TO SIDE WHILE IN FLAT BAD: A LITTLE
DRESSING REGULAR UPPER BODY CLOTHING: A LITTLE
STANDING UP FROM CHAIR USING ARMS: A LOT
TOILETING: A LOT
DAILY ACTIVITIY SCORE: 15
EATING MEALS: A LITTLE
MOBILITY SCORE: 13
HELP NEEDED FOR BATHING: A LOT
SUGGESTED CMS G CODE MODIFIER DAILY ACTIVITY: CK
MOVING TO AND FROM BED TO CHAIR: A LOT

## 2017-10-23 ASSESSMENT — COPD QUESTIONNAIRES
COPD SCREENING SCORE: 2
DO YOU EVER COUGH UP ANY MUCUS OR PHLEGM?: NO/ONLY WITH OCCASIONAL COLDS OR INFECTIONS
DURING THE PAST 4 WEEKS HOW MUCH DID YOU FEEL SHORT OF BREATH: NONE/LITTLE OF THE TIME
HAVE YOU SMOKED AT LEAST 100 CIGARETTES IN YOUR ENTIRE LIFE: NO/DON'T KNOW

## 2017-10-23 ASSESSMENT — PAIN SCALES - GENERAL
PAINLEVEL_OUTOF10: 0

## 2017-10-23 ASSESSMENT — LIFESTYLE VARIABLES
EVER_SMOKED: NEVER
ALCOHOL_USE: NO
DO YOU DRINK ALCOHOL: NO
EVER_SMOKED: NEVER

## 2017-10-23 ASSESSMENT — PATIENT HEALTH QUESTIONNAIRE - PHQ9
1. LITTLE INTEREST OR PLEASURE IN DOING THINGS: NOT AT ALL
2. FEELING DOWN, DEPRESSED, IRRITABLE, OR HOPELESS: NOT AT ALL
SUM OF ALL RESPONSES TO PHQ9 QUESTIONS 1 AND 2: 0
SUM OF ALL RESPONSES TO PHQ QUESTIONS 1-9: 0

## 2017-10-23 NOTE — PROGRESS NOTES
PT STARTING TO SHIVER AGAIN. FEELS HOT TO THE TOUCH. TEMP .1. SPOKE WITH DR. BLAS. SEPSIS PROTOCOL INIATED. PT HAS NO OTHER COMPLAINTS. PT IS ALERT AND ORIENTED.

## 2017-10-23 NOTE — ASSESSMENT & PLAN NOTE
Presented with falls.  Has paroxysmal A fib and was found to be in Afib with RVR. It was thought this was the cause of his weakness and falls  Continue cardiac monitoring  Patient stat chads 2 score is 3, patient will benefit from full dose anticoagulation  Was starte don Lovenox full dose  Continue Cardizem when necessary heart rate greater than 120  Cardiology, Dr. Iglesias has been consulted  Echo showed normal EF.  Discussed with Cardiology. Because of high fall risk, they have opted NO full anticoagulation. Heparin gtt d/cynthia and this was transitioned to hepa SQ  I will discharge him on aspirin and Plavix instead. HR in the 60s without rate controllers.  Hospital course complicaed by sepsis and bacteremia (see below)  At discharge date he is afebrile, hemodynamically stable and wants to go home. He has declined SNF and prefers home health care. This was arranged.  Follow up with Zahra Yañez M.D. In 1-2 weeks  Follow up with Dr. Iglesias, EP Cardiology in 1-2 weeks for continued management of Afib

## 2017-10-23 NOTE — ASSESSMENT & PLAN NOTE
With P.Afib  R/o CVA  CT head is negative follow-up repeat MRI of the brain  Follow up CTA. Neck  Echocardiogram  Continue aspirin, Lovenox, statin  Blood pressure control  Neurology consult as needed  PT/OT

## 2017-10-23 NOTE — ASSESSMENT & PLAN NOTE
Follow-up A1c  Sliding scale insulin/Accu-Chek  Hold metformin  At discharge date may resume metformin  Home health care to do blood sugar logs  Follow up with Zahra Yañez M.D. In 1-2 weeks

## 2017-10-23 NOTE — ASSESSMENT & PLAN NOTE
This is sepsis (without associated acute organ dysfunction).   After admission patient spiked a fever to 102 with heart rate 112.  Sepsis protocol initiated.  Check urinalysis and blood cultures  Fluid bolus prn according to guidelines  Empiric rocephin until source ascertained.  Blood culture came back positive with E. Coli, Klebsiella  I consulted ID physician, Dr. Rapp  On IV Rocephin and PICC line.  Stop date 11/7/2017  Follow up with Zahra Yañez M.D. In 1-2 weeks

## 2017-10-23 NOTE — ASSESSMENT & PLAN NOTE
History of  CT of the head is negative  MRI in May 2017 no new findings  CTA HandN showed carotid disease but no significant blockage or narrowing.  Continue statin and aspirin  Follow up with Zahra Yañez M.D. In 1-2 weeks

## 2017-10-23 NOTE — THERAPY
PT eval attempted. Nsg requests hold PT for today s/p patient with new onset of fever and very fatigued this afternoon. Will f/u as able.

## 2017-10-23 NOTE — PROGRESS NOTES
PT BACK FROM CT  COMPLAINING OF BEING COLD. PLACED BACK IN BED AND COVERED WITH WARM BLANKETS. VITALS OBTAINED. PT HAS ELEVATED TEMP. TYLENOL GIVEN PER MAR. DR. BLAS NOTIFIED. APPROX 30 MIN AFTER TYLENOL ADMINISTRATION PT IS CALM AND NOT SHAKING. TEMP RECHECKED AND WITHIN NORMAL RANGE.

## 2017-10-23 NOTE — PROGRESS NOTES
"After admission patient spiked a fever to 102 with heart rate 112.  Sepsis protocol initiated.  Check urinalysis and blood cultures  Fluid bolus prn according to guidelines  Empiric rocephin until source ascertained    I examined the patient 10/23/2017 2:09 PM  Vital Signs:BP (!) 168/85 Comment: nurse notified  Pulse (!) 117   Temp 37.2 °C (99 °F)   Resp 20   Ht 1.727 m (5' 8\")   Wt 72.6 kg (160 lb)   SpO2 91%   BMI 24.33 kg/m²   Cardiac examination significant for Tachycardia  Pulmonary examination significant for Clear lung fileds  Capillary refill is brisk  Peripheral Pulse is 2+   Skin is pale    Patient is critically ill.   The patient continues to have: sepsis.  If untreated there is a high chance of deterioration into: septic shock  And eventually death.   The critical care that I am providing today is: management of sepsis with sepsis bundle.  The critical that has been undertaken is medically complex.   There has been no overlap in critical care time.   Critical Care Time not including procedures: 35    "

## 2017-10-23 NOTE — H&P
Hospital Medicine History and Physical    Date of Service  10/23/2017    Chief Complaint  Chief Complaint   Patient presents with   • Fall     Patient complains of falling out of chair tonight and has had multiple falls recently and here via EMS to find out why he keeps falling.       History of Presenting Illness  89 y.o. male who presented 10/23/2017 with History of paroxysmal atrial fibrillation with carotid artery stenosis, type II diabetes mellitus non-insulin-dependent presents with increasing falls and generalized weakness. Patient had been evaluated back in May 2017 for similar presentation. Patient did have a neurologic workup including MRI of the brain and cardiac monitoring at the time with no significant arrhythmias. MRI of the brain did not reveal a new infarction. Patient now returns with complaints of falls With bilateral lower extremity weakness. No loss of consciousness, no head trauma. Patient denies associated chest pain shortness of breath, nausea, vomiting. Patient denies any abdominal pain diarrhea or dysuria.    Patient does not have any significant focal deficits on examination. Denies any associated dizziness or headache. No shortness of breath or calf pain.    In the emergency room patient is fasting, arousable, oriented ×4. Reports generalized weakness. Patient does ambulate with a walker at home. Lives with his son.   Primary Care Physician  Zahra Yañez M.D.    Consultants  None    Code Status  Full code    Review of Systems  Review of Systems   Constitutional: Negative for chills, diaphoresis, fever and malaise/fatigue.   HENT: Negative for congestion and hearing loss.    Eyes: Negative for blurred vision and double vision.   Respiratory: Negative for cough and shortness of breath.    Cardiovascular: Negative for chest pain, palpitations and leg swelling.   Gastrointestinal: Negative for abdominal pain, diarrhea, nausea and vomiting.   Genitourinary: Negative for dysuria, flank pain  and urgency.   Musculoskeletal: Negative for back pain, joint pain and myalgias.   Neurological: Positive for weakness. Negative for dizziness, tingling, tremors, sensory change, speech change, focal weakness, seizures, loss of consciousness and headaches.   Psychiatric/Behavioral: Negative for depression and memory loss. The patient is not nervous/anxious.         Past Medical History  Past Medical History:   Diagnosis Date   • Occlusion and stenosis of carotid artery without mention of cerebral infarction 3/11/2014   • Carotid artery stenosis 2/18/2014   • Urinary tract infection, site not specified 9/27/2013   • Sepsis 9/27/2013   • Acute kidney failure, unspecified 9/27/2013   • Renal cyst 9/19/2013   • UTI (lower urinary tract infection) 9/1/2012   • Pyelonephritis October 2010   • Arrhythmia     in 80's, not recent   • Arthritis    • Backpain    • CATARACT     surgery in 1985   • Diabetes    • Glaucoma     Interocular lenses placed in 1985   • Heart burn    • Hypertension    • Indigestion    • Pneumonia    • Pyelonephritis        Surgical History  Past Surgical History:   Procedure Laterality Date   • CAROTID ENDARTERECTOMY  3/11/2014    Performed by Bob Antonio M.D. at SURGERY [facility] ORS   • ERCP W/REMOVAL CALCULUS  2009   • COLONOSCOPY  1999   • CHOLECYSTECTOMY  1999   • EYE SURGERY  1980's    cataracts O[facility]       Medications  No current facility-administered medications on file prior to encounter.      Current Outpatient Prescriptions on File Prior to Encounter   Medication Sig Dispense Refill   • latanoprost (XALATAN) 0.005 % Solution Place 1 Drop in both eyes every day. 1 Bottle 0   • trazodone (DESYREL) 50 MG Tab Take 1 Tab by mouth at bedtime as needed for Sleep. 90 Tab 3   • atorvastatin (LIPITOR) 10 MG Tab Take 0.5 Tabs by mouth every day. 45 Tab 3   • finasteride (PROSCAR) 5 MG Tab TAKE  ONE TABLET BY MOUTH EVERY MORNING 90 Tab 0   • ASCENSIA MICROFILL TEST strip Test every day as directed  100 Strip 11   • Omega-3 Fatty Acids (FISH OIL) 1000 MG Cap capsule Take 1,000 mg by mouth every day.     • metformin (GLUCOPHAGE) 500 MG Tab Take 2 Tabs by mouth 2 times a day, with meals. 360 Tab 3   • aspirin EC (ECOTRIN) 81 MG Tablet Delayed Response Take 81 mg by mouth every day.     • Probiotic Product (PROBIOTIC PO) Take 1 Cap by mouth every evening.     • B Complex Vitamins (VITAMIN B COMPLEX PO) Take 1 Tab by mouth every evening.     • Multiple Vitamins-Minerals (CENTRUM SILVER PO) Take 1 Tab by mouth every day.     • Cholecalciferol (VITAMIN D PO) Take 1 Cap by mouth every day.     • Multiple Vitamins-Minerals (OCUVITE) Tab Take 1 Tab by mouth every evening.         Family History  Family History   Problem Relation Age of Onset   • Heart Disease Father    • Diabetes Father    • Alcohol/Drug Father    • Cancer Sister      ovarian   • Stroke Brother      age 90       Social History  Social History   Substance Use Topics   • Smoking status: Never Smoker   • Smokeless tobacco: Never Used   • Alcohol use No      Comment: hasnt drank since    Lives with son, uses a walker for ambulation   remains functional with ADLs and IADLs    Allergies  No Known Allergies     Physical Exam  Laboratory   Hemodynamics  Temp (24hrs), Av.8 °C (98.2 °F), Min:36.8 °C (98.2 °F), Max:36.8 °C (98.2 °F)   Temperature: 36.8 °C (98.2 °F)  Pulse  Av.4  Min: 78  Max: 105 Heart Rate (Monitored): 80  Blood Pressure : 115/65, NIBP: 140/57      Respiratory      Respiration: 16, Pulse Oximetry: 95 %             Physical Exam   Constitutional: He is oriented to person, place, and time. He appears well-nourished. No distress.   Elderly   HENT:   Head: Normocephalic and atraumatic.   Eyes: EOM are normal. Pupils are equal, round, and reactive to light. Right eye exhibits no discharge. Left eye exhibits no discharge.   Neck: Neck supple. No JVD present. No thyromegaly present.   Cardiovascular: Normal rate and intact distal pulses.     Murmur heard.  Irregularly irregular   Pulmonary/Chest: Breath sounds normal. No respiratory distress. He has no wheezes. He exhibits no tenderness.   Abdominal: Soft. Bowel sounds are normal. He exhibits no distension and no mass. There is no tenderness.   Musculoskeletal: He exhibits no edema or tenderness.   Neurological: He is alert and oriented to person, place, and time. No cranial nerve deficit. He exhibits normal muscle tone. Coordination normal.   Skin: Skin is warm and dry. No rash noted. He is not diaphoretic. No erythema. There is pallor.   Psychiatric: He has a normal mood and affect. His behavior is normal. Judgment and thought content normal.   Nursing note and vitals reviewed.      Recent Labs      10/23/17   0500   WBC  13.7*   RBC  3.97*   HEMOGLOBIN  12.1*   HEMATOCRIT  35.7*   MCV  89.9   MCH  30.5   MCHC  33.9   RDW  45.1   PLATELETCT  145*   MPV  10.2     Recent Labs      10/23/17   0500   SODIUM  132*   POTASSIUM  3.8   CHLORIDE  97   CO2  21   GLUCOSE  199*   BUN  29*   CREATININE  1.03   CALCIUM  9.3     Recent Labs      10/23/17   0500   GLUCOSE  199*     Recent Labs      10/23/17   0500   APTT  32.2   INR  1.27*             Lab Results   Component Value Date    TROPONINI 0.05 (H) 10/23/2017     Urinalysis:    Lab Results  Component Value Date/Time   SPECGRAVITY 1.018 10/05/2017 0851   GLUCOSEUR 250 (A) 10/05/2017 0851   KETONES Negative 10/05/2017 0851   NITRITE Negative 10/05/2017 0851   WBCURINE 0-2 (A) 10/05/2017 0851   RBCURINE 0-2 (A) 10/05/2017 0851   BACTERIA Negative 10/05/2017 0851   EPITHELCELL Negative 10/05/2017 0851        Imaging  DX-CHEST-PORTABLE (1 VIEW)   Final Result      No acute cardiopulmonary abnormality.      CT-HEAD W/O   Final Result      1.  No acute intracranial abnormality.   2.  Age-consistent atrophy and chronic white matter changes.               INTERPRETING LOCATION:  [facility], [id number]      ECHOCARDIOGRAM COMP W/O CONT    (Results Pending)         Assessment/Plan     I anticipate this patient will require at least two midnights for appropriate medical management, necessitating inpatient admission.    * Atrial fibrillation (CMS-HCC)   Assessment & Plan    Paroxysmal  Continue cardiac monitoring  Follow-up troponin  Admit to telemetry inpatient unit  Patient stat chads 2 score is 3, patient will benefit from full dose anticoagulation  We will start patient on Lovenox, transitioned to oral anticoagulation on discharge  Continue Cardizem when necessary heart rate greater than 120  Consult cardiology as needed  Follow-up echocardiogram  Follow-up d-dimer  Associated with generalized weakness and falls        Carotid artery stenosis- (present on admission)   Assessment & Plan    History of  CT of the head is negative  Follow-up CTA head and neck, echocardiogram  MRI in May 2017 no new findings, if on worsening neural deficits consider repeat MRI        DM (diabetes mellitus) (CMS-HCC)- (present on admission)   Assessment & Plan    Follow-up A1c  Sliding scale insulin/Accu-Chek  Hold metformin        Glaucoma- (present on admission)   Assessment & Plan    Tinea eyedrops        BPH (benign prostatic hyperplasia)- (present on admission)   Assessment & Plan    Continue home meds            VTE prophylaxis:Lovenox .

## 2017-10-23 NOTE — ED NOTES
Assumed care of patient. Patient complains of falling out of chair on buttocks tonight with multiple falls recently. Patient alert and oriented x 4. Patient denies any other complaints at this time. Patient side rails up x 2 and call bell within reach.

## 2017-10-24 ENCOUNTER — APPOINTMENT (OUTPATIENT)
Dept: RADIOLOGY | Facility: MEDICAL CENTER | Age: 82
DRG: 871 | End: 2017-10-24
Attending: INTERNAL MEDICINE
Payer: MEDICARE

## 2017-10-24 PROBLEM — Z71.89 ADVANCED DIRECTIVES, COUNSELING/DISCUSSION: Status: ACTIVE | Noted: 2017-10-24

## 2017-10-24 PROBLEM — I21.4 NSTEMI (NON-ST ELEVATED MYOCARDIAL INFARCTION) (HCC): Status: ACTIVE | Noted: 2017-10-24

## 2017-10-24 LAB
ANION GAP SERPL CALC-SCNC: 10 MMOL/L (ref 0–11.9)
APTT PPP: 161.8 SEC (ref 24.7–36)
APTT PPP: 177.7 SEC (ref 24.7–36)
APTT PPP: 62.2 SEC (ref 24.7–36)
BASOPHILS # BLD AUTO: 0.3 % (ref 0–1.8)
BASOPHILS # BLD: 0.04 K/UL (ref 0–0.12)
BUN SERPL-MCNC: 23 MG/DL (ref 8–22)
CALCIUM SERPL-MCNC: 8.3 MG/DL (ref 8.5–10.5)
CHLORIDE SERPL-SCNC: 100 MMOL/L (ref 96–112)
CHOLEST SERPL-MCNC: 56 MG/DL (ref 100–199)
CO2 SERPL-SCNC: 23 MMOL/L (ref 20–33)
CREAT SERPL-MCNC: 0.88 MG/DL (ref 0.5–1.4)
EOSINOPHIL # BLD AUTO: 0.02 K/UL (ref 0–0.51)
EOSINOPHIL NFR BLD: 0.1 % (ref 0–6.9)
ERYTHROCYTE [DISTWIDTH] IN BLOOD BY AUTOMATED COUNT: 45.7 FL (ref 35.9–50)
GFR SERPL CREATININE-BSD FRML MDRD: >60 ML/MIN/1.73 M 2
GLUCOSE BLD-MCNC: 140 MG/DL (ref 65–99)
GLUCOSE BLD-MCNC: 164 MG/DL (ref 65–99)
GLUCOSE BLD-MCNC: 188 MG/DL (ref 65–99)
GLUCOSE BLD-MCNC: 191 MG/DL (ref 65–99)
GLUCOSE SERPL-MCNC: 149 MG/DL (ref 65–99)
HCT VFR BLD AUTO: 32.8 % (ref 42–52)
HDLC SERPL-MCNC: 30 MG/DL
HGB BLD-MCNC: 10.8 G/DL (ref 14–18)
IMM GRANULOCYTES # BLD AUTO: 0.1 K/UL (ref 0–0.11)
IMM GRANULOCYTES NFR BLD AUTO: 0.7 % (ref 0–0.9)
LDLC SERPL CALC-MCNC: 16 MG/DL
LV EJECT FRACT  99904: 65
LV EJECT FRACT MOD 2C 99903: 69.81
LV EJECT FRACT MOD 4C 99902: 71.57
LV EJECT FRACT MOD BP 99901: 72.03
LYMPHOCYTES # BLD AUTO: 1.25 K/UL (ref 1–4.8)
LYMPHOCYTES NFR BLD: 8.4 % (ref 22–41)
MCH RBC QN AUTO: 29.7 PG (ref 27–33)
MCHC RBC AUTO-ENTMCNC: 32.9 G/DL (ref 33.7–35.3)
MCV RBC AUTO: 90.1 FL (ref 81.4–97.8)
MONOCYTES # BLD AUTO: 1.25 K/UL (ref 0–0.85)
MONOCYTES NFR BLD AUTO: 8.4 % (ref 0–13.4)
NEUTROPHILS # BLD AUTO: 12.14 K/UL (ref 1.82–7.42)
NEUTROPHILS NFR BLD: 82.1 % (ref 44–72)
NRBC # BLD AUTO: 0 K/UL
NRBC BLD AUTO-RTO: 0 /100 WBC
PLATELET # BLD AUTO: 128 K/UL (ref 164–446)
PMV BLD AUTO: 10.3 FL (ref 9–12.9)
POTASSIUM SERPL-SCNC: 4 MMOL/L (ref 3.6–5.5)
RBC # BLD AUTO: 3.64 M/UL (ref 4.7–6.1)
SODIUM SERPL-SCNC: 133 MMOL/L (ref 135–145)
TRIGL SERPL-MCNC: 48 MG/DL (ref 0–149)
TROPONIN I SERPL-MCNC: 4.58 NG/ML (ref 0–0.04)
TROPONIN I SERPL-MCNC: 7.14 NG/ML (ref 0–0.04)
WBC # BLD AUTO: 14.8 K/UL (ref 4.8–10.8)

## 2017-10-24 PROCEDURE — 71010 DX-CHEST-PORTABLE (1 VIEW): CPT

## 2017-10-24 PROCEDURE — 93306 TTE W/DOPPLER COMPLETE: CPT | Mod: 26 | Performed by: INTERNAL MEDICINE

## 2017-10-24 PROCEDURE — G8979 MOBILITY GOAL STATUS: HCPCS | Mod: CI

## 2017-10-24 PROCEDURE — 82962 GLUCOSE BLOOD TEST: CPT

## 2017-10-24 PROCEDURE — 80061 LIPID PANEL: CPT

## 2017-10-24 PROCEDURE — 700111 HCHG RX REV CODE 636 W/ 250 OVERRIDE (IP)

## 2017-10-24 PROCEDURE — 36415 COLL VENOUS BLD VENIPUNCTURE: CPT

## 2017-10-24 PROCEDURE — 700105 HCHG RX REV CODE 258: Performed by: INTERNAL MEDICINE

## 2017-10-24 PROCEDURE — 85025 COMPLETE CBC W/AUTO DIFF WBC: CPT

## 2017-10-24 PROCEDURE — A9270 NON-COVERED ITEM OR SERVICE: HCPCS | Performed by: INTERNAL MEDICINE

## 2017-10-24 PROCEDURE — 97165 OT EVAL LOW COMPLEX 30 MIN: CPT

## 2017-10-24 PROCEDURE — 87040 BLOOD CULTURE FOR BACTERIA: CPT | Mod: 91

## 2017-10-24 PROCEDURE — 700111 HCHG RX REV CODE 636 W/ 250 OVERRIDE (IP): Performed by: HOSPITALIST

## 2017-10-24 PROCEDURE — 700102 HCHG RX REV CODE 250 W/ 637 OVERRIDE(OP): Performed by: INTERNAL MEDICINE

## 2017-10-24 PROCEDURE — 99233 SBSQ HOSP IP/OBS HIGH 50: CPT | Performed by: INTERNAL MEDICINE

## 2017-10-24 PROCEDURE — 770020 HCHG ROOM/CARE - TELE (206)

## 2017-10-24 PROCEDURE — G8988 SELF CARE GOAL STATUS: HCPCS | Mod: CI

## 2017-10-24 PROCEDURE — G8987 SELF CARE CURRENT STATUS: HCPCS | Mod: CK

## 2017-10-24 PROCEDURE — 80048 BASIC METABOLIC PNL TOTAL CA: CPT

## 2017-10-24 PROCEDURE — 93306 TTE W/DOPPLER COMPLETE: CPT

## 2017-10-24 PROCEDURE — 700101 HCHG RX REV CODE 250: Performed by: INTERNAL MEDICINE

## 2017-10-24 PROCEDURE — 97162 PT EVAL MOD COMPLEX 30 MIN: CPT

## 2017-10-24 PROCEDURE — G8978 MOBILITY CURRENT STATUS: HCPCS | Mod: CJ

## 2017-10-24 PROCEDURE — 84484 ASSAY OF TROPONIN QUANT: CPT | Mod: 91

## 2017-10-24 PROCEDURE — 85730 THROMBOPLASTIN TIME PARTIAL: CPT | Mod: 91

## 2017-10-24 PROCEDURE — 700105 HCHG RX REV CODE 258

## 2017-10-24 RX ORDER — SODIUM CHLORIDE 9 MG/ML
INJECTION, SOLUTION INTRAVENOUS
Status: COMPLETED
Start: 2017-10-24 | End: 2017-10-24

## 2017-10-24 RX ADMIN — HEPARIN SODIUM 950 UNITS/HR: 5000 INJECTION, SOLUTION INTRAVENOUS at 22:39

## 2017-10-24 RX ADMIN — INSULIN LISPRO 1 UNITS: 100 INJECTION, SOLUTION INTRAVENOUS; SUBCUTANEOUS at 11:55

## 2017-10-24 RX ADMIN — INSULIN LISPRO 1 UNITS: 100 INJECTION, SOLUTION INTRAVENOUS; SUBCUTANEOUS at 17:01

## 2017-10-24 RX ADMIN — SODIUM CHLORIDE: 9 INJECTION, SOLUTION INTRAVENOUS at 21:49

## 2017-10-24 RX ADMIN — CEFTRIAXONE 2 G: 2 INJECTION, SOLUTION INTRAVENOUS at 09:23

## 2017-10-24 RX ADMIN — SODIUM CHLORIDE: 9 INJECTION, SOLUTION INTRAVENOUS at 06:27

## 2017-10-24 RX ADMIN — STANDARDIZED SENNA CONCENTRATE AND DOCUSATE SODIUM 2 TABLET: 8.6; 5 TABLET, FILM COATED ORAL at 02:13

## 2017-10-24 RX ADMIN — FINASTERIDE 5 MG: 5 TABLET, FILM COATED ORAL at 09:22

## 2017-10-24 RX ADMIN — OMEGA-3 FATTY ACIDS CAP 1000 MG 1000 MG: 1000 CAP at 09:22

## 2017-10-24 RX ADMIN — INSULIN LISPRO 1 UNITS: 100 INJECTION, SOLUTION INTRAVENOUS; SUBCUTANEOUS at 21:46

## 2017-10-24 RX ADMIN — ASPIRIN 81 MG: 81 TABLET, COATED ORAL at 09:21

## 2017-10-24 RX ADMIN — LABETALOL HYDROCHLORIDE 10 MG: 5 INJECTION, SOLUTION INTRAVENOUS at 23:37

## 2017-10-24 RX ADMIN — ATORVASTATIN CALCIUM 5 MG: 10 TABLET, FILM COATED ORAL at 09:23

## 2017-10-24 RX ADMIN — LATANOPROST 1 DROP: 50 SOLUTION OPHTHALMIC at 09:30

## 2017-10-24 ASSESSMENT — ENCOUNTER SYMPTOMS
WHEEZING: 0
INSOMNIA: 0
TREMORS: 0
HEADACHES: 0
DIZZINESS: 0
FALLS: 1
EYE PAIN: 0
MYALGIAS: 0
EYE REDNESS: 0
BLOOD IN STOOL: 0
DIZZINESS: 1
LOSS OF CONSCIOUSNESS: 0
ORTHOPNEA: 0
CONSTIPATION: 0
WEAKNESS: 1
FOCAL WEAKNESS: 0
CHILLS: 0
HEMOPTYSIS: 0
WEAKNESS: 0
DIARRHEA: 0
CLAUDICATION: 0
SEIZURES: 0
ABDOMINAL PAIN: 0
COUGH: 0
PND: 0
NAUSEA: 0
SHORTNESS OF BREATH: 0
VOMITING: 0
MYALGIAS: 1
FEVER: 0
PALPITATIONS: 0
NERVOUS/ANXIOUS: 0

## 2017-10-24 ASSESSMENT — PAIN SCALES - GENERAL
PAINLEVEL_OUTOF10: 0

## 2017-10-24 ASSESSMENT — ACTIVITIES OF DAILY LIVING (ADL): TOILETING: INDEPENDENT

## 2017-10-24 ASSESSMENT — COGNITIVE AND FUNCTIONAL STATUS - GENERAL
HELP NEEDED FOR BATHING: A LOT
TOILETING: A LOT
SUGGESTED CMS G CODE MODIFIER DAILY ACTIVITY: CK
DAILY ACTIVITIY SCORE: 15
MOVING TO AND FROM BED TO CHAIR: A LOT
CLIMB 3 TO 5 STEPS WITH RAILING: A LOT
DRESSING REGULAR UPPER BODY CLOTHING: A LITTLE
EATING MEALS: A LITTLE
PERSONAL GROOMING: A LITTLE
DRESSING REGULAR LOWER BODY CLOTHING: A LOT
MOBILITY SCORE: 13
STANDING UP FROM CHAIR USING ARMS: A LOT
SUGGESTED CMS G CODE MODIFIER MOBILITY: CL
WALKING IN HOSPITAL ROOM: A LOT
TURNING FROM BACK TO SIDE WHILE IN FLAT BAD: A LITTLE
MOVING FROM LYING ON BACK TO SITTING ON SIDE OF FLAT BED: A LOT

## 2017-10-24 ASSESSMENT — GAIT ASSESSMENTS
ASSISTIVE DEVICE: FRONT WHEEL WALKER
DISTANCE (FEET): 75
DEVIATION: DECREASED BASE OF SUPPORT
GAIT LEVEL OF ASSIST: MINIMAL ASSIST

## 2017-10-24 NOTE — PROGRESS NOTES
Report received from KATINA More. Assumed care of patient. Updated patient on plan of care. Fall precautions in place, call light within reach.

## 2017-10-24 NOTE — DISCHARGE PLANNING
Care Transition Team Assessment    IHD met with pt at bedside. Pt currently lives at home with his son, who will be able to provide transportation home upon d/c. Pt does not have any home O2 or home health services at this time. He states that he uses a walker, cane, wheelchair, or power scooter at home dependent on weakness.     Information Source  Orientation : Oriented x 4  Information Given By: Patient  Informant's Name: Bulmaro  Who is responsible for making decisions for patient? : Patient         Elopement Risk  Legal Hold: No  Ambulatory or Self Mobile in Wheelchair: Yes  Disoriented: No  Psychiatric Symptoms: None  History of Wandering: No  Elopement this Admit: No  Vocalizing Wanting to Leave: No  Displays Behaviors, Body Language Wanting to Leave: No-Not at Risk for Elopement  Elopement Risk: Not at Risk for Elopement    Interdisciplinary Discharge Planning  Does Admitting Nurse Feel This Could be a Complex Discharge?: No  Lives with - Patient's Self Care Capacity: Adult Children  Patient or legal guardian wants to designate a caregiver (see row info): No  Support Systems: Family Member(s), Friends / Neighbors  Housing / Facility: [address]  Do You Take your Prescribed Medications Regularly: Yes  Able to Return to Previous ADL's: Future Time w/Therapy  Mobility Issues: Yes  Patient Expects to be Discharged to:: HOME  Assistance Needed: Yes    Discharge Preparedness  What is your plan after discharge?: Home with help  What are your discharge supports?: Child  Prior Functional Level: Ambulatory, Independent with Activities of Daily Living, Independent with Medication Management, Uses Cane, Uses Walker, Uses Wheelchair (power scooter)  Difficulity with ADLs: Walking  Difficulty with ADLs Comment: uses walker, cane, wheelchair, or power scooter  Difficulity with IADLs: Driving  Difficulity with IADL Comments: assisted by son    Functional Assesment  Prior Functional Level: Ambulatory, Independent with  Activities of Daily Living, Independent with Medication Management, Uses Cane, Uses Walker, Uses Wheelchair (power scooter)    Finances  Financial Barriers to Discharge: No  Prescription Coverage: Yes ([facility] [address])    Vision / Hearing Impairment  Vision Impairment : Yes  Right Eye Vision: Wears Glasses  Left Eye Vision: Wears Glasses  Hearing Impairment : Yes  Hearing Impairment: Both Ears, Hearing Device Not Available    Values / Beliefs / Concerns  Values / Beliefs Concerns : No         Domestic Abuse  Have you ever been the victim of abuse or violence?: No  Physical Abuse or Sexual Abuse: No  Verbal Abuse or Emotional Abuse: No  Possible Abuse Reported to:: Not Applicable    Psychological Assessment  History of Substance Abuse: None  History of Psychiatric Problems: No  Non-compliant with Treatment: No    Discharge Risks or Barriers  Discharge risks or barriers?: No    Anticipated Discharge Information  Anticipated discharge disposition: Home  Discharge Address: [address] [place] [address]  Discharge Contact Phone Number: [phone]

## 2017-10-24 NOTE — THERAPY
"Occupational Therapy Evaluation completed.   Functional Status:  Min/mod A with ADLs and txfs  Plan of Care: Will benefit from Occupational Therapy 3 times per week  Discharge Recommendations:  Equipment: Will Continue to Assess for Equipment Needs. Post-acute therapy Discharge to a transitional care facility for continued skilled therapy services. and Discharge to home with outpatient or home health for additional skilled therapy services.    See \"Rehab Therapy-Acute\" Patient Summary Report for complete documentation.    "

## 2017-10-24 NOTE — CARE PLAN
Problem: Safety  Goal: Will remain free from falls  Outcome: PROGRESSING AS EXPECTED  Gricelda Coombs Fall Risk Assessment:     Last Known Fall: Within the last month  Mobility: Use of assistive device/requires assist of two people  Medications: Cardiovascular or central nervous system meds  Mental Status/LOC/Awareness: Awake, alert, and oriented to date, place, and person  Toileting Needs: Use of assistive device (Bedside commode, bedpan, urinal)  Volume/Electrolyte Status: Use of IV fluids/tube feeds  Communication/Sensory: Visual (Glasses)/hearing deficit  Behavior: Appropriate behavior  Gricelda Coombs Fall Risk Total: 15  Fall Risk Level: HIGH RISK    Universal Fall Precautions:  call light/belongings in reach, bed in low position and locked, wheelchairs and assistive devices out of sight, siderails up x 2, use non-slip footwear, adequate lighting, clutter free and spill free environment, educate on level of risk, educate to call for assistance    Fall Risk Level Interventions:     TRIAL (TELE 8, NEURO, MED MIAH 5) High Fall Risk Interventions  Place yellow fall risk ID band on patient: verified  Provide patient/family education based on risk assessment: completed  Educate patient/family to call staff for assistance when getting out of bed: completed  Place fall precaution signage outside patient door: completed  Place patient in room close to nursing station: completed  Utilize bed/chair fall alarm: verified  Notify charge of high risk for huddle: verified    Patient Specific Interventions:     Medication: review medications with patient and family, limit combination of prn medications and assess need for orthostatic hypotension evaluation  Mental Status/LOC/Awareness: reinforce falls education, utilize bed/chair fall alarm and reinforce the use of call light  Toileting: provide frquent toileting, instruct patient/family on the use of grab bars and do not leave patient unattended in bathroom/refer to toileting  scripting  Volume/Electrolyte Status: monitor blood sugars and maintain appropriate blood sugar levels if diabetic, monitor abnormal lab values and ensure IV fluids are appropriate  Communication/Sensory: update plan of care on whiteboard and ensure patient has glasses/contacts and hearing aids/dentures  Behavioral: encourage patient to voice feelings and engage patient in daily activities  Mobility: schedule physical activity throughout the day, provide comfort measures during transport, dangle prior to standing, utilize bed/chair fall alarm, ensure bed is locked and in lowest position, provide appropriate assistive device and instruct patient to exit bed on their strongest side

## 2017-10-24 NOTE — PROGRESS NOTES
Doctor Harris returned page out to cardiology. New orders received. Lab to recheck troponin levels in 6 hours.

## 2017-10-24 NOTE — PROGRESS NOTES
Cardiology Progress Note               Author: Belinda Carrera Date & Time created: 10/24/2017  8:50 AM     Interval History:  89 Year old man was found down by his son at home and continues to have weakness and falling at home, he has fallen three times at home recently. He was found to have an elevated troponin in the setting of fever and sepsis.    He is asymptomatic from a cardiac perspective today, but remains weak still. He is walking with PT with a walker. He has no other complaints.    Review of Systems   Constitutional: Negative for fever and malaise/fatigue.   Respiratory: Negative for cough and shortness of breath.    Cardiovascular: Negative for chest pain, palpitations, orthopnea, claudication, leg swelling and PND.   Gastrointestinal: Negative for abdominal pain.   Musculoskeletal: Negative for myalgias.   Neurological: Positive for dizziness and weakness.   All other systems reviewed and are negative.      Physical Exam   Constitutional: He is oriented to person, place, and time. He appears well-developed and well-nourished. No distress.   HENT:   Head: Normocephalic and atraumatic.   Eyes: EOM are normal.   Neck: Normal range of motion. No JVD present.   Cardiovascular: Normal rate, regular rhythm, normal heart sounds and intact distal pulses.    No murmur heard.  Pulmonary/Chest: Effort normal and breath sounds normal. No respiratory distress. He has no wheezes. He has no rales.   Abdominal: Soft. Bowel sounds are normal.   Musculoskeletal: He exhibits no edema.   Walker with weakness   Neurological: He is alert and oriented to person, place, and time.   Skin: Skin is warm and dry.   Psychiatric: He has a normal mood and affect.   Nursing note and vitals reviewed.      Hemodynamics:  Temp (24hrs), Av.6 °C (99.7 °F), Min:36.3 °C (97.3 °F), Max:39.5 °C (103.1 °F)  Temperature: 36.9 °C (98.4 °F)  Pulse  Av.6  Min: 60  Max: 117   Blood Pressure : 121/56     Respiratory:    Respiration: 16,  Pulse Oximetry: 94 %     Work Of Breathing / Effort: Mild  RUL Breath Sounds: Clear, RML Breath Sounds: Clear, RLL Breath Sounds: Clear, HUNTER Breath Sounds: Clear, LLL Breath Sounds: Clear  Fluids:     Weight: 74.1 kg (163 lb 5.8 oz)  GI/Nutrition:  Orders Placed This Encounter   Procedures   • Diet Order     Standing Status:   Standing     Number of Occurrences:   1     Order Specific Question:   Diet:     Answer:   Diabetic [3]     Order Specific Question:   Diet:     Answer:   Cardiac [6]     Order Specific Question:   Calorie modifications:     Answer:   2500 kcals [7]     Order Specific Question:   Macronutrient modifications:     Answer:   Low Cholesterol [7]     Lab Results:  Recent Labs      10/23/17   0500  10/24/17   0208   WBC  13.7*  14.8*   RBC  3.97*  3.64*   HEMOGLOBIN  12.1*  10.8*   HEMATOCRIT  35.7*  32.8*   MCV  89.9  90.1   MCH  30.5  29.7   MCHC  33.9  32.9*   RDW  45.1  45.7   PLATELETCT  145*  128*   MPV  10.2  10.3     Recent Labs      10/23/17   0500  10/24/17   0208   SODIUM  132*  133*   POTASSIUM  3.8  4.0   CHLORIDE  97  100   CO2  21  23   GLUCOSE  199*  149*   BUN  29*  23*   CREATININE  1.03  0.88   CALCIUM  9.3  8.3*     Recent Labs      10/23/17   0500  10/24/17   0545   APTT  32.2  177.7*   INR  1.27*   --      Recent Labs      10/23/17   0500   BNPBTYPENAT  190*     Recent Labs      10/23/17   0500   10/23/17   1333  10/23/17   1937  10/24/17   0208   TROPONINI  0.05*   < >  0.55*  4.00*  7.14*   BNPBTYPENAT  190*   --    --    --    --     < > = values in this interval not displayed.     Recent Labs      10/24/17   0208   TRIGLYCERIDE  48   HDL  30*   LDL  16         Medical Decision Making, by Problem:  Active Hospital Problems    Diagnosis   • *Atrial fibrillation (CMS-HCC) [I48.91]   • Sepsis (CMS-HCC) [A41.9]   • Carotid artery stenosis [I65.29]   • DM (diabetes mellitus) (CMS-HCC) [E11.9]   • BPH (benign prostatic hyperplasia) [N40.0]   • Generalized weakness [R53.1]   •  Glaucoma [H40.9]   • Thrombocytopenia (HCC) [D69.6]       Plan:  1. Found down with recurrent weakness and falling  -4 falls this month  -check orthostatic BP readings with RN  -no angina or JUAREZ  -echo WNL in '16, no repeat warranted at this time    2. Elevated troponin  -low threshold for cath  -consider stress imaging if neg for PE  -repeat trop pending  -elevated D-dimer, continue PE workup  -call for chest pain    3. PAF  -SR on EKG and tele, no palps  -heparin gtt    4. Carotid disease  -mild in '16  -statin  -MRI brain with no acute infarct but mild ischemic disease    5.DM  -managed by hospitalist  -insulin    6. Sepsis  -unknown source  -leukocytosis, fever, and tachycardia overnight   -hospitalist following  -ABX, fluids    Thank you for the consultation. We will continue to follow alongside you.      Reviewed items::  EKG reviewed, Radiology images reviewed, Labs reviewed and Medications reviewed  Aj catheter::  No Aj  DVT prophylaxis pharmacological::  Heparin  DVT prophylaxis - mechanical:  Not indicated at this time, ambulatory  Ulcer Prophylaxis::  Not indicated

## 2017-10-24 NOTE — CARE PLAN
Problem: Knowledge Deficit  Goal: Knowledge of the prescribed therapeutic regimen will improve  Outcome: PROGRESSING AS EXPECTED  Provided education on medications. Verbal acknowledgement of education with appropriate questions. All questions answered.

## 2017-10-24 NOTE — PROGRESS NOTES
Tech from Lab called with critical result of troponin 4.0 at 2030. Critical lab result read back to Tech.   Dr. Nobles notified of critical lab result at 2035.  Critical lab result read back by Dr. Nobles.

## 2017-10-24 NOTE — CONSULTS
DATE OF SERVICE:  10/24/2017    REASON FOR CONSULT:  Gram-negative sepsis.    CONSULTING PHYSICIAN:  Carlos Taylor MD    HISTORY OF PRESENT ILLNESS:  This is a 89-year-old white male who was admitted   to the hospital yesterday after having multiple falls.  He has a known   history of paroxysmal atrial fibrillation, coronary artery stenosis, diabetes   and prior pyelonephritis.  He has a main complaint of multiple falls including   out of a chair and generalized weakness.  He did have a prior neurologic   workup in May 2017 including MRI of the brain and cardiac monitoring, which   revealed no significant abnormalities or new strokes.  Denies any fever,   chills, nausea, vomiting, chest pain or shortness of breath.  One of his blood   cultures showing gram-negative rods and infectious disease is consulted for   antibiotic recommendations and management.  He is currently receiving   ceftriaxone.    REVIEW OF SYSTEMS:  Negative except for stated in the HPI.    ALLERGIES:  He has no known antibiotic allergies.    PAST MEDICAL HISTORY:  Peripheral vascular disease, UTI, renal failure,   pyelonephritis, diabetes, GERD, hypertension and atrial fibrillation.    FAMILY HISTORY:  Heart disease, diabetes, alcohol abuse, cancer and stroke.    SOCIAL HISTORY:  He is a nonsmoker.  He has not had a drink in decades.  He   does not use illicit drugs.    PHYSICAL EXAMINATION:  GENERAL:  He is an elderly male in a chair, attempting to use the urinal, no   acute distress.  HEENT:  Normocephalic, atraumatic.  Pupils are reactive to light.  NECK:  Supple.  CARDIOVASCULAR:  Irregular rate and rhythm with a murmur.  CHEST:  Grossly clear to auscultation bilaterally, unlabored.  ABDOMEN:  Soft, nontender.  EXTREMITIES:  Show no cyanosis, clubbing, or edema.  SKIN:  He is somewhat some pale.  NEUROLOGIC:  He is awake and does follow commands.  He is hard of hearing.    Current labs show white blood cell count of 14.8, H and H 10.8  and 32.8,   platelets of 128, decreased from his baseline of 200.  Sodium 133, potassium   4, chloride 100, bicarbonate 23, glucose 129, BUN 23, creatinine 0.88.    Cholesterol is very low.  Troponin is markedly elevated at 7.14 and now 4.58.    Urinalysis only show 0-2 wbc's.  Blood culture 1/2 is positive for   gram-negative rods.  CTA of the head and neck showed no thrombus or aneurysm,   but diffuse atrophy and periventricular white matter changes.  Chest x-ray   showed no infiltrate.  Influenza is negative.  Thyroid was normal.    ASSESSMENT AND PLAN:  A 89-year-old male with multiple chronic medical   conditions including atrial fibrillation, peripheral vascular disease,   diabetes, presents with generalized weakness and multiple falls.  He has   evidence of demand ischemia with markedly elevated troponin; however, his   initial CNS evaluation showed no stroke.  We would proceed with MRI to verify.    Unclear if he will need to add any additional cardiac workup that he is a   candidate for a cardiac catheterization.      He is currently receiving ceftriaxone due to the finding of a gram   negative yusef in his blood stream.  Unclear source as his urine does not show   any evidence of infection at this time but his fevers have decreased from 103.    We will continue on his current medications for the time being with further   Recommendations per final pathogen identification and clinical course.    Discussed with internal medicine.    Thank you and we will follow with you.       ____________________________________     MD SANDRA PHILLIPS / ALLAN    DD:  10/24/2017 12:11:52  DT:  10/24/2017 13:18:16    D#:  [id number]  Job#:  [id number]

## 2017-10-24 NOTE — CONSULTS
DATE OF SERVICE:  10/23/2017    REASON FOR CONSULTATION:  This consultation is performed at the request of Dr. Perrin, the covering hospitalist.  This consultation is performed for   evaluation of elevated troponin.    HISTORY OF PRESENT ILLNESS:  This is a 89-year-old gentleman with a known   history of diabetes, carotid disease and paroxysmal atrial fibrillation who   was found down by his son today.  The patient tells me that he thinks he slid   off the couch and was on the ground and could not get out.  He just tells me   that was because he was weak.  He does not feel was weak as he did at that   time, he is not sure what was making him weak.  He does not recall having any   arrhythmia.  He does not really remember perfectly right now.  He tells me   that he generally gets along pretty well.  He walks around the house using a   walker.  He lives with his son, but can do his ADLs.  He has had several falls   and lower extremity weakness in the past.  He did not hurt himself with this   fall as far as he knows that has got no localizing symptoms.  He tells me he   knows he injured his ankle some time back.  He does not know about any   asymmetric leg swelling or anything of this sort.  He had no chest pain or   shortness of breath.  He cannot give me any further details because he does   not recall any more.    PAST MEDICAL HISTORY:  Significant for recurrent falls, paroxysmal atrial   fibrillation, carotid artery disease, recurrent UTI, hypertension.    PAST SURGICAL HISTORY:  Significant for carotid endarterectomy, cataracts,   cholecystectomy, colonoscopy in the past and procedures for kidney stones.    ALLERGIES:  No known drug allergies.    CURRENT MEDICATIONS:  Include aspirin, atorvastatin, Rocephin, diltiazem,   finasteride, fish oil, insulin regimen, eyedrops and a bowel regimen.  I am   starting a heparin drip for him with the positive troponin.    FAMILY HISTORY:  Significant for heart disease in his  father.  He has a   brother that had a stroke at a similar age as him.    SOCIAL HISTORY:  He is a nonsmoker.  He lives with son.    REVIEW OF SYSTEMS:  Full review of systems is unchanged since the admission H   and P.  He notes the weakness kept him from getting up.  He does not feel weak   any more, though he notes that he had ankle injury, but that is not bothering   him currently.    PHYSICAL EXAMINATION:  GENERAL:  He is afebrile.  VITAL SIGNS:  Stable.  There is a chart note of a fever to 102 degrees, but   the T-max recorded is 100.2 degrees, his pulse is 61.  He is currently   afebrile with temperature 98.4, respiratory rate is 16, blood pressure is   115/85.  He is satting 95% on 2 liters.  HEENT:  Normocephalic, atraumatic.  Mucous membranes are moist.  Eyes, status   post cataract surgery.  Sclerae nonicteric.  NECK:  No jugular venous distention appreciated.  HEART:  S1, S2 regular rate and rhythm.  No murmurs, rubs, gallops   appreciated.  LUNGS:  Clear to auscultation bilaterally.  ABDOMEN:  Soft, nontender, nondistended.  EXTREMITIES:  The left lower extremity has a larger circumference than the   right lower extremity.  This is with nonpitting edema.  NEUROLOGIC:  Grossly nonfocal.  He is moving all 4.  I did not get him up and   ambulate.   were strong.  Cranial nerves are intact.  PSYCHIATRIC:  He is alert, oriented and appropriate.  He is oriented to time   and appears to be doing quite well for his age.    LABORATORY DATA:  Reviewing his data, he has a troponin that was 0.13 on   admission elevated at 0.55 and now at 4.0 that was the reason for the   consultation.  White blood cell count is 13.7, hemoglobin 12.1, platelet count   is 145.  Labs are significant for creatinine of 1.03, glucose is elevated at   199.  Sodium slightly low at 132, potassium within normal limits.  Lactic acid   is 1.9, glycohemoglobin elevated at 79.  His troponin, again slowly declined   over the course of the  day.  BNP was 190 initially.  He does have a D-dimer   that was elevated at presentation at 807.  TSH is 1.25, within normal limits.    Influenza screen is negative.    ASSESSMENT AND PLAN:  This is a 89-year-old gentleman coming in with a   troponin positive syndrome after a fall that he cannot describe well to me,   but no injuries with the fall.  I am a little suspicious for an asymptomatic   acute coronary syndrome or a pulmonary embolism.  Either of these diagnoses   would be reason for heparinization so I will start the patient on heparin now.    I think with the elevated D-dimer and the asymmetric legs in this relatively   debilitated gentleman it is worthwhile ruling out pulmonary embolism, but I   will defer to the medicine team what they wanted to do.  There are some   markers infection with low level elevation of white blood cell count and a low   level fever, but I do not think that he has systemic sepsis that would cause   the troponin to elevate like this.  So, I think it is reasonable to start   heparin for either this possible pulmonary embolism for acute coronary   syndrome.  Her team will follow with you.  I think a stress test is reasonable   if there is not a pulmonary embolism.       ____________________________________     MD YASMANY Baker / ALLAN    DD:  10/23/2017 23:05:15  DT:  10/24/2017 01:13:45    D#:  [id number]  Job#:  [id number]

## 2017-10-24 NOTE — THERAPY
"Physical Therapy Evaluation completed.   Bed Mobility:  Supine to Sit: Minimal Assist  Transfers: Sit to Stand: Contact Guard Assist  Gait: Level Of Assist: Minimal Assist (cg at start, min A by end) with Front-Wheel Walker       Plan of Care: Will benefit from Physical Therapy 3 times per week  Discharge Recommendations: Equipment: Will Continue to Assess for Equipment Needs. Post-acute therapy Discharge to a transitional care facility for continued skilled therapy services.    See \"Rehab Therapy-Acute\" Patient Summary Report for complete documentation.     "

## 2017-10-24 NOTE — PROGRESS NOTES
2 RN SKIN CHECK COMPLETE WITH BUD    SOME BRUISES TO RIGHT ARM.  SMALL SCABS ON RIGHT SHIN.  HEELS ARE DRY AND FLAKY.  COCCYX IS RED AND BLANCHING. Q2 TURNS INIATED.

## 2017-10-24 NOTE — PROGRESS NOTES
Notified by bedside nurse that latest troponin 4.00 up from 0.55.  Cardiology Dr. Iglesias consulted.  Will order additional troponin in 6 hours.

## 2017-10-24 NOTE — PROGRESS NOTES
Assumed care of patient at bedside report from day shift RN. Patient currently resting. Call light within reach. Fall precautions in place. Bed locked and in lowest position. No needs indicated at this time.

## 2017-10-24 NOTE — PROGRESS NOTES
Received call from lab. First blood culture positive for gram negative rods. Informed on call hospitalist. No new orders received.

## 2017-10-25 ENCOUNTER — APPOINTMENT (OUTPATIENT)
Dept: RADIOLOGY | Facility: MEDICAL CENTER | Age: 82
DRG: 871 | End: 2017-10-25
Attending: INTERNAL MEDICINE
Payer: MEDICARE

## 2017-10-25 ENCOUNTER — HOME HEALTH ADMISSION (OUTPATIENT)
Dept: HOME HEALTH SERVICES | Facility: HOME HEALTHCARE | Age: 82
End: 2017-10-25
Payer: MEDICARE

## 2017-10-25 LAB
ANION GAP SERPL CALC-SCNC: 8 MMOL/L (ref 0–11.9)
APTT PPP: 56.6 SEC (ref 24.7–36)
BUN SERPL-MCNC: 14 MG/DL (ref 8–22)
CALCIUM SERPL-MCNC: 8.1 MG/DL (ref 8.5–10.5)
CHLORIDE SERPL-SCNC: 103 MMOL/L (ref 96–112)
CO2 SERPL-SCNC: 23 MMOL/L (ref 20–33)
CREAT SERPL-MCNC: 0.63 MG/DL (ref 0.5–1.4)
ERYTHROCYTE [DISTWIDTH] IN BLOOD BY AUTOMATED COUNT: 44.4 FL (ref 35.9–50)
GFR SERPL CREATININE-BSD FRML MDRD: >60 ML/MIN/1.73 M 2
GLUCOSE BLD-MCNC: 113 MG/DL (ref 65–99)
GLUCOSE BLD-MCNC: 138 MG/DL (ref 65–99)
GLUCOSE BLD-MCNC: 167 MG/DL (ref 65–99)
GLUCOSE BLD-MCNC: 190 MG/DL (ref 65–99)
GLUCOSE SERPL-MCNC: 112 MG/DL (ref 65–99)
HCT VFR BLD AUTO: 30.5 % (ref 42–52)
HGB BLD-MCNC: 10.2 G/DL (ref 14–18)
MCH RBC QN AUTO: 29.5 PG (ref 27–33)
MCHC RBC AUTO-ENTMCNC: 33.4 G/DL (ref 33.7–35.3)
MCV RBC AUTO: 88.2 FL (ref 81.4–97.8)
PLATELET # BLD AUTO: 122 K/UL (ref 164–446)
PMV BLD AUTO: 10.4 FL (ref 9–12.9)
POTASSIUM SERPL-SCNC: 3.3 MMOL/L (ref 3.6–5.5)
RBC # BLD AUTO: 3.46 M/UL (ref 4.7–6.1)
SODIUM SERPL-SCNC: 134 MMOL/L (ref 135–145)
WBC # BLD AUTO: 8.1 K/UL (ref 4.8–10.8)

## 2017-10-25 PROCEDURE — A9270 NON-COVERED ITEM OR SERVICE: HCPCS | Performed by: NURSE PRACTITIONER

## 2017-10-25 PROCEDURE — 99233 SBSQ HOSP IP/OBS HIGH 50: CPT | Performed by: INTERNAL MEDICINE

## 2017-10-25 PROCEDURE — 82962 GLUCOSE BLOOD TEST: CPT | Mod: 91

## 2017-10-25 PROCEDURE — 85730 THROMBOPLASTIN TIME PARTIAL: CPT

## 2017-10-25 PROCEDURE — 700101 HCHG RX REV CODE 250: Performed by: INTERNAL MEDICINE

## 2017-10-25 PROCEDURE — 36415 COLL VENOUS BLD VENIPUNCTURE: CPT

## 2017-10-25 PROCEDURE — 700111 HCHG RX REV CODE 636 W/ 250 OVERRIDE (IP): Performed by: INTERNAL MEDICINE

## 2017-10-25 PROCEDURE — A9567 TECHNETIUM TC-99M AEROSOL: HCPCS

## 2017-10-25 PROCEDURE — 700102 HCHG RX REV CODE 250 W/ 637 OVERRIDE(OP): Performed by: NURSE PRACTITIONER

## 2017-10-25 PROCEDURE — 700105 HCHG RX REV CODE 258: Performed by: INTERNAL MEDICINE

## 2017-10-25 PROCEDURE — A9270 NON-COVERED ITEM OR SERVICE: HCPCS | Performed by: INTERNAL MEDICINE

## 2017-10-25 PROCEDURE — 700102 HCHG RX REV CODE 250 W/ 637 OVERRIDE(OP): Performed by: INTERNAL MEDICINE

## 2017-10-25 PROCEDURE — 80048 BASIC METABOLIC PNL TOTAL CA: CPT

## 2017-10-25 PROCEDURE — 85027 COMPLETE CBC AUTOMATED: CPT

## 2017-10-25 PROCEDURE — 700111 HCHG RX REV CODE 636 W/ 250 OVERRIDE (IP): Performed by: HOSPITALIST

## 2017-10-25 PROCEDURE — 770020 HCHG ROOM/CARE - TELE (206)

## 2017-10-25 RX ORDER — CLOPIDOGREL BISULFATE 75 MG/1
75 TABLET ORAL DAILY
Status: DISCONTINUED | OUTPATIENT
Start: 2017-10-25 | End: 2017-10-30 | Stop reason: HOSPADM

## 2017-10-25 RX ADMIN — OMEGA-3 FATTY ACIDS CAP 1000 MG 1000 MG: 1000 CAP at 09:47

## 2017-10-25 RX ADMIN — INSULIN LISPRO 1 UNITS: 100 INJECTION, SOLUTION INTRAVENOUS; SUBCUTANEOUS at 11:45

## 2017-10-25 RX ADMIN — OXYCODONE HYDROCHLORIDE 5 MG: 5 TABLET ORAL at 02:19

## 2017-10-25 RX ADMIN — STANDARDIZED SENNA CONCENTRATE AND DOCUSATE SODIUM 2 TABLET: 8.6; 5 TABLET, FILM COATED ORAL at 02:19

## 2017-10-25 RX ADMIN — ENOXAPARIN SODIUM 40 MG: 100 INJECTION SUBCUTANEOUS at 15:35

## 2017-10-25 RX ADMIN — POLYETHYLENE GLYCOL 3350 1 PACKET: 17 POWDER, FOR SOLUTION ORAL at 20:17

## 2017-10-25 RX ADMIN — CEFTRIAXONE 2 G: 2 INJECTION, SOLUTION INTRAVENOUS at 09:47

## 2017-10-25 RX ADMIN — FINASTERIDE 5 MG: 5 TABLET, FILM COATED ORAL at 09:47

## 2017-10-25 RX ADMIN — CLOPIDOGREL 75 MG: 75 TABLET, FILM COATED ORAL at 15:35

## 2017-10-25 RX ADMIN — LATANOPROST 1 DROP: 50 SOLUTION OPHTHALMIC at 09:48

## 2017-10-25 RX ADMIN — SODIUM CHLORIDE: 9 INJECTION, SOLUTION INTRAVENOUS at 09:51

## 2017-10-25 RX ADMIN — STANDARDIZED SENNA CONCENTRATE AND DOCUSATE SODIUM 2 TABLET: 8.6; 5 TABLET, FILM COATED ORAL at 09:48

## 2017-10-25 RX ADMIN — ASPIRIN 81 MG: 81 TABLET, COATED ORAL at 09:47

## 2017-10-25 RX ADMIN — ATORVASTATIN CALCIUM 5 MG: 10 TABLET, FILM COATED ORAL at 09:47

## 2017-10-25 RX ADMIN — LABETALOL HYDROCHLORIDE 10 MG: 5 INJECTION, SOLUTION INTRAVENOUS at 21:05

## 2017-10-25 RX ADMIN — INSULIN LISPRO 1 UNITS: 100 INJECTION, SOLUTION INTRAVENOUS; SUBCUTANEOUS at 20:17

## 2017-10-25 ASSESSMENT — ENCOUNTER SYMPTOMS
FOCAL WEAKNESS: 0
MYALGIAS: 0
CHILLS: 0
FEVER: 0
EYE REDNESS: 0
NERVOUS/ANXIOUS: 0
EYE PAIN: 0
ABDOMINAL PAIN: 0
SHORTNESS OF BREATH: 0
MYALGIAS: 1
PALPITATIONS: 0
CONSTIPATION: 0
PND: 0
VOMITING: 0
LOSS OF CONSCIOUSNESS: 0
FALLS: 1
HEADACHES: 0
WEAKNESS: 1
DIZZINESS: 1
CLAUDICATION: 0
INSOMNIA: 0
COUGH: 0
TREMORS: 0
HEMOPTYSIS: 0
WHEEZING: 0
DIZZINESS: 0
WEAKNESS: 0
DIARRHEA: 0
NAUSEA: 0
SEIZURES: 0
BLOOD IN STOOL: 0
ORTHOPNEA: 0

## 2017-10-25 ASSESSMENT — PATIENT HEALTH QUESTIONNAIRE - PHQ9
1. LITTLE INTEREST OR PLEASURE IN DOING THINGS: NOT AT ALL
SUM OF ALL RESPONSES TO PHQ QUESTIONS 1-9: 0
2. FEELING DOWN, DEPRESSED, IRRITABLE, OR HOPELESS: NOT AT ALL
SUM OF ALL RESPONSES TO PHQ9 QUESTIONS 1 AND 2: 0

## 2017-10-25 ASSESSMENT — PAIN SCALES - GENERAL
PAINLEVEL_OUTOF10: 0
PAINLEVEL_OUTOF10: 0
PAINLEVEL_OUTOF10: 6
PAINLEVEL_OUTOF10: 0
PAINLEVEL_OUTOF10: 0

## 2017-10-25 NOTE — CARE PLAN
Problem: Knowledge Deficit  Goal: Knowledge of disease process/condition, treatment plan, diagnostic tests, and medications will improve  Outcome: PROGRESSING AS EXPECTED  Patient educated regarding activity, diet, meds and plan of care. Patient verbalized understanding.

## 2017-10-25 NOTE — PROGRESS NOTES
Gricelda Coombs Fall Risk Assessment:     Last Known Fall: Within the last month  Mobility: Use of assistive device/requires assist of two people  Medications: Cardiovascular or central nervous system meds  Mental Status/LOC/Awareness: Awake, alert, and oriented to date, place, and person  Toileting Needs: Use of assistive device (Bedside commode, bedpan, urinal)  Volume/Electrolyte Status: Use of IV fluids/tube feeds  Communication/Sensory: Visual (Glasses)/hearing deficit  Behavior: Appropriate behavior  Gricelda Coombs Fall Risk Total: 15  Fall Risk Level: HIGH RISK    Universal Fall Precautions:  call light/belongings in reach, bed in low position and locked, wheelchairs and assistive devices out of sight, siderails up x 2, use non-slip footwear, adequate lighting, clutter free and spill free environment, educate on level of risk, educate to call for assistance    Fall Risk Level Interventions:     TRIAL (TELE 8, NEURO, MED MIAH 5) High Fall Risk Interventions  Place yellow fall risk ID band on patient: verified  Provide patient/family education based on risk assessment: completed  Educate patient/family to call staff for assistance when getting out of bed: completed  Place fall precaution signage outside patient door: completed  Place patient in room close to nursing station: completed  Utilize bed/chair fall alarm: completed  Notify charge of high risk for huddle: completed    Patient Specific Interventions:     Medication: limit combination of prn medications  Mental Status/LOC/Awareness: reinforce falls education, check on patient hourly, utilize bed/chair fall alarm and reinforce the use of call light  Toileting: provide frquent toileting  Volume/Electrolyte Status: ensure patient remains hydrated, monitor abnormal lab values and ensure IV fluids are appropriate  Communication/Sensory: update plan of care on whiteboard, ensure patient has glasses/contacts and hearing aids/dentures and for visually impaired  patients orient to their room surrounding and do not change their surroundings  Behavioral: not applicable  Mobility: dangle prior to standing, utilize bed/chair fall alarm and ensure bed is locked and in lowest position

## 2017-10-25 NOTE — PROGRESS NOTES
Infectious Disease Progress Note    Author: Alanis Rapp M.D. Date & Time of service: 10/25/2017  4:32 PM    Chief Complaint:  FU gram neg sepsis    Interval History:  89 y.o. White male admitted 10/23/2017 after a fall -found to have gram neg sepsis  10/25 AF WBC 8.1 up on side of bed-pleasantly confused    Labs Reviewed, Medications Reviewed and Radiology Reviewed.    Review of Systems:  Review of Systems   Unable to perform ROS: Medical condition       Hemodynamics:  Temp (24hrs), Av.4 °C (99.3 °F), Min:36.5 °C (97.7 °F), Max:38 °C (100.4 °F)  Temperature: 36.5 °C (97.7 °F)  Pulse  Av  Min: 57  Max: 117   Blood Pressure : (!) 170/79       Physical Exam:  Physical Exam   Constitutional: He appears well-developed. No distress.   HENT:   Head: Normocephalic and atraumatic.   Eyes: EOM are normal. Pupils are equal, round, and reactive to light.   Neck: Neck supple.   Cardiovascular: Normal rate.    IRR   Pulmonary/Chest: Effort normal. No respiratory distress.   Abdominal: Soft. He exhibits no distension. There is no tenderness.   Neurological: He is alert. A cranial nerve deficit is present.   Skin: No rash noted. No erythema.   Nursing note and vitals reviewed.      Meds:    Current Facility-Administered Medications:   •  enoxaparin (LOVENOX) injection  •  clopidogrel  •  aspirin EC  •  atorvastatin  •  finasteride  •  latanoprost  •  fish oil  •  senna-docusate **AND** polyethylene glycol/lytes **AND** magnesium hydroxide **AND** bisacodyl  •  Respiratory Care per Protocol  •  acetaminophen  •  Notify provider if pain remains uncontrolled **AND** Use the numeric rating scale (NRS-11) on regular floors and Critical-Care Pain Observation Tool (CPOT) on ICUs/Trauma to assess pain **AND** Pulse Ox (Oximetry) **AND** Pharmacy Consult Request **AND** If patient difficult to arouse and/or has respiratory depression, stop any opiates that are currently infusing and call a Rapid Response. **AND** oxycodone  immediate-release **AND** oxycodone immediate-release **AND** morphine injection  •  labetalol  •  diltiazem  •  insulin lispro  •  Action is required: Protocol 1073 Hypoglycemia has been implemented **AND** Protocol 1073 Inclusion Criteria **AND** Protocol 1073 NOTIFY **AND** Protocol 1073 Initiate protocol immediately if FSBG is less than or equal to 70 mg/dL **AND** glucose 4 g **AND** dextrose 50%  •  ondansetron  •  ondansetron  •  NS  •  cefTRIAXone (ROCEPHIN) IVPB    Labs:  Recent Labs      10/23/17   0500  10/24/17   0208  10/25/17   0450   WBC  13.7*  14.8*  8.1   RBC  3.97*  3.64*  3.46*   HEMOGLOBIN  12.1*  10.8*  10.2*   HEMATOCRIT  35.7*  32.8*  30.5*   MCV  89.9  90.1  88.2   MCH  30.5  29.7  29.5   RDW  45.1  45.7  44.4   PLATELETCT  145*  128*  122*   MPV  10.2  10.3  10.4   NEUTSPOLYS  86.60*  82.10*   --    LYMPHOCYTES  4.90*  8.40*   --    MONOCYTES  8.00  8.40   --    EOSINOPHILS  0.00  0.10   --    BASOPHILS  0.10  0.30   --      Recent Labs      10/23/17   0500  10/24/17   0208  10/25/17   0450   SODIUM  132*  133*  134*   POTASSIUM  3.8  4.0  3.3*   CHLORIDE  97  100  103   CO2  21  23  23   GLUCOSE  199*  149*  112*   BUN  29*  23*  14     Recent Labs      10/23/17   0500  10/24/17   0208  10/25/17   0450   CREATININE  1.03  0.88  0.63       Imaging:  Ct-cta Head With & W/o-post Process    Result Date: 10/23/2017  10/23/2017 11:49 AM HISTORY/REASON FOR EXAM:  Weakness and frequent falls TECHNIQUE/EXAM DESCRIPTION: CT angiogram of the [place] without and with contrast.  Initial precontrast images were obtained of the head from the skull base through the vertex.  Postcontrast images were obtained of the [place] following the power injection of nonionic contrast at 5.0 mL/sec. Thin-section helical images were obtained with overlapping reconstruction interval. Coronal and sagittal multiplanar volume reformats were generated.  3D angiographic images were reviewed on PACS.   Maximum intensity projection (MIP) images were generated and reviewed. 100 mL of Omnipaque 350 nonionic contrast was injected intravenously. Low dose optimization technique was utilized for this CT exam including automated exposure control and adjustment of the mA and/or kV according to patient size. COMPARISON:  10/23/2017 CT head 5:06 AM FINDINGS: CT head: No acute cranial hemorrhage, mass effect, or midline shift. No acute ischemia or mass effect is identified. There is diffuse atrophy. Periventricular white matter changes consistent with chronic small vessel disease. Ventricles and cisterns are patent. Paranasal sinuses and mastoid air cells are clear. CTA head: There are scattered calcifications in the intracranial segments of the right internal carotid artery. The right middle and anterior cerebral arteries are normal in course and caliber. No thrombus or aneurysm identified. There are scattered calcifications in the intracranial segment of the left internal carotid artery. The left middle and anterior through arteries are normal in course and caliber. No thrombus or aneurysm identified. The distal vertebral arteries are widely patent. The basilar artery is patent. There is a fetal origin of the right posterior cerebral artery. The dural venous sinuses are patent.     1.  No acute thrombus or aneurysm is identified. 2.  Diffuse atrophy and periventricular white matter changes, consistent with chronic small vessel disease.    Ct-cta Neck With & W/o-post Processing    Result Date: 10/23/2017  10/23/2017 11:49 AM HISTORY/REASON FOR EXAM: Altered mental status. TECHNIQUE/EXAM DESCRIPTION: CT angiogram of the neck without and with contrast, with reconstructions. Initial precontrast images were obtained from the great vessels through the skull base. Postcontrast images were obtained of the neck from the great vessels through the skull base following the power injection of nonionic contrast at 5.0 mL/sec.  Thin-section helical images were obtained with overlapping reconstruction interval. Coronal and oblique multiplanar volume reformats were generated. 3-D images were reviewed on a PACS workstation. Maximum intensity pixel shading reconstructions were performed. 100 mL of Omnipaque 350 nonionic contrast was injected intravenously. Cervical internal carotid artery percent stenosis is calculated using the standard method according to the NASCET criteria wherein a segment of uniform caliber mid or distal cervical internal carotid is used as the reference denominator. Low dose optimization technique was utilized for this CT exam including automated exposure control and adjustment of the mA and/or kV according to patient size. COMPARISON: 2/25/2014 FINDINGS: There is mild emphysematous change of the lungs. There is extensive atherosclerotic plaque involving the aorta and originating vessels. There is extensive plaque extending along the course of the common carotid arteries and the subclavian arteries bilaterally. There is less than 50% diameter narrowing of the common carotid artery on the left. There has been interval endarterectomy on the left with a widely patent internal carotid artery on the left. There is some atherosclerotic plaque of the intracranial internal carotid arteries. There is atherosclerotic plaque at the right carotid bifurcation and extending into the internal carotid artery on the right. This results in less than 50% diameter narrowing. The vertebral arteries are patent bilaterally.     1.  Interval left internal carotid endarterectomy with no single widely patent left internal carotid artery. 2.  Atherosclerotic plaque involving the common carotid arteries bilaterally and the right internal carotid artery at and above the level of the bifurcation. This results in less than 50% diameter narrowing. 3.  Patent vertebral arteries bilaterally.    Ct-head W/o    Result Date: 10/23/2017  HISTORY/REASON FOR  EXAM:  Loss of equilibrium followed by fall. TECHNIQUE/EXAM DESCRIPTION: CT scan of the head without contrast, 10/23/2017 4:57 AM. Contiguous 5 mm axial sections were obtained from the skull base through the vertex. Up to date radiation dose reduction adjustments have been utilized to meet ALARA standards for radiation dose reduction. COMPARISON:  5/2/2017 FINDINGS:   The ventricular system and cortical sulci are prominent, consistent with the patient's advanced age.  There is no midline shift or other mass effect. Patchy white matter lucencies are again seen bilaterally consistent with old small vessel ischemic changes or demyelination. There is no acute intra-axial abnormality or extra-axial fluid collection.  There is no intracranial hemorrhage.  The calvaria are intact. The visualized paranasal sinuses show no unusual opacity.     1.  No acute intracranial abnormality. 2.  Age-consistent atrophy and chronic white matter changes. INTERPRETING LOCATION:  [address] [place] [address], [id number]    Dx-chest-portable (1 View)    Result Date: 10/24/2017  10/24/2017 10:40 AM HISTORY/REASON FOR EXAM:  Shortness of breath. TECHNIQUE/EXAM DESCRIPTION AND NUMBER OF VIEWS: Single portable view of the chest. COMPARISON: 1 view chest history FINDINGS: The lungs are clear. Cardiac silhouette: normal in size. There is aortic atherosclerosis. Pleura: There are no pleural effusion or pneumothoraces. Osseous structures: There is severe bilateral glenohumeral joint osteoarthritis     No acute cardiopulmonary abnormality identified.    Dx-chest-portable (1 View)    Result Date: 10/23/2017  10/23/2017 4:51 AM HISTORY/REASON FOR EXAM:  Chest Pain TECHNIQUE/EXAM DESCRIPTION AND NUMBER OF VIEWS: Single portable view of the chest. COMPARISON: 5/12/2017 FINDINGS: The heart, mediastinum, and taylor are unremarkable. The lungs are well expanded and show no evidence of infiltrate or other acute process. There is no obvious pleural effusion. The bony  "thorax is grossly normal.     No acute cardiopulmonary abnormality.    Mr-brain-w/o    Result Date: 10/23/2017  10/23/2017 10:09 AM HISTORY/REASON FOR EXAM:  Ataxia. TECHNIQUE/EXAM DESCRIPTION: MRI of the brain without contrast. T1 sagittal, T2 fast spin-echo axial, T1 coronal, FLAIR coronal, diffusion-weighted and apparent diffusion coefficient (ADC map) axial images were obtained of the whole brain. The study was performed on a [employer] Signa 1.5 Kiah MRI scanner. COMPARISON:  None. FINDINGS: There is no acute infarct. There is no acute or chronic parenchymal hemorrhage. A few nonspecific T2 hyperintensities are noted in the subcortical and periventricular white matter. Moderate cerebral volume loss is seen. There is chronic lacunar  infarct in the left thalamus. There is mild atrophy of the midbrain causing \"hummingbird\" sign. There is moderate dilatation of the lateral and third ventricles. There is prominent flow void in the aqueduct. There is no extra-axial fluid collection, hemorrhage or mass. The visualized flow voids of the cerebral vasculature are unremarkable.  There is no large lesion identified in the expected course of the intracranial portions of the cranial nerves. The skull bones are normal. The paranasal sinuses are clear. The extracranial soft tissue including orbits appear grossly normal.     1.  Moderate dilatation of the ventricles. The ventricular dilatation is slightly more prominent than the associated cortical atrophy. There is also prominent flow void in the aqueduct. 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 However there are no other secondary signs of normal pressure hydrocephalus such as acute angulation of the frontal horns and tight frontoparietal sulci. Please correlate clinically. There has been no significant interval change. 2.  No acute infarct. 3.  Mild chronic microvascular ischemic disease. 4.  Moderate cerebral atrophy. 5.  There is mild atrophy of the " "midbrain causing a\"hummingbird\" sign. This is a nonspecific finding and can be seen in the patients with progressive supra bulbar palsy. Please correlate clinically.    Nm-lung Vent/perf Imaging    Result Date: 10/25/2017  10/25/2017 1:45 PM HISTORY/REASON FOR EXAM:  Elevated troponin and chest pain TECHNIQUE/EXAM DESCRIPTION AND NUMBER OF VIEWS:  30.5 mCi aerosolized Tc 99m-DTPA was administered as a gas, followed by multiple projection imaging. 6.75 mCi Tc 99m-MAA was then administered intravenously followed by multiple projection imaging. COMPARISON:  Chest radiograph 10/24/2017 FINDINGS:     No focal consolidations are noted on chest radiograph. Some poorly defined opacifications are noted in the left lung base. Perfusion images reveal no evidence of lobar perfusion defect. No segmental or significant subsegmental defects are noted. Ventilation images reveal no focal areas of normal ventilation that would correspond to abnormal perfusion.     1.  Low probability of pulmonary embolism.    Echocardiogram Comp W/o Cont    Result Date: 10/24/2017  Transthoracic Echo Report Echocardiography Laboratory CONCLUSIONS Normal left ventricular chamber size. Normal left ventricular systolic function. Left ventricular ejection fraction is visually estimated to be 65%. Trace mitral regurgitation. Mildly dilated left atrium. Aortic sclerosis without stenosis. Structurally normal tricuspid valve without significant stenosis or regurgitation. Normal right ventricular size and systolic function. No pericardial effusion seen. Compared to the images of the prior study done  11/28/16, no signficant change JT MARIA Exam Date:          Deceleration Time              152 ms                * Indicates values subject to auto-interpretation LV EF:  65    % FINDINGS Left Ventricle Normal left ventricular chamber size. Normal left ventricular wall thickness with sigmoid septum. Normal left ventricular systolic function. Left " "ventricular ejection fraction is visually estimated to be 65%. Normal regional wall motion. Grade II diastolic dysfunction. Right Ventricle Normal right ventricular size and systolic function. Right Atrium Normal right atrial size. Left Atrium Mildly dilated left atrium. Left atrial volume index is 36 mL/sq m. Mitral Valve Structurally normal mitral valve. No mitral stenosis.  Trace mitral regurgitation. Aortic Valve Aortic sclerosis without stenosis. No aortic insufficiency. Tricuspid Valve Structurally normal tricuspid valve without significant stenosis or regurgitation. Pulmonic Valve The pulmonic valve is not well visualized. No stenosis or regurgitation seen. Pericardium No pericardial effusion seen. Aorta Normal ascending aorta. Jackie Bustillo M.D. (Electronically Signed) Final Date:     24 October 2017                 14:28      Micro:  Results     Procedure Component Value Units Date/Time    BLOOD CULTURE [[id number]]  (Abnormal) Collected:  10/23/17 1333    Order Status:  Completed Specimen:  Blood from Peripheral Updated:  10/25/17 0925     Significant Indicator POS (POS)     Source BLD     Site PERIPHERAL     Blood Culture Growth detected by Bactec instrument.  10/24/2017  01:57 (A)     Blood Culture Lactose fermenting Gram negative yusef (A)    Narrative:       CALL  Tran  183 tel. [phone],  CALLED  183 tel. [phone] 10/24/2017, 02:01, RB PERF. RESULTS CALLED TO: RN  [phone]  Per Hospital Policy: Only change Specimen Src: to \"Line\" if  specified by physician order.    BLOOD CULTURE [[id number]]  (Abnormal) Collected:  10/23/17 1333    Order Status:  Completed Specimen:  Blood from Peripheral Updated:  10/25/17 0918     Significant Indicator POS (POS)     Source BLD     Site PERIPHERAL     Blood Culture Growth detected by Bactec instrument.  10/24/2017  01:55 (A)     Blood Culture Lactose fermenting Gram negative yusef (A)    Narrative:       CALL  Tran  183 tel. [phone],  CALLED  183 tel. " "[phone] 10/24/2017, 02:02, RB PERF. RESULTS CALLED TO: RN  [phone]  Per Hospital Policy: Only change Specimen Src: to \"Line\" if  specified by physician order.    BLOOD CULTURE [[id number]] Collected:  10/24/17 1227    Order Status:  Completed Specimen:  Blood from Peripheral Updated:  10/25/17 0822     Significant Indicator NEG     Source BLD     Site PERIPHERAL     Blood Culture --     No Growth    Note: Blood cultures are incubated for 5 days and  are monitored continuously.Positive blood cultures  are called to the RN and reported as soon as  they are identified.      Narrative:       Per Hospital Policy: Only change Specimen Src: to \"Line\" if  specified by physician order.    BLOOD CULTURE [[id number]] Collected:  10/24/17 1227    Order Status:  Completed Specimen:  Blood from Peripheral Updated:  10/25/17 0822     Significant Indicator NEG     Source BLD     Site PERIPHERAL     Blood Culture --     No Growth    Note: Blood cultures are incubated for 5 days and  are monitored continuously.Positive blood cultures  are called to the RN and reported as soon as  they are identified.      Narrative:       Per Hospital Policy: Only change Specimen Src: to \"Line\" if  specified by physician order.    INFLUENZA BY PCR, A/B/H1N1 [[id number]] Collected:  10/23/17 1840    Order Status:  Completed Specimen:  Nasal from Nasopharyngeal Updated:  10/23/17 2246     Influenza virus A RNA Negative     Influenza virus B, PCR Negative     Influenza A 2009, H1N1, PCR Not Detected    Narrative:       Collected By:[id number] FEI HDZ    URINALYSIS [[id number]]  (Abnormal) Collected:  10/23/17 1410    Order Status:  Completed Specimen:  Urine from Urine, Clean Catch Updated:  10/23/17 1750     Color Yellow     Character Clear     Specific Gravity 1.038     Ph 6.5     Glucose 500 (A) mg/dL      Ketones Trace (A) mg/dL      Protein 100 (A) mg/dL      Bilirubin Negative     Urobilinogen, Urine 1.0     Nitrite Negative     Leukocyte " Esterase Negative     Occult Blood Moderate (A)     Micro Urine Req Microscopic    Narrative:       Collected By:[id number] FEI HDZ    Culture Respiratory W/ GRM STN [[id number]]     Order Status:  Completed Specimen:  Respirate from Sputum           Assessment:  Active Hospital Problems    Diagnosis   • *Atrial fibrillation (CMS-[facility]) [I48.91]   • Sepsis (CMS-[facility]) [A41.9]   • Carotid artery stenosis [I65.29]   • DM (diabetes mellitus) (CMS-[facility]) [E11.9]   • BPH (benign prostatic hyperplasia) [N40.0]   • NSTEMI (non-ST elevated myocardial infarction) (CMS-[facility]) [I21.4]   • Advanced directives, counseling/discussion [Z71.89]   • Generalized weakness [R53.1]   • Glaucoma [H40.9]   • Thrombocytopenia ([facility]) [D69.6]       Plan:  Gram negative sepsis (CMS-[facility])- (present on admission)  Sepsis (with associated acute organ dysfunction-NSTEMI and AMS)  Source cryptic  Less febrile  Decreased leukocytosis  Hemodynamics more stable  Blood cultures 10/23 +GNR  Ucx neg  Blood cxs 10/24 NGTD  Continue rocephin for now     DM (diabetes mellitus) (CMS-[facility])- (present on admission)   Keep BS under 150 to help control current infection  Follow-up A1c    Discussed with internal medicine.

## 2017-10-25 NOTE — DISCHARGE PLANNING
Received choice form from DREW Arias for HH. Referral sent to [facility] at 1217.

## 2017-10-25 NOTE — FACE TO FACE
Face to Face Supporting Documentation - Home Health    The encounter with this patient was in whole or in part the primary reason for home health admission.    Date of encounter:   Patient:                    MRN:                       YOB: 2017  Bulmaro Wheeler  [id number]  4/16/1927     Home health to see patient for:  Skilled Nursing care for assessment, interventions & education  PT/OT  Skilled need for:  Medication Management A fib, cardiac meds    Skilled nursing interventions to include:  Comment: medication mgmt    Homebound status evidenced by:  Needs the assistance of another person in order to leave the home. Leaving home requires a considerable and taxing effort. There is a normal inability to leave the home.    Community Physician to provide follow up care: Zahra Yañez M.D.     Optional Interventions? No      I certify the face to face encounter for this home health care referral meets the CMS requirements and the encounter/clinical assessment with the patient was, in whole, or in part, for the medical condition(s) listed above, which is the primary reason for home health care. Based on my clinical findings: the service(s) are medically necessary, support the need for home health care, and the homebound criteria are met.  I certify that this patient has had a face to face encounter by myself.  Carlos Taylor M.D. - NPI: [id number]

## 2017-10-25 NOTE — DISCHARGE PLANNING
Medical Social Work    This SW received prescription from bedside RN for Plavix. This SW met with pt at bedside and pt's pharmacy is [facility] on [address]. This SW faxed prescription over to pharmacy to run for co-pay amount.    Plan: Awaiting call back from pharmacy on pt's co-pay.

## 2017-10-25 NOTE — CARE PLAN
Problem: Safety  Goal: Will remain free from falls    Intervention: Implement fall precautions  Pt cooperative with fall precautions. Pt used call bell appropriately. Pt verbalized understanding of fall risk and precautions in place.

## 2017-10-25 NOTE — DISCHARGE PLANNING
Medical Social Work    This SW spoke with pt's pharmacy and pt's co-pay for Plavix is $5 dollars.    Updated Bedside RN that pt has been accepted to HH and cost of pt's prescription.

## 2017-10-25 NOTE — PROGRESS NOTES
[facility] Hospitalist Progress Note    Date of Service: 10/24/2017    Chief Complaint  89 y.o. male admitted 10/23/2017 with Fall (Patient complains of falling out of chair tonight and has had multiple falls recently and here via EMS to find out why he keeps falling.)        Interval Problem Update  No chest pain, SOB or lightheadedness. He however has elevated troponin. He refused PE study. His mentation is clear and wants full code.    Consultants/Specialty  Cardiology    Disposition  SNF likely        Review of Systems   Constitutional: Negative for chills and fever.   HENT: Negative for congestion, hearing loss and nosebleeds.    Eyes: Negative for pain and redness.   Respiratory: Negative for cough, hemoptysis, shortness of breath and wheezing.    Cardiovascular: Negative for chest pain and palpitations.   Gastrointestinal: Negative for abdominal pain, blood in stool, constipation, diarrhea, nausea and vomiting.   Genitourinary: Negative for dysuria, frequency and hematuria.   Musculoskeletal: Positive for falls and myalgias. Negative for joint pain.   Skin: Negative for rash.   Neurological: Negative for dizziness, tremors, focal weakness, seizures, loss of consciousness, weakness and headaches.   Psychiatric/Behavioral: The patient is not nervous/anxious and does not have insomnia.    All other systems reviewed and are negative.     Physical Exam  Laboratory/Imaging   Hemodynamics  Temp (24hrs), Av.8 °C (98.2 °F), Min:36.3 °C (97.3 °F), Max:38 °C (100.4 °F)   Temperature: 38 °C (100.4 °F)  Pulse  Av.7  Min: 57  Max: 117    Blood Pressure : (!) 164/80      Respiratory      Respiration: 12, Pulse Oximetry: 96 %     Work Of Breathing / Effort: Mild  RUL Breath Sounds: Clear, RML Breath Sounds: Clear, RLL Breath Sounds: Clear, HUNTER Breath Sounds: Clear, LLL Breath Sounds: Clear    Fluids    Intake/Output Summary (Last 24 hours) at 10/24/17 2204  Last data filed at 10/24/17 0600   Gross per 24 hour   Intake             796.6 ml   Output                0 ml   Net            796.6 ml       Nutrition  Orders Placed This Encounter   Procedures   • Diet Order     Standing Status:   Standing     Number of Occurrences:   1     Order Specific Question:   Diet:     Answer:   Diabetic [3]     Order Specific Question:   Diet:     Answer:   Cardiac [6]     Order Specific Question:   Calorie modifications:     Answer:   2500 kcals [7]     Order Specific Question:   Macronutrient modifications:     Answer:   Low Cholesterol [7]     Physical Exam   Constitutional: He appears well-developed and well-nourished.   Frail elderly   HENT:   Head: Normocephalic and atraumatic.   Eyes: Conjunctivae and EOM are normal. No scleral icterus.   Neck: Normal range of motion. Neck supple.   Cardiovascular: Normal rate and regular rhythm.  Exam reveals no gallop and no friction rub.    No murmur heard.  Pulmonary/Chest: Effort normal and breath sounds normal. No respiratory distress. He has no wheezes. He has no rales.   Abdominal: Soft. Bowel sounds are normal. He exhibits no distension. There is no tenderness. There is no rebound and no guarding.   Musculoskeletal: He exhibits no edema or tenderness.   Neurological: He is alert.   Skin: Skin is warm. Rash (old ecchymoses) noted.   Psychiatric: He has a normal mood and affect. His behavior is normal.       Recent Labs      10/23/17   0500  10/24/17   0208   WBC  13.7*  14.8*   RBC  3.97*  3.64*   HEMOGLOBIN  12.1*  10.8*   HEMATOCRIT  35.7*  32.8*   MCV  89.9  90.1   MCH  30.5  29.7   MCHC  33.9  32.9*   RDW  45.1  45.7   PLATELETCT  145*  128*   MPV  10.2  10.3     Recent Labs      10/23/17   0500  10/24/17   0208   SODIUM  132*  133*   POTASSIUM  3.8  4.0   CHLORIDE  97  100   CO2  21  23   GLUCOSE  199*  149*   BUN  29*  23*   CREATININE  1.03  0.88   CALCIUM  9.3  8.3*     Recent Labs      10/23/17   0500  10/24/17   0545  10/24/17   1137   APTT  32.2  177.7*  161.8*   INR  1.27*   --    --   "    Recent Labs      10/23/17   0500   BNPBTYPENAT  190*     Recent Labs      10/24/17   0208   TRIGLYCERIDE  48   HDL  30*   LDL  16          Assessment/Plan     * Atrial fibrillation (CMS-HCC)   Assessment & Plan    Paroxysmal  Associated with generalized weakness and falls  Continue cardiac monitoring  Follow-up troponin  Admit to telemetry inpatient unit  Patient stat chads 2 score is 3, patient will benefit from full dose anticoagulation  We will start patient on Lovenox, transitioned to oral anticoagulation on discharge  Continue Cardizem when necessary heart rate greater than 120  Cardiology, Dr. Iglesias has been consulted  Echo ordered.        Sepsis (CMS-HCC)- (present on admission)   Assessment & Plan    This is sepsis (without associated acute organ dysfunction).   After admission patient spiked a fever to 102 with heart rate 112.  Sepsis protocol initiated.  Check urinalysis and blood cultures  Fluid bolus prn according to guidelines  Empiric rocephin until source ascertained.  Blood culture came back positive with GNB  I consulted ID physician, Dr. Rapp        Carotid artery stenosis- (present on admission)   Assessment & Plan    History of  CT of the head is negative  Follow-up CTA head and neck, echocardiogram  MRI in May 2017 no new findings        DM (diabetes mellitus) (CMS-HCC)- (present on admission)   Assessment & Plan    Follow-up A1c  Sliding scale insulin/Accu-Chek  Hold metformin        Advanced directives, counseling/discussion   Assessment & Plan    Discussed code statu. He wants to be FULL CODE. He clearly states \"Nobody wants to die, right?\"        NSTEMI (non-ST elevated myocardial infarction) (CMS-HCC)   Assessment & Plan    Heparin started. Dr. Iglesias, Cardiology consulted. He felt PE needs to be ruled out. He however declined getting NM scan and says \"I dont think it is a clot\". Will try getting CT Chest tomorrow since he is now >24hrs from his CTA. Probably needs to be on " anticoagulation anyway because of multiple indications (possible PE, Afib) though consider fall risk. LE Duplex pending. Defer to Dr. Iglesias when it is appropriate to rule out ischemia. As noted below, I discussed code status with him and he wants full code.        Generalized weakness   Assessment & Plan    With P.Afib  R/o CVA  CT head is negative follow-up repeat MRI of the brain  Follow up CTA. Neck  Echocardiogram  Continue aspirin, Lovenox, statin  Blood pressure control  Neurology consult as needed  PT/OT        Glaucoma- (present on admission)   Assessment & Plan    Tinea eyedrops        Thrombocytopenia (HCC)- (present on admission)   Assessment & Plan    Monitor        BPH (benign prostatic hyperplasia)- (present on admission)   Assessment & Plan    Continue home meds        I spent 39 minutes, reviewing the chart, notes, vitals, labs, imaging, ordering labs, evaluating Bulmaro Wheeler for assessment, enacting the plan above. 50% of the time was spent in counseling Bulmaro Wheeler and answering questions. Time was devoted to counseling and coordinating care including review of records, pertinent lab data and studies, as well as discussing diagnostic evaluation and work up, planned therapeutic interventions and future disposition of care. Where indicated, the assessment and plan reflect discussion of patient with consultants, other healthcare providers, family members, and additional research needed to obtain further information in formulating the plan of care for Bulmaro Wheeler.     Quality-Core Measures

## 2017-10-25 NOTE — PROGRESS NOTES
Lab called this RN stating there was a significant drop in the PTT. This RN requesting a re-draw to assure its accuracy. Phlebotomist coming to bedside to redraw patient. Awaiting final PTT result

## 2017-10-25 NOTE — ASSESSMENT & PLAN NOTE
"Discussed code statu. He wants to be FULL CODE. He clearly states \"Nobody wants to die, right?\"  "

## 2017-10-25 NOTE — PROGRESS NOTES
Gricelda Coombs Fall Risk Assessment:     Last Known Fall: Within the last month  Mobility: Use of assistive device/requires assist of two people  Medications: Cardiovascular or central nervous system meds  Mental Status/LOC/Awareness: Awake, alert, and oriented to date, place, and person  Toileting Needs: Use of assistive device (Bedside commode, bedpan, urinal)  Volume/Electrolyte Status: Use of IV fluids/tube feeds  Communication/Sensory: Visual (Glasses)/hearing deficit  Behavior: Appropriate behavior  Gricelda Coomsb Fall Risk Total: 15  Fall Risk Level: HIGH RISK    Universal Fall Precautions:  call light/belongings in reach, bed in low position and locked, wheelchairs and assistive devices out of sight, siderails up x 2, use non-slip footwear, adequate lighting, clutter free and spill free environment, educate on level of risk, educate to call for assistance    Fall Risk Level Interventions:     TRIAL (TELE 8, NEURO, MED MIAH 5) High Fall Risk Interventions  Place yellow fall risk ID band on patient: verified  Provide patient/family education based on risk assessment: completed  Educate patient/family to call staff for assistance when getting out of bed: completed  Place fall precaution signage outside patient door: verified  Place patient in room close to nursing station: verified  Utilize bed/chair fall alarm: completed  Notify charge of high risk for huddle: completed    Patient Specific Interventions:     Medication: review medications with patient and family  Mental Status/LOC/Awareness: reorient patient, reinforce falls education, check on patient hourly, reinforce the use of call light and provide activity  Toileting: provide frquent toileting and monitor intake and output/use of appropriate interventions  Volume/Electrolyte Status: advance diet as tolerated, monitor abnormal lab values, ensure IV fluids are appropriate and teach patients to dangle before rising if hypotensive  Communication/Sensory:  update plan of care on whiteboard  Behavioral: encourage patient to voice feelings and administer medication as ordered  Mobility: provide comfort measures during transport, ensure bed is locked and in lowest position and provide appropriate assistive device

## 2017-10-25 NOTE — PROGRESS NOTES
Cardiology Progress Note               Author: Belinda A Debi Date & Time created: 10/25/2017  8:27 AM     Interval History:  89 Year old man was found down by his son at home and continues to have weakness and falling at home, he has fallen three times at home recently. He was found to have an elevated troponin in the setting of fever and sepsis.    He is asymptomatic from a cardiac perspective today, but remains weak still.     He is a little more confused today than yesterday, thinks it's November and he is on the first floor of the hospital, when he is on the 8th floor.    Review of Systems   Constitutional: Negative for fever and malaise/fatigue.   Respiratory: Negative for cough and shortness of breath.    Cardiovascular: Negative for chest pain, palpitations, orthopnea, claudication, leg swelling and PND.   Gastrointestinal: Negative for abdominal pain.   Musculoskeletal: Negative for myalgias.   Neurological: Positive for dizziness and weakness.   All other systems reviewed and are negative.      Physical Exam   Constitutional: He is oriented to person, place, and time. He appears well-developed and well-nourished. No distress.   HENT:   Head: Normocephalic and atraumatic.   Eyes: EOM are normal.   Neck: Normal range of motion. No JVD present.   Cardiovascular: Normal rate, regular rhythm, normal heart sounds and intact distal pulses.    No murmur heard.  Pulmonary/Chest: Effort normal and breath sounds normal. No respiratory distress. He has no wheezes. He has no rales.   Abdominal: Soft. Bowel sounds are normal.   Musculoskeletal: He exhibits no edema.   Walker with weakness   Neurological: He is alert and oriented to person, place, and time.   Skin: Skin is warm and dry.   Psychiatric: He has a normal mood and affect.   More confused today, reorients well   Nursing note and vitals reviewed.      Hemodynamics:  Temp (24hrs), Av.2 °C (99 °F), Min:36.6 °C (97.8 °F), Max:38 °C (100.4  °F)  Temperature: 37.7 °C (99.8 °F)  Pulse  Av.8  Min: 57  Max: 117   Blood Pressure : 157/73     Respiratory:    Respiration: 12, Pulse Oximetry: 95 %     Work Of Breathing / Effort:  (Respirations equal and unlabored)  RUL Breath Sounds: Clear, RML Breath Sounds: Clear, RLL Breath Sounds: Clear, HUNTER Breath Sounds: Clear, LLL Breath Sounds: Clear  Fluids:     Weight: 77.4 kg (170 lb 10.2 oz)  GI/Nutrition:  Orders Placed This Encounter   Procedures   • Diet Order     Standing Status:   Standing     Number of Occurrences:   1     Order Specific Question:   Diet:     Answer:   Diabetic [3]     Order Specific Question:   Diet:     Answer:   Cardiac [6]     Order Specific Question:   Calorie modifications:     Answer:   2500 kcals [7]     Order Specific Question:   Macronutrient modifications:     Answer:   Low Cholesterol [7]     Lab Results:  Recent Labs      10/23/17   0500  10/24/17   0208  10/25/17   045   WBC  13.7*  14.8*  8.1   RBC  3.97*  3.64*  3.46*   HEMOGLOBIN  12.1*  10.8*  10.2*   HEMATOCRIT  35.7*  32.8*  30.5*   MCV  89.9  90.1  88.2   MCH  30.5  29.7  29.5   MCHC  33.9  32.9*  33.4*   RDW  45.1  45.7  44.4   PLATELETCT  145*  128*  122*   MPV  10.2  10.3  10.4     Recent Labs      10/23/17   0500  10/24/17   0208  10/25/17   0450   SODIUM  132*  133*  134*   POTASSIUM  3.8  4.0  3.3*   CHLORIDE  97  100  103   CO2  21  23  23   GLUCOSE  199*  149*  112*   BUN  29*  23*  14   CREATININE  1.03  0.88  0.63   CALCIUM  9.3  8.3*  8.1*     Recent Labs      10/23/17   0500   10/24/17   1137  10/24/17   2111  10/25/17   0450   APTT  32.2   < >  161.8*  62.2*  56.6*   INR  1.27*   --    --    --    --     < > = values in this interval not displayed.     Recent Labs      10/23/17   0500   BNPBTYPENAT  190*     Recent Labs      10/23/17   0500   10/23/17   1937  10/24/17   0208  10/24/17   0839   TROPONINI  0.05*   < >  4.00*  7.14*  4.58*   BNPBTYPENAT  190*   --    --    --    --     < > = values in this  interval not displayed.     Recent Labs      10/24/17   0208   TRIGLYCERIDE  48   HDL  30*   LDL  16         Medical Decision Making, by Problem:  Active Hospital Problems    Diagnosis   • *Atrial fibrillation (CMS-HCC) [I48.91]   • Sepsis (CMS-HCC) [A41.9]   • Carotid artery stenosis [I65.29]   • DM (diabetes mellitus) (CMS-HCC) [E11.9]   • BPH (benign prostatic hyperplasia) [N40.0]   • Generalized weakness [R53.1]   • Glaucoma [H40.9]   • Thrombocytopenia ([facility]) [D69.6]       Plan:  1. Found down with recurrent weakness and falling  -4 falls this month  -check orthostatic BP readings with RN  -no angina or JUAREZ  -echo unremarkable    2. Elevated troponin  -low threshold for cath/stress imaging inpatient  -consider stress imaging outpatient, trop trended down  -elevated D-dimer, patient refuses PE workup  -call for chest pain    3. PAF  -SR on EKG and tele, no palps  -heparin gtt-consider transition to OAC today-will discuss with fall risk with Dr. Corcoran    4. Carotid disease  -mild in '16  -statin  -MRI brain with no acute infarct but mild ischemic disease    5.DM  -managed by hospitalist  -insulin    6. Sepsis, improved  -afebrile, WBC down  -hospitalist following  -ABX, fluids    He does have a Hgb that is trending down, please follow.    Thank you for the consultation. We will continue to follow alongside you.      Reviewed items::  EKG reviewed, Radiology images reviewed, Labs reviewed and Medications reviewed  Aj catheter::  No Aj  DVT prophylaxis pharmacological::  Heparin  DVT prophylaxis - mechanical:  Not indicated at this time, ambulatory  Ulcer Prophylaxis::  Not indicated

## 2017-10-25 NOTE — DISCHARGE PLANNING
Transitional Care Navigator:    Met with the patient and spoke with his son via phone.  Pt refuses SNF, instead wants to return home with home health.  The patient's son is in agreement.  Message passed to hospital RN Daina, as well as page to Dr. Taylor for home health order.  Await call back from Dr. Taylor.    Have referred to [facility] , as patient has been on service with them previously and states that he received very good care from the therapist who treated him before.    Martha is aware.

## 2017-10-25 NOTE — ASSESSMENT & PLAN NOTE
Heparin started. Dr. Iglesias, Cardiology consulted. He felt PE needs to be ruled out. Bulmaro Wheeler intiially declined NM scan  On 10/25 he agreed to NM scan and that showed NO lung clot. NM scan showed low probability for PE.  At this time, Dr. Iglesias prefers to do outpatient stress testing. I willdefer to him if he wants to do elective cardiac catheterization.  Meanwhile he will continue aspirin, Plavix, statin. His HR is 50-60s so I will hold off on beta blockers. He has blood pressure room, so I will start him on an ACEI and give him PRN nitro SL, advise him no Viagra.  Follow up with Zahra Yañez M.D. In 1-2 weeks  Follow up with Dr. Iglesias, EP Cardiology in 1-2 weeks for continued management of Afib

## 2017-10-25 NOTE — PROGRESS NOTES
Gricelda Coombs Fall Risk Assessment:     Last Known Fall: Within the last month  Mobility: Use of assistive device/requires assist of two people  Medications: Cardiovascular or central nervous system meds  Mental Status/LOC/Awareness: Awake, alert, and oriented to date, place, and person  Toileting Needs: Use of assistive device (Bedside commode, bedpan, urinal), Diarrhea/frequency/urgency  Volume/Electrolyte Status: Use of IV fluids/tube feeds  Communication/Sensory: Visual (Glasses)/hearing deficit  Behavior: Appropriate behavior  Gricelda Coombs Fall Risk Total: 19  Fall Risk Level: HIGH RISK    Universal Fall Precautions:  call light/belongings in reach, bed in low position and locked, wheelchairs and assistive devices out of sight, siderails up x 2, use non-slip footwear, adequate lighting, clutter free and spill free environment, educate on level of risk, educate to call for assistance    Fall Risk Level Interventions:     TRIAL (TELE 8, NEURO, MED MIAH 5) High Fall Risk Interventions  Place yellow fall risk ID band on patient: verified  Provide patient/family education based on risk assessment: completed  Educate patient/family to call staff for assistance when getting out of bed: completed  Place fall precaution signage outside patient door: completed  Place patient in room close to nursing station: completed  Utilize bed/chair fall alarm: completed  Notify charge of high risk for huddle: completed    Patient Specific Interventions:     Medication: limit combination of prn medications  Mental Status/LOC/Awareness: reinforce falls education, check on patient hourly, utilize bed/chair fall alarm and reinforce the use of call light  Toileting: provide frquent toileting  Volume/Electrolyte Status: monitor abnormal lab values and ensure IV fluids are appropriate  Communication/Sensory: update plan of care on whiteboard, ensure proper positioning when transferrng/ambulating, ensure patient has glasses/contacts and  hearing aids/dentures and have patients with hearing deficit repeat information back to you to ensure proper understanding  Behavioral: engage patient in daily activities  Mobility: dangle prior to standing, utilize bed/chair fall alarm, ensure bed is locked and in lowest position, provide appropriate assistive device and instruct patient to exit bed on their strongest side

## 2017-10-25 NOTE — PROGRESS NOTES
Patient back in room from nuclear medicine. Patient resting in bed. No signs or symptoms of distress.

## 2017-10-25 NOTE — PROGRESS NOTES
Report received from KATINA Flores. Assumed care of patient. Updated patient on plan of care. Fall precautions in place, call light within reach.

## 2017-10-25 NOTE — CARE PLAN
Problem: Infection  Goal: Will remain free from infection  Outcome: PROGRESSING AS EXPECTED  Standard precautions are maintained while caring for patient. Hand hygiene is performed prior to and post caring for patient at all times.

## 2017-10-25 NOTE — PROGRESS NOTES
Patient called out to this RN stating he has aching in his right groin. This RN immediately assessed patient. +Femoral, popliteal, and pedal pulses in legs bilaterally. Positive warmth, sensation in both legs. Pt denies any other pain. Pt states he has had pains like this in the past. Pt medicated with PRN pain medication. Continue to monitor.

## 2017-10-26 PROBLEM — R78.81 BACTEREMIA: Status: ACTIVE | Noted: 2017-10-26

## 2017-10-26 LAB
ANION GAP SERPL CALC-SCNC: 7 MMOL/L (ref 0–11.9)
APTT PPP: 32.6 SEC (ref 24.7–36)
BACTERIA BLD CULT: ABNORMAL
BUN SERPL-MCNC: 13 MG/DL (ref 8–22)
CALCIUM SERPL-MCNC: 8.5 MG/DL (ref 8.5–10.5)
CHLORIDE SERPL-SCNC: 102 MMOL/L (ref 96–112)
CO2 SERPL-SCNC: 24 MMOL/L (ref 20–33)
CREAT SERPL-MCNC: 0.67 MG/DL (ref 0.5–1.4)
ERYTHROCYTE [DISTWIDTH] IN BLOOD BY AUTOMATED COUNT: 44.2 FL (ref 35.9–50)
GFR SERPL CREATININE-BSD FRML MDRD: >60 ML/MIN/1.73 M 2
GLUCOSE BLD-MCNC: 112 MG/DL (ref 65–99)
GLUCOSE BLD-MCNC: 182 MG/DL (ref 65–99)
GLUCOSE BLD-MCNC: 211 MG/DL (ref 65–99)
GLUCOSE SERPL-MCNC: 99 MG/DL (ref 65–99)
HCT VFR BLD AUTO: 32.1 % (ref 42–52)
HGB BLD-MCNC: 10.7 G/DL (ref 14–18)
MCH RBC QN AUTO: 29.2 PG (ref 27–33)
MCHC RBC AUTO-ENTMCNC: 33.3 G/DL (ref 33.7–35.3)
MCV RBC AUTO: 87.5 FL (ref 81.4–97.8)
PLATELET # BLD AUTO: 133 K/UL (ref 164–446)
PMV BLD AUTO: 10.3 FL (ref 9–12.9)
POTASSIUM SERPL-SCNC: 3.5 MMOL/L (ref 3.6–5.5)
RBC # BLD AUTO: 3.67 M/UL (ref 4.7–6.1)
SIGNIFICANT IND 70042: ABNORMAL
SIGNIFICANT IND 70042: ABNORMAL
SITE SITE: ABNORMAL
SITE SITE: ABNORMAL
SODIUM SERPL-SCNC: 133 MMOL/L (ref 135–145)
SOURCE SOURCE: ABNORMAL
SOURCE SOURCE: ABNORMAL
WBC # BLD AUTO: 6.5 K/UL (ref 4.8–10.8)

## 2017-10-26 PROCEDURE — 700102 HCHG RX REV CODE 250 W/ 637 OVERRIDE(OP): Performed by: NURSE PRACTITIONER

## 2017-10-26 PROCEDURE — 700111 HCHG RX REV CODE 636 W/ 250 OVERRIDE (IP): Performed by: HOSPITALIST

## 2017-10-26 PROCEDURE — 80048 BASIC METABOLIC PNL TOTAL CA: CPT

## 2017-10-26 PROCEDURE — 85027 COMPLETE CBC AUTOMATED: CPT

## 2017-10-26 PROCEDURE — A9270 NON-COVERED ITEM OR SERVICE: HCPCS | Performed by: INTERNAL MEDICINE

## 2017-10-26 PROCEDURE — 99233 SBSQ HOSP IP/OBS HIGH 50: CPT | Performed by: INTERNAL MEDICINE

## 2017-10-26 PROCEDURE — 700111 HCHG RX REV CODE 636 W/ 250 OVERRIDE (IP): Performed by: INTERNAL MEDICINE

## 2017-10-26 PROCEDURE — 700102 HCHG RX REV CODE 250 W/ 637 OVERRIDE(OP): Performed by: INTERNAL MEDICINE

## 2017-10-26 PROCEDURE — 36415 COLL VENOUS BLD VENIPUNCTURE: CPT

## 2017-10-26 PROCEDURE — 82962 GLUCOSE BLOOD TEST: CPT | Mod: 91

## 2017-10-26 PROCEDURE — A9270 NON-COVERED ITEM OR SERVICE: HCPCS | Performed by: NURSE PRACTITIONER

## 2017-10-26 PROCEDURE — 85730 THROMBOPLASTIN TIME PARTIAL: CPT

## 2017-10-26 PROCEDURE — 770020 HCHG ROOM/CARE - TELE (206)

## 2017-10-26 RX ORDER — NITROGLYCERIN 0.4 MG/1
0.4 TABLET SUBLINGUAL
Status: DISCONTINUED | OUTPATIENT
Start: 2017-10-26 | End: 2017-10-30 | Stop reason: HOSPADM

## 2017-10-26 RX ORDER — POTASSIUM CHLORIDE 20 MEQ/1
40 TABLET, EXTENDED RELEASE ORAL ONCE
Status: COMPLETED | OUTPATIENT
Start: 2017-10-26 | End: 2017-10-26

## 2017-10-26 RX ORDER — AMLODIPINE BESYLATE 5 MG/1
2.5 TABLET ORAL
Status: DISCONTINUED | OUTPATIENT
Start: 2017-10-26 | End: 2017-10-26

## 2017-10-26 RX ORDER — LISINOPRIL 5 MG/1
5 TABLET ORAL
Status: DISCONTINUED | OUTPATIENT
Start: 2017-10-26 | End: 2017-10-30 | Stop reason: HOSPADM

## 2017-10-26 RX ADMIN — CEFTRIAXONE 2 G: 2 INJECTION, SOLUTION INTRAVENOUS at 08:33

## 2017-10-26 RX ADMIN — ATORVASTATIN CALCIUM 5 MG: 10 TABLET, FILM COATED ORAL at 08:31

## 2017-10-26 RX ADMIN — STANDARDIZED SENNA CONCENTRATE AND DOCUSATE SODIUM 2 TABLET: 8.6; 5 TABLET, FILM COATED ORAL at 08:32

## 2017-10-26 RX ADMIN — ASPIRIN 81 MG: 81 TABLET, COATED ORAL at 08:30

## 2017-10-26 RX ADMIN — MAGNESIUM HYDROXIDE 30 ML: 400 SUSPENSION ORAL at 17:56

## 2017-10-26 RX ADMIN — OMEGA-3 FATTY ACIDS CAP 1000 MG 1000 MG: 1000 CAP at 08:32

## 2017-10-26 RX ADMIN — METOPROLOL TARTRATE 25 MG: 25 TABLET ORAL at 11:26

## 2017-10-26 RX ADMIN — INSULIN LISPRO 1 UNITS: 100 INJECTION, SOLUTION INTRAVENOUS; SUBCUTANEOUS at 16:51

## 2017-10-26 RX ADMIN — FINASTERIDE 5 MG: 5 TABLET, FILM COATED ORAL at 08:32

## 2017-10-26 RX ADMIN — ENOXAPARIN SODIUM 40 MG: 100 INJECTION SUBCUTANEOUS at 08:31

## 2017-10-26 RX ADMIN — LATANOPROST 1 DROP: 50 SOLUTION OPHTHALMIC at 08:32

## 2017-10-26 RX ADMIN — LABETALOL HYDROCHLORIDE 10 MG: 5 INJECTION, SOLUTION INTRAVENOUS at 16:56

## 2017-10-26 RX ADMIN — CLOPIDOGREL 75 MG: 75 TABLET, FILM COATED ORAL at 08:31

## 2017-10-26 RX ADMIN — POTASSIUM CHLORIDE 40 MEQ: 1500 TABLET, EXTENDED RELEASE ORAL at 11:25

## 2017-10-26 RX ADMIN — METOPROLOL TARTRATE 25 MG: 25 TABLET ORAL at 22:10

## 2017-10-26 RX ADMIN — LABETALOL HYDROCHLORIDE 10 MG: 5 INJECTION, SOLUTION INTRAVENOUS at 23:37

## 2017-10-26 RX ADMIN — INSULIN LISPRO 1 UNITS: 100 INJECTION, SOLUTION INTRAVENOUS; SUBCUTANEOUS at 22:12

## 2017-10-26 RX ADMIN — ACETAMINOPHEN 650 MG: 325 TABLET, FILM COATED ORAL at 04:40

## 2017-10-26 RX ADMIN — INSULIN LISPRO 2 UNITS: 100 INJECTION, SOLUTION INTRAVENOUS; SUBCUTANEOUS at 11:28

## 2017-10-26 RX ADMIN — LISINOPRIL 5 MG: 5 TABLET ORAL at 08:32

## 2017-10-26 ASSESSMENT — PAIN SCALES - GENERAL
PAINLEVEL_OUTOF10: 0

## 2017-10-26 ASSESSMENT — PATIENT HEALTH QUESTIONNAIRE - PHQ9
SUM OF ALL RESPONSES TO PHQ9 QUESTIONS 1 AND 2: 0
2. FEELING DOWN, DEPRESSED, IRRITABLE, OR HOPELESS: NOT AT ALL
SUM OF ALL RESPONSES TO PHQ QUESTIONS 1-9: 0
1. LITTLE INTEREST OR PLEASURE IN DOING THINGS: NOT AT ALL

## 2017-10-26 ASSESSMENT — ENCOUNTER SYMPTOMS
TREMORS: 0
SEIZURES: 0
PND: 0
SHORTNESS OF BREATH: 0
CHILLS: 0
VOMITING: 0
BLOOD IN STOOL: 0
FOCAL WEAKNESS: 0
DIZZINESS: 0
MYALGIAS: 1
EYE REDNESS: 0
WEAKNESS: 0
HEADACHES: 0
CONSTIPATION: 0
ORTHOPNEA: 0
NAUSEA: 0
ABDOMINAL PAIN: 0
COUGH: 0
DIARRHEA: 0
CLAUDICATION: 0
PALPITATIONS: 0
INSOMNIA: 0
LOSS OF CONSCIOUSNESS: 0
WHEEZING: 0
EYE PAIN: 0
FALLS: 1
FEVER: 0
NERVOUS/ANXIOUS: 0
HEMOPTYSIS: 0

## 2017-10-26 ASSESSMENT — COPD QUESTIONNAIRES
COPD SCREENING SCORE: 2
HAVE YOU SMOKED AT LEAST 100 CIGARETTES IN YOUR ENTIRE LIFE: NO/DON'T KNOW
DO YOU EVER COUGH UP ANY MUCUS OR PHLEGM?: NO/ONLY WITH OCCASIONAL COLDS OR INFECTIONS
DURING THE PAST 4 WEEKS HOW MUCH DID YOU FEEL SHORT OF BREATH: NONE/LITTLE OF THE TIME

## 2017-10-26 NOTE — PROGRESS NOTES
Gricelda Coombs Fall Risk Assessment:     Last Known Fall: Within the last month  Mobility: Use of assistive device/requires assist of two people  Medications: Cardiovascular or central nervous system meds  Mental Status/LOC/Awareness: Awake, alert, and oriented to date, place, and person  Toileting Needs: Diarrhea/frequency/urgency  Volume/Electrolyte Status: No problems  Communication/Sensory: Visual (Glasses)/hearing deficit  Behavior: Appropriate behavior  Gricelda Coombs Fall Risk Total: 15  Fall Risk Level: HIGH RISK    Universal Fall Precautions:  call light/belongings in reach, bed in low position and locked, wheelchairs and assistive devices out of sight, use non-slip footwear, siderails up x 2, adequate lighting, clutter free and spill free environment, educate on level of risk, educate to call for assistance    Fall Risk Level Interventions:     TRIAL (TELE 8, NEURO, MED MIAH 5) High Fall Risk Interventions  Place yellow fall risk ID band on patient: verified  Provide patient/family education based on risk assessment: completed  Educate patient/family to call staff for assistance when getting out of bed: completed  Place fall precaution signage outside patient door: completed  Place patient in room close to nursing station: completed  Utilize bed/chair fall alarm: verified  Notify charge of high risk for huddle: completed    Patient Specific Interventions:     Medication: review medications with patient and family and limit combination of prn medications  Mental Status/LOC/Awareness: reorient patient, reinforce falls education, encourage family to stay with patient, check on patient hourly, utilize bed/chair fall alarm, reinforce the use of call light and provide activity  Toileting: provide frquent toileting, monitor intake and output/use of appropriate interventions, instruct patient/family on the use of grab bars and instruct patient/family on the need to call for assistance when  toileting  Volume/Electrolyte Status: ensure patient remains hydrated, monitor blood sugars and maintain appropriate blood sugar levels if diabetic, administer medications as ordered for nausea and vomiting and monitor abnormal lab values  Communication/Sensory: update plan of care on whiteboard, ensure proper positioning when transferrng/ambulating and for visually impaired patients orient to their room surrounding and do not change their surroundings  Behavioral: encourage patient to voice feelings, engage patient in daily activities, administer medication as ordered and instruct/reinforce fall program rationale  Mobility: schedule physical activity throughout the day, provide comfort measures during transport, dangle prior to standing, utilize bed/chair fall alarm, ensure bed is locked and in lowest position, provide appropriate assistive device and instruct patient to exit bed on their strongest side

## 2017-10-26 NOTE — PROGRESS NOTES
Pt lying in bed. Bedside shift report completed, POC discussed with dayshift. Pt verbalizes understanding of POC. Bed low and locked, call light in reach, alarm on. No needs at this time.

## 2017-10-26 NOTE — CARE PLAN
Problem: Venous Thromboembolism (VTW)/Deep Vein Thrombosis (DVT) Prevention:  Goal: Patient will participate in Venous Thrombosis (VTE)/Deep Vein Thrombosis (DVT)Prevention Measures  Outcome: PROGRESSING AS EXPECTED  Pt on Lovenox for VTE prophylaxis. Pt verbalizes understanding of need for VTE prophylaxis.    Problem: Mobility  Goal: Risk for activity intolerance will decrease  Outcome: PROGRESSING AS EXPECTED  Pt ambulates up to the bathroom. Educated pt on need to mobilize early and often, pt verbalizes understanding.

## 2017-10-26 NOTE — PROGRESS NOTES
Infectious Disease Progress Note    Author: Alanis Rapp M.D. Date & Time of service: 10/26/2017  4:28 PM    Chief Complaint:  FU gram neg sepsis    Interval History:  89 y.o. White male admitted 10/23/2017 after a fall -found to have gram neg sepsis  10/25 AF WBC 8.1 up on side of bed-pleasantly confused  10/26 AF WBC 6.5 feels better-denies pain Family at bedside  Labs Reviewed, Medications Reviewed and Radiology Reviewed.    Review of Systems:  Review of Systems   Constitutional: Negative for chills and fever.   Gastrointestinal: Negative for abdominal pain, diarrhea, nausea and vomiting.   Genitourinary: Negative for dysuria.       Hemodynamics:  Temp (24hrs), Av.4 °C (99.4 °F), Min:36.9 °C (98.4 °F), Max:38.2 °C (100.7 °F)  Temperature: 37 °C (98.6 °F)  Pulse  Av.3  Min: 56  Max: 117   Blood Pressure : (!) 163/72       Physical Exam:  Physical Exam   Constitutional: He appears well-developed. No distress.   HENT:   Head: Normocephalic and atraumatic.   Eyes: EOM are normal. Pupils are equal, round, and reactive to light.   Neck: Neck supple.   Cardiovascular: Normal rate.    IRR   Pulmonary/Chest: Effort normal. No respiratory distress.   Abdominal: Soft. He exhibits no distension. There is no tenderness.   Neurological: He is alert. A cranial nerve deficit is present.   Skin: No rash noted. No erythema.   Nursing note and vitals reviewed.      Meds:    Current Facility-Administered Medications:   •  lisinopril  •  nitroglycerin  •  metoprolol  •  enoxaparin (LOVENOX) injection  •  clopidogrel  •  aspirin EC  •  atorvastatin  •  finasteride  •  latanoprost  •  fish oil  •  senna-docusate **AND** polyethylene glycol/lytes **AND** magnesium hydroxide **AND** bisacodyl  •  Respiratory Care per Protocol  •  acetaminophen  •  Notify provider if pain remains uncontrolled **AND** Use the numeric rating scale (NRS-11) on regular floors and Critical-Care Pain Observation Tool (CPOT) on ICUs/Trauma to  assess pain **AND** Pulse Ox (Oximetry) **AND** Pharmacy Consult Request **AND** If patient difficult to arouse and/or has respiratory depression, stop any opiates that are currently infusing and call a Rapid Response. **AND** oxycodone immediate-release **AND** oxycodone immediate-release **AND** morphine injection  •  labetalol  •  diltiazem  •  insulin lispro  •  Action is required: Protocol 1073 Hypoglycemia has been implemented **AND** Protocol 1073 Inclusion Criteria **AND** Protocol 1073 NOTIFY **AND** Protocol 1073 Initiate protocol immediately if FSBG is less than or equal to 70 mg/dL **AND** glucose 4 g **AND** dextrose 50%  •  ondansetron  •  ondansetron  •  NS  •  cefTRIAXone (ROCEPHIN) IVPB    Labs:  Recent Labs      10/24/17   0208  10/25/17   0450  10/26/17   0445   WBC  14.8*  8.1  6.5   RBC  3.64*  3.46*  3.67*   HEMOGLOBIN  10.8*  10.2*  10.7*   HEMATOCRIT  32.8*  30.5*  32.1*   MCV  90.1  88.2  87.5   MCH  29.7  29.5  29.2   RDW  45.7  44.4  44.2   PLATELETCT  128*  122*  133*   MPV  10.3  10.4  10.3   NEUTSPOLYS  82.10*   --    --    LYMPHOCYTES  8.40*   --    --    MONOCYTES  8.40   --    --    EOSINOPHILS  0.10   --    --    BASOPHILS  0.30   --    --      Recent Labs      10/24/17   0208  10/25/17   0450  10/26/17   0445   SODIUM  133*  134*  133*   POTASSIUM  4.0  3.3*  3.5*   CHLORIDE  100  103  102   CO2  23  23  24   GLUCOSE  149*  112*  99   BUN  23*  14  13     Recent Labs      10/24/17   0208  10/25/17   0450  10/26/17   0445   CREATININE  0.88  0.63  0.67       Imaging:  Ct-cta Head With & W/o-post Process    Result Date: 10/23/2017  10/23/2017 11:49 AM HISTORY/REASON FOR EXAM:  Weakness and frequent falls TECHNIQUE/EXAM DESCRIPTION: CT angiogram of the [place] without and with contrast.  Initial precontrast images were obtained of the head from the skull base through the vertex.  Postcontrast images were obtained of the [place] following the power injection of  nonionic contrast at 5.0 mL/sec. Thin-section helical images were obtained with overlapping reconstruction interval. Coronal and sagittal multiplanar volume reformats were generated.  3D angiographic images were reviewed on PACS.  Maximum intensity projection (MIP) images were generated and reviewed. 100 mL of Omnipaque 350 nonionic contrast was injected intravenously. Low dose optimization technique was utilized for this CT exam including automated exposure control and adjustment of the mA and/or kV according to patient size. COMPARISON:  10/23/2017 CT head 5:06 AM FINDINGS: CT head: No acute cranial hemorrhage, mass effect, or midline shift. No acute ischemia or mass effect is identified. There is diffuse atrophy. Periventricular white matter changes consistent with chronic small vessel disease. Ventricles and cisterns are patent. Paranasal sinuses and mastoid air cells are clear. CTA head: There are scattered calcifications in the intracranial segments of the right internal carotid artery. The right middle and anterior cerebral arteries are normal in course and caliber. No thrombus or aneurysm identified. There are scattered calcifications in the intracranial segment of the left internal carotid artery. The left middle and anterior through arteries are normal in course and caliber. No thrombus or aneurysm identified. The distal vertebral arteries are widely patent. The basilar artery is patent. There is a fetal origin of the right posterior cerebral artery. The dural venous sinuses are patent.     1.  No acute thrombus or aneurysm is identified. 2.  Diffuse atrophy and periventricular white matter changes, consistent with chronic small vessel disease.    Ct-cta Neck With & W/o-post Processing    Result Date: 10/23/2017  10/23/2017 11:49 AM HISTORY/REASON FOR EXAM: Altered mental status. TECHNIQUE/EXAM DESCRIPTION: CT angiogram of the neck without and with contrast, with reconstructions. Initial precontrast images  were obtained from the great vessels through the skull base. Postcontrast images were obtained of the neck from the great vessels through the skull base following the power injection of nonionic contrast at 5.0 mL/sec. Thin-section helical images were obtained with overlapping reconstruction interval. Coronal and oblique multiplanar volume reformats were generated. 3-D images were reviewed on a PACS workstation. Maximum intensity pixel shading reconstructions were performed. 100 mL of Omnipaque 350 nonionic contrast was injected intravenously. Cervical internal carotid artery percent stenosis is calculated using the standard method according to the NASCET criteria wherein a segment of uniform caliber mid or distal cervical internal carotid is used as the reference denominator. Low dose optimization technique was utilized for this CT exam including automated exposure control and adjustment of the mA and/or kV according to patient size. COMPARISON: 2/25/2014 FINDINGS: There is mild emphysematous change of the lungs. There is extensive atherosclerotic plaque involving the aorta and originating vessels. There is extensive plaque extending along the course of the common carotid arteries and the subclavian arteries bilaterally. There is less than 50% diameter narrowing of the common carotid artery on the left. There has been interval endarterectomy on the left with a widely patent internal carotid artery on the left. There is some atherosclerotic plaque of the intracranial internal carotid arteries. There is atherosclerotic plaque at the right carotid bifurcation and extending into the internal carotid artery on the right. This results in less than 50% diameter narrowing. The vertebral arteries are patent bilaterally.     1.  Interval left internal carotid endarterectomy with no single widely patent left internal carotid artery. 2.  Atherosclerotic plaque involving the common carotid arteries bilaterally and the right  internal carotid artery at and above the level of the bifurcation. This results in less than 50% diameter narrowing. 3.  Patent vertebral arteries bilaterally.    Ct-head W/o    Result Date: 10/23/2017  HISTORY/REASON FOR EXAM:  Loss of equilibrium followed by fall. TECHNIQUE/EXAM DESCRIPTION: CT scan of the head without contrast, 10/23/2017 4:57 AM. Contiguous 5 mm axial sections were obtained from the skull base through the vertex. Up to date radiation dose reduction adjustments have been utilized to meet ALARA standards for radiation dose reduction. COMPARISON:  5/2/2017 FINDINGS:   The ventricular system and cortical sulci are prominent, consistent with the patient's advanced age.  There is no midline shift or other mass effect. Patchy white matter lucencies are again seen bilaterally consistent with old small vessel ischemic changes or demyelination. There is no acute intra-axial abnormality or extra-axial fluid collection.  There is no intracranial hemorrhage.  The calvaria are intact. The visualized paranasal sinuses show no unusual opacity.     1.  No acute intracranial abnormality. 2.  Age-consistent atrophy and chronic white matter changes. INTERPRETING LOCATION:  [address] [place] [address], [id number]    Dx-chest-portable (1 View)    Result Date: 10/24/2017  10/24/2017 10:40 AM HISTORY/REASON FOR EXAM:  Shortness of breath. TECHNIQUE/EXAM DESCRIPTION AND NUMBER OF VIEWS: Single portable view of the chest. COMPARISON: 1 view chest history FINDINGS: The lungs are clear. Cardiac silhouette: normal in size. There is aortic atherosclerosis. Pleura: There are no pleural effusion or pneumothoraces. Osseous structures: There is severe bilateral glenohumeral joint osteoarthritis     No acute cardiopulmonary abnormality identified.    Dx-chest-portable (1 View)    Result Date: 10/23/2017  10/23/2017 4:51 AM HISTORY/REASON FOR EXAM:  Chest Pain TECHNIQUE/EXAM DESCRIPTION AND NUMBER OF VIEWS: Single portable view of the chest.  "COMPARISON: 5/12/2017 FINDINGS: The heart, mediastinum, and taylor are unremarkable. The lungs are well expanded and show no evidence of infiltrate or other acute process. There is no obvious pleural effusion. The bony thorax is grossly normal.     No acute cardiopulmonary abnormality.    Mr-brain-w/o    Result Date: 10/23/2017  10/23/2017 10:09 AM HISTORY/REASON FOR EXAM:  Ataxia. TECHNIQUE/EXAM DESCRIPTION: MRI of the brain without contrast. T1 sagittal, T2 fast spin-echo axial, T1 coronal, FLAIR coronal, diffusion-weighted and apparent diffusion coefficient (ADC map) axial images were obtained of the whole brain. The study was performed on a [employer] 1.5 Kiah MRI scanner. COMPARISON:  None. FINDINGS: There is no acute infarct. There is no acute or chronic parenchymal hemorrhage. A few nonspecific T2 hyperintensities are noted in the subcortical and periventricular white matter. Moderate cerebral volume loss is seen. There is chronic lacunar  infarct in the left thalamus. There is mild atrophy of the midbrain causing \"hummingbird\" sign. There is moderate dilatation of the lateral and third ventricles. There is prominent flow void in the aqueduct. There is no extra-axial fluid collection, hemorrhage or mass. The visualized flow voids of the cerebral vasculature are unremarkable.  There is no large lesion identified in the expected course of the intracranial portions of the cranial nerves. The skull bones are normal. The paranasal sinuses are clear. The extracranial soft tissue including orbits appear grossly normal.     1.  Moderate dilatation of the ventricles. The ventricular dilatation is slightly more prominent than the associated cortical atrophy. There is also prominent flow void in the aqueduct. 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 However there are no other secondary signs of normal pressure hydrocephalus such as acute angulation of the frontal horns and tight frontoparietal " "sulci. Please correlate clinically. There has been no significant interval change. 2.  No acute infarct. 3.  Mild chronic microvascular ischemic disease. 4.  Moderate cerebral atrophy. 5.  There is mild atrophy of the midbrain causing a\"hummingbird\" sign. This is a nonspecific finding and can be seen in the patients with progressive supra bulbar palsy. Please correlate clinically.    Nm-lung Vent/perf Imaging    Result Date: 10/25/2017  10/25/2017 1:45 PM HISTORY/REASON FOR EXAM:  Elevated troponin and chest pain TECHNIQUE/EXAM DESCRIPTION AND NUMBER OF VIEWS:  30.5 mCi aerosolized Tc 99m-DTPA was administered as a gas, followed by multiple projection imaging. 6.75 mCi Tc 99m-MAA was then administered intravenously followed by multiple projection imaging. COMPARISON:  Chest radiograph 10/24/2017 FINDINGS:     No focal consolidations are noted on chest radiograph. Some poorly defined opacifications are noted in the left lung base. Perfusion images reveal no evidence of lobar perfusion defect. No segmental or significant subsegmental defects are noted. Ventilation images reveal no focal areas of normal ventilation that would correspond to abnormal perfusion.     1.  Low probability of pulmonary embolism.    Echocardiogram Comp W/o Cont    Result Date: 10/24/2017  Transthoracic Echo Report Echocardiography Laboratory CONCLUSIONS Normal left ventricular chamber size. Normal left ventricular systolic function. Left ventricular ejection fraction is visually estimated to be 65%. Trace mitral regurgitation. Mildly dilated left atrium. Aortic sclerosis without stenosis. Structurally normal tricuspid valve without significant stenosis or regurgitation. Normal right ventricular size and systolic function. No pericardial effusion seen. Compared to the images of the prior study done  11/28/16, no signficant change JT MARIA Exam Date:          Deceleration Time              152 ms                * Indicates values subject " "to auto-interpretation LV EF:  65    % FINDINGS Left Ventricle Normal left ventricular chamber size. Normal left ventricular wall thickness with sigmoid septum. Normal left ventricular systolic function. Left ventricular ejection fraction is visually estimated to be 65%. Normal regional wall motion. Grade II diastolic dysfunction. Right Ventricle Normal right ventricular size and systolic function. Right Atrium Normal right atrial size. Left Atrium Mildly dilated left atrium. Left atrial volume index is 36 mL/sq m. Mitral Valve Structurally normal mitral valve. No mitral stenosis.  Trace mitral regurgitation. Aortic Valve Aortic sclerosis without stenosis. No aortic insufficiency. Tricuspid Valve Structurally normal tricuspid valve without significant stenosis or regurgitation. Pulmonic Valve The pulmonic valve is not well visualized. No stenosis or regurgitation seen. Pericardium No pericardial effusion seen. Aorta Normal ascending aorta. Jackie Bustillo M.D. (Electronically Signed) Final Date:     24 October 2017                 14:28      Micro:  Results     Procedure Component Value Units Date/Time    BLOOD CULTURE [[id number]]  (Abnormal) Collected:  10/23/17 1333    Order Status:  Completed Specimen:  Blood from Peripheral Updated:  10/26/17 0903     Significant Indicator POS (POS)     Source BLD     Site PERIPHERAL     Blood Culture Growth detected by Bactec instrument.  10/24/2017  01:57 (A)     Blood Culture Klebsiella pneumoniae (A)     Blood Culture -- (A)     Escherichia coli  See previous culture for sensitivity report.      Narrative:       CALL  Tran  183 tel. [phone],  CALLED  183 tel. [phone] 10/24/2017, 02:01, RB PERF. RESULTS CALLED TO: RN  [phone]  Per Hospital Policy: Only change Specimen Src: to \"Line\" if  specified by physician order.    BLOOD CULTURE [[id number]]  (Abnormal)  (Susceptibility) Collected:  10/23/17 1333    Order Status:  Completed Specimen:  Blood from Peripheral " "Updated:  10/26/17 0901     Significant Indicator POS (POS)     Source BLD     Site PERIPHERAL     Blood Culture Growth detected by Bactec instrument.  10/24/2017  01:55 (A)     Blood Culture Klebsiella pneumoniae (A)     Blood Culture Escherichia coli (A)    Narrative:       CALL  Tran  183 tel. [phone],  CALLED  183 tel. [phone] 10/24/2017, 02:02, RB PERF. RESULTS CALLED TO: RN  [phone]  Per Hospital Policy: Only change Specimen Src: to \"Line\" if  specified by physician order.    Culture & Susceptibility     ESCHERICHIA COLI     Antibiotic Sensitivity Microscan Unit Status    Ampicillin Sensitive <=8 mcg/mL Final    Cefepime Sensitive <=8 mcg/mL Final    Cefotaxime Sensitive <=2 mcg/mL Final    Cefotetan Sensitive <=16 mcg/mL Final    Ceftazidime Sensitive <=1 mcg/mL Final    Ceftriaxone Sensitive <=8 mcg/mL Final    Cefuroxime Sensitive 8 mcg/mL Final    Ciprofloxacin Resistant >2 mcg/mL Final    Ertapenem Sensitive <=1 mcg/mL Final    Gentamicin Sensitive <=4 mcg/mL Final    Pip/Tazobactam Sensitive <=16 mcg/mL Final    Tigecycline Sensitive <=2 mcg/mL Final    Tobramycin Sensitive <=4 mcg/mL Final    Trimeth/Sulfa Resistant >2/38 mcg/mL Final              KLEBSIELLA PNEUMONIAE     Antibiotic Sensitivity Microscan Unit Status    Ampicillin Resistant >16 mcg/mL Final    Cefepime Sensitive <=8 mcg/mL Final    Cefotaxime Sensitive <=2 mcg/mL Final    Cefotetan Sensitive <=16 mcg/mL Final    Ceftazidime Sensitive <=1 mcg/mL Final    Ceftriaxone Sensitive <=8 mcg/mL Final    Cefuroxime Sensitive <=4 mcg/mL Final    Ciprofloxacin Sensitive <=1 mcg/mL Final    Ertapenem Sensitive <=1 mcg/mL Final    Gentamicin Sensitive <=4 mcg/mL Final    Pip/Tazobactam Sensitive <=16 mcg/mL Final    Tigecycline Sensitive <=2 mcg/mL Final    Tobramycin Sensitive <=4 mcg/mL Final    Trimeth/Sulfa Sensitive <=2/38 mcg/mL Final                       BLOOD CULTURE [[id number]] Collected:  10/24/17 1227    Order Status:  Completed " "Specimen:  Blood from Peripheral Updated:  10/25/17 0822     Significant Indicator NEG     Source BLD     Site PERIPHERAL     Blood Culture --     No Growth    Note: Blood cultures are incubated for 5 days and  are monitored continuously.Positive blood cultures  are called to the RN and reported as soon as  they are identified.      Narrative:       Per Hospital Policy: Only change Specimen Src: to \"Line\" if  specified by physician order.    BLOOD CULTURE [[id number]] Collected:  10/24/17 1227    Order Status:  Completed Specimen:  Blood from Peripheral Updated:  10/25/17 0822     Significant Indicator NEG     Source BLD     Site PERIPHERAL     Blood Culture --     No Growth    Note: Blood cultures are incubated for 5 days and  are monitored continuously.Positive blood cultures  are called to the RN and reported as soon as  they are identified.      Narrative:       Per Hospital Policy: Only change Specimen Src: to \"Line\" if  specified by physician order.    INFLUENZA BY PCR, A/B/H1N1 [[id number]] Collected:  10/23/17 1840    Order Status:  Completed Specimen:  Nasal from Nasopharyngeal Updated:  10/23/17 2246     Influenza virus A RNA Negative     Influenza virus B, PCR Negative     Influenza A 2009, H1N1, PCR Not Detected    Narrative:       Collected By:[id number] FEI HDZ    URINALYSIS [[id number]]  (Abnormal) Collected:  10/23/17 1410    Order Status:  Completed Specimen:  Urine from Urine, Clean Catch Updated:  10/23/17 1750     Color Yellow     Character Clear     Specific Gravity 1.038     Ph 6.5     Glucose 500 (A) mg/dL      Ketones Trace (A) mg/dL      Protein 100 (A) mg/dL      Bilirubin Negative     Urobilinogen, Urine 1.0     Nitrite Negative     Leukocyte Esterase Negative     Occult Blood Moderate (A)     Micro Urine Req Microscopic    Narrative:       Collected By:[id number] FEI HDZ    Culture Respiratory W/ GRM STN [[id number]]     Order Status:  Completed Specimen:  Respirate from " Sputum           Assessment:  Active Hospital Problems    Diagnosis   • *Atrial fibrillation (CMS-HCC) [I48.91]   • Sepsis (CMS-HCC) [A41.9]   • Carotid artery stenosis [I65.29]   • DM (diabetes mellitus) (CMS-HCC) [E11.9]   • BPH (benign prostatic hyperplasia) [N40.0]   • NSTEMI (non-ST elevated myocardial infarction) (CMS-HCC) [I21.4]   • Advanced directives, counseling/discussion [Z71.89]   • Generalized weakness [R53.1]   • Glaucoma [H40.9]   • Thrombocytopenia ([facility]) [D69.6]       Plan:  Gram negative sepsis (CMS-HCC)- (present on admission)  Sepsis (with associated acute organ dysfunction-NSTEMI and AMS)  Source cryptic  Afebrile  resolved leukocytosis  Blood cultures 10/23 Kleb and Ecoli  Ucx neg  Blood cxs 10/24 NGTD  Continue rocephin for 2 weeks from date of neg cxs  PICC when blood cxs neg 48 hours-will order today  GI work up recommended for source but pt age and comorbidities make him increased risk for complications-per their wishes     DM (diabetes mellitus) (CMS-HCC)- (present on admission)   Keep BS under 150 to help control current infection  Follow-up A1c    Discussed with internal medicine.  Orders for infusion faxed to [facility]

## 2017-10-26 NOTE — PROGRESS NOTES
[facility] Hospitalist Progress Note    Date of Service: 10/25/2017    Chief Complaint  89 y.o. male admitted 10/23/2017 with Fall (Patient complains of falling out of chair tonight and has had multiple falls recently and here via EMS to find out why he keeps falling.)        Interval Problem Update  No chest pain, SOB or lightheadedness. Son at bedside. He agreed to do NM scan. Planned for skilled but now he wants C instead.    Consultants/Specialty  Cardiology    Disposition  SNF likely vs C        Review of Systems   Constitutional: Negative for chills and fever.   HENT: Negative for congestion, hearing loss and nosebleeds.    Eyes: Negative for pain and redness.   Respiratory: Negative for cough, hemoptysis, shortness of breath and wheezing.    Cardiovascular: Negative for chest pain and palpitations.   Gastrointestinal: Negative for abdominal pain, blood in stool, constipation, diarrhea, nausea and vomiting.   Genitourinary: Negative for dysuria, frequency and hematuria.   Musculoskeletal: Positive for falls and myalgias. Negative for joint pain.   Skin: Negative for rash.   Neurological: Negative for dizziness, tremors, focal weakness, seizures, loss of consciousness, weakness and headaches.   Psychiatric/Behavioral: The patient is not nervous/anxious and does not have insomnia.    All other systems reviewed and are negative.     Physical Exam  Laboratory/Imaging   Hemodynamics  Temp (24hrs), Av.3 °C (99.1 °F), Min:36.5 °C (97.7 °F), Max:38 °C (100.4 °F)   Temperature: 36.7 °C (98.1 °F)  Pulse  Av.2  Min: 56  Max: 117    Blood Pressure : 150/64      Respiratory      Respiration: 17, Pulse Oximetry: 96 %     Work Of Breathing / Effort:  (Respirations equal and unlabored)  RUL Breath Sounds: Clear, RML Breath Sounds: Clear, RLL Breath Sounds: Clear, HUNTER Breath Sounds: Clear, LLL Breath Sounds: Clear    Fluids    Intake/Output Summary (Last 24 hours) at 10/25/17 1706  Last data filed at 10/25/17 1600    Gross per 24 hour   Intake              890 ml   Output             4650 ml   Net            -3760 ml       Nutrition  Orders Placed This Encounter   Procedures   • Diet Order     Standing Status:   Standing     Number of Occurrences:   1     Order Specific Question:   Diet:     Answer:   Diabetic [3]     Order Specific Question:   Diet:     Answer:   Cardiac [6]     Order Specific Question:   Calorie modifications:     Answer:   2500 kcals [7]     Order Specific Question:   Macronutrient modifications:     Answer:   Low Cholesterol [7]     Physical Exam   Constitutional: He appears well-developed and well-nourished.   Frail elderly   HENT:   Head: Normocephalic and atraumatic.   Eyes: Conjunctivae and EOM are normal. No scleral icterus.   Neck: Normal range of motion. Neck supple.   Cardiovascular: Normal rate and regular rhythm.  Exam reveals no gallop and no friction rub.    No murmur heard.  Pulmonary/Chest: Effort normal and breath sounds normal. No respiratory distress. He has no wheezes. He has no rales.   Abdominal: Soft. Bowel sounds are normal. He exhibits no distension. There is no tenderness. There is no rebound and no guarding.   Musculoskeletal: He exhibits no edema or tenderness.   Neurological: He is alert.   Skin: Skin is warm. Rash (old ecchymoses) noted.   Psychiatric: He has a normal mood and affect. His behavior is normal.       Recent Labs      10/23/17   0500  10/24/17   0208  10/25/17   0450   WBC  13.7*  14.8*  8.1   RBC  3.97*  3.64*  3.46*   HEMOGLOBIN  12.1*  10.8*  10.2*   HEMATOCRIT  35.7*  32.8*  30.5*   MCV  89.9  90.1  88.2   MCH  30.5  29.7  29.5   MCHC  33.9  32.9*  33.4*   RDW  45.1  45.7  44.4   PLATELETCT  145*  128*  122*   MPV  10.2  10.3  10.4     Recent Labs      10/23/17   0500  10/24/17   0208  10/25/17   0450   SODIUM  132*  133*  134*   POTASSIUM  3.8  4.0  3.3*   CHLORIDE  97  100  103   CO2  21  23  23   GLUCOSE  199*  149*  112*   BUN  29*  23*  14   CREATININE  1.03   "0.88  0.63   CALCIUM  9.3  8.3*  8.1*     Recent Labs      10/23/17   0500   10/24/17   1137  10/24/17   2111  10/25/17   0450   APTT  32.2   < >  161.8*  62.2*  56.6*   INR  1.27*   --    --    --    --     < > = values in this interval not displayed.     Recent Labs      10/23/17   0500   BNPBTYPENAT  190*     Recent Labs      10/24/17   0208   TRIGLYCERIDE  48   HDL  30*   LDL  16          Assessment/Plan     * Atrial fibrillation (CMS-HCC)   Assessment & Plan    Presented with falls.  Has paroxysmal A fib and was found to be in Afib with RVR. It was thought this was the cause of his weakness and falls  Continue cardiac monitoring  Patient stat chads 2 score is 3, patient will benefit from full dose anticoagulation  Was starte don Lovenox full dose  Continue Cardizem when necessary heart rate greater than 120  Cardiology, Dr. Iglesias has been consulted  Echo showed normal EF.  Discussed with Cardiology. Because of high fall risk, they have opted NO full anticoagulation. Heparin gtt d/cynthia and this was transitioned to hepa SQ  .        NSTEMI (non-ST elevated myocardial infarction) (CMS-HCC)   Assessment & Plan    Heparin started. Dr. Iglesias, Cardiology consulted. He felt PE needs to be ruled out. He however declined getting NM scan and says \"I dont think it is a clot\". Will try getting CT Chest tomorrow since he is now >24hrs from his CTA. Probably needs to be on anticoagulation anyway because of multiple indications (possible PE, Afib) though consider fall risk. LE Duplex pending. Defer to Dr. Iglesias when it is appropriate to rule out ischemia. As noted below, I discussed code status with him and he wants full code.  On 10/25 he agreed to NM scan and that showed NO lung clot. At this time, Dr. Iglesias prefer to have an outpatient stress test to work up his elevated troponin and would not want full dose anticoagulation because of fall risk.        Sepsis (CMS-HCC)- (present on admission)   Assessment & Plan    This is " "sepsis (without associated acute organ dysfunction).   After admission patient spiked a fever to 102 with heart rate 112.  Sepsis protocol initiated.  Check urinalysis and blood cultures  Fluid bolus prn according to guidelines  Empiric rocephin until source ascertained.  Blood culture came back positive with GNB  I consulted ID physician, Dr. Rapp        DM (diabetes mellitus) (CMS-[facility])- (present on admission)   Assessment & Plan    Follow-up A1c  Sliding scale insulin/Accu-Chek  Hold metformin        Carotid artery stenosis- (present on admission)   Assessment & Plan    History of  CT of the head is negative  Follow-up CTA head and neck, echocardiogram  MRI in May 2017 no new findings        Advanced directives, counseling/discussion   Assessment & Plan    Discussed code statu. He wants to be FULL CODE. He clearly states \"Nobody wants to die, right?\"        Generalized weakness   Assessment & Plan    With P.Afib  R/o CVA  CT head is negative follow-up repeat MRI of the brain  Follow up CTA. Neck  Echocardiogram  Continue aspirin, Lovenox, statin  Blood pressure control  Neurology consult as needed  PT/OT        Glaucoma- (present on admission)   Assessment & Plan    Tinea eyedrops        Thrombocytopenia (HCC)- (present on admission)   Assessment & Plan    Monitor        BPH (benign prostatic hyperplasia)- (present on admission)   Assessment & Plan    Continue home meds        I spent 39 minutes, reviewing the chart, notes, vitals, labs, imaging, ordering labs, evaluating Bulmaro Wheeler for assessment, enacting the plan above. 50% of the time was spent in counseling Bulmaro Wheeler and answering questions. Spoke with Cardiology Time was devoted to counseling and coordinating care including review of records, pertinent lab data and studies, as well as discussing diagnostic evaluation and work up, planned therapeutic interventions and future disposition of care. Where indicated, the assessment and " plan reflect discussion of patient with consultants, other healthcare providers, family members, and additional research needed to obtain further information in formulating the plan of care for Bulmaro Wheeler.       DVT prophylaxis pharmacological::  Contraindicated - High bleeding risk

## 2017-10-26 NOTE — PROGRESS NOTES
Cardiology Progress Note               Author: Harinder Wood Date & Time created: 10/26/2017  8:34 AM     Consultation for elevated troponon    Admitted with complaining of falls with bilateral lower extremity weakness    History of PAF, peripheral vascular disease, carotid artery stenosis s/p carotid endarterectomy, diabetes type 2, recurrent falls, hypertension, pyelonephritis, UTI  Labs reviewed    BP = 170/80  HR =60-70 NSR    Nuclear medicine VQ scan , neg for PE    Echocardiogram 10/25/17, EF 65%, no significant valvular disease    BLD CX + GNR, on Rocephin, ID on the case      Review of Systems   Respiratory: Negative for shortness of breath.    Cardiovascular: Negative for chest pain, palpitations, orthopnea, claudication, leg swelling and PND.       Physical Exam   Constitutional: He is oriented to person, place, and time.   [place]   HENT:   Head: Normocephalic.   Eyes: Conjunctivae are normal.   Neck: No JVD present. No thyromegaly present.   Cardiovascular: Normal rate and regular rhythm.    Pulses:       Carotid pulses are 2+ on the right side, and 2+ on the left side.       Radial pulses are 2+ on the left side.   Pulmonary/Chest: He has no wheezes.   Abdominal: Soft.   Musculoskeletal: He exhibits no edema.   Neurological: He is alert and oriented to person, place, and time.   Skin: Skin is warm and dry.       Hemodynamics:  Temp (24hrs), Av.2 °C (99 °F), Min:36.5 °C (97.7 °F), Max:38.2 °C (100.7 °F)  Temperature: 36.9 °C (98.4 °F)  Pulse  Av.8  Min: 56  Max: 117   Blood Pressure : (!) 172/81     Respiratory:    Respiration: 15, Pulse Oximetry: 90 %     Work Of Breathing / Effort: Mild  RUL Breath Sounds: Clear, RML Breath Sounds: Clear, RLL Breath Sounds: Diminished, HUNTER Breath Sounds: Clear, LLL Breath Sounds: Diminished  Fluids:     Weight: 75 kg (165 lb 5.5 oz)  GI/Nutrition:  Orders Placed This Encounter   Procedures   • Diet Order     Standing Status:   Standing     Number of Occurrences:    1     Order Specific Question:   Diet:     Answer:   Diabetic [3]     Order Specific Question:   Diet:     Answer:   Cardiac [6]     Order Specific Question:   Calorie modifications:     Answer:   2500 kcals [7]     Order Specific Question:   Macronutrient modifications:     Answer:   Low Cholesterol [7]     Lab Results:  Recent Labs      10/24/17   0208  10/25/17   0450  10/26/17   0445   WBC  14.8*  8.1  6.5   RBC  3.64*  3.46*  3.67*   HEMOGLOBIN  10.8*  10.2*  10.7*   HEMATOCRIT  32.8*  30.5*  32.1*   MCV  90.1  88.2  87.5   MCH  29.7  29.5  29.2   MCHC  32.9*  33.4*  33.3*   RDW  45.7  44.4  44.2   PLATELETCT  128*  122*  133*   MPV  10.3  10.4  10.3     Recent Labs      10/24/17   0208  10/25/17   0450  10/26/17   0445   SODIUM  133*  134*  133*   POTASSIUM  4.0  3.3*  3.5*   CHLORIDE  100  103  102   CO2  23  23  24   GLUCOSE  149*  112*  99   BUN  23*  14  13   CREATININE  0.88  0.63  0.67   CALCIUM  8.3*  8.1*  8.5     Recent Labs      10/24/17   2111  10/25/17   0450  10/26/17   0445   APTT  62.2*  56.6*  32.6         Recent Labs      10/23/17   1937  10/24/17   0208  10/24/17   0839   TROPONINI  4.00*  7.14*  4.58*     Recent Labs      10/24/17   0208   TRIGLYCERIDE  48   HDL  30*   LDL  16         Medical Decision Making, by Problem:  Active Hospital Problems    Diagnosis   • *Atrial fibrillation (CMS-[facility]) [I48.91]   • NSTEMI (non-ST elevated myocardial infarction) (CMS-[facility]) [I21.4]   • Sepsis (CMS-[facility]) [A41.9]   • DM (diabetes mellitus) (CMS-[facility]) [E11.9]   • Carotid artery stenosis [I65.29]   • BPH (benign prostatic hyperplasia) [N40.0]   • Advanced directives, counseling/discussion [Z71.89]   • Generalized weakness [R53.1]   • Glaucoma [H40.9]   • Thrombocytopenia ([facility]) [D69.6]       Assessment/Plan:    NSTEMI, trop peaked at 7, echo 10/24/17 , EF 65% with normal regional wall motion , no angina    Currently on aspirin, Plavix, Lipitor 5 mg    Hypertension, hypertensive today blood pressure 170/80,  lisinopril 5 mg initiated    Plan for MPI as an outpatient    PAF, No rhythm issues overnight, normal sinus rhythm with frequent PVCs, not a good candidate for OAC secondary to frequent falls, anemia, frailty     Appointment was scheduled with our cardiology office on 11/7/17      Reviewed items::  Medications reviewed and Labs reviewed

## 2017-10-26 NOTE — PROGRESS NOTES
Report received from KATINA Brandon. Assumed care of patient. Updated patient on plan of care. Fall precautions in place, call light within reach.

## 2017-10-26 NOTE — PROGRESS NOTES
Gricelda Coombs Fall Risk Assessment:     Last Known Fall: Within the last month  Mobility: Use of assistive device/requires assist of two people  Medications: Cardiovascular or central nervous system meds  Mental Status/LOC/Awareness: Awake, alert, and oriented to date, place, and person  Toileting Needs: Diarrhea/frequency/urgency  Volume/Electrolyte Status: No problems  Communication/Sensory: Visual (Glasses)/hearing deficit  Behavior: Appropriate behavior  Gricelda Coombs Fall Risk Total: 15  Fall Risk Level: HIGH RISK    Universal Fall Precautions:  call light/belongings in reach, bed in low position and locked, wheelchairs and assistive devices out of sight, siderails up x 2, use non-slip footwear, adequate lighting, clutter free and spill free environment, educate on level of risk, educate to call for assistance    Fall Risk Level Interventions:     TRIAL (TELE 8, NEURO, MED MIAH 5) High Fall Risk Interventions  Place yellow fall risk ID band on patient: verified  Provide patient/family education based on risk assessment: completed  Educate patient/family to call staff for assistance when getting out of bed: completed  Place fall precaution signage outside patient door: completed  Place patient in room close to nursing station: completed  Utilize bed/chair fall alarm: completed  Notify charge of high risk for huddle: completed    Patient Specific Interventions:     Medication: limit combination of prn medications  Mental Status/LOC/Awareness: check on patient hourly, utilize bed/chair fall alarm and reinforce the use of call light  Toileting: provide frquent toileting  Volume/Electrolyte Status: ensure patient remains hydrated, monitor blood sugars and maintain appropriate blood sugar levels if diabetic and monitor abnormal lab values  Communication/Sensory: update plan of care on whiteboard, ensure proper positioning when transferrng/ambulating, ensure patient has glasses/contacts and hearing aids/dentures and  for visually impaired patients orient to their room surrounding and do not change their surroundings  Behavioral: not applicable  Mobility: schedule physical activity throughout the day, dangle prior to standing, utilize bed/chair fall alarm, ensure bed is locked and in lowest position and provide appropriate assistive device

## 2017-10-26 NOTE — PROGRESS NOTES
[facility] Hospitalist Progress Note    Date of Service: 10/26/2017    Chief Complaint  89 y.o. male admitted 10/23/2017 with Fall (Patient complains of falling out of chair tonight and has had multiple falls recently and here via EMS to find out why he keeps falling.)        Interval Problem Update  No new issues or complaints. Awaiting PICC line    Consultants/Specialty  Cardiology    Disposition  SNF likely vs [facility]        Review of Systems   Constitutional: Negative for chills and fever.   HENT: Negative for congestion, hearing loss and nosebleeds.    Eyes: Negative for pain and redness.   Respiratory: Negative for cough, hemoptysis, shortness of breath and wheezing.    Cardiovascular: Negative for chest pain and palpitations.   Gastrointestinal: Negative for abdominal pain, blood in stool, constipation, diarrhea, nausea and vomiting.   Genitourinary: Negative for dysuria, frequency and hematuria.   Musculoskeletal: Positive for falls and myalgias. Negative for joint pain.   Skin: Negative for rash.   Neurological: Negative for dizziness, tremors, focal weakness, seizures, loss of consciousness, weakness and headaches.   Psychiatric/Behavioral: The patient is not nervous/anxious and does not have insomnia.    All other systems reviewed and are negative.     Physical Exam  Laboratory/Imaging   Hemodynamics  Temp (24hrs), Av.4 °C (99.4 °F), Min:36.9 °C (98.4 °F), Max:38.2 °C (100.7 °F)   Temperature: 37 °C (98.6 °F)  Pulse  Av.3  Min: 56  Max: 117    Blood Pressure : (!) 163/72      Respiratory      Respiration: 16, Pulse Oximetry: 93 %     Work Of Breathing / Effort: Mild  RUL Breath Sounds: Clear, RML Breath Sounds: Clear, RLL Breath Sounds: Diminished, HUNTER Breath Sounds: Clear, LLL Breath Sounds: Diminished    Fluids    Intake/Output Summary (Last 24 hours) at 10/26/17 1645  Last data filed at 10/26/17 0300   Gross per 24 hour   Intake                0 ml   Output             1050 ml   Net            -1050         Nutrition  Orders Placed This Encounter   Procedures   • Diet Order     Standing Status:   Standing     Number of Occurrences:   1     Order Specific Question:   Diet:     Answer:   Diabetic [3]     Order Specific Question:   Diet:     Answer:   Cardiac [6]     Order Specific Question:   Calorie modifications:     Answer:   2500 kcals [7]     Order Specific Question:   Macronutrient modifications:     Answer:   Low Cholesterol [7]     Physical Exam   Constitutional: He appears well-developed and well-nourished.   Frail elderly   HENT:   Head: Normocephalic and atraumatic.   Eyes: Conjunctivae and EOM are normal. No scleral icterus.   Neck: Normal range of motion. Neck supple.   Cardiovascular: Normal rate and regular rhythm.  Exam reveals no gallop and no friction rub.    No murmur heard.  Pulmonary/Chest: Effort normal and breath sounds normal. No respiratory distress. He has no wheezes. He has no rales.   Abdominal: Soft. Bowel sounds are normal. He exhibits no distension. There is no tenderness. There is no rebound and no guarding.   Musculoskeletal: He exhibits no edema or tenderness.   Neurological: He is alert.   Skin: Skin is warm. Rash (old ecchymoses) noted.   Psychiatric: He has a normal mood and affect. His behavior is normal.       Recent Labs      10/24/17   0208  10/25/17   0450  10/26/17   0445   WBC  14.8*  8.1  6.5   RBC  3.64*  3.46*  3.67*   HEMOGLOBIN  10.8*  10.2*  10.7*   HEMATOCRIT  32.8*  30.5*  32.1*   MCV  90.1  88.2  87.5   MCH  29.7  29.5  29.2   MCHC  32.9*  33.4*  33.3*   RDW  45.7  44.4  44.2   PLATELETCT  128*  122*  133*   MPV  10.3  10.4  10.3     Recent Labs      10/24/17   0208  10/25/17   0450  10/26/17   0445   SODIUM  133*  134*  133*   POTASSIUM  4.0  3.3*  3.5*   CHLORIDE  100  103  102   CO2  23  23  24   GLUCOSE  149*  112*  99   BUN  23*  14  13   CREATININE  0.88  0.63  0.67   CALCIUM  8.3*  8.1*  8.5     Recent Labs      10/24/17   2111  10/25/17   0450   10/26/17   0445   APTT  62.2*  56.6*  32.6         Recent Labs      10/24/17   0208   TRIGLYCERIDE  48   HDL  30*   LDL  16          Assessment/Plan     * Atrial fibrillation (CMS-HCC)   Assessment & Plan    Presented with falls.  Has paroxysmal A fib and was found to be in Afib with RVR. It was thought this was the cause of his weakness and falls  Continue cardiac monitoring  Patient stat chads 2 score is 3, patient will benefit from full dose anticoagulation  Was starte don Lovenox full dose  Continue Cardizem when necessary heart rate greater than 120  Cardiology, Dr. Iglesias has been consulted  Echo showed normal EF.  Discussed with Cardiology. Because of high fall risk, they have opted NO full anticoagulation. Heparin gtt d/cynthia and this was transitioned to hepa SQ  I will discharge him on aspirin and Plavix instead. HR in the 60s without rate controllers.  At discharge date he is afebrile, hemodynamically stable and wants to go home. He has declined SNF and prefers home health care. This was arranged.  Follow up with Zahra Yañez M.D. In 1-2 weeks  Follow up with Dr. Iglesias, EP Cardiology in 1-2 weeks for continued management of Afib          NSTEMI (non-ST elevated myocardial infarction) (CMS-HCC)   Assessment & Plan    Heparin started. Dr. Iglesias, Cardiology consulted. He felt PE needs to be ruled out. Bulmaro Wheeler intiially declined NM scan  On 10/25 he agreed to NM scan and that showed NO lung clot. NM scan showed low probability for PE.  At this time, Dr. Iglesias prefers to do outpatient stress testing. I willdefer to him if he wants to do elective cardiac catheterization.  Meanwhile he will continue aspirin, Plavix, statin. His HR is 50-60s so I will hold off on beta blockers. He has blood pressure room, so I will start him on an ACEI and give him PRN nitro SL, advise him no Viagra.  Follow up with Zahra Yañez M.D. In 1-2 weeks  Follow up with Dr. Iglesias, EP Cardiology in 1-2 weeks for continued  "management of Afib        Sepsis (CMS-HCC)- (present on admission)   Assessment & Plan    This is sepsis (without associated acute organ dysfunction).   After admission patient spiked a fever to 102 with heart rate 112.  Sepsis protocol initiated.  Check urinalysis and blood cultures  Fluid bolus prn according to guidelines  Empiric rocephin until source ascertained.  Blood culture came back positive with E. Coli, Klebsiella  I consulted ID physician, Dr. Rapp  On IV Rocephin and PICC line.        DM (diabetes mellitus) (CMS-HCC)- (present on admission)   Assessment & Plan    Follow-up A1c  Sliding scale insulin/Accu-Chek  Hold metformin        Carotid artery stenosis- (present on admission)   Assessment & Plan    History of  CT of the head is negative  MRI in May 2017 no new findings  CTA HandN showed carotid disease but no significant blockage or narrowing.        Bacteremia   Assessment & Plan    E. Coli and Klebsiella bacteremia, unclear source; cannot do GI workup as a source as he declines endoscopy  PICC line has been ordered, currently on Rocephin        Advanced directives, counseling/discussion   Assessment & Plan    Discussed code statu. He wants to be FULL CODE. He clearly states \"Nobody wants to die, right?\"        Generalized weakness   Assessment & Plan    With P.Afib  R/o CVA  CT head is negative follow-up repeat MRI of the brain  Follow up CTA. Neck  Echocardiogram  Continue aspirin, Lovenox, statin  Blood pressure control  Neurology consult as needed  PT/OT        Glaucoma- (present on admission)   Assessment & Plan    Tinea eyedrops        Thrombocytopenia (HCC)- (present on admission)   Assessment & Plan    Monitor        BPH (benign prostatic hyperplasia)- (present on admission)   Assessment & Plan    Continue home meds        I spent 35 minutes, reviewing the chart, notes, vitals, labs, imaging, ordering labs, evaluating Bulmaro Wheeler for assessment, enacting the plan above. 50% of " the time was spent in counseling Bulmaro Wheeler and answering questions.    DVT prophylaxis pharmacological::  Contraindicated - High bleeding risk

## 2017-10-26 NOTE — ASSESSMENT & PLAN NOTE
E. Coli and Klebsiella bacteremia, unclear source; cannot do GI workup as a source as he declines endoscopy  PICC line has been ordered, currently on Rocephin  On 10/27, awaiting PICC.  PICC placed on 10/30  Stop date for IV Rocephin 11/7/2017  He will be discharged to home health care with home infusion. He and his family refused SNF  F

## 2017-10-27 LAB
ERYTHROCYTE [DISTWIDTH] IN BLOOD BY AUTOMATED COUNT: 44.5 FL (ref 35.9–50)
GLUCOSE BLD-MCNC: 108 MG/DL (ref 65–99)
GLUCOSE BLD-MCNC: 158 MG/DL (ref 65–99)
GLUCOSE BLD-MCNC: 158 MG/DL (ref 65–99)
GLUCOSE BLD-MCNC: 204 MG/DL (ref 65–99)
GLUCOSE BLD-MCNC: 96 MG/DL (ref 65–99)
HCT VFR BLD AUTO: 32.6 % (ref 42–52)
HGB BLD-MCNC: 11 G/DL (ref 14–18)
MCH RBC QN AUTO: 29.9 PG (ref 27–33)
MCHC RBC AUTO-ENTMCNC: 33.7 G/DL (ref 33.7–35.3)
MCV RBC AUTO: 88.6 FL (ref 81.4–97.8)
PLATELET # BLD AUTO: 147 K/UL (ref 164–446)
PMV BLD AUTO: 10.4 FL (ref 9–12.9)
RBC # BLD AUTO: 3.68 M/UL (ref 4.7–6.1)
WBC # BLD AUTO: 8.4 K/UL (ref 4.8–10.8)

## 2017-10-27 PROCEDURE — 700102 HCHG RX REV CODE 250 W/ 637 OVERRIDE(OP): Performed by: NURSE PRACTITIONER

## 2017-10-27 PROCEDURE — 36415 COLL VENOUS BLD VENIPUNCTURE: CPT

## 2017-10-27 PROCEDURE — A9270 NON-COVERED ITEM OR SERVICE: HCPCS | Performed by: INTERNAL MEDICINE

## 2017-10-27 PROCEDURE — 770020 HCHG ROOM/CARE - TELE (206)

## 2017-10-27 PROCEDURE — 700102 HCHG RX REV CODE 250 W/ 637 OVERRIDE(OP): Performed by: INTERNAL MEDICINE

## 2017-10-27 PROCEDURE — A9270 NON-COVERED ITEM OR SERVICE: HCPCS | Performed by: NURSE PRACTITIONER

## 2017-10-27 PROCEDURE — 85027 COMPLETE CBC AUTOMATED: CPT

## 2017-10-27 PROCEDURE — 82962 GLUCOSE BLOOD TEST: CPT | Mod: 91

## 2017-10-27 PROCEDURE — 700111 HCHG RX REV CODE 636 W/ 250 OVERRIDE (IP): Performed by: HOSPITALIST

## 2017-10-27 PROCEDURE — 700111 HCHG RX REV CODE 636 W/ 250 OVERRIDE (IP): Performed by: INTERNAL MEDICINE

## 2017-10-27 PROCEDURE — 99232 SBSQ HOSP IP/OBS MODERATE 35: CPT | Performed by: INTERNAL MEDICINE

## 2017-10-27 RX ADMIN — LISINOPRIL 5 MG: 5 TABLET ORAL at 08:41

## 2017-10-27 RX ADMIN — FINASTERIDE 5 MG: 5 TABLET, FILM COATED ORAL at 08:41

## 2017-10-27 RX ADMIN — LATANOPROST 1 DROP: 50 SOLUTION OPHTHALMIC at 08:43

## 2017-10-27 RX ADMIN — CLOPIDOGREL 75 MG: 75 TABLET, FILM COATED ORAL at 08:41

## 2017-10-27 RX ADMIN — ACETAMINOPHEN 650 MG: 325 TABLET, FILM COATED ORAL at 22:40

## 2017-10-27 RX ADMIN — STANDARDIZED SENNA CONCENTRATE AND DOCUSATE SODIUM 2 TABLET: 8.6; 5 TABLET, FILM COATED ORAL at 08:40

## 2017-10-27 RX ADMIN — STANDARDIZED SENNA CONCENTRATE AND DOCUSATE SODIUM 2 TABLET: 8.6; 5 TABLET, FILM COATED ORAL at 20:35

## 2017-10-27 RX ADMIN — ASPIRIN 81 MG: 81 TABLET, COATED ORAL at 08:41

## 2017-10-27 RX ADMIN — ATORVASTATIN CALCIUM 5 MG: 10 TABLET, FILM COATED ORAL at 08:42

## 2017-10-27 RX ADMIN — OMEGA-3 FATTY ACIDS CAP 1000 MG 1000 MG: 1000 CAP at 08:41

## 2017-10-27 RX ADMIN — INSULIN LISPRO 2 UNITS: 100 INJECTION, SOLUTION INTRAVENOUS; SUBCUTANEOUS at 20:37

## 2017-10-27 RX ADMIN — INSULIN LISPRO 1 UNITS: 100 INJECTION, SOLUTION INTRAVENOUS; SUBCUTANEOUS at 17:21

## 2017-10-27 RX ADMIN — ENOXAPARIN SODIUM 40 MG: 100 INJECTION SUBCUTANEOUS at 08:41

## 2017-10-27 RX ADMIN — METOPROLOL TARTRATE 25 MG: 25 TABLET ORAL at 20:35

## 2017-10-27 RX ADMIN — CEFTRIAXONE 2 G: 2 INJECTION, SOLUTION INTRAVENOUS at 08:43

## 2017-10-27 RX ADMIN — METOPROLOL TARTRATE 25 MG: 25 TABLET ORAL at 08:40

## 2017-10-27 ASSESSMENT — ENCOUNTER SYMPTOMS
NAUSEA: 0
FEVER: 0
TREMORS: 0
INSOMNIA: 0
NERVOUS/ANXIOUS: 0
LOSS OF CONSCIOUSNESS: 0
BLOOD IN STOOL: 0
FOCAL WEAKNESS: 0
ABDOMINAL PAIN: 0
COUGH: 0
EYE REDNESS: 0
WEAKNESS: 0
HEADACHES: 0
HEMOPTYSIS: 0
CHILLS: 0
SHORTNESS OF BREATH: 0
EYE PAIN: 0
CONSTIPATION: 0
MYALGIAS: 1
DIZZINESS: 0
DIARRHEA: 0
VOMITING: 0
SEIZURES: 0
PALPITATIONS: 0
FALLS: 1
WHEEZING: 0

## 2017-10-27 ASSESSMENT — PAIN SCALES - GENERAL
PAINLEVEL_OUTOF10: 0

## 2017-10-27 NOTE — DISCHARGE PLANNING
SW received op infusion order yesterday late PM. This AM, SW went to pt's bedside for infusion choice, but pt sleeping. SW called pt's name, pt woke up, and SW asked if she can speak with him or come back later. Pt went back to sleep.   SW will try again later

## 2017-10-27 NOTE — DISCHARGE PLANNING
Received infusion order for [facility].  Referral has been sent to [facility].  Per [facility], PICC Line will not be placed until Saturday or Sunday.  Referral sent 1337 on 10-27-17.

## 2017-10-27 NOTE — PROGRESS NOTES
Gricelda Coombs Fall Risk Assessment:     Last Known Fall: Within the last month  Mobility: Use of assistive device/requires assist of two people  Medications: Cardiovascular or central nervous system meds  Mental Status/LOC/Awareness: Lethargic/oriented to person only  Toileting Needs: Incontinence  Volume/Electrolyte Status: No problems  Communication/Sensory: Visual (Glasses)/hearing deficit  Behavior: Appropriate behavior  Gricelda Coombs Fall Risk Total: 16  Fall Risk Level: HIGH RISK    Universal Fall Precautions:  call light/belongings in reach, bed in low position and locked, wheelchairs and assistive devices out of sight, siderails up x 2, use non-slip footwear, adequate lighting, educate on level of risk, clutter free and spill free environment, educate to call for assistance    Fall Risk Level Interventions:     TRIAL (TELE 8, NEURO, MED MIAH 5) High Fall Risk Interventions  Place yellow fall risk ID band on patient: verified  Provide patient/family education based on risk assessment: completed  Educate patient/family to call staff for assistance when getting out of bed: completed  Place fall precaution signage outside patient door: verified  Place patient in room close to nursing station: verified  Utilize bed/chair fall alarm: completed  Notify charge of high risk for huddle: verified    Patient Specific Interventions:     Medication: review medications with patient and family, assess for medications that can be discontinued or dosage decreased and limit combination of prn medications  Mental Status/LOC/Awareness: check on patient hourly, utilize bed/chair fall alarm and reinforce the use of call light  Toileting: instruct patient/family on the need to call for assistance when toileting and do not leave patient unattended in bathroom/refer to toileting scripting  Volume/Electrolyte Status: ensure patient remains hydrated and monitor abnormal lab values  Communication/Sensory: update plan of care on  whiteboard and ensure proper positioning when transferrng/ambulating  Behavioral: administer medication as ordered and instruct/reinforce fall program rationale  Mobility: utilize bed/chair fall alarm, ensure bed is locked and in lowest position and provide appropriate assistive device

## 2017-10-27 NOTE — DISCHARGE PLANNING
SUKH met with pt at bedside regarding op infusion. Per pt, wants to do home infusion. Pt gave verbal consent for referral to be sent to [facility]. SUKH faxed choice form to RANDOLPH Brandon.

## 2017-10-27 NOTE — PROGRESS NOTES
"Pt disoriented, pt does not know where he is, stating \"I need to get out of here\". Reoriented, education re MD rounds and POC. Urinal emptied. Repositioned for comfort, emotional support. Cont to monitor as bp elevated after confusion and agitation.   "

## 2017-10-27 NOTE — PROGRESS NOTES
Assumed care of patient. Bedside report received from KATINA Costa. Updated on POC, call light within reach and fall precautions in place. Bed locked and in lowest position. Patient sleeping at this time, bed alarm and strip alarm in use and functioning properly.  MAR, labs, orders reviewed.

## 2017-10-27 NOTE — PROGRESS NOTES
Infectious Disease Progress Note    Author: Alanis Rapp M.D. Date & Time of service: 10/27/2017  1:14 PM    Chief Complaint:  FU gram neg sepsis    Interval History:  89 y.o. White male admitted 10/23/2017 after a fall -found to have gram neg sepsis  10/25 AF WBC 8.1 up on side of bed-pleasantly confused  10/26 AF WBC 6.5 feels better-denies pain Family at bedside  10/27 AF no new complaints-no PICC yet  Labs Reviewed, Medications Reviewed and Radiology Reviewed.    Review of Systems:  Review of Systems   Constitutional: Negative for chills and fever.   Gastrointestinal: Negative for abdominal pain, diarrhea, nausea and vomiting.   Genitourinary: Negative for dysuria.       Hemodynamics:  Temp (24hrs), Av.9 °C (98.5 °F), Min:36.3 °C (97.3 °F), Max:37.5 °C (99.5 °F)  Temperature: 36.5 °C (97.7 °F)  Pulse  Av.4  Min: 49  Max: 117   Blood Pressure : 142/55       Physical Exam:  Physical Exam   Constitutional: He appears well-developed. No distress.   HENT:   Head: Normocephalic and atraumatic.   Eyes: EOM are normal. Pupils are equal, round, and reactive to light.   Neck: Neck supple.   Cardiovascular: Normal rate.    IRR   Pulmonary/Chest: Effort normal. No respiratory distress.   Abdominal: Soft. He exhibits no distension. There is no tenderness.   Neurological: He is alert. A cranial nerve deficit is present.   Skin: No rash noted. No erythema.   Nursing note and vitals reviewed.      Meds:    Current Facility-Administered Medications:   •  lisinopril  •  nitroglycerin  •  metoprolol  •  enoxaparin (LOVENOX) injection  •  clopidogrel  •  aspirin EC  •  atorvastatin  •  finasteride  •  latanoprost  •  fish oil  •  senna-docusate **AND** polyethylene glycol/lytes **AND** magnesium hydroxide **AND** bisacodyl  •  Respiratory Care per Protocol  •  acetaminophen  •  Notify provider if pain remains uncontrolled **AND** Use the numeric rating scale (NRS-11) on regular floors and Critical-Care Pain  Observation Tool (CPOT) on ICUs/Trauma to assess pain **AND** Pulse Ox (Oximetry) **AND** Pharmacy Consult Request **AND** If patient difficult to arouse and/or has respiratory depression, stop any opiates that are currently infusing and call a Rapid Response. **AND** oxycodone immediate-release **AND** oxycodone immediate-release **AND** morphine injection  •  labetalol  •  diltiazem  •  insulin lispro  •  Action is required: Protocol 1073 Hypoglycemia has been implemented **AND** Protocol 1073 Inclusion Criteria **AND** Protocol 1073 NOTIFY **AND** Protocol 1073 Initiate protocol immediately if FSBG is less than or equal to 70 mg/dL **AND** glucose 4 g **AND** dextrose 50%  •  ondansetron  •  ondansetron  •  NS  •  cefTRIAXone (ROCEPHIN) IVPB    Labs:  Recent Labs      10/25/17   0450  10/26/17   0445  10/27/17   0209   WBC  8.1  6.5  8.4   RBC  3.46*  3.67*  3.68*   HEMOGLOBIN  10.2*  10.7*  11.0*   HEMATOCRIT  30.5*  32.1*  32.6*   MCV  88.2  87.5  88.6   MCH  29.5  29.2  29.9   RDW  44.4  44.2  44.5   PLATELETCT  122*  133*  147*   MPV  10.4  10.3  10.4     Recent Labs      10/25/17   0450  10/26/17   0445   SODIUM  134*  133*   POTASSIUM  3.3*  3.5*   CHLORIDE  103  102   CO2  23  24   GLUCOSE  112*  99   BUN  14  13     Recent Labs      10/25/17   0450  10/26/17   0445   CREATININE  0.63  0.67       Imaging:  Ct-cta Head With & W/o-post Process    Result Date: 10/23/2017  10/23/2017 11:49 AM HISTORY/REASON FOR EXAM:  Weakness and frequent falls TECHNIQUE/EXAM DESCRIPTION: CT angiogram of the [place] without and with contrast.  Initial precontrast images were obtained of the head from the skull base through the vertex.  Postcontrast images were obtained of the [place] following the power injection of nonionic contrast at 5.0 mL/sec. Thin-section helical images were obtained with overlapping reconstruction interval. Coronal and sagittal multiplanar volume reformats were generated.  3D  angiographic images were reviewed on PACS.  Maximum intensity projection (MIP) images were generated and reviewed. 100 mL of Omnipaque 350 nonionic contrast was injected intravenously. Low dose optimization technique was utilized for this CT exam including automated exposure control and adjustment of the mA and/or kV according to patient size. COMPARISON:  10/23/2017 CT head 5:06 AM FINDINGS: CT head: No acute cranial hemorrhage, mass effect, or midline shift. No acute ischemia or mass effect is identified. There is diffuse atrophy. Periventricular white matter changes consistent with chronic small vessel disease. Ventricles and cisterns are patent. Paranasal sinuses and mastoid air cells are clear. CTA head: There are scattered calcifications in the intracranial segments of the right internal carotid artery. The right middle and anterior cerebral arteries are normal in course and caliber. No thrombus or aneurysm identified. There are scattered calcifications in the intracranial segment of the left internal carotid artery. The left middle and anterior through arteries are normal in course and caliber. No thrombus or aneurysm identified. The distal vertebral arteries are widely patent. The basilar artery is patent. There is a fetal origin of the right posterior cerebral artery. The dural venous sinuses are patent.     1.  No acute thrombus or aneurysm is identified. 2.  Diffuse atrophy and periventricular white matter changes, consistent with chronic small vessel disease.    Ct-cta Neck With & W/o-post Processing    Result Date: 10/23/2017  10/23/2017 11:49 AM HISTORY/REASON FOR EXAM: Altered mental status. TECHNIQUE/EXAM DESCRIPTION: CT angiogram of the neck without and with contrast, with reconstructions. Initial precontrast images were obtained from the great vessels through the skull base. Postcontrast images were obtained of the neck from the great vessels through the skull base following the power injection of  nonionic contrast at 5.0 mL/sec. Thin-section helical images were obtained with overlapping reconstruction interval. Coronal and oblique multiplanar volume reformats were generated. 3-D images were reviewed on a PACS workstation. Maximum intensity pixel shading reconstructions were performed. 100 mL of Omnipaque 350 nonionic contrast was injected intravenously. Cervical internal carotid artery percent stenosis is calculated using the standard method according to the NASCET criteria wherein a segment of uniform caliber mid or distal cervical internal carotid is used as the reference denominator. Low dose optimization technique was utilized for this CT exam including automated exposure control and adjustment of the mA and/or kV according to patient size. COMPARISON: 2/25/2014 FINDINGS: There is mild emphysematous change of the lungs. There is extensive atherosclerotic plaque involving the aorta and originating vessels. There is extensive plaque extending along the course of the common carotid arteries and the subclavian arteries bilaterally. There is less than 50% diameter narrowing of the common carotid artery on the left. There has been interval endarterectomy on the left with a widely patent internal carotid artery on the left. There is some atherosclerotic plaque of the intracranial internal carotid arteries. There is atherosclerotic plaque at the right carotid bifurcation and extending into the internal carotid artery on the right. This results in less than 50% diameter narrowing. The vertebral arteries are patent bilaterally.     1.  Interval left internal carotid endarterectomy with no single widely patent left internal carotid artery. 2.  Atherosclerotic plaque involving the common carotid arteries bilaterally and the right internal carotid artery at and above the level of the bifurcation. This results in less than 50% diameter narrowing. 3.  Patent vertebral arteries bilaterally.    Ct-head W/o    Result  Date: 10/23/2017  HISTORY/REASON FOR EXAM:  Loss of equilibrium followed by fall. TECHNIQUE/EXAM DESCRIPTION: CT scan of the head without contrast, 10/23/2017 4:57 AM. Contiguous 5 mm axial sections were obtained from the skull base through the vertex. Up to date radiation dose reduction adjustments have been utilized to meet ALARA standards for radiation dose reduction. COMPARISON:  5/2/2017 FINDINGS:   The ventricular system and cortical sulci are prominent, consistent with the patient's advanced age.  There is no midline shift or other mass effect. Patchy white matter lucencies are again seen bilaterally consistent with old small vessel ischemic changes or demyelination. There is no acute intra-axial abnormality or extra-axial fluid collection.  There is no intracranial hemorrhage.  The calvaria are intact. The visualized paranasal sinuses show no unusual opacity.     1.  No acute intracranial abnormality. 2.  Age-consistent atrophy and chronic white matter changes. INTERPRETING LOCATION:  [address] [place] [address], [id number]    Dx-chest-portable (1 View)    Result Date: 10/24/2017  10/24/2017 10:40 AM HISTORY/REASON FOR EXAM:  Shortness of breath. TECHNIQUE/EXAM DESCRIPTION AND NUMBER OF VIEWS: Single portable view of the chest. COMPARISON: 1 view chest history FINDINGS: The lungs are clear. Cardiac silhouette: normal in size. There is aortic atherosclerosis. Pleura: There are no pleural effusion or pneumothoraces. Osseous structures: There is severe bilateral glenohumeral joint osteoarthritis     No acute cardiopulmonary abnormality identified.    Dx-chest-portable (1 View)    Result Date: 10/23/2017  10/23/2017 4:51 AM HISTORY/REASON FOR EXAM:  Chest Pain TECHNIQUE/EXAM DESCRIPTION AND NUMBER OF VIEWS: Single portable view of the chest. COMPARISON: 5/12/2017 FINDINGS: The heart, mediastinum, and taylor are unremarkable. The lungs are well expanded and show no evidence of infiltrate or other acute process. There is no  "obvious pleural effusion. The bony thorax is grossly normal.     No acute cardiopulmonary abnormality.    Mr-brain-w/o    Result Date: 10/23/2017  10/23/2017 10:09 AM HISTORY/REASON FOR EXAM:  Ataxia. TECHNIQUE/EXAM DESCRIPTION: MRI of the brain without contrast. T1 sagittal, T2 fast spin-echo axial, T1 coronal, FLAIR coronal, diffusion-weighted and apparent diffusion coefficient (ADC map) axial images were obtained of the whole brain. The study was performed on a A[employer] Signa 1.5 Kiah MRI scanner. COMPARISON:  None. FINDINGS: There is no acute infarct. There is no acute or chronic parenchymal hemorrhage. A few nonspecific T2 hyperintensities are noted in the subcortical and periventricular white matter. Moderate cerebral volume loss is seen. There is chronic lacunar  infarct in the left thalamus. There is mild atrophy of the midbrain causing \"hummingbird\" sign. There is moderate dilatation of the lateral and third ventricles. There is prominent flow void in the aqueduct. There is no extra-axial fluid collection, hemorrhage or mass. The visualized flow voids of the cerebral vasculature are unremarkable.  There is no large lesion identified in the expected course of the intracranial portions of the cranial nerves. The skull bones are normal. The paranasal sinuses are clear. The extracranial soft tissue including orbits appear grossly normal.     1.  Moderate dilatation of the ventricles. The ventricular dilatation is slightly more prominent than the associated cortical atrophy. There is also prominent flow void in the aqueduct. 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 However there are no other secondary signs of normal pressure hydrocephalus such as acute angulation of the frontal horns and tight frontoparietal sulci. Please correlate clinically. There has been no significant interval change. 2.  No acute infarct. 3.  Mild chronic microvascular ischemic disease. 4.  Moderate cerebral atrophy. 5. " " There is mild atrophy of the midbrain causing a\"hummingbird\" sign. This is a nonspecific finding and can be seen in the patients with progressive supra bulbar palsy. Please correlate clinically.    Nm-lung Vent/perf Imaging    Result Date: 10/25/2017  10/25/2017 1:45 PM HISTORY/REASON FOR EXAM:  Elevated troponin and chest pain TECHNIQUE/EXAM DESCRIPTION AND NUMBER OF VIEWS:  30.5 mCi aerosolized Tc 99m-DTPA was administered as a gas, followed by multiple projection imaging. 6.75 mCi Tc 99m-MAA was then administered intravenously followed by multiple projection imaging. COMPARISON:  Chest radiograph 10/24/2017 FINDINGS:     No focal consolidations are noted on chest radiograph. Some poorly defined opacifications are noted in the left lung base. Perfusion images reveal no evidence of lobar perfusion defect. No segmental or significant subsegmental defects are noted. Ventilation images reveal no focal areas of normal ventilation that would correspond to abnormal perfusion.     1.  Low probability of pulmonary embolism.    Echocardiogram Comp W/o Cont    Result Date: 10/24/2017  Transthoracic Echo Report Echocardiography Laboratory CONCLUSIONS Normal left ventricular chamber size. Normal left ventricular systolic function. Left ventricular ejection fraction is visually estimated to be 65%. Trace mitral regurgitation. Mildly dilated left atrium. Aortic sclerosis without stenosis. Structurally normal tricuspid valve without significant stenosis or regurgitation. Normal right ventricular size and systolic function. No pericardial effusion seen. Compared to the images of the prior study done  11/28/16, no signficant change JT MARIA Exam Date:          Deceleration Time              152 ms                * Indicates values subject to auto-interpretation LV EF:  65    % FINDINGS Left Ventricle Normal left ventricular chamber size. Normal left ventricular wall thickness with sigmoid septum. Normal left ventricular " "systolic function. Left ventricular ejection fraction is visually estimated to be 65%. Normal regional wall motion. Grade II diastolic dysfunction. Right Ventricle Normal right ventricular size and systolic function. Right Atrium Normal right atrial size. Left Atrium Mildly dilated left atrium. Left atrial volume index is 36 mL/sq m. Mitral Valve Structurally normal mitral valve. No mitral stenosis.  Trace mitral regurgitation. Aortic Valve Aortic sclerosis without stenosis. No aortic insufficiency. Tricuspid Valve Structurally normal tricuspid valve without significant stenosis or regurgitation. Pulmonic Valve The pulmonic valve is not well visualized. No stenosis or regurgitation seen. Pericardium No pericardial effusion seen. Aorta Normal ascending aorta. Jackie Bustillo M.D. (Electronically Signed) Final Date:     24 October 2017                 14:28      Micro:  Results     Procedure Component Value Units Date/Time    BLOOD CULTURE [[id number]]  (Abnormal) Collected:  10/23/17 1333    Order Status:  Completed Specimen:  Blood from Peripheral Updated:  10/26/17 0903     Significant Indicator POS (POS)     Source BLD     Site PERIPHERAL     Blood Culture Growth detected by Bactec instrument.  10/24/2017  01:57 (A)     Blood Culture Klebsiella pneumoniae (A)     Blood Culture -- (A)     Escherichia coli  See previous culture for sensitivity report.      Narrative:       CALL  Tran  183 tel. [phone],  CALLED  183 tel. [phone] 10/24/2017, 02:01, RB PERF. RESULTS CALLED TO: RN  [phone]  Per Hospital Policy: Only change Specimen Src: to \"Line\" if  specified by physician order.    BLOOD CULTURE [[id number]]  (Abnormal)  (Susceptibility) Collected:  10/23/17 1333    Order Status:  Completed Specimen:  Blood from Peripheral Updated:  10/26/17 0901     Significant Indicator POS (POS)     Source BLD     Site PERIPHERAL     Blood Culture Growth detected by Bactec instrument.  10/24/2017  01:55 (A)     Blood Culture " "Klebsiella pneumoniae (A)     Blood Culture Escherichia coli (A)    Narrative:       CALL  Tran  183 tel. [phone],  CALLED  183 tel. [phone] 10/24/2017, 02:02, RB PERF. RESULTS CALLED TO: RN  [phone]  Per Hospital Policy: Only change Specimen Src: to \"Line\" if  specified by physician order.    Culture & Susceptibility     ESCHERICHIA COLI     Antibiotic Sensitivity Microscan Unit Status    Ampicillin Sensitive <=8 mcg/mL Final    Cefepime Sensitive <=8 mcg/mL Final    Cefotaxime Sensitive <=2 mcg/mL Final    Cefotetan Sensitive <=16 mcg/mL Final    Ceftazidime Sensitive <=1 mcg/mL Final    Ceftriaxone Sensitive <=8 mcg/mL Final    Cefuroxime Sensitive 8 mcg/mL Final    Ciprofloxacin Resistant >2 mcg/mL Final    Ertapenem Sensitive <=1 mcg/mL Final    Gentamicin Sensitive <=4 mcg/mL Final    Pip/Tazobactam Sensitive <=16 mcg/mL Final    Tigecycline Sensitive <=2 mcg/mL Final    Tobramycin Sensitive <=4 mcg/mL Final    Trimeth/Sulfa Resistant >2/38 mcg/mL Final              KLEBSIELLA PNEUMONIAE     Antibiotic Sensitivity Microscan Unit Status    Ampicillin Resistant >16 mcg/mL Final    Cefepime Sensitive <=8 mcg/mL Final    Cefotaxime Sensitive <=2 mcg/mL Final    Cefotetan Sensitive <=16 mcg/mL Final    Ceftazidime Sensitive <=1 mcg/mL Final    Ceftriaxone Sensitive <=8 mcg/mL Final    Cefuroxime Sensitive <=4 mcg/mL Final    Ciprofloxacin Sensitive <=1 mcg/mL Final    Ertapenem Sensitive <=1 mcg/mL Final    Gentamicin Sensitive <=4 mcg/mL Final    Pip/Tazobactam Sensitive <=16 mcg/mL Final    Tigecycline Sensitive <=2 mcg/mL Final    Tobramycin Sensitive <=4 mcg/mL Final    Trimeth/Sulfa Sensitive <=2/38 mcg/mL Final                       BLOOD CULTURE [[id number]] Collected:  10/24/17 1227    Order Status:  Completed Specimen:  Blood from Peripheral Updated:  10/25/17 0822     Significant Indicator NEG     Source BLD     Site PERIPHERAL     Blood Culture --     No Growth    Note: Blood cultures are incubated " "for 5 days and  are monitored continuously.Positive blood cultures  are called to the RN and reported as soon as  they are identified.      Narrative:       Per Hospital Policy: Only change Specimen Src: to \"Line\" if  specified by physician order.    BLOOD CULTURE [[id number]] Collected:  10/24/17 1227    Order Status:  Completed Specimen:  Blood from Peripheral Updated:  10/25/17 0822     Significant Indicator NEG     Source BLD     Site PERIPHERAL     Blood Culture --     No Growth    Note: Blood cultures are incubated for 5 days and  are monitored continuously.Positive blood cultures  are called to the RN and reported as soon as  they are identified.      Narrative:       Per Hospital Policy: Only change Specimen Src: to \"Line\" if  specified by physician order.    INFLUENZA BY PCR, A/B/H1N1 [[id number]] Collected:  10/23/17 1840    Order Status:  Completed Specimen:  Nasal from Nasopharyngeal Updated:  10/23/17 2246     Influenza virus A RNA Negative     Influenza virus B, PCR Negative     Influenza A 2009, H1N1, PCR Not Detected    Narrative:       Collected By:[id number] FEI HDZ    URINALYSIS [[id number]]  (Abnormal) Collected:  10/23/17 1410    Order Status:  Completed Specimen:  Urine from Urine, Clean Catch Updated:  10/23/17 1750     Color Yellow     Character Clear     Specific Gravity 1.038     Ph 6.5     Glucose 500 (A) mg/dL      Ketones Trace (A) mg/dL      Protein 100 (A) mg/dL      Bilirubin Negative     Urobilinogen, Urine 1.0     Nitrite Negative     Leukocyte Esterase Negative     Occult Blood Moderate (A)     Micro Urine Req Microscopic    Narrative:       Collected By:[id number] FEI HDZ    Culture Respiratory W/ GRM STN [[id number]]     Order Status:  Completed Specimen:  Respirate from Sputum           Assessment:  Active Hospital Problems    Diagnosis   • *Atrial fibrillation (CMS-[facility]) [I48.91]   • Sepsis (CMS-[facility]) [A41.9]   • Carotid artery stenosis [I65.29]   • DM (diabetes " mellitus) (CMS-HCC) [E11.9]   • BPH (benign prostatic hyperplasia) [N40.0]   • NSTEMI (non-ST elevated myocardial infarction) (CMS-HCC) [I21.4]   • Advanced directives, counseling/discussion [Z71.89]   • Generalized weakness [R53.1]   • Glaucoma [H40.9]   • Thrombocytopenia ([facility]) [D69.6]       Plan:  Gram negative sepsis (CMS-HCC)- (present on admission)  Sepsis (with associated acute organ dysfunction-NSTEMI and AMS)  Source cryptic  Afebrile  Resolved leukocytosis  Blood cultures 10/23 Kleb and Ecoli  Ucx neg  Blood cxs 10/24 NGTD  Continue rocephin for 2 weeks from date of neg cxs  PICC when blood cxs neg 48 hours-ordered 10/26  GI work up recommended for source but pt age and comorbidities make him increased risk for complications-per their wishes     DM (diabetes mellitus) (CMS-HCC)- (present on admission)   Keep BS under 150 to help control current infection  Follow-up A1c    Discussed with internal medicine.  Orders for infusion faxed to  10/26

## 2017-10-27 NOTE — PROGRESS NOTES
Gricelda Coombs Fall Risk Assessment:     Last Known Fall: Within the last month  Mobility: Use of assistive device/requires assist of two people  Medications: Cardiovascular or central nervous system meds  Mental Status/LOC/Awareness: Awake, alert, and oriented to date, place, and person  Toileting Needs: Incontinence  Volume/Electrolyte Status: No problems  Communication/Sensory: Visual (Glasses)/hearing deficit  Behavior: Appropriate behavior  Gricelda Coombs Fall Risk Total: 14  Fall Risk Level: MODERATE RISK    Universal Fall Precautions:  call light/belongings in reach, bed in low position and locked, wheelchairs and assistive devices out of sight, siderails up x 2, use non-slip footwear, adequate lighting, clutter free and spill free environment, educate on level of risk, educate to call for assistance    Fall Risk Level Interventions:     TRIAL (TELE 8, NEURO, MED MIAH 5) High Fall Risk Interventions  Place yellow fall risk ID band on patient: verified  Provide patient/family education based on risk assessment: verified  Educate patient/family to call staff for assistance when getting out of bed: verified  Place fall precaution signage outside patient door: verified  Place patient in room close to nursing station: verified  Utilize bed/chair fall alarm: verified  Notify charge of high risk for huddle: verified    Patient Specific Interventions:     Medication: review medications with patient and family, assess for medications that can be discontinued or dosage decreased and limit combination of prn medications  Mental Status/LOC/Awareness: reinforce falls education, check on patient hourly, utilize bed/chair fall alarm, reinforce the use of call light and provide activity  Toileting: provide frquent toileting, monitor intake and output/use of appropriate interventions, instruct male patients prone to dizziness to void while sitting, instruct patient/family on the need to call for assistance when toileting and  do not leave patient unattended in bathroom/refer to toileting scripting  Volume/Electrolyte Status: ensure patient remains hydrated, administer medications as ordered for nausea and vomiting and monitor abnormal lab values  Communication/Sensory: update plan of care on whiteboard, provide communication alternatives/, ensure proper positioning when transferrng/ambulating and for visually impaired patients orient to their room surrounding and do not change their surroundings  Behavioral: encourage patient to voice feelings, engage patient in daily activities, administer medication as ordered, instruct/reinforce fall program rationale and use appropriate de-escalation techniques  Mobility: schedule physical activity throughout the day, provide comfort measures during transport, dangle prior to standing, utilize bed/chair fall alarm, ensure bed is locked and in lowest position and provide appropriate assistive device

## 2017-10-27 NOTE — CARE PLAN
Problem: Communication  Goal: The ability to communicate needs accurately and effectively will improve  Outcome: PROGRESSING AS EXPECTED  Pt calls appropriately, expresses needs and concerns to staff appropriately.    Problem: Pain Management  Goal: Pain level will decrease to patient's comfort goal  Outcome: PROGRESSING AS EXPECTED  Pt denies pain at this time.

## 2017-10-28 LAB
ERYTHROCYTE [DISTWIDTH] IN BLOOD BY AUTOMATED COUNT: 44.7 FL (ref 35.9–50)
GLUCOSE BLD-MCNC: 142 MG/DL (ref 65–99)
GLUCOSE BLD-MCNC: 191 MG/DL (ref 65–99)
GLUCOSE BLD-MCNC: 222 MG/DL (ref 65–99)
GLUCOSE BLD-MCNC: 99 MG/DL (ref 65–99)
HCT VFR BLD AUTO: 34 % (ref 42–52)
HGB BLD-MCNC: 11.3 G/DL (ref 14–18)
MCH RBC QN AUTO: 29.9 PG (ref 27–33)
MCHC RBC AUTO-ENTMCNC: 33.2 G/DL (ref 33.7–35.3)
MCV RBC AUTO: 89.9 FL (ref 81.4–97.8)
PLATELET # BLD AUTO: 164 K/UL (ref 164–446)
PMV BLD AUTO: 10.3 FL (ref 9–12.9)
RBC # BLD AUTO: 3.78 M/UL (ref 4.7–6.1)
WBC # BLD AUTO: 8.7 K/UL (ref 4.8–10.8)

## 2017-10-28 PROCEDURE — 700111 HCHG RX REV CODE 636 W/ 250 OVERRIDE (IP): Performed by: HOSPITALIST

## 2017-10-28 PROCEDURE — A9270 NON-COVERED ITEM OR SERVICE: HCPCS | Performed by: INTERNAL MEDICINE

## 2017-10-28 PROCEDURE — 85027 COMPLETE CBC AUTOMATED: CPT

## 2017-10-28 PROCEDURE — 99232 SBSQ HOSP IP/OBS MODERATE 35: CPT | Performed by: INTERNAL MEDICINE

## 2017-10-28 PROCEDURE — A9270 NON-COVERED ITEM OR SERVICE: HCPCS | Performed by: NURSE PRACTITIONER

## 2017-10-28 PROCEDURE — 770020 HCHG ROOM/CARE - TELE (206)

## 2017-10-28 PROCEDURE — 82962 GLUCOSE BLOOD TEST: CPT

## 2017-10-28 PROCEDURE — 700102 HCHG RX REV CODE 250 W/ 637 OVERRIDE(OP): Performed by: INTERNAL MEDICINE

## 2017-10-28 PROCEDURE — 700102 HCHG RX REV CODE 250 W/ 637 OVERRIDE(OP): Performed by: NURSE PRACTITIONER

## 2017-10-28 PROCEDURE — 36415 COLL VENOUS BLD VENIPUNCTURE: CPT

## 2017-10-28 PROCEDURE — 700111 HCHG RX REV CODE 636 W/ 250 OVERRIDE (IP): Performed by: INTERNAL MEDICINE

## 2017-10-28 RX ADMIN — STANDARDIZED SENNA CONCENTRATE AND DOCUSATE SODIUM 2 TABLET: 8.6; 5 TABLET, FILM COATED ORAL at 08:46

## 2017-10-28 RX ADMIN — LABETALOL HYDROCHLORIDE 10 MG: 5 INJECTION, SOLUTION INTRAVENOUS at 03:47

## 2017-10-28 RX ADMIN — ACETAMINOPHEN 325 MG: 325 TABLET, FILM COATED ORAL at 20:16

## 2017-10-28 RX ADMIN — METOPROLOL TARTRATE 25 MG: 25 TABLET ORAL at 20:16

## 2017-10-28 RX ADMIN — STANDARDIZED SENNA CONCENTRATE AND DOCUSATE SODIUM 2 TABLET: 8.6; 5 TABLET, FILM COATED ORAL at 20:16

## 2017-10-28 RX ADMIN — CEFTRIAXONE 2 G: 2 INJECTION, SOLUTION INTRAVENOUS at 08:49

## 2017-10-28 RX ADMIN — LATANOPROST 1 DROP: 50 SOLUTION OPHTHALMIC at 08:50

## 2017-10-28 RX ADMIN — METOPROLOL TARTRATE 25 MG: 25 TABLET ORAL at 08:49

## 2017-10-28 RX ADMIN — INSULIN LISPRO 2 UNITS: 100 INJECTION, SOLUTION INTRAVENOUS; SUBCUTANEOUS at 16:46

## 2017-10-28 RX ADMIN — FINASTERIDE 5 MG: 5 TABLET, FILM COATED ORAL at 08:48

## 2017-10-28 RX ADMIN — CLOPIDOGREL 75 MG: 75 TABLET, FILM COATED ORAL at 08:48

## 2017-10-28 RX ADMIN — ATORVASTATIN CALCIUM 5 MG: 10 TABLET, FILM COATED ORAL at 08:46

## 2017-10-28 RX ADMIN — LISINOPRIL 5 MG: 5 TABLET ORAL at 08:46

## 2017-10-28 RX ADMIN — OMEGA-3 FATTY ACIDS CAP 1000 MG 1000 MG: 1000 CAP at 08:48

## 2017-10-28 RX ADMIN — ENOXAPARIN SODIUM 40 MG: 100 INJECTION SUBCUTANEOUS at 08:49

## 2017-10-28 RX ADMIN — ASPIRIN 81 MG: 81 TABLET, COATED ORAL at 08:48

## 2017-10-28 RX ADMIN — INSULIN LISPRO 1 UNITS: 100 INJECTION, SOLUTION INTRAVENOUS; SUBCUTANEOUS at 11:25

## 2017-10-28 ASSESSMENT — ENCOUNTER SYMPTOMS
EYE REDNESS: 0
COUGH: 0
BLOOD IN STOOL: 0
DIARRHEA: 0
TREMORS: 0
DIZZINESS: 0
SHORTNESS OF BREATH: 0
PALPITATIONS: 0
WEAKNESS: 0
FOCAL WEAKNESS: 0
SEIZURES: 0
FEVER: 0
WHEEZING: 0
MYALGIAS: 1
INSOMNIA: 0
HEADACHES: 0
FALLS: 1
ABDOMINAL PAIN: 0
CHILLS: 0
LOSS OF CONSCIOUSNESS: 0
NERVOUS/ANXIOUS: 0
HEMOPTYSIS: 0
CONSTIPATION: 0
VOMITING: 0
EYE PAIN: 0
NAUSEA: 0

## 2017-10-28 ASSESSMENT — PAIN SCALES - GENERAL
PAINLEVEL_OUTOF10: 0
PAINLEVEL_OUTOF10: 3
PAINLEVEL_OUTOF10: 0

## 2017-10-28 ASSESSMENT — LIFESTYLE VARIABLES: DO YOU DRINK ALCOHOL: NO

## 2017-10-28 NOTE — PROGRESS NOTES
[facility] Hospitalist Progress Note    Date of Service: 10/27/2017    Chief Complaint  89 y.o. male admitted 10/23/2017 with Fall (Patient complains of falling out of chair tonight and has had multiple falls recently and here via EMS to find out why he keeps falling.)        Interval Problem Update  No fever or chills. Awaiting PICC line    Consultants/Specialty  Cardiology    Disposition  SNF likely vs [facility]        Review of Systems   Constitutional: Negative for chills and fever.   HENT: Negative for congestion, hearing loss and nosebleeds.    Eyes: Negative for pain and redness.   Respiratory: Negative for cough, hemoptysis, shortness of breath and wheezing.    Cardiovascular: Negative for chest pain and palpitations.   Gastrointestinal: Negative for abdominal pain, blood in stool, constipation, diarrhea, nausea and vomiting.   Genitourinary: Negative for dysuria, frequency and hematuria.   Musculoskeletal: Positive for falls and myalgias. Negative for joint pain.   Skin: Negative for rash.   Neurological: Negative for dizziness, tremors, focal weakness, seizures, loss of consciousness, weakness and headaches.   Psychiatric/Behavioral: The patient is not nervous/anxious and does not have insomnia.    All other systems reviewed and are negative.     Physical Exam  Laboratory/Imaging   Hemodynamics  Temp (24hrs), Av.8 °C (98.3 °F), Min:36.3 °C (97.3 °F), Max:37.4 °C (99.3 °F)   Temperature: 36.5 °C (97.7 °F)  Pulse  Av  Min: 49  Max: 117    Blood Pressure : 140/58      Respiratory      Respiration: 18, Pulse Oximetry: 92 %     Work Of Breathing / Effort: Mild  RUL Breath Sounds: Clear, RML Breath Sounds: Diminished, RLL Breath Sounds: Diminished, HUNTER Breath Sounds: Clear, LLL Breath Sounds: Diminished    Fluids    Intake/Output Summary (Last 24 hours) at 10/27/17 194  Last data filed at 10/27/17 1600   Gross per 24 hour   Intake              390 ml   Output             1000 ml   Net             -610 ml        Nutrition  Orders Placed This Encounter   Procedures   • Diet Order     Standing Status:   Standing     Number of Occurrences:   1     Order Specific Question:   Diet:     Answer:   Diabetic [3]     Order Specific Question:   Diet:     Answer:   Cardiac [6]     Order Specific Question:   Calorie modifications:     Answer:   2500 kcals [7]     Order Specific Question:   Macronutrient modifications:     Answer:   Low Cholesterol [7]     Physical Exam   Constitutional: He appears well-developed and well-nourished.   Frail elderly   HENT:   Head: Normocephalic and atraumatic.   Eyes: Conjunctivae and EOM are normal. No scleral icterus.   Neck: Normal range of motion. Neck supple.   Cardiovascular: Normal rate and regular rhythm.  Exam reveals no gallop and no friction rub.    No murmur heard.  Pulmonary/Chest: Effort normal and breath sounds normal. No respiratory distress. He has no wheezes. He has no rales.   Abdominal: Soft. Bowel sounds are normal. He exhibits no distension. There is no tenderness. There is no rebound and no guarding.   Musculoskeletal: He exhibits no edema or tenderness.   Neurological: He is alert.   Skin: Skin is warm. Rash (old ecchymoses) noted.   Psychiatric: He has a normal mood and affect. His behavior is normal.       Recent Labs      10/25/17   0450  10/26/17   0445  10/27/17   0209   WBC  8.1  6.5  8.4   RBC  3.46*  3.67*  3.68*   HEMOGLOBIN  10.2*  10.7*  11.0*   HEMATOCRIT  30.5*  32.1*  32.6*   MCV  88.2  87.5  88.6   MCH  29.5  29.2  29.9   MCHC  33.4*  33.3*  33.7   RDW  44.4  44.2  44.5   PLATELETCT  122*  133*  147*   MPV  10.4  10.3  10.4     Recent Labs      10/25/17   0450  10/26/17   0445   SODIUM  134*  133*   POTASSIUM  3.3*  3.5*   CHLORIDE  103  102   CO2  23  24   GLUCOSE  112*  99   BUN  14  13   CREATININE  0.63  0.67   CALCIUM  8.1*  8.5     Recent Labs      10/24/17   2111  10/25/17   0450  10/26/17   0445   APTT  62.2*  56.6*  32.6                   Assessment/Plan     * Atrial fibrillation (CMS-HCC)   Assessment & Plan    Presented with falls.  Has paroxysmal A fib and was found to be in Afib with RVR. It was thought this was the cause of his weakness and falls  Continue cardiac monitoring  Patient stat chads 2 score is 3, patient will benefit from full dose anticoagulation  Was starte don Lovenox full dose  Continue Cardizem when necessary heart rate greater than 120  Cardiology, Dr. Iglesias has been consulted  Echo showed normal EF.  Discussed with Cardiology. Because of high fall risk, they have opted NO full anticoagulation. Heparin gtt d/cynthia and this was transitioned to hepa SQ  I will discharge him on aspirin and Plavix instead. HR in the 60s without rate controllers.  At discharge date he is afebrile, hemodynamically stable and wants to go home. He has declined SNF and prefers home health care. This was arranged.  Follow up with Zahra Yañez M.D. In 1-2 weeks  Follow up with Dr. Iglesias, EP Cardiology in 1-2 weeks for continued management of Afib          NSTEMI (non-ST elevated myocardial infarction) (CMS-HCC)   Assessment & Plan    Heparin started. Dr. Iglesias, Cardiology consulted. He felt PE needs to be ruled out. Bulmaro Wheeler intiially declined NM scan  On 10/25 he agreed to NM scan and that showed NO lung clot. NM scan showed low probability for PE.  At this time, Dr. Iglesias prefers to do outpatient stress testing. I willdefer to him if he wants to do elective cardiac catheterization.  Meanwhile he will continue aspirin, Plavix, statin. His HR is 50-60s so I will hold off on beta blockers. He has blood pressure room, so I will start him on an ACEI and give him PRN nitro SL, advise him no Viagra.  Follow up with Zahra Yañez M.D. In 1-2 weeks  Follow up with Dr. Iglesias, EP Cardiology in 1-2 weeks for continued management of Afib        Sepsis (CMS-HCC)- (present on admission)   Assessment & Plan    This is sepsis (without associated acute  "organ dysfunction).   After admission patient spiked a fever to 102 with heart rate 112.  Sepsis protocol initiated.  Check urinalysis and blood cultures  Fluid bolus prn according to guidelines  Empiric rocephin until source ascertained.  Blood culture came back positive with E. Coli, Klebsiella  I consulted ID physician, Dr. Rapp  On IV Rocephin and PICC line.        DM (diabetes mellitus) (CMS-HCC)- (present on admission)   Assessment & Plan    Follow-up A1c  Sliding scale insulin/Accu-Chek  Hold metformin        Carotid artery stenosis- (present on admission)   Assessment & Plan    History of  CT of the head is negative  MRI in May 2017 no new findings  CTA HandN showed carotid disease but no significant blockage or narrowing.        Bacteremia   Assessment & Plan    E. Coli and Klebsiella bacteremia, unclear source; cannot do GI workup as a source as he declines endoscopy  PICC line has been ordered, currently on Rocephin  On 10/27, awaiting PICC.        Advanced directives, counseling/discussion   Assessment & Plan    Discussed code statu. He wants to be FULL CODE. He clearly states \"Nobody wants to die, right?\"        Generalized weakness   Assessment & Plan    With P.Afib  R/o CVA  CT head is negative follow-up repeat MRI of the brain  Follow up CTA. Neck  Echocardiogram  Continue aspirin, Lovenox, statin  Blood pressure control  Neurology consult as needed  PT/OT        Glaucoma- (present on admission)   Assessment & Plan    Tinea eyedrops        Thrombocytopenia (HCC)- (present on admission)   Assessment & Plan    Monitor        BPH (benign prostatic hyperplasia)- (present on admission)   Assessment & Plan    Continue home meds            DVT prophylaxis pharmacological::  Contraindicated - High bleeding risk        "

## 2017-10-28 NOTE — PROGRESS NOTES
Infectious Disease Progress Note    Author: Alanis Rapp M.D. Date & Time of service: 10/28/2017  12:30 PM    Chief Complaint:  FU gram neg sepsis    Interval History:  89 y.o. White male admitted 10/23/2017 after a fall -found to have gram neg sepsis  10/25 AF WBC 8.1 up on side of bed-pleasantly confused  10/26 AF WBC 6.5 feels better-denies pain Family at bedside  10/27 AF no new complaints-no PICC yet  10/28 AF WBC 8.7 no complaints-denies pain or SE meds  Labs Reviewed, Medications Reviewed and Radiology Reviewed.    Review of Systems:  Review of Systems   Constitutional: Negative for chills and fever.   Gastrointestinal: Negative for abdominal pain, diarrhea, nausea and vomiting.   Genitourinary: Negative for dysuria.       Hemodynamics:  Temp (24hrs), Av.4 °C (97.6 °F), Min:36.2 °C (97.1 °F), Max:36.5 °C (97.7 °F)  Temperature: 36.4 °C (97.6 °F)  Pulse  Av.4  Min: 49  Max: 117   Blood Pressure : (!) 165/70       Physical Exam:  Physical Exam   Constitutional: He appears well-developed. No distress.   HENT:   Head: Normocephalic and atraumatic.   Eyes: EOM are normal. Pupils are equal, round, and reactive to light.   Neck: Neck supple.   Cardiovascular: Normal rate.    IRR   Pulmonary/Chest: Effort normal. No respiratory distress.   Abdominal: Soft. He exhibits no distension. There is no tenderness.   Neurological: He is alert. A cranial nerve deficit is present.   Skin: No rash noted. No erythema.   Nursing note and vitals reviewed.      Meds:    Current Facility-Administered Medications:   •  lisinopril  •  nitroglycerin  •  metoprolol  •  enoxaparin (LOVENOX) injection  •  clopidogrel  •  aspirin EC  •  atorvastatin  •  finasteride  •  latanoprost  •  fish oil  •  senna-docusate **AND** polyethylene glycol/lytes **AND** magnesium hydroxide **AND** bisacodyl  •  Respiratory Care per Protocol  •  acetaminophen  •  Notify provider if pain remains uncontrolled **AND** Use the numeric rating  scale (NRS-11) on regular floors and Critical-Care Pain Observation Tool (CPOT) on ICUs/Trauma to assess pain **AND** Pulse Ox (Oximetry) **AND** Pharmacy Consult Request **AND** If patient difficult to arouse and/or has respiratory depression, stop any opiates that are currently infusing and call a Rapid Response. **AND** oxycodone immediate-release **AND** oxycodone immediate-release **AND** morphine injection  •  labetalol  •  diltiazem  •  insulin lispro  •  Action is required: Protocol 1073 Hypoglycemia has been implemented **AND** Protocol 1073 Inclusion Criteria **AND** Protocol 1073 NOTIFY **AND** Protocol 1073 Initiate protocol immediately if FSBG is less than or equal to 70 mg/dL **AND** glucose 4 g **AND** dextrose 50%  •  ondansetron  •  ondansetron  •  NS  •  cefTRIAXone (ROCEPHIN) IVPB    Labs:  Recent Labs      10/26/17   0445  10/27/17   0209  10/28/17   0300   WBC  6.5  8.4  8.7   RBC  3.67*  3.68*  3.78*   HEMOGLOBIN  10.7*  11.0*  11.3*   HEMATOCRIT  32.1*  32.6*  34.0*   MCV  87.5  88.6  89.9   MCH  29.2  29.9  29.9   RDW  44.2  44.5  44.7   PLATELETCT  133*  147*  164   MPV  10.3  10.4  10.3     Recent Labs      10/26/17   0445   SODIUM  133*   POTASSIUM  3.5*   CHLORIDE  102   CO2  24   GLUCOSE  99   BUN  13     Recent Labs      10/26/17   0445   CREATININE  0.67       Imaging:  Ct-cta Head With & W/o-post Process    Result Date: 10/23/2017  10/23/2017 11:49 AM HISTORY/REASON FOR EXAM:  Weakness and frequent falls TECHNIQUE/EXAM DESCRIPTION: CT angiogram of the [place] without and with contrast.  Initial precontrast images were obtained of the head from the skull base through the vertex.  Postcontrast images were obtained of the [place] following the power injection of nonionic contrast at 5.0 mL/sec. Thin-section helical images were obtained with overlapping reconstruction interval. Coronal and sagittal multiplanar volume reformats were generated.  3D angiographic images were  reviewed on PACS.  Maximum intensity projection (MIP) images were generated and reviewed. 100 mL of Omnipaque 350 nonionic contrast was injected intravenously. Low dose optimization technique was utilized for this CT exam including automated exposure control and adjustment of the mA and/or kV according to patient size. COMPARISON:  10/23/2017 CT head 5:06 AM FINDINGS: CT head: No acute cranial hemorrhage, mass effect, or midline shift. No acute ischemia or mass effect is identified. There is diffuse atrophy. Periventricular white matter changes consistent with chronic small vessel disease. Ventricles and cisterns are patent. Paranasal sinuses and mastoid air cells are clear. CTA head: There are scattered calcifications in the intracranial segments of the right internal carotid artery. The right middle and anterior cerebral arteries are normal in course and caliber. No thrombus or aneurysm identified. There are scattered calcifications in the intracranial segment of the left internal carotid artery. The left middle and anterior through arteries are normal in course and caliber. No thrombus or aneurysm identified. The distal vertebral arteries are widely patent. The basilar artery is patent. There is a fetal origin of the right posterior cerebral artery. The dural venous sinuses are patent.     1.  No acute thrombus or aneurysm is identified. 2.  Diffuse atrophy and periventricular white matter changes, consistent with chronic small vessel disease.    Ct-cta Neck With & W/o-post Processing    Result Date: 10/23/2017  10/23/2017 11:49 AM HISTORY/REASON FOR EXAM: Altered mental status. TECHNIQUE/EXAM DESCRIPTION: CT angiogram of the neck without and with contrast, with reconstructions. Initial precontrast images were obtained from the great vessels through the skull base. Postcontrast images were obtained of the neck from the great vessels through the skull base following the power injection of nonionic contrast at 5.0  mL/sec. Thin-section helical images were obtained with overlapping reconstruction interval. Coronal and oblique multiplanar volume reformats were generated. 3-D images were reviewed on a PACS workstation. Maximum intensity pixel shading reconstructions were performed. 100 mL of Omnipaque 350 nonionic contrast was injected intravenously. Cervical internal carotid artery percent stenosis is calculated using the standard method according to the NASCET criteria wherein a segment of uniform caliber mid or distal cervical internal carotid is used as the reference denominator. Low dose optimization technique was utilized for this CT exam including automated exposure control and adjustment of the mA and/or kV according to patient size. COMPARISON: 2/25/2014 FINDINGS: There is mild emphysematous change of the lungs. There is extensive atherosclerotic plaque involving the aorta and originating vessels. There is extensive plaque extending along the course of the common carotid arteries and the subclavian arteries bilaterally. There is less than 50% diameter narrowing of the common carotid artery on the left. There has been interval endarterectomy on the left with a widely patent internal carotid artery on the left. There is some atherosclerotic plaque of the intracranial internal carotid arteries. There is atherosclerotic plaque at the right carotid bifurcation and extending into the internal carotid artery on the right. This results in less than 50% diameter narrowing. The vertebral arteries are patent bilaterally.     1.  Interval left internal carotid endarterectomy with no single widely patent left internal carotid artery. 2.  Atherosclerotic plaque involving the common carotid arteries bilaterally and the right internal carotid artery at and above the level of the bifurcation. This results in less than 50% diameter narrowing. 3.  Patent vertebral arteries bilaterally.    Ct-head W/o    Result Date:  10/23/2017  HISTORY/REASON FOR EXAM:  Loss of equilibrium followed by fall. TECHNIQUE/EXAM DESCRIPTION: CT scan of the head without contrast, 10/23/2017 4:57 AM. Contiguous 5 mm axial sections were obtained from the skull base through the vertex. Up to date radiation dose reduction adjustments have been utilized to meet ALARA standards for radiation dose reduction. COMPARISON:  5/2/2017 FINDINGS:   The ventricular system and cortical sulci are prominent, consistent with the patient's advanced age.  There is no midline shift or other mass effect. Patchy white matter lucencies are again seen bilaterally consistent with old small vessel ischemic changes or demyelination. There is no acute intra-axial abnormality or extra-axial fluid collection.  There is no intracranial hemorrhage.  The calvaria are intact. The visualized paranasal sinuses show no unusual opacity.     1.  No acute intracranial abnormality. 2.  Age-consistent atrophy and chronic white matter changes. INTERPRETING LOCATION:  [address] [place] [address], [id number]    Dx-chest-portable (1 View)    Result Date: 10/24/2017  10/24/2017 10:40 AM HISTORY/REASON FOR EXAM:  Shortness of breath. TECHNIQUE/EXAM DESCRIPTION AND NUMBER OF VIEWS: Single portable view of the chest. COMPARISON: 1 view chest history FINDINGS: The lungs are clear. Cardiac silhouette: normal in size. There is aortic atherosclerosis. Pleura: There are no pleural effusion or pneumothoraces. Osseous structures: There is severe bilateral glenohumeral joint osteoarthritis     No acute cardiopulmonary abnormality identified.    Dx-chest-portable (1 View)    Result Date: 10/23/2017  10/23/2017 4:51 AM HISTORY/REASON FOR EXAM:  Chest Pain TECHNIQUE/EXAM DESCRIPTION AND NUMBER OF VIEWS: Single portable view of the chest. COMPARISON: 5/12/2017 FINDINGS: The heart, mediastinum, and taylor are unremarkable. The lungs are well expanded and show no evidence of infiltrate or other acute process. There is no obvious  "pleural effusion. The bony thorax is grossly normal.     No acute cardiopulmonary abnormality.    Mr-brain-w/o    Result Date: 10/23/2017  10/23/2017 10:09 AM HISTORY/REASON FOR EXAM:  Ataxia. TECHNIQUE/EXAM DESCRIPTION: MRI of the brain without contrast. T1 sagittal, T2 fast spin-echo axial, T1 coronal, FLAIR coronal, diffusion-weighted and apparent diffusion coefficient (ADC map) axial images were obtained of the whole brain. The study was performed on a [employer] 1.5 Kiah MRI scanner. COMPARISON:  None. FINDINGS: There is no acute infarct. There is no acute or chronic parenchymal hemorrhage. A few nonspecific T2 hyperintensities are noted in the subcortical and periventricular white matter. Moderate cerebral volume loss is seen. There is chronic lacunar  infarct in the left thalamus. There is mild atrophy of the midbrain causing \"hummingbird\" sign. There is moderate dilatation of the lateral and third ventricles. There is prominent flow void in the aqueduct. There is no extra-axial fluid collection, hemorrhage or mass. The visualized flow voids of the cerebral vasculature are unremarkable.  There is no large lesion identified in the expected course of the intracranial portions of the cranial nerves. The skull bones are normal. The paranasal sinuses are clear. The extracranial soft tissue including orbits appear grossly normal.     1.  Moderate dilatation of the ventricles. The ventricular dilatation is slightly more prominent than the associated cortical atrophy. There is also prominent flow void in the aqueduct. 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 However there are no other secondary signs of normal pressure hydrocephalus such as acute angulation of the frontal horns and tight frontoparietal sulci. Please correlate clinically. There has been no significant interval change. 2.  No acute infarct. 3.  Mild chronic microvascular ischemic disease. 4.  Moderate cerebral atrophy. 5.  There " "is mild atrophy of the midbrain causing a\"hummingbird\" sign. This is a nonspecific finding and can be seen in the patients with progressive supra bulbar palsy. Please correlate clinically.    Nm-lung Vent/perf Imaging    Result Date: 10/25/2017  10/25/2017 1:45 PM HISTORY/REASON FOR EXAM:  Elevated troponin and chest pain TECHNIQUE/EXAM DESCRIPTION AND NUMBER OF VIEWS:  30.5 mCi aerosolized Tc 99m-DTPA was administered as a gas, followed by multiple projection imaging. 6.75 mCi Tc 99m-MAA was then administered intravenously followed by multiple projection imaging. COMPARISON:  Chest radiograph 10/24/2017 FINDINGS:     No focal consolidations are noted on chest radiograph. Some poorly defined opacifications are noted in the left lung base. Perfusion images reveal no evidence of lobar perfusion defect. No segmental or significant subsegmental defects are noted. Ventilation images reveal no focal areas of normal ventilation that would correspond to abnormal perfusion.     1.  Low probability of pulmonary embolism.    Echocardiogram Comp W/o Cont    Result Date: 10/24/2017  Transthoracic Echo Report Echocardiography Laboratory CONCLUSIONS Normal left ventricular chamber size. Normal left ventricular systolic function. Left ventricular ejection fraction is visually estimated to be 65%. Trace mitral regurgitation. Mildly dilated left atrium. Aortic sclerosis without stenosis. Structurally normal tricuspid valve without significant stenosis or regurgitation. Normal right ventricular size and systolic function. No pericardial effusion seen. Compared to the images of the prior study done  11/28/16, no signficant change JT MARIA Exam Date:          Deceleration Time              152 ms                * Indicates values subject to auto-interpretation LV EF:  65    % FINDINGS Left Ventricle Normal left ventricular chamber size. Normal left ventricular wall thickness with sigmoid septum. Normal left ventricular systolic " "function. Left ventricular ejection fraction is visually estimated to be 65%. Normal regional wall motion. Grade II diastolic dysfunction. Right Ventricle Normal right ventricular size and systolic function. Right Atrium Normal right atrial size. Left Atrium Mildly dilated left atrium. Left atrial volume index is 36 mL/sq m. Mitral Valve Structurally normal mitral valve. No mitral stenosis.  Trace mitral regurgitation. Aortic Valve Aortic sclerosis without stenosis. No aortic insufficiency. Tricuspid Valve Structurally normal tricuspid valve without significant stenosis or regurgitation. Pulmonic Valve The pulmonic valve is not well visualized. No stenosis or regurgitation seen. Pericardium No pericardial effusion seen. Aorta Normal ascending aorta. Jackie Bustillo M.D. (Electronically Signed) Final Date:     24 October 2017                 14:28      Micro:  Results     Procedure Component Value Units Date/Time    BLOOD CULTURE [[id number]]  (Abnormal) Collected:  10/23/17 1333    Order Status:  Completed Specimen:  Blood from Peripheral Updated:  10/26/17 0903     Significant Indicator POS (POS)     Source BLD     Site PERIPHERAL     Blood Culture Growth detected by Bactec instrument.  10/24/2017  01:57 (A)     Blood Culture Klebsiella pneumoniae (A)     Blood Culture -- (A)     Escherichia coli  See previous culture for sensitivity report.      Narrative:       CALL  Tran  183 tel. [phone],  CALLED  183 tel. [phone] 10/24/2017, 02:01, RB PERF. RESULTS CALLED TO: RN  [phone]  Per Hospital Policy: Only change Specimen Src: to \"Line\" if  specified by physician order.    BLOOD CULTURE [[id number]]  (Abnormal)  (Susceptibility) Collected:  10/23/17 1333    Order Status:  Completed Specimen:  Blood from Peripheral Updated:  10/26/17 0901     Significant Indicator POS (POS)     Source BLD     Site PERIPHERAL     Blood Culture Growth detected by Bactec instrument.  10/24/2017  01:55 (A)     Blood Culture " "Klebsiella pneumoniae (A)     Blood Culture Escherichia coli (A)    Narrative:       CALL  Tran  183 tel. [phone],  CALLED  183 tel. [phone] 10/24/2017, 02:02, RB PERF. RESULTS CALLED TO: RN  [phone]  Per Hospital Policy: Only change Specimen Src: to \"Line\" if  specified by physician order.    Culture & Susceptibility     ESCHERICHIA COLI     Antibiotic Sensitivity Microscan Unit Status    Ampicillin Sensitive <=8 mcg/mL Final    Cefepime Sensitive <=8 mcg/mL Final    Cefotaxime Sensitive <=2 mcg/mL Final    Cefotetan Sensitive <=16 mcg/mL Final    Ceftazidime Sensitive <=1 mcg/mL Final    Ceftriaxone Sensitive <=8 mcg/mL Final    Cefuroxime Sensitive 8 mcg/mL Final    Ciprofloxacin Resistant >2 mcg/mL Final    Ertapenem Sensitive <=1 mcg/mL Final    Gentamicin Sensitive <=4 mcg/mL Final    Pip/Tazobactam Sensitive <=16 mcg/mL Final    Tigecycline Sensitive <=2 mcg/mL Final    Tobramycin Sensitive <=4 mcg/mL Final    Trimeth/Sulfa Resistant >2/38 mcg/mL Final              KLEBSIELLA PNEUMONIAE     Antibiotic Sensitivity Microscan Unit Status    Ampicillin Resistant >16 mcg/mL Final    Cefepime Sensitive <=8 mcg/mL Final    Cefotaxime Sensitive <=2 mcg/mL Final    Cefotetan Sensitive <=16 mcg/mL Final    Ceftazidime Sensitive <=1 mcg/mL Final    Ceftriaxone Sensitive <=8 mcg/mL Final    Cefuroxime Sensitive <=4 mcg/mL Final    Ciprofloxacin Sensitive <=1 mcg/mL Final    Ertapenem Sensitive <=1 mcg/mL Final    Gentamicin Sensitive <=4 mcg/mL Final    Pip/Tazobactam Sensitive <=16 mcg/mL Final    Tigecycline Sensitive <=2 mcg/mL Final    Tobramycin Sensitive <=4 mcg/mL Final    Trimeth/Sulfa Sensitive <=2/38 mcg/mL Final                       BLOOD CULTURE [[id number]] Collected:  10/24/17 1227    Order Status:  Completed Specimen:  Blood from Peripheral Updated:  10/25/17 0822     Significant Indicator NEG     Source BLD     Site PERIPHERAL     Blood Culture --     No Growth    Note: Blood cultures are incubated " "for 5 days and  are monitored continuously.Positive blood cultures  are called to the RN and reported as soon as  they are identified.      Narrative:       Per Hospital Policy: Only change Specimen Src: to \"Line\" if  specified by physician order.    BLOOD CULTURE [[id number]] Collected:  10/24/17 1227    Order Status:  Completed Specimen:  Blood from Peripheral Updated:  10/25/17 0822     Significant Indicator NEG     Source BLD     Site PERIPHERAL     Blood Culture --     No Growth    Note: Blood cultures are incubated for 5 days and  are monitored continuously.Positive blood cultures  are called to the RN and reported as soon as  they are identified.      Narrative:       Per Hospital Policy: Only change Specimen Src: to \"Line\" if  specified by physician order.    INFLUENZA BY PCR, A/B/H1N1 [[id number]] Collected:  10/23/17 1840    Order Status:  Completed Specimen:  Nasal from Nasopharyngeal Updated:  10/23/17 2246     Influenza virus A RNA Negative     Influenza virus B, PCR Negative     Influenza A 2009, H1N1, PCR Not Detected    Narrative:       Collected By:[id number] FEI HDZ    URINALYSIS [[id number]]  (Abnormal) Collected:  10/23/17 1410    Order Status:  Completed Specimen:  Urine from Urine, Clean Catch Updated:  10/23/17 1750     Color Yellow     Character Clear     Specific Gravity 1.038     Ph 6.5     Glucose 500 (A) mg/dL      Ketones Trace (A) mg/dL      Protein 100 (A) mg/dL      Bilirubin Negative     Urobilinogen, Urine 1.0     Nitrite Negative     Leukocyte Esterase Negative     Occult Blood Moderate (A)     Micro Urine Req Microscopic    Narrative:       Collected By:[id number] FEI HDZ    Culture Respiratory W/ GRM STN [[id number]]     Order Status:  Completed Specimen:  Respirate from Sputum           Assessment:  Active Hospital Problems    Diagnosis   • *Atrial fibrillation (CMS-[facility]) [I48.91]   • Sepsis (CMS-[facility]) [A41.9]   • Carotid artery stenosis [I65.29]   • DM (diabetes " mellitus) (CMS-HCC) [E11.9]   • BPH (benign prostatic hyperplasia) [N40.0]   • NSTEMI (non-ST elevated myocardial infarction) (CMS-HCC) [I21.4]   • Advanced directives, counseling/discussion [Z71.89]   • Generalized weakness [R53.1]   • Glaucoma [H40.9]   • Thrombocytopenia (HCC) [D69.6]       Plan:  Gram negative sepsis (CMS-HCC)- (present on admission)  Sepsis (with associated acute organ dysfunction-NSTEMI and AMS)  Source cryptic  Afebrile  Resolved leukocytosis  Blood cultures 10/23 Kleb and Ecoli  Ucx neg  Blood cxs 10/24 NGTD  Continue rocephin for 2 weeks from date of neg cxs  PICC when blood cxs neg 48 hours-ordered 10/26-not done yet  GI work up recommended for source but pt age and comorbidities make him increased risk for complications-per their wishes  Stop date 11/7/2017     DM (diabetes mellitus) (CMS-HCC)- (present on admission)   Keep BS under 150 to help control current infection    Orders for infusion faxed to  10/26  Will sign off-please reconsult if needed

## 2017-10-28 NOTE — PROGRESS NOTES
[facility] Hospitalist Progress Note    Date of Service: 10/28/2017    Chief Complaint  89 y.o. male admitted 10/23/2017 with Fall (Patient complains of falling out of chair tonight and has had multiple falls recently and here via EMS to find out why he keeps falling.)        Interval Problem Update  No fever or chills. Awaiting PICC line. Currently he is not agitated.    Consultants/Specialty  Cardiology    Disposition  SNF likely vs [facility]        Review of Systems   Constitutional: Negative for chills and fever.   HENT: Negative for congestion, hearing loss and nosebleeds.    Eyes: Negative for pain and redness.   Respiratory: Negative for cough, hemoptysis, shortness of breath and wheezing.    Cardiovascular: Negative for chest pain and palpitations.   Gastrointestinal: Negative for abdominal pain, blood in stool, constipation, diarrhea, nausea and vomiting.   Genitourinary: Negative for dysuria, frequency and hematuria.   Musculoskeletal: Positive for falls and myalgias. Negative for joint pain.   Skin: Negative for rash.   Neurological: Negative for dizziness, tremors, focal weakness, seizures, loss of consciousness, weakness and headaches.   Psychiatric/Behavioral: The patient is not nervous/anxious and does not have insomnia.    All other systems reviewed and are negative.     Physical Exam  Laboratory/Imaging   Hemodynamics  Temp (24hrs), Av.4 °C (97.6 °F), Min:35.9 °C (96.6 °F), Max:37.2 °C (98.9 °F)   Temperature: 37.2 °C (98.9 °F)  Pulse  Av  Min: 49  Max: 117    Blood Pressure : 144/52      Respiratory      Respiration: 18, Pulse Oximetry: 98 %     Work Of Breathing / Effort: Mild  RUL Breath Sounds: Clear, RML Breath Sounds: Diminished, RLL Breath Sounds: Diminished, HUNTER Breath Sounds: Clear, LLL Breath Sounds: Diminished    Fluids    Intake/Output Summary (Last 24 hours) at 10/28/17 1642  Last data filed at 10/28/17 1617   Gross per 24 hour   Intake              640 ml   Output             2050 ml    Net            -1410 ml       Nutrition  Orders Placed This Encounter   Procedures   • Diet Order     Standing Status:   Standing     Number of Occurrences:   1     Order Specific Question:   Diet:     Answer:   Diabetic [3]     Order Specific Question:   Diet:     Answer:   Cardiac [6]     Order Specific Question:   Calorie modifications:     Answer:   2500 kcals [7]     Order Specific Question:   Macronutrient modifications:     Answer:   Low Cholesterol [7]     Physical Exam   Constitutional: He appears well-developed and well-nourished.   Frail elderly   HENT:   Head: Normocephalic and atraumatic.   Eyes: Conjunctivae and EOM are normal. No scleral icterus.   Neck: Normal range of motion. Neck supple.   Cardiovascular: Normal rate and regular rhythm.  Exam reveals no gallop and no friction rub.    No murmur heard.  Pulmonary/Chest: Effort normal and breath sounds normal. No respiratory distress. He has no wheezes. He has no rales.   Abdominal: Soft. Bowel sounds are normal. He exhibits no distension. There is no tenderness. There is no rebound and no guarding.   Musculoskeletal: He exhibits no edema or tenderness.   Neurological: He is alert.   Skin: Skin is warm. Rash (old ecchymoses) noted.   Psychiatric: He has a normal mood and affect. His behavior is normal.       Recent Labs      10/26/17   0445  10/27/17   0209  10/28/17   0300   WBC  6.5  8.4  8.7   RBC  3.67*  3.68*  3.78*   HEMOGLOBIN  10.7*  11.0*  11.3*   HEMATOCRIT  32.1*  32.6*  34.0*   MCV  87.5  88.6  89.9   MCH  29.2  29.9  29.9   MCHC  33.3*  33.7  33.2*   RDW  44.2  44.5  44.7   PLATELETCT  133*  147*  164   MPV  10.3  10.4  10.3     Recent Labs      10/26/17   0445   SODIUM  133*   POTASSIUM  3.5*   CHLORIDE  102   CO2  24   GLUCOSE  99   BUN  13   CREATININE  0.67   CALCIUM  8.5     Recent Labs      10/26/17   0445   APTT  32.6                  Assessment/Plan     * Atrial fibrillation (CMS-HCC)   Assessment & Plan    Presented with  falls.  Has paroxysmal A fib and was found to be in Afib with RVR. It was thought this was the cause of his weakness and falls  Continue cardiac monitoring  Patient stat chads 2 score is 3, patient will benefit from full dose anticoagulation  Was starte don Lovenox full dose  Continue Cardizem when necessary heart rate greater than 120  Cardiology, Dr. Iglesias has been consulted  Echo showed normal EF.  Discussed with Cardiology. Because of high fall risk, they have opted NO full anticoagulation. Heparin gtt d/cynthia and this was transitioned to hepa SQ  I will discharge him on aspirin and Plavix instead. HR in the 60s without rate controllers.  At discharge date he is afebrile, hemodynamically stable and wants to go home. He has declined SNF and prefers home health care. This was arranged.  Follow up with Zahra Yañez M.D. In 1-2 weeks  Follow up with Dr. Iglesias, [facility] in 1-2 weeks for continued management of Afib          NSTEMI (non-ST elevated myocardial infarction) (CMS-HCC)   Assessment & Plan    Heparin started. Dr. Iglesias, Cardiology consulted. He felt PE needs to be ruled out. Bulmaro Wheeler intiially declined NM scan  On 10/25 he agreed to NM scan and that showed NO lung clot. NM scan showed low probability for PE.  At this time, Dr. Iglesias prefers to do outpatient stress testing. I willdefer to him if he wants to do elective cardiac catheterization.  Meanwhile he will continue aspirin, Plavix, statin. His HR is 50-60s so I will hold off on beta blockers. He has blood pressure room, so I will start him on an ACEI and give him PRN nitro SL, advise him no Viagra.  Follow up with Zahra Yañez M.D. In 1-2 weeks  Follow up with Dr. Iglesias, [facility] in 1-2 weeks for continued management of Afib        Sepsis (CMS-HCC)- (present on admission)   Assessment & Plan    This is sepsis (without associated acute organ dysfunction).   After admission patient spiked a fever to 102 with heart rate  "112.  Sepsis protocol initiated.  Check urinalysis and blood cultures  Fluid bolus prn according to guidelines  Empiric rocephin until source ascertained.  Blood culture came back positive with E. Coli, Klebsiella  I consulted ID physician, Dr. Rapp  On IV Rocephin and PICC line.        DM (diabetes mellitus) (CMS-HCC)- (present on admission)   Assessment & Plan    Follow-up A1c  Sliding scale insulin/Accu-Chek  Hold metformin        Carotid artery stenosis- (present on admission)   Assessment & Plan    History of  CT of the head is negative  MRI in May 2017 no new findings  CTA HandN showed carotid disease but no significant blockage or narrowing.        Bacteremia   Assessment & Plan    E. Coli and Klebsiella bacteremia, unclear source; cannot do GI workup as a source as he declines endoscopy  PICC line has been ordered, currently on Rocephin  On 10/27, awaiting PICC.        Advanced directives, counseling/discussion   Assessment & Plan    Discussed code statu. He wants to be FULL CODE. He clearly states \"Nobody wants to die, right?\"        Generalized weakness   Assessment & Plan    With P.Afib  R/o CVA  CT head is negative follow-up repeat MRI of the brain  Follow up CTA. Neck  Echocardiogram  Continue aspirin, Lovenox, statin  Blood pressure control  Neurology consult as needed  PT/OT        Glaucoma- (present on admission)   Assessment & Plan    Tinea eyedrops        Thrombocytopenia (HCC)- (present on admission)   Assessment & Plan    Monitor        BPH (benign prostatic hyperplasia)- (present on admission)   Assessment & Plan    Continue home meds            DVT prophylaxis pharmacological::  Contraindicated - High bleeding risk        "

## 2017-10-28 NOTE — DISCHARGE PLANNING
Medical Social Work    Referral: [facility]HC and Infusion Care.     Intervention: Pt will most likely get a PICC Sunday. [facility] have accepted. CCS asked to follow with HHC and infusion that discharge will most likely be Sunday, October 29, 2017.    Plan: Wait for PICC. Then wait for confirmation from HHC and infusion on start date

## 2017-10-28 NOTE — PROGRESS NOTES
Assumed care of patient. Bedside report received from KATINA Costa. Updated on POC, call light within reach and fall precautions in place. Bed locked and in lowest position. Pt asleep at this time, bed alarm on and functioning properly. MAR, labs, orders reviewed.

## 2017-10-28 NOTE — PROGRESS NOTES
Gricelda Coombs Fall Risk Assessment:     Last Known Fall: Within the last month  Mobility: Use of assistive device/requires assist of two people  Medications: Cardiovascular or central nervous system meds  Mental Status/LOC/Awareness: Lethargic/oriented to person only  Toileting Needs: Incontinence  Volume/Electrolyte Status: No problems  Communication/Sensory: Visual (Glasses)/hearing deficit  Behavior: Appropriate behavior  Gricelda Coombs Fall Risk Total: 16  Fall Risk Level: HIGH RISK    Universal Fall Precautions:  call light/belongings in reach, bed in low position and locked, siderails up x 2, wheelchairs and assistive devices out of sight, adequate lighting, use non-slip footwear, clutter free and spill free environment, educate on level of risk, educate to call for assistance    Fall Risk Level Interventions:     TRIAL (TELE 8, NEURO, MED MIAH 5) High Fall Risk Interventions  Place yellow fall risk ID band on patient: completed  Provide patient/family education based on risk assessment: verified  Educate patient/family to call staff for assistance when getting out of bed: verified  Place fall precaution signage outside patient door: verified  Place patient in room close to nursing station: verified  Utilize bed/chair fall alarm: verified  Notify charge of high risk for huddle: verified    Patient Specific Interventions:     Medication: review medications with patient and family, assess for medications that can be discontinued or dosage decreased and limit combination of prn medications  Mental Status/LOC/Awareness: reorient patient, reinforce falls education, check on patient hourly, reinforce the use of call light and provide activity  Toileting: provide frquent toileting, monitor intake and output/use of appropriate interventions, instruct patient/family on the need to call for assistance when toileting and do not leave patient unattended in bathroom/refer to toileting scripting  Volume/Electrolyte Status:  ensure patient remains hydrated, monitor blood sugars and maintain appropriate blood sugar levels if diabetic and monitor abnormal lab values  Communication/Sensory: update plan of care on whiteboard, ensure proper positioning when transferrng/ambulating and ensure patient has glasses/contacts and hearing aids/dentures  Behavioral: encourage patient to voice feelings, engage patient in daily activities, administer medication as ordered, instruct/reinforce fall program rationale and use appropriate de-escalation techniques  Mobility: schedule physical activity throughout the day, provide comfort measures during transport, ensure bed is locked and in lowest position, provide appropriate assistive device, instruct patient to exit bed on their strongest side and collaborate with doctor for possible PT/OT consult

## 2017-10-28 NOTE — CARE PLAN
Problem: Knowledge Deficit  Goal: Knowledge of disease process/condition, treatment plan, diagnostic tests, and medications will improve  Outcome: PROGRESSING AS EXPECTED  POC discussed, all questions answered, no other concerns at this time.    Problem: Pain Management  Goal: Pain level will decrease to patient's comfort goal  Outcome: PROGRESSING AS EXPECTED  Pt denies pain at this time.

## 2017-10-28 NOTE — PROGRESS NOTES
Gricelda Coombs Fall Risk Assessment:     Last Known Fall: Within the last month  Mobility: Use of assistive device/requires assist of two people  Medications: Cardiovascular or central nervous system meds  Mental Status/LOC/Awareness: Awake, alert, and oriented to date, place, and person  Toileting Needs: Incontinence  Volume/Electrolyte Status: No problems  Communication/Sensory: Visual (Glasses)/hearing deficit  Behavior: Appropriate behavior  Gricelda Coombs Fall Risk Total: 14  Fall Risk Level: MODERATE RISK    Universal Fall Precautions:  call light/belongings in reach, bed in low position and locked, wheelchairs and assistive devices out of sight, siderails up x 2, use non-slip footwear, adequate lighting, clutter free and spill free environment, educate on level of risk, educate to call for assistance    Fall Risk Level Interventions:    TRIAL ([employer] 8, NEURO, MED MIAH 5) Moderate Fall Risk Interventions  Place yellow fall risk ID band on patient: verified  Provide patient/family education based on risk assessment : completed  Educate patient/family to call staff for assistance when getting out of bed: completed  Place fall precaution signage outside patient door: verified  Utilize bed/chair fall alarm: completedTRIAL (TELE 8, NEURO, [employer] MIAH 5) High Fall Risk Interventions  Place yellow fall risk ID band on patient: completed  Provide patient/family education based on risk assessment: verified  Educate patient/family to call staff for assistance when getting out of bed: verified  Place fall precaution signage outside patient door: verified  Place patient in room close to nursing station: verified  Utilize bed/chair fall alarm: verified  Notify charge of high risk for huddle: verified    Patient Specific Interventions:     Medication: review medications with patient and family, assess for medications that can be discontinued or dosage decreased and limit combination of prn medications  Mental  Status/LOC/Awareness: reinforce falls education, check on patient hourly, utilize bed/chair fall alarm and reinforce the use of call light  Toileting: instruct patient/family on the need to call for assistance when toileting and do not leave patient unattended in bathroom/refer to toileting scripting  Volume/Electrolyte Status: monitor abnormal lab values  Communication/Sensory: update plan of care on whiteboard, ensure proper positioning when transferrng/ambulating and ensure patient has glasses/contacts and hearing aids/dentures  Behavioral: administer medication as ordered and instruct/reinforce fall program rationale  Mobility: utilize bed/chair fall alarm, ensure bed is locked and in lowest position and provide appropriate assistive device

## 2017-10-29 LAB
BACTERIA BLD CULT: NORMAL
BACTERIA BLD CULT: NORMAL
ERYTHROCYTE [DISTWIDTH] IN BLOOD BY AUTOMATED COUNT: 43.8 FL (ref 35.9–50)
GLUCOSE BLD-MCNC: 103 MG/DL (ref 65–99)
GLUCOSE BLD-MCNC: 125 MG/DL (ref 65–99)
GLUCOSE BLD-MCNC: 169 MG/DL (ref 65–99)
GLUCOSE BLD-MCNC: 175 MG/DL (ref 65–99)
HCT VFR BLD AUTO: 34.5 % (ref 42–52)
HGB BLD-MCNC: 11.6 G/DL (ref 14–18)
MCH RBC QN AUTO: 29.7 PG (ref 27–33)
MCHC RBC AUTO-ENTMCNC: 33.6 G/DL (ref 33.7–35.3)
MCV RBC AUTO: 88.2 FL (ref 81.4–97.8)
PLATELET # BLD AUTO: 175 K/UL (ref 164–446)
PMV BLD AUTO: 9.9 FL (ref 9–12.9)
RBC # BLD AUTO: 3.91 M/UL (ref 4.7–6.1)
SIGNIFICANT IND 70042: NORMAL
SIGNIFICANT IND 70042: NORMAL
SITE SITE: NORMAL
SITE SITE: NORMAL
SOURCE SOURCE: NORMAL
SOURCE SOURCE: NORMAL
WBC # BLD AUTO: 9.3 K/UL (ref 4.8–10.8)

## 2017-10-29 PROCEDURE — 700102 HCHG RX REV CODE 250 W/ 637 OVERRIDE(OP): Performed by: NURSE PRACTITIONER

## 2017-10-29 PROCEDURE — 36415 COLL VENOUS BLD VENIPUNCTURE: CPT

## 2017-10-29 PROCEDURE — 770020 HCHG ROOM/CARE - TELE (206)

## 2017-10-29 PROCEDURE — 700102 HCHG RX REV CODE 250 W/ 637 OVERRIDE(OP): Performed by: INTERNAL MEDICINE

## 2017-10-29 PROCEDURE — A9270 NON-COVERED ITEM OR SERVICE: HCPCS | Performed by: INTERNAL MEDICINE

## 2017-10-29 PROCEDURE — 700111 HCHG RX REV CODE 636 W/ 250 OVERRIDE (IP): Performed by: HOSPITALIST

## 2017-10-29 PROCEDURE — 700111 HCHG RX REV CODE 636 W/ 250 OVERRIDE (IP): Performed by: INTERNAL MEDICINE

## 2017-10-29 PROCEDURE — A9270 NON-COVERED ITEM OR SERVICE: HCPCS | Performed by: NURSE PRACTITIONER

## 2017-10-29 PROCEDURE — 85027 COMPLETE CBC AUTOMATED: CPT

## 2017-10-29 PROCEDURE — 82962 GLUCOSE BLOOD TEST: CPT | Mod: 91

## 2017-10-29 PROCEDURE — 99233 SBSQ HOSP IP/OBS HIGH 50: CPT | Performed by: INTERNAL MEDICINE

## 2017-10-29 RX ADMIN — STANDARDIZED SENNA CONCENTRATE AND DOCUSATE SODIUM 2 TABLET: 8.6; 5 TABLET, FILM COATED ORAL at 09:33

## 2017-10-29 RX ADMIN — ENOXAPARIN SODIUM 40 MG: 100 INJECTION SUBCUTANEOUS at 09:34

## 2017-10-29 RX ADMIN — INSULIN LISPRO 1 UNITS: 100 INJECTION, SOLUTION INTRAVENOUS; SUBCUTANEOUS at 12:23

## 2017-10-29 RX ADMIN — LISINOPRIL 5 MG: 5 TABLET ORAL at 09:33

## 2017-10-29 RX ADMIN — STANDARDIZED SENNA CONCENTRATE AND DOCUSATE SODIUM 2 TABLET: 8.6; 5 TABLET, FILM COATED ORAL at 20:26

## 2017-10-29 RX ADMIN — INSULIN LISPRO 1 UNITS: 100 INJECTION, SOLUTION INTRAVENOUS; SUBCUTANEOUS at 20:28

## 2017-10-29 RX ADMIN — FINASTERIDE 5 MG: 5 TABLET, FILM COATED ORAL at 09:33

## 2017-10-29 RX ADMIN — METOPROLOL TARTRATE 25 MG: 25 TABLET ORAL at 09:34

## 2017-10-29 RX ADMIN — ACETAMINOPHEN 325 MG: 325 TABLET, FILM COATED ORAL at 00:32

## 2017-10-29 RX ADMIN — OMEGA-3 FATTY ACIDS CAP 1000 MG 1000 MG: 1000 CAP at 09:33

## 2017-10-29 RX ADMIN — LATANOPROST 1 DROP: 50 SOLUTION OPHTHALMIC at 09:34

## 2017-10-29 RX ADMIN — CLOPIDOGREL 75 MG: 75 TABLET, FILM COATED ORAL at 09:33

## 2017-10-29 RX ADMIN — METOPROLOL TARTRATE 25 MG: 25 TABLET ORAL at 20:27

## 2017-10-29 RX ADMIN — ATORVASTATIN CALCIUM 5 MG: 10 TABLET, FILM COATED ORAL at 09:33

## 2017-10-29 RX ADMIN — CEFTRIAXONE 2 G: 2 INJECTION, SOLUTION INTRAVENOUS at 09:34

## 2017-10-29 RX ADMIN — ASPIRIN 81 MG: 81 TABLET, COATED ORAL at 09:33

## 2017-10-29 RX ADMIN — ACETAMINOPHEN 325 MG: 325 TABLET, FILM COATED ORAL at 21:57

## 2017-10-29 ASSESSMENT — ENCOUNTER SYMPTOMS
VOMITING: 0
FALLS: 1
SEIZURES: 0
EYE REDNESS: 0
FEVER: 0
CONSTIPATION: 0
EYE PAIN: 0
INSOMNIA: 0
TREMORS: 0
CHILLS: 0
HEADACHES: 0
NERVOUS/ANXIOUS: 0
HEMOPTYSIS: 0
MYALGIAS: 1
PALPITATIONS: 0
WHEEZING: 0
COUGH: 0
LOSS OF CONSCIOUSNESS: 0
SHORTNESS OF BREATH: 0
BLOOD IN STOOL: 0
DIARRHEA: 0
FOCAL WEAKNESS: 0
ABDOMINAL PAIN: 0
DIZZINESS: 0
NAUSEA: 0
WEAKNESS: 0

## 2017-10-29 ASSESSMENT — PAIN SCALES - GENERAL
PAINLEVEL_OUTOF10: 0
PAINLEVEL_OUTOF10: 0
PAINLEVEL_OUTOF10: 4
PAINLEVEL_OUTOF10: 0
PAINLEVEL_OUTOF10: 4

## 2017-10-29 ASSESSMENT — LIFESTYLE VARIABLES: DO YOU DRINK ALCOHOL: NO

## 2017-10-29 NOTE — PROGRESS NOTES
Assumed pt care @ 1900. Received bedside shift report. Pt in no acute distress. Sleeping in bed at this time. Safety precautions in place. Bed alarm activated and bed in lowest position. Will cont to monitor

## 2017-10-29 NOTE — PROGRESS NOTES
[facility] Hospitalist Progress Note    Date of Service: 10/29/2017    Chief Complaint  89 y.o. male admitted 10/23/2017 with Fall (Patient complains of falling out of chair tonight and has had multiple falls recently and here via EMS to find out why he keeps falling.)        Interval Problem Update  PICC planned for today.    Consultants/Specialty  Cardiology    Disposition  SNF likely vs [facility]        Review of Systems   Constitutional: Negative for chills and fever.   HENT: Negative for congestion, hearing loss and nosebleeds.    Eyes: Negative for pain and redness.   Respiratory: Negative for cough, hemoptysis, shortness of breath and wheezing.    Cardiovascular: Negative for chest pain and palpitations.   Gastrointestinal: Negative for abdominal pain, blood in stool, constipation, diarrhea, nausea and vomiting.   Genitourinary: Negative for dysuria, frequency and hematuria.   Musculoskeletal: Positive for falls and myalgias. Negative for joint pain.   Skin: Negative for rash.   Neurological: Negative for dizziness, tremors, focal weakness, seizures, loss of consciousness, weakness and headaches.   Psychiatric/Behavioral: The patient is not nervous/anxious and does not have insomnia.    All other systems reviewed and are negative.     Physical Exam  Laboratory/Imaging   Hemodynamics  Temp (24hrs), Av.4 °C (97.6 °F), Min:36.3 °C (97.3 °F), Max:36.6 °C (97.9 °F)   Temperature: 36.6 °C (97.8 °F)  Pulse  Av.4  Min: 49  Max: 117    Blood Pressure : 149/73      Respiratory      Respiration: 18, Pulse Oximetry: 95 %     Work Of Breathing / Effort: Mild  RUL Breath Sounds: Diminished, RML Breath Sounds: Clear, RLL Breath Sounds: Absent, HUNTER Breath Sounds: Diminished, LLL Breath Sounds: Diminished    Fluids    Intake/Output Summary (Last 24 hours) at 10/29/17 1606  Last data filed at 10/29/17 0235   Gross per 24 hour   Intake                0 ml   Output              900 ml   Net             -900 ml        Nutrition  Orders Placed This Encounter   Procedures   • Diet Order     Standing Status:   Standing     Number of Occurrences:   1     Order Specific Question:   Diet:     Answer:   Diabetic [3]     Order Specific Question:   Diet:     Answer:   Cardiac [6]     Order Specific Question:   Calorie modifications:     Answer:   2500 kcals [7]     Order Specific Question:   Macronutrient modifications:     Answer:   Low Cholesterol [7]     Physical Exam   Constitutional: He appears well-developed and well-nourished.   Frail elderly   HENT:   Head: Normocephalic and atraumatic.   Eyes: Conjunctivae and EOM are normal. No scleral icterus.   Neck: Normal range of motion. Neck supple.   Cardiovascular: Normal rate and regular rhythm.  Exam reveals no gallop and no friction rub.    No murmur heard.  Pulmonary/Chest: Effort normal and breath sounds normal. No respiratory distress. He has no wheezes. He has no rales.   Abdominal: Soft. Bowel sounds are normal. He exhibits no distension. There is no tenderness. There is no rebound and no guarding.   Musculoskeletal: He exhibits no edema or tenderness.   Neurological: He is alert.   Skin: Skin is warm. Rash (old ecchymoses) noted.   Psychiatric: He has a normal mood and affect. His behavior is normal.       Recent Labs      10/27/17   0209  10/28/17   0300  10/29/17   0305   WBC  8.4  8.7  9.3   RBC  3.68*  3.78*  3.91*   HEMOGLOBIN  11.0*  11.3*  11.6*   HEMATOCRIT  32.6*  34.0*  34.5*   MCV  88.6  89.9  88.2   MCH  29.9  29.9  29.7   MCHC  33.7  33.2*  33.6*   RDW  44.5  44.7  43.8   PLATELETCT  147*  164  175   MPV  10.4  10.3  9.9                          Assessment/Plan     * Atrial fibrillation (CMS-HCC)   Assessment & Plan    Presented with falls.  Has paroxysmal A fib and was found to be in Afib with RVR. It was thought this was the cause of his weakness and falls  Continue cardiac monitoring  Patient stat chads 2 score is 3, patient will benefit from full dose  anticoagulation  Was starte don Lovenox full dose  Continue Cardizem when necessary heart rate greater than 120  Cardiology, Dr. Iglesias has been consulted  Echo showed normal EF.  Discussed with Cardiology. Because of high fall risk, they have opted NO full anticoagulation. Heparin gtt d/cynthia and this was transitioned to hepa SQ  I will discharge him on aspirin and Plavix instead. HR in the 60s without rate controllers.  At discharge date he is afebrile, hemodynamically stable and wants to go home. He has declined SNF and prefers home health care. This was arranged.  Follow up with Zahra Yañez M.D. In 1-2 weeks  Follow up with Dr. Iglesias, EP Cardiology in 1-2 weeks for continued management of Afib          NSTEMI (non-ST elevated myocardial infarction) (CMS-HCC)   Assessment & Plan    Heparin started. Dr. Iglesias, Cardiology consulted. He felt PE needs to be ruled out. Bulmaro Wheeler intiially declined NM scan  On 10/25 he agreed to NM scan and that showed NO lung clot. NM scan showed low probability for PE.  At this time, Dr. Iglesias prefers to do outpatient stress testing. I willdefer to him if he wants to do elective cardiac catheterization.  Meanwhile he will continue aspirin, Plavix, statin. His HR is 50-60s so I will hold off on beta blockers. He has blood pressure room, so I will start him on an ACEI and give him PRN nitro SL, advise him no Viagra.  Follow up with Zahra Yañez M.D. In 1-2 weeks  Follow up with Dr. Iglesias, [facility] in 1-2 weeks for continued management of Afib        Sepsis (CMS-HCC)- (present on admission)   Assessment & Plan    This is sepsis (without associated acute organ dysfunction).   After admission patient spiked a fever to 102 with heart rate 112.  Sepsis protocol initiated.  Check urinalysis and blood cultures  Fluid bolus prn according to guidelines  Empiric rocephin until source ascertained.  Blood culture came back positive with E. Coli, Klebsiella  I consulted ID  "physician, Dr. Rapp  On IV Rocephin and PICC line.        DM (diabetes mellitus) (CMS-HCC)- (present on admission)   Assessment & Plan    Follow-up A1c  Sliding scale insulin/Accu-Chek  Hold metformin        Carotid artery stenosis- (present on admission)   Assessment & Plan    History of  CT of the head is negative  MRI in May 2017 no new findings  CTA HandN showed carotid disease but no significant blockage or narrowing.        Bacteremia   Assessment & Plan    E. Coli and Klebsiella bacteremia, unclear source; cannot do GI workup as a source as he declines endoscopy  PICC line has been ordered, currently on Rocephin  On 10/27, awaiting PICC.        Advanced directives, counseling/discussion   Assessment & Plan    Discussed code statu. He wants to be FULL CODE. He clearly states \"Nobody wants to die, right?\"        Generalized weakness   Assessment & Plan    With P.Afib  R/o CVA  CT head is negative follow-up repeat MRI of the brain  Follow up CTA. Neck  Echocardiogram  Continue aspirin, Lovenox, statin  Blood pressure control  Neurology consult as needed  PT/OT        Glaucoma- (present on admission)   Assessment & Plan    Tinea eyedrops        Thrombocytopenia (HCC)- (present on admission)   Assessment & Plan    Monitor        BPH (benign prostatic hyperplasia)- (present on admission)   Assessment & Plan    Continue home meds            DVT prophylaxis pharmacological::  Contraindicated - High bleeding risk        "

## 2017-10-29 NOTE — PROGRESS NOTES
Gricelda Coombs Fall Risk Assessment:     Last Known Fall: Within the last month  Mobility: Use of assistive device/requires assist of two people  Medications: Cardiovascular or central nervous system meds  Mental Status/LOC/Awareness: Awake, alert, and oriented to date, place, and person  Toileting Needs: Use of assistive device (Bedside commode, bedpan, urinal)  Volume/Electrolyte Status: No problems  Communication/Sensory: Visual (Glasses)/hearing deficit  Behavior: Appropriate behavior  Gricelda Coombs Fall Risk Total: 13  Fall Risk Level: MODERATE RISK    Universal Fall Precautions:  call light/belongings in reach, bed in low position and locked, wheelchairs and assistive devices out of sight, siderails up x 2, use non-slip footwear, adequate lighting, clutter free and spill free environment, educate on level of risk, educate to call for assistance    Fall Risk Level Interventions:    TRIAL (TELE 8, NEURO, MED MIAH 5) Moderate Fall Risk Interventions  Place yellow fall risk ID band on patient: verified  Provide patient/family education based on risk assessment : completed  Educate patient/family to call staff for assistance when getting out of bed: completed  Place fall precaution signage outside patient door: verified  Utilize bed/chair fall alarm: verifiedTRIAL (TELE 8, NEURO, [place] MIAH 5) High Fall Risk Interventions  Place yellow fall risk ID band on patient: verified  Provide patient/family education based on risk assessment: completed  Educate patient/family to call staff for assistance when getting out of bed: completed  Place fall precaution signage outside patient door: verified  Place patient in room close to nursing station: verified  Utilize bed/chair fall alarm: verified  Notify charge of high risk for huddle: completed    Patient Specific Interventions:     Medication: review medications with patient and family  Mental Status/LOC/Awareness: reinforce falls education, check on patient hourly, utilize  bed/chair fall alarm, reinforce the use of call light and provide activity  Toileting: provide frquent toileting, monitor intake and output/use of appropriate interventions and do not leave patient unattended in bathroom/refer to toileting scripting  Volume/Electrolyte Status: ensure patient remains hydrated and monitor blood sugars and maintain appropriate blood sugar levels if diabetic  Communication/Sensory: update plan of care on whiteboard and ensure proper positioning when transferrng/ambulating  Behavioral: encourage patient to voice feelings, engage patient in daily activities, administer medication as ordered and instruct/reinforce fall program rationale  Mobility: schedule physical activity throughout the day, dangle prior to standing, utilize bed/chair fall alarm, ensure bed is locked and in lowest position and provide appropriate assistive device

## 2017-10-29 NOTE — PROGRESS NOTES
Gricelda Coombs Fall Risk Assessment:     Last Known Fall: Within the last month  Mobility: Use of assistive device/requires assist of two people  Medications: Cardiovascular or central nervous system meds  Mental Status/LOC/Awareness: Awake, alert, and oriented to date, place, and person  Toileting Needs: Use of assistive device (Bedside commode, bedpan, urinal), Incontinence  Volume/Electrolyte Status: No problems  Communication/Sensory: Visual (Glasses)/hearing deficit  Behavior: Appropriate behavior  Gricelda Coombs Fall Risk Total: 16  Fall Risk Level: HIGH RISK    Universal Fall Precautions:  call light/belongings in reach, bed in low position and locked, wheelchairs and assistive devices out of sight, use non-slip footwear, siderails up x 2, adequate lighting, clutter free and spill free environment, educate on level of risk, educate to call for assistance    Fall Risk Level Interventions:    TRIAL (TELE 8, NEURO, MED MIAH 5) Moderate Fall Risk Interventions  Place yellow fall risk ID band on patient: verified  Provide patient/family education based on risk assessment : completed  Educate patient/family to call staff for assistance when getting out of bed: completed  Place fall precaution signage outside patient door: verified  Utilize bed/chair fall alarm: completedTRIAL (TELE 8, NEURO, MED MIAH 5) High Fall Risk Interventions  Place yellow fall risk ID band on patient: verified  Provide patient/family education based on risk assessment: completed  Educate patient/family to call staff for assistance when getting out of bed: completed  Place fall precaution signage outside patient door: verified  Place patient in room close to nursing station: verified  Utilize bed/chair fall alarm: verified  Notify charge of high risk for huddle: completed    Patient Specific Interventions:     Medication: review medications with patient and family  Mental Status/LOC/Awareness: utilize bed/chair fall alarm and reinforce the  use of call light  Toileting: instruct patient/family on the need to call for assistance when toileting  Volume/Electrolyte Status: monitor blood sugars and maintain appropriate blood sugar levels if diabetic  Communication/Sensory: update plan of care on whiteboard  Behavioral: not applicable  Mobility: utilize bed/chair fall alarm and ensure bed is locked and in lowest position

## 2017-10-29 NOTE — CARE PLAN
Problem: Safety  Goal: Will remain free from falls  Outcome: PROGRESSING AS EXPECTED  Pt encouraged to use call bell before attempting to get up, bed alarm activated, bed in lowest position    Problem: Mobility  Goal: Risk for activity intolerance will decrease  Outcome: PROGRESSING AS EXPECTED  Pt ambulated in halls with assistance of this RN without difficulty

## 2017-10-29 NOTE — PROGRESS NOTES
Called [facility], both numbers, to see when pt is on the schedule for his PICC line insertion; no answer.

## 2017-10-30 ENCOUNTER — APPOINTMENT (OUTPATIENT)
Dept: RADIOLOGY | Facility: MEDICAL CENTER | Age: 82
DRG: 871 | End: 2017-10-30
Attending: INTERNAL MEDICINE
Payer: MEDICARE

## 2017-10-30 VITALS
HEIGHT: 68 IN | SYSTOLIC BLOOD PRESSURE: 130 MMHG | DIASTOLIC BLOOD PRESSURE: 58 MMHG | HEART RATE: 60 BPM | WEIGHT: 161.6 LBS | BODY MASS INDEX: 24.49 KG/M2 | TEMPERATURE: 98.2 F | OXYGEN SATURATION: 97 % | RESPIRATION RATE: 18 BRPM

## 2017-10-30 LAB
ERYTHROCYTE [DISTWIDTH] IN BLOOD BY AUTOMATED COUNT: 43.6 FL (ref 35.9–50)
GLUCOSE BLD-MCNC: 163 MG/DL (ref 65–99)
GLUCOSE BLD-MCNC: 84 MG/DL (ref 65–99)
HCT VFR BLD AUTO: 33.4 % (ref 42–52)
HGB BLD-MCNC: 11.1 G/DL (ref 14–18)
MCH RBC QN AUTO: 29.6 PG (ref 27–33)
MCHC RBC AUTO-ENTMCNC: 33.2 G/DL (ref 33.7–35.3)
MCV RBC AUTO: 89.1 FL (ref 81.4–97.8)
PLATELET # BLD AUTO: 203 K/UL (ref 164–446)
PMV BLD AUTO: 10.3 FL (ref 9–12.9)
RBC # BLD AUTO: 3.75 M/UL (ref 4.7–6.1)
WBC # BLD AUTO: 9.4 K/UL (ref 4.8–10.8)

## 2017-10-30 PROCEDURE — A9270 NON-COVERED ITEM OR SERVICE: HCPCS | Performed by: NURSE PRACTITIONER

## 2017-10-30 PROCEDURE — 700111 HCHG RX REV CODE 636 W/ 250 OVERRIDE (IP): Performed by: INTERNAL MEDICINE

## 2017-10-30 PROCEDURE — 85027 COMPLETE CBC AUTOMATED: CPT

## 2017-10-30 PROCEDURE — 700111 HCHG RX REV CODE 636 W/ 250 OVERRIDE (IP): Performed by: HOSPITALIST

## 2017-10-30 PROCEDURE — 700102 HCHG RX REV CODE 250 W/ 637 OVERRIDE(OP): Performed by: NURSE PRACTITIONER

## 2017-10-30 PROCEDURE — 97530 THERAPEUTIC ACTIVITIES: CPT

## 2017-10-30 PROCEDURE — 700102 HCHG RX REV CODE 250 W/ 637 OVERRIDE(OP): Performed by: INTERNAL MEDICINE

## 2017-10-30 PROCEDURE — 99239 HOSP IP/OBS DSCHRG MGMT >30: CPT | Performed by: INTERNAL MEDICINE

## 2017-10-30 PROCEDURE — A9270 NON-COVERED ITEM OR SERVICE: HCPCS | Performed by: INTERNAL MEDICINE

## 2017-10-30 PROCEDURE — 36415 COLL VENOUS BLD VENIPUNCTURE: CPT

## 2017-10-30 PROCEDURE — 82962 GLUCOSE BLOOD TEST: CPT | Mod: 91

## 2017-10-30 PROCEDURE — 76937 US GUIDE VASCULAR ACCESS: CPT

## 2017-10-30 RX ORDER — NITROGLYCERIN 0.4 MG/1
0.4 TABLET SUBLINGUAL PRN
Qty: 25 TAB | Refills: 0 | Status: SHIPPED | OUTPATIENT
Start: 2017-10-30 | End: 2017-11-25

## 2017-10-30 RX ORDER — ACETAMINOPHEN 325 MG/1
650 TABLET ORAL EVERY 6 HOURS PRN
Qty: 30 TAB | Refills: 0 | COMMUNITY
Start: 2017-10-30 | End: 2017-11-25

## 2017-10-30 RX ORDER — CLOPIDOGREL BISULFATE 75 MG/1
75 TABLET ORAL DAILY
Qty: 30 TAB | Refills: 0 | Status: SHIPPED | OUTPATIENT
Start: 2017-10-30 | End: 2018-02-22

## 2017-10-30 RX ORDER — LISINOPRIL 5 MG/1
5 TABLET ORAL DAILY
Qty: 30 TAB | Refills: 0 | Status: SHIPPED | OUTPATIENT
Start: 2017-10-30 | End: 2017-11-08 | Stop reason: SDUPTHER

## 2017-10-30 RX ORDER — NICOTINE 14MG/24HR
PATCH, TRANSDERMAL 24 HOURS TRANSDERMAL
Qty: 14 CAP | COMMUNITY
Start: 2017-10-30 | End: 2017-11-25

## 2017-10-30 RX ORDER — AMOXICILLIN 250 MG
2 CAPSULE ORAL 2 TIMES DAILY PRN
COMMUNITY
Start: 2017-10-30 | End: 2017-11-25

## 2017-10-30 RX ORDER — POLYETHYLENE GLYCOL 3350 17 G/17G
17 POWDER, FOR SOLUTION ORAL
Refills: 3 | COMMUNITY
Start: 2017-10-30 | End: 2017-11-25

## 2017-10-30 RX ADMIN — CEFTRIAXONE 2 G: 2 INJECTION, SOLUTION INTRAVENOUS at 09:09

## 2017-10-30 RX ADMIN — ASPIRIN 81 MG: 81 TABLET, COATED ORAL at 09:08

## 2017-10-30 RX ADMIN — INSULIN LISPRO 1 UNITS: 100 INJECTION, SOLUTION INTRAVENOUS; SUBCUTANEOUS at 11:12

## 2017-10-30 RX ADMIN — CLOPIDOGREL 75 MG: 75 TABLET, FILM COATED ORAL at 09:07

## 2017-10-30 RX ADMIN — OMEGA-3 FATTY ACIDS CAP 1000 MG 1000 MG: 1000 CAP at 09:07

## 2017-10-30 RX ADMIN — METOPROLOL TARTRATE 25 MG: 25 TABLET ORAL at 09:08

## 2017-10-30 RX ADMIN — LISINOPRIL 5 MG: 5 TABLET ORAL at 09:08

## 2017-10-30 RX ADMIN — ENOXAPARIN SODIUM 40 MG: 100 INJECTION SUBCUTANEOUS at 09:09

## 2017-10-30 RX ADMIN — ATORVASTATIN CALCIUM 5 MG: 10 TABLET, FILM COATED ORAL at 09:06

## 2017-10-30 RX ADMIN — LATANOPROST 1 DROP: 50 SOLUTION OPHTHALMIC at 09:09

## 2017-10-30 RX ADMIN — FINASTERIDE 5 MG: 5 TABLET, FILM COATED ORAL at 09:08

## 2017-10-30 RX ADMIN — STANDARDIZED SENNA CONCENTRATE AND DOCUSATE SODIUM 2 TABLET: 8.6; 5 TABLET, FILM COATED ORAL at 09:07

## 2017-10-30 ASSESSMENT — COGNITIVE AND FUNCTIONAL STATUS - GENERAL
CLIMB 3 TO 5 STEPS WITH RAILING: A LOT
WALKING IN HOSPITAL ROOM: A LITTLE
MOVING TO AND FROM BED TO CHAIR: A LITTLE
STANDING UP FROM CHAIR USING ARMS: A LITTLE
MOBILITY SCORE: 18
TURNING FROM BACK TO SIDE WHILE IN FLAT BAD: A LITTLE
SUGGESTED CMS G CODE MODIFIER MOBILITY: CK

## 2017-10-30 ASSESSMENT — GAIT ASSESSMENTS
DEVIATION: SHUFFLED GAIT;DECREASED BASE OF SUPPORT;BRADYKINETIC
DISTANCE (FEET): 15
ASSISTIVE DEVICE: FRONT WHEEL WALKER
GAIT LEVEL OF ASSIST: CONTACT GUARD ASSIST

## 2017-10-30 ASSESSMENT — PAIN SCALES - GENERAL
PAINLEVEL_OUTOF10: 0

## 2017-10-30 NOTE — DISCHARGE PLANNING
Received call from Zahra with [facility] they have accepted patient, need to know if first dose has been given and when dc'ing. Notified [facility] via voicemail.

## 2017-10-30 NOTE — CARE PLAN
Problem: Discharge Barriers/Planning  Goal: Patient's continuum of care needs will be met    Intervention: Explain discharge instructions and medication reconcilliation to patient and significant other/support system  Discharge instructions given to patient, all questions answered, no other concerns at this time.

## 2017-10-30 NOTE — DISCHARGE PLANNING
Per RN, pt's son is unable to assist/care for pt at home. SW requested RN to notify MD Taylor.     RN spoke with MD Taylor; per MD Taylor, pt will need SNF since son can't provide care. Per RN, will ask MD Taylor to speak with pt. SW notified DREW Ortiz

## 2017-10-30 NOTE — DISCHARGE INSTRUCTIONS
Discharge Instructions    Discharged to home by car with relative. Discharged via wheelchair, hospital escort: Yes.  Special equipment needed: Not Applicable    Be sure to schedule a follow-up appointment with your primary care doctor or any specialists as instructed.     Discharge Plan:   Diet Plan: Discussed  Activity Level: Discussed  Confirmed Follow up Appointment: Patient to Call and Schedule Appointment  Confirmed Symptoms Management: Discussed  Medication Reconciliation Updated: Yes  Influenza Vaccine Indication: Not indicated: Previously immunized this influenza season and > 8 years of age    I understand that a diet low in cholesterol, fat, and sodium is recommended for good health. Unless I have been given specific instructions below for another diet, I accept this instruction as my diet prescription.   Other diet: cardiac, diabetic    Special Instructions: none    · Is patient discharged on Warfarin / Coumadin?   No     · Is patient Post Blood Transfusion?  No    Depression / Suicide Risk    As you are discharged from this [facility] facility, it is important to learn how to keep safe from harming yourself.    Recognize the warning signs:  · Abrupt changes in personality, positive or negative- including increase in energy   · Giving away possessions  · Change in eating patterns- significant weight changes-  positive or negative  · Change in sleeping patterns- unable to sleep or sleeping all the time   · Unwillingness or inability to communicate  · Depression  · Unusual sadness, discouragement and loneliness  · Talk of wanting to die  · Neglect of personal appearance   · Rebelliousness- reckless behavior  · Withdrawal from people/activities they love  · Confusion- inability to concentrate     If you or a loved one observes any of these behaviors or has concerns about self-harm, here's what you can do:  · Talk about it- your feelings and reasons for harming yourself  · Remove any means that you might  "use to hurt yourself (examples: pills, rope, extension cords, firearm)  · Get professional help from the community (Mental Health, Substance Abuse, psychological counseling)  · Do not be alone:Call your Safe Contact- someone whom you trust who will be there for you.  · Call your local CRISIS HOTLINE 902-5287 or 779-840-5227  · Call your local Children's Mobile Crisis Response Team [place] (266) 245-7402 or www.Yummly  · Call the toll free National Suicide Prevention Hotlines   · National Suicide Prevention Lifeline 117-683-SSQR (9803)  · National Hope Line Network 800-SUICIDE (463-3275)      PICC Line Instructions    How to Care for your PICC  • Do not take a bath, swim, or use hot tubs when you have a PICC. Cover PICC line with clear plastic wrap and tape to keep it dry while showering  • Check the PICC insertion site daily for leakage, redness, swelling, or pain.  • Flush the PICC as directed by your health care provider. Let your health care provider know right away if the PICC is difficult to flush or does not flush. Do not use force to flush the PICC.  • Do not use a syringe that is less than 10 mL to flush the PICC  • Avoid blood pressure checks on the arm with the PICC.  • Do not take the PICC out yourself. Only a trained clinical professional should remove the PICC  • Make sure you or anyone who access your PICC Line washes their hands before using the line  • Make sure the hub of the line is \"scrubbed\" prior to using the line  Dressing Changes  • Change the PICC dressing as instructed by your health care provider.  • Change your PICC dressing if it becomes loose or wet.  When to seek medical attention  • PICC is accidentally pulled all the way out  • There is any type of drainage, redness, or swelling where the PICC enters the skin.  • You cannot flush the PICC, it is difficult to flush, or the PICC leaks around the insertion site when it is flushed.  • You hear a \"flushing\" sound when the " PICC is flushed.  • You notice a hole or tear in the PICC.  • You develop chills or a fever  Atrial Fibrillation  Atrial fibrillation is a type of irregular heart rhythm (arrhythmia). During atrial fibrillation, the upper chambers of the heart (atria) quiver continuously in a chaotic pattern. This causes an irregular and often rapid heart rate.   Atrial fibrillation is the result of the heart becoming overloaded with disorganized signals that tell it to beat. These signals are normally released one at a time by a part of the right atrium called the sinoatrial node. They then travel from the atria to the lower chambers of the heart (ventricles), causing the atria and ventricles to contract and pump blood as they pass. In atrial fibrillation, parts of the atria outside of the sinoatrial node also release these signals. This results in two problems. First, the atria receive so many signals that they do not have time to fully contract. Second, the ventricles, which can only receive one signal at a time, beat irregularly and out of rhythm with the atria.   There are three types of atrial fibrillation:   · Paroxysmal. Paroxysmal atrial fibrillation starts suddenly and stops on its own within a week.  · Persistent. Persistent atrial fibrillation lasts for more than a week. It may stop on its own or with treatment.  · Permanent. Permanent atrial fibrillation does not go away. Episodes of atrial fibrillation may lead to permanent atrial fibrillation.  Atrial fibrillation can prevent your heart from pumping blood normally. It increases your risk of stroke and can lead to heart failure.   CAUSES   · Heart conditions, including a heart attack, heart failure, coronary artery disease, and heart valve conditions.    · Inflammation of the sac that surrounds the heart (pericarditis).  · Blockage of an artery in the lungs (pulmonary embolism).  · Pneumonia or other infections.  · Chronic lung disease.  · Thyroid problems, especially  if the thyroid is overactive (hyperthyroidism).  · Caffeine, excessive alcohol use, and use of some illegal drugs.    · Use of some medicines, including certain decongestants and diet pills.  · Heart surgery.    · Birth defects.    Sometimes, no cause can be found. When this happens, the atrial fibrillation is called lone atrial fibrillation. The risk of complications from atrial fibrillation increases if you have lone atrial fibrillation and you are age 60 years or older.  RISK FACTORS  · Heart failure.  · Coronary artery disease.  · Diabetes mellitus.    · High blood pressure (hypertension).    · Obesity.    · Other arrhythmias.    · Increased age.  SIGNS AND SYMPTOMS   · A feeling that your heart is beating rapidly or irregularly.    · A feeling of discomfort or pain in your chest.    · Shortness of breath.    · Sudden light-headedness or weakness.    · Getting tired easily when exercising.    · Urinating more often than normal (mainly when atrial fibrillation first begins).    In paroxysmal atrial fibrillation, symptoms may start and suddenly stop.  DIAGNOSIS   Your health care provider may be able to detect atrial fibrillation when taking your pulse. Your health care provider may have you take a test called an ambulatory electrocardiogram (ECG). An ECG records your heartbeat patterns over a 24-hour period. You may also have other tests, such as:  · Transthoracic echocardiogram (TTE). During echocardiography, sound waves are used to evaluate how blood flows through your heart.  · Transesophageal echocardiogram (JOSE ALFREDO).  · Stress test. There is more than one type of stress test. If a stress test is needed, ask your health care provider about which type is best for you.  · Chest X-ray exam.  · Blood tests.  · Computed tomography (CT).  TREATMENT   Treatment may include:  · Treating any underlying conditions. For example, if you have an overactive thyroid, treating the condition may correct atrial  fibrillation.  · Taking medicine. Medicines may be given to control a rapid heart rate or to prevent blood clots, heart failure, or a stroke.  · Having a procedure to correct the rhythm of the heart:  ¨ Electrical cardioversion. During electrical cardioversion, a controlled, low-energy shock is delivered to the heart through your skin. If you have chest pain, very low blood pressure, or sudden heart failure, this procedure may need to be done as an emergency.  ¨ Catheter ablation. During this procedure, heart tissues that send the signals that cause atrial fibrillation are destroyed.  ¨ Surgical ablation. During this surgery, thin lines of heart tissue that carry the abnormal signals are destroyed. This procedure can either be an open-heart surgery or a minimally invasive surgery. With the minimally invasive surgery, small cuts are made to access the heart instead of a large opening.  ¨ Pulmonary venous isolation. During this surgery, tissue around the veins that carry blood from the lungs (pulmonary veins) is destroyed. This tissue is thought to carry the abnormal signals.  HOME CARE INSTRUCTIONS   · Take medicines only as directed by your health care provider. Some medicines can make atrial fibrillation worse or recur.  · If blood thinners were prescribed by your health care provider, take them exactly as directed. Too much blood-thinning medicine can cause bleeding. If you take too little, you will not have the needed protection against stroke and other problems.  · Perform blood tests at home if directed by your health care provider. Perform blood tests exactly as directed.  · Quit smoking if you smoke.  · Do not drink alcohol.  · Do not drink caffeinated beverages such as coffee, soda, and some teas. You may drink decaffeinated coffee, soda, or tea.    · Maintain a healthy weight. Do not use diet pills unless your health care provider approves. They may make heart problems worse.    · Follow diet instructions  as directed by your health care provider.  · Exercise regularly as directed by your health care provider.  · Keep all follow-up visits as directed by your health care provider. This is important.  PREVENTION   The following substances can cause atrial fibrillation to recur:   · Caffeinated beverages.  · Alcohol.  · Certain medicines, especially those used for breathing problems.  · Certain herbs and herbal medicines, such as those containing ephedra or ginseng.  · Illegal drugs, such as cocaine and amphetamines.  Sometimes medicines are given to prevent atrial fibrillation from recurring. Proper treatment of any underlying condition is also important in helping prevent recurrence.   SEEK MEDICAL CARE IF:  · You notice a change in the rate, rhythm, or strength of your heartbeat.  · You suddenly begin urinating more frequently.  · You tire more easily when exerting yourself or exercising.  SEEK IMMEDIATE MEDICAL CARE IF:   · You have chest pain, abdominal pain, sweating, or weakness.  · You feel nauseous.  · You have shortness of breath.  · You suddenly have swollen feet and ankles.  · You feel dizzy.  · Your face or limbs feel numb or weak.  · You have a change in your vision or speech.  MAKE SURE YOU:   · Understand these instructions.  · Will watch your condition.  · Will get help right away if you are not doing well or get worse.     This information is not intended to replace advice given to you by your health care provider. Make sure you discuss any questions you have with your health care provider.     Document Released: 12/18/2006 Document Revised: 01/08/2016 Document Reviewed: 04/13/2016  [email or web address] Interactive Patient Education ©2016 [email or web address]    Sepsis, Adult  Sepsis is a serious infection of your blood or tissues that affects your whole body. The infection that causes sepsis may be bacterial, viral, fungal, or parasitic. Sepsis may be life threatening. Sepsis can cause your blood pressure to drop. This  may result in shock. Shock causes your central nervous system and your organs to stop working correctly.   RISK FACTORS  Sepsis can happen in anyone, but it is more likely to happen in people who have weakened immune systems.  SIGNS AND SYMPTOMS   Symptoms of sepsis can include:  · Fever or low body temperature (hypothermia).  · Rapid breathing (hyperventilation).  · Chills.  · Rapid heartbeat (tachycardia).  · Confusion or light-headedness.  · Trouble breathing.  · Urinating much less than usual.  · Cool, clammy skin or red, flushed skin.  · Other problems with the heart, kidneys, or brain.  DIAGNOSIS   Your health care provider will likely do tests to look for an infection, to see if the infection has spread to your blood, and to see how serious your condition is. Tests can include:  · Blood tests, including cultures of your blood.  · Cultures of other fluids from your body, such as:  ¨ Urine.  ¨ Pus from wounds.  ¨ Mucus coughed up from your lungs.  · Urine tests other than cultures.  · X-ray exams or other imaging tests.  TREATMENT   Treatment will begin with elimination of the source of infection. If your sepsis is likely caused by a bacterial or fungal infection, you will be given antibiotic or antifungal medicines.  You may also receive:  · Oxygen.  · Fluids through an IV tube.  · Medicines to increase your blood pressure.  · A machine to clean your blood (dialysis) if your kidneys fail.  · A machine to help you breathe if your lungs fail.  SEEK IMMEDIATE MEDICAL CARE IF:  You get an infection or develop any of the signs and symptoms of sepsis after surgery or a hospitalization.     This information is not intended to replace advice given to you by your health care provider. Make sure you discuss any questions you have with your health care provider.     Document Released: 09/15/2004 Document Revised: 05/03/2016 Document Reviewed: 08/25/2014  Elsevier Interactive Patient Education ©2016 [employer]vier  Inc.    Acetaminophen tablets or caplets  What is this medicine?  ACETAMINOPHEN (a set a ROBERTA gilberto fen) is a pain reliever. It is used to treat mild pain and fever.  This medicine may be used for other purposes; ask your health care provider or pharmacist if you have questions.  COMMON BRAND NAME(S): Aceta, Actamin, Anacin Aspirin Free, Genapap, Genebs, Mapap, Pain & Fever , Pain and Fever , PAIN RELIEF , PAIN RELIEF Extra Strength, Pain Reliever, Panadol, Q-Pap Extra Strength, Q-Pap, Tylenol CrushableTablet, Tylenol Extra Strength, Tylenol, XS No Aspirin, XS Pain Reliever  What should I tell my health care provider before I take this medicine?  They need to know if you have any of these conditions:  -if you frequently drink alcohol containing drinks  -liver disease  -an unusual or allergic reaction to acetaminophen, other medicines, foods, dyes or preservatives  -pregnant or trying to get pregnant  -breast-feeding  How should I use this medicine?  Take this medicine by mouth with a glass of water. Follow the directions on the package or prescription label. Take your medicine at regular intervals. Do not take your medicine more often than directed.  Talk to your pediatrician regarding the use of this medicine in children. While this drug may be prescribed for children as young as 6 years of age for selected conditions, precautions do apply.  Overdosage: If you think you have taken too much of this medicine contact a poison control center or emergency room at once.  NOTE: This medicine is only for you. Do not share this medicine with others.  What if I miss a dose?  If you miss a dose, take it as soon as you can. If it is almost time for your next dose, take only that dose. Do not take double or extra doses.  What may interact with this medicine?  -alcohol  -imatinib  -isoniazid  -other medicines with acetaminophen  This list may not describe all possible interactions. Give your health care provider a list of all the  medicines, herbs, non-prescription drugs, or dietary supplements you use. Also tell them if you smoke, drink alcohol, or use illegal drugs. Some items may interact with your medicine.  What should I watch for while using this medicine?  Tell your doctor or health care professional if the pain lasts more than 10 days (5 days for children), if it gets worse, or if there is a new or different kind of pain. Also, check with your doctor if a fever lasts for more than 3 days.  Do not take other medicines that contain acetaminophen with this medicine. Always read labels carefully. If you have questions, ask your doctor or pharmacist.  If you take too much acetaminophen get medical help right away. Too much acetaminophen can be very dangerous and cause liver damage. Even if you do not have symptoms, it is important to get help right away.  What side effects may I notice from receiving this medicine?  Side effects that you should report to your doctor or health care professional as soon as possible:  -allergic reactions like skin rash, itching or hives, swelling of the face, lips, or tongue  -breathing problems  -fever or sore throat  -redness, blistering, peeling or loosening of the skin, including inside the mouth  -trouble passing urine or change in the amount of urine  -unusual bleeding or bruising  -unusually weak or tired  -yellowing of the eyes or skin  Side effects that usually do not require medical attention (report to your doctor or health care professional if they continue or are bothersome):  -headache  -nausea, stomach upset  This list may not describe all possible side effects. Call your doctor for medical advice about side effects. You may report side effects to FDA at 3-679-FDA-2144.  Where should I keep my medicine?  Keep out of reach of children.  Store at room temperature between 20 and 25 degrees C (68 and 77 degrees F). Protect from moisture and heat. Throw away any unused medicine after the expiration  date.  NOTE: This sheet is a summary. It may not cover all possible information. If you have questions about this medicine, talk to your doctor, pharmacist, or health care provider.  © 2014, Elsevier/[employer] Standard. (2013 11:24:31 AM)    Ceftriaxone injection  What is this medicine?  CEFTRIAXONE (sef try AX one) is a cephalosporin antibiotic. It is used to treat certain kinds of bacterial infections. It will not work for colds, flu, or other viral infections.  This medicine may be used for other purposes; ask your health care provider or pharmacist if you have questions.  COMMON BRAND NAME(S): Neno  What should I tell my health care provider before I take this medicine?  They need to know if you have any of these conditions:  -any chronic illness  -bowel disease, like colitis  -both kidney and liver disease  -high bilirubin level in  patients  -an unusual or allergic reaction to ceftriaxone, other cephalosporin or penicillin antibiotics, foods, dyes or preservatives  -pregnant or trying to get pregnant  -breast-feeding  How should I use this medicine?  This medicine is injected into a muscle or infused it into a vein. It is usually given in a medical office or clinic. If you are to give this medicine you will be taught how to inject it. Follow instructions carefully. Use your doses at regular intervals. Do not take your medicine more often than directed. Do not skip doses or stop your medicine early even if you feel better. Do not stop taking except on your doctor's advice.  Talk to your pediatrician regarding the use of this medicine in children. Special care may be needed.  Overdosage: If you think you have taken too much of this medicine contact a poison control center or emergency room at once.  NOTE: This medicine is only for you. Do not share this medicine with others.  What if I miss a dose?  If you miss a dose, take it as soon as you can. If it is almost time for your next dose, take only that  dose. Do not take double or extra doses.  What may interact with this medicine?  Do not take this medicine with any of the following medications:  -intravenous calcium  This list may not describe all possible interactions. Give your health care provider a list of all the medicines, herbs, non-prescription drugs, or dietary supplements you use. Also tell them if you smoke, drink alcohol, or use illegal drugs. Some items may interact with your medicine.  What should I watch for while using this medicine?  Tell your doctor or health care professional if your symptoms do not improve or if they get worse.  Do not treat diarrhea with over the counter products. Contact your doctor if you have diarrhea that lasts more than 2 days or if it is severe and watery.  If you are being treated for a sexually transmitted disease, avoid sexual contact until you have finished your treatment. Having sex can infect your sexual partner.  Calcium may bind to this medicine and cause lung or kidney problems. Avoid calcium products while taking this medicine and for 48 hours after taking the last dose of this medicine.  What side effects may I notice from receiving this medicine?  Side effects that you should report to your doctor or health care professional as soon as possible:  -allergic reactions like skin rash, itching or hives, swelling of the face, lips, or tongue  -breathing problems  -fever, chills  -irregular heartbeat  -pain when passing urine  -seizures  -stomach pain, cramps  -unusual bleeding, bruising  -unusually weak or tired  Side effects that usually do not require medical attention (report to your doctor or health care professional if they continue or are bothersome):  -diarrhea  -dizzy, drowsy  -headache  -nausea, vomiting  -pain, swelling, irritation where injected  -stomach upset  -sweating  This list may not describe all possible side effects. Call your doctor for medical advice about side effects. You may report side  effects to FDA at 1-526-FDA-6699.  Where should I keep my medicine?  Keep out of the reach of children.  Store at room temperature below 25 degrees C (77 degrees F). Protect from light. Throw away any unused vials after the expiration date.  NOTE: This sheet is a summary. It may not cover all possible information. If you have questions about this medicine, talk to your doctor, pharmacist, or health care provider.  © 2014, Elsevier/[employer] Standard. (12/30/2013 3:34:57 PM)    Clopidogrel tablets  What is this medicine?  CLOPIDOGREL (kloh PID oh grel) helps to prevent blood clots. This medicine is used to prevent heart attack, stroke, or other vascular events in people who are at high risk.  This medicine may be used for other purposes; ask your health care provider or pharmacist if you have questions.  COMMON BRAND NAME(S): Plavix  What should I tell my health care provider before I take this medicine?  They need to know if you have any of the following conditions:  -bleeding disorder  -bleeding in the brain  -planned surgery  -stomach or intestinal ulcers  -stroke or transient ischemic attack  -an unusual or allergic reaction to clopidogrel, other medicines, foods, dyes, or preservatives  -pregnant or trying to get pregnant  -breast-feeding  How should I use this medicine?  Take this medicine by mouth with a drink of water. Follow the directions on the prescription label. You may take this medicine with or without food. Take your medicine at regular intervals. Do not take your medicine more often than directed.  Talk to your pediatrician regarding the use of this medicine in children. Special care may be needed.  Overdosage: If you think you have taken too much of this medicine contact a poison control center or emergency room at once.  NOTE: This medicine is only for you. Do not share this medicine with others.  What if I miss a dose?  If you miss a dose, take it as soon as you can. If it is almost time for your next  dose, take only that dose. Do not take double or extra doses.  What may interact with this medicine?  -aspirin  -blood thinners like cilostazol, enoxaparin, ticlopidine, and warfarin  -certain medicines for depression like citalopram, fluoxetine, and fluvoxamine  -certain medicines for fungal infections like ketoconazole, fluconazole, and voriconazole  -certain medicines for HIV infection like delavirdine, efavirenz, and etravirine  -certain medicines for seizures like felbamate, oxcarbazepine, and phenytoin  -chloramphenicol  -fluvastatin  -isoniazid, INH  -medicines for inflammation like ibuprofen and naproxen  -modafinil  -nicardipine  -over-the counter supplements like echinacea, feverfew, fish oil, garlic, jono, ginkgo, green tea, horse chestnut  -quinine  -stomach acid blockers like cimetidine, omeprazole, and esomeprazole  -tamoxifen  -tolbutamide  -topiramate  -torsemide  This list may not describe all possible interactions. Give your health care provider a list of all the medicines, herbs, non-prescription drugs, or dietary supplements you use. Also tell them if you smoke, drink alcohol, or use illegal drugs. Some items may interact with your medicine.  What should I watch for while using this medicine?  Visit your doctor or health care professional for regular check ups. Do not stop taking your medicine unless your doctor tells you to.  If you are going to have surgery or dental work, tell your doctor or health care professional that you are taking this medicine.  Certain genetic factors may reduce the effect of this medicine. Your doctor may use genetic tests to determine treatment.  What side effects may I notice from receiving this medicine?  Side effects that you should report to your doctor or health care professional as soon as possible:  -allergic reactions like skin rash, itching or hives, swelling of the face, lips, or tongue  -black, tarry stools  -blood in urine or vomit  -breathing  problems  -changes in vision  -fever  -sudden weakness  -unusual bleeding or bruising  Side effects that usually do not require medical attention (report to your doctor or health care professional if they continue or are bothersome):  -constipation or diarrhea  -headache  -pain in back or joints  -stomach upset  This list may not describe all possible side effects. Call your doctor for medical advice about side effects. You may report side effects to FDA at 4-344-GDC-5053.  Where should I keep my medicine?  Keep out of the reach of children.  Store at room temperature of 59 to 86 degrees F (15 to 30 degrees C). Throw away any unused medicine after the expiration date.  NOTE: This sheet is a summary. It may not cover all possible information. If you have questions about this medicine, talk to your doctor, pharmacist, or health care provider.  © 2014, [employer]. (6/8/2010 9:41:49 AM)    Lisinopril tablets  What is this medicine?  LISINOPRIL (lyse IN oh pril) is an ACE inhibitor. This medicine is used to treat high blood pressure and heart failure. It is also used to protect the heart immediately after a heart attack.  This medicine may be used for other purposes; ask your health care provider or pharmacist if you have questions.  COMMON BRAND NAME(S): Prinivil, Zestril  What should I tell my health care provider before I take this medicine?  They need to know if you have any of these conditions:  -diabetes  -heart or blood vessel disease  -immune system disease like lupus or scleroderma  -kidney disease  -low blood pressure  -previous swelling of the tongue, face, or lips with difficulty breathing, difficulty swallowing, hoarseness, or tightening of the throat  -an unusual or allergic reaction to lisinopril, other ACE inhibitors, insect venom, foods, dyes, or preservatives  -pregnant or trying to get pregnant  -breast-feeding  How should I use this medicine?  Take this medicine by mouth with a glass of  water. Follow the directions on your prescription label. You may take this medicine with or without food. Take your medicine at regular intervals. Do not stop taking this medicine except on the advice of your doctor or health care professional.  Talk to your pediatrician regarding the use of this medicine in children. Special care may be needed. While this drug may be prescribed for children as young as 6 years of age for selected conditions, precautions do apply.  Overdosage: If you think you have taken too much of this medicine contact a poison control center or emergency room at once.  NOTE: This medicine is only for you. Do not share this medicine with others.  What if I miss a dose?  If you miss a dose, take it as soon as you can. If it is almost time for your next dose, take only that dose. Do not take double or extra doses.  What may interact with this medicine?  -diuretics  -lithium  -NSAIDs, medicines for pain and inflammation, like ibuprofen or naproxen  -over-the-counter herbal supplements like hawthorn  -potassium salts or potassium supplements  -salt substitutes  This list may not describe all possible interactions. Give your health care provider a list of all the medicines, herbs, non-prescription drugs, or dietary supplements you use. Also tell them if you smoke, drink alcohol, or use illegal drugs. Some items may interact with your medicine.  What should I watch for while using this medicine?  Visit your doctor or health care professional for regular check ups. Check your blood pressure as directed. Ask your doctor what your blood pressure should be, and when you should contact him or her. Call your doctor or health care professional if you notice an irregular or fast heart beat.  Women should inform their doctor if they wish to become pregnant or think they might be pregnant. There is a potential for serious side effects to an unborn child. Talk to your health care professional or pharmacist for  more information.  Check with your doctor or health care professional if you get an attack of severe diarrhea, nausea and vomiting, or if you sweat a lot. The loss of too much body fluid can make it dangerous for you to take this medicine.  You may get drowsy or dizzy. Do not drive, use machinery, or do anything that needs mental alertness until you know how this drug affects you. Do not stand or sit up quickly, especially if you are an older patient. This reduces the risk of dizzy or fainting spells. Alcohol can make you more drowsy and dizzy. Avoid alcoholic drinks.  Avoid salt substitutes unless you are told otherwise by your doctor or health care professional.  Do not treat yourself for coughs, colds, or pain while you are taking this medicine without asking your doctor or health care professional for advice. Some ingredients may increase your blood pressure.  What side effects may I notice from receiving this medicine?  Side effects that you should report to your doctor or health care professional as soon as possible:  -abdominal pain with or without nausea or vomiting  -allergic reactions like skin rash or hives, swelling of the hands, feet, face, lips, throat, or tongue  -dark urine  -difficulty breathing  -dizzy, lightheaded or fainting spell  -fever or sore throat  -irregular heart beat, chest pain  -pain or difficulty passing urine  -redness, blistering, peeling or loosening of the skin, including inside the mouth  -unusually weak  -yellowing of the eyes or skin  Side effects that usually do not require medical attention (report to your doctor or health care professional if they continue or are bothersome):  -change in taste  -cough  -decreased sexual function or desire  -headache  -sun sensitivity  -tiredness  This list may not describe all possible side effects. Call your doctor for medical advice about side effects. You may report side effects to FDA at 3-636-FDA-0006.  Where should I keep my  medicine?  Keep out of the reach of children.  Store at room temperature between 15 and 30 degrees C (59 and 86 degrees F). Protect from moisture. Keep container tightly closed. Throw away any unused medicine after the expiration date.  NOTE: This sheet is a summary. It may not cover all possible information. If you have questions about this medicine, talk to your doctor, pharmacist, or health care provider.  © 2014, [employer]. (6/22/2009 5:36:32 PM)  Metoprolol tablets  What is this medicine?  METOPROLOL (me TOE proe lole) is a beta-blocker. Beta-blockers reduce the workload on the heart and help it to beat more regularly. This medicine is used to treat high blood pressure and to prevent chest pain. It is also used to after a heart attack and to prevent an additional heart attack from occurring.  This medicine may be used for other purposes; ask your health care provider or pharmacist if you have questions.  COMMON BRAND NAME(S): Lopressor  What should I tell my health care provider before I take this medicine?  They need to know if you have any of these conditions:  -diabetes  -heart or vessel disease like slow heart rate, worsening heart failure, heart block, sick sinus syndrome or Raynaud's disease  -kidney disease  -liver disease  -lung or breathing disease, like asthma or emphysema  -pheochromocytoma  -thyroid disease  -an unusual or allergic reaction to metoprolol, other beta-blockers, medicines, foods, dyes, or preservatives  -pregnant or trying to get pregnant  -breast-feeding  How should I use this medicine?  Take this medicine by mouth with a drink of water. Follow the directions on the prescription label. Take this medicine immediately after meals. Take your doses at regular intervals. Do not take more medicine than directed. Do not stop taking this medicine suddenly. This could lead to serious heart-related effects.  Talk to your pediatrician regarding the use of this medicine in children.  Special care may be needed.  Overdosage: If you think you have taken too much of this medicine contact a poison control center or emergency room at once.  NOTE: This medicine is only for you. Do not share this medicine with others.  What if I miss a dose?  If you miss a dose, take it as soon as you can. If it is almost time for your next dose, take only that dose. Do not take double or extra doses.  What may interact with this medicine?  Do not take this medicine with any of the following medications:  -sotalol  This medicine may also interact with the following medications:  -clonidine  -digoxin  -dobutamine  -epinephrine  -isoproterenol  -medicine to control heart rhythm like quinidine, propafenone  -medicine for depression like monoamine oxidase (MAO) inhibitors, fluoxetine, and paroxetine  -medicine for high blood pressure like calcium channel blockers  -reserpine  This list may not describe all possible interactions. Give your health care provider a list of all the medicines, herbs, non-prescription drugs, or dietary supplements you use. Also tell them if you smoke, drink alcohol, or use illegal drugs. Some items may interact with your medicine.  What should I watch for while using this medicine?  Visit your doctor or health care professional for regular check ups. Contact your doctor right away if your symptoms worsen. Check your blood pressure and pulse rate regularly. Ask your health care professional what your blood pressure and pulse rate should be, and when you should contact them.  You may get drowsy or dizzy. Do not drive, use machinery, or do anything that needs mental alertness until you know how this medicine affects you. Do not sit or stand up quickly, especially if you are an older patient. This reduces the risk of dizzy or fainting spells. Contact your doctor if these symptoms continue. Alcohol may interfere with the effect of this medicine. Avoid alcoholic drinks.  What side effects may I notice  from receiving this medicine?  Side effects that you should report to your doctor or health care professional as soon as possible:  -allergic reactions like skin rash, itching or hives  -cold or numb hands or feet  -depression  -difficulty breathing  -faint  -fever with sore throat  -irregular heartbeat, chest pain  -rapid weight gain  -swollen legs or ankles  Side effects that usually do not require medical attention (report to your doctor or health care professional if they continue or are bothersome):  -anxiety or nervousness  -change in sex drive or performance  -dry skin  -headache  -nightmares or trouble sleeping  -short term memory loss  -stomach upset or diarrhea  -unusually tired  This list may not describe all possible side effects. Call your doctor for medical advice about side effects. You may report side effects to FDA at 8-440-FDA-2777.  Where should I keep my medicine?  Keep out of the reach of children.  Store at room temperature between 15 and 30 degrees C (59 and 86 degrees F). Throw away any unused medicine after the expiration date.  NOTE: This sheet is a summary. It may not cover all possible information. If you have questions about this medicine, talk to your doctor, pharmacist, or health care provider.  © 2014, [employer]. (2/27/2009 4:11:19 PM)    Nitroglycerin sublingual tablets  What is this medicine?  NITROGLYCERIN (venkat troe GLI ser in) is a type of vasodilator. It relaxes blood vessels, increasing the blood and oxygen supply to your heart. This medicine is used to relieve chest pain caused by angina. It is also used to prevent chest pain before activities like climbing stairs, going outdoors in cold weather, or sexual activity.  This medicine may be used for other purposes; ask your health care provider or pharmacist if you have questions.  COMMON BRAND NAME(S): Nitroquick, Nitrostat, Nitrotab  What should I tell my health care provider before I take this medicine?  They need  to know if you have any of these conditions:  -anemia  -head injury, recent stroke, or bleeding in the brain  -liver disease  -previous heart attack  -an unusual or allergic reaction to nitroglycerin, other medicines, foods, dyes, or preservatives  -pregnant or trying to get pregnant  -breast-feeding  How should I use this medicine?  Take this medicine by mouth as needed. At the first sign of an angina attack (chest pain or tightness) place one tablet under your tongue. You can also take this medicine 5 to 10 minutes before an event likely to produce chest pain. Follow the directions on the prescription label. Let the tablet dissolve under the tongue. Do not swallow whole. Replace the dose if you accidentally swallow it. It will help if your mouth is not dry. Saliva around the tablet will help it to dissolve more quickly. Do not eat or drink, smoke or chew tobacco while a tablet is dissolving. If you are not better within 5 minutes after taking ONE dose of nitroglycerin, call 9-1-1 immediately to seek emergency medical care. Do not take more than 3 nitroglycerin tablets over 15 minutes.  If you take this medicine often to relieve symptoms of angina, your doctor or health care professional may provide you with different instructions to manage your symptoms. If symptoms do not go away after following these instructions, it is important to call 9-1-1 immediately. Do not take more than 3 nitroglycerin tablets over 15 minutes.  Talk to your pediatrician regarding the use of this medicine in children. Special care may be needed.  Overdosage: If you think you have taken too much of this medicine contact a poison control center or emergency room at once.  NOTE: This medicine is only for you. Do not share this medicine with others.  What if I miss a dose?  This does not apply. This medicine is only used as needed.  What may interact with this medicine?  Do not take this medicine with any of the following  medications:  -certain migraine medicines like ergotamine and dihydroergotamine (DHE)  -medicines used to treat erectile dysfunction like sildenafil, tadalafil, and vardenafil  This medicine may also interact with the following medications:  -alteplase  -aspirin  -heparin  -medicines for high blood pressure  -medicines for mental depression  -other medicines used to treat angina  -phenothiazines like chlorpromazine, mesoridazine, prochlorperazine, thioridazine  This list may not describe all possible interactions. Give your health care provider a list of all the medicines, herbs, non-prescription drugs, or dietary supplements you use. Also tell them if you smoke, drink alcohol, or use illegal drugs. Some items may interact with your medicine.  What should I watch for while using this medicine?  Tell your doctor or health care professional if you feel your medicine is no longer working.  Keep this medicine with you at all times. Sit or lie down when you take your medicine to prevent falling if you feel dizzy or faint after using it. Try to remain calm. This will help you to feel better faster. If you feel dizzy, take several deep breaths and lie down with your feet propped up, or bend forward with your head resting between your knees.  You may get drowsy or dizzy. Do not drive, use machinery, or do anything that needs mental alertness until you know how this drug affects you. Do not stand or sit up quickly, especially if you are an older patient. This reduces the risk of dizzy or fainting spells. Alcohol can make you more drowsy and dizzy. Avoid alcoholic drinks.  Do not treat yourself for coughs, colds, or pain while you are taking this medicine without asking your doctor or health care professional for advice. Some ingredients may increase your blood pressure.  What side effects may I notice from receiving this medicine?  Side effects that you should report to your doctor or health care professional as soon as  possible:  -blurred vision  -dry mouth  -skin rash  -sweating  -the feeling of extreme pressure in the head  -unusually weak or tired  Side effects that usually do not require medical attention (report to your doctor or health care professional if they continue or are bothersome):  -flushing of the face or neck  -headache  -irregular heartbeat, palpitations  -nausea, vomiting  This list may not describe all possible side effects. Call your doctor for medical advice about side effects. You may report side effects to FDA at 2-647-FDA-5373.  Where should I keep my medicine?  Keep out of the reach of children.  Store at room temperature between 20 and 25 degrees C (68 and 77 degrees F). Store in original container. Protect from light and moisture. Keep tightly closed. Throw away any unused medicine after the expiration date.  NOTE: This sheet is a summary. It may not cover all possible information. If you have questions about this medicine, talk to your doctor, pharmacist, or health care provider.  © 2014, [employer]. (7/10/2009 5:16:24 PM)    Polyethylene Glycol powder  What is this medicine?  POLYETHYLENE GLYCOL 3350 (izabella ee ETH i basil; GLYE col) powder is a laxative used to treat constipation. It increases the amount of water in the stool. Bowel movements become easier and more frequent.  This medicine may be used for other purposes; ask your health care provider or pharmacist if you have questions.  COMMON BRAND NAME(S): GaviLax, GlycoLax, MiraLax, [employer]   What should I tell my health care provider before I take this medicine?  They need to know if you have any of these conditions:  -a history of blockage of the stomach or intestine  -current abdomen distension or pain  -difficulty swallowing  -diverticulitis, ulcerative colitis, or other chronic bowel disease  -phenylketonuria  -an unusual or allergic reaction to polyethylene glycol, other medicines, dyes, or preservatives  -pregnant or trying to  get pregnant  -breast-feeding  How should I use this medicine?  Take this medicine by mouth. The bottle has a measuring cap that is marked with a line. Pour the powder into the cap up to the marked line (the dose is about 1 heaping tablespoon). Add the powder in the cap to a full glass (4 to 8 ounces or 120 to 240 ml) of water, juice, soda, coffee or tea. Mix the powder well. Drink the solution. Take exactly as directed. Do not take your medicine more often than directed.  Talk to your pediatrician regarding the use of this medicine in children. Special care may be needed.  Overdosage: If you think you have taken too much of this medicine contact a poison control center or emergency room at once.  NOTE: This medicine is only for you. Do not share this medicine with others.  What if I miss a dose?  If you miss a dose, take it as soon as you can. If it is almost time for your next dose, take only that dose. Do not take double or extra doses.  What may interact with this medicine?  Interactions are not expected.  This list may not describe all possible interactions. Give your health care provider a list of all the medicines, herbs, non-prescription drugs, or dietary supplements you use. Also tell them if you smoke, drink alcohol, or use illegal drugs. Some items may interact with your medicine.  What should I watch for while using this medicine?  Do not use for more than 2 weeks without advice from your doctor or health care professional. It can take 2 to 4 days to have a bowel movement and to experience improvement in constipation. See your health care professional for any changes in bowel habits, including constipation, that are severe or last longer than three weeks.  Always take this medicine with plenty of water.  What side effects may I notice from receiving this medicine?  Side effects that you should report to your doctor or health care professional as soon as possible:  -diarrhea  -difficulty  breathing  -itching of the skin, hives, or skin rash  -severe bloating, pain, or distension of the stomach  -vomiting  Side effects that usually do not require medical attention (report to your doctor or health care professional if they continue or are bothersome):  -bloating or gas  -lower abdominal discomfort or cramps  -nausea  This list may not describe all possible side effects. Call your doctor for medical advice about side effects. You may report side effects to FDA at 5-516-FDA-5094.  Where should I keep my medicine?  Keep out of the reach of children.  Store between 15 and 30 degrees C (59 and 86 degrees F). Throw away any unused medicine after the expiration date.  NOTE: This sheet is a summary. It may not cover all possible information. If you have questions about this medicine, talk to your doctor, pharmacist, or health care provider.  © 2014, [employer]. (7/20/2009 4:50:45 PM)    Docusate Sodium; Senna tablets  What is this medicine?  DOCUSATE SODIUM; SENNA (doc CUE sayt LORI ryan um; SEN na) contains a stool softener and a laxative. It is used to treat constipation.  This medicine may be used for other purposes; ask your health care provider or pharmacist if you have questions.  COMMON BRAND NAME(S): Dok Plus, Gerri-Colace, Senexon-S, Senna Plus, Senna-S, Senna-Time-S , SennaLax-S, Senohot-S, SenoSol-SS  What should I tell my health care provider before I take this medicine?  They need to know if you have any of these conditions:  -nausea or vomiting  -severe constipation  -stomach pain  -sudden change in bowel habit lasting more than 2 weeks  -an unusual or allergic reaction to docusate, senna, other medicines, foods, dyes, or preservatives  -pregnant or trying to get pregnant  -breast-feeding  How should I use this medicine?  Take this medicine by mouth with a full glass of water. Follow the directions on the label. Take your doses at regular intervals. Do not take your medicine more often  than directed.  Talk to your pediatrician regarding the use of this medicine in children. While this medicine may be prescribed for children as young as 2 years for selected conditions, precautions do apply.  Overdosage: If you think you have taken too much of this medicine contact a poison control center or emergency room at once.  NOTE: This medicine is only for you. Do not share this medicine with others.  What if I miss a dose?  If you miss a dose, take it as soon as you can. If it is almost time for your next dose, take only that dose. Do not take double or extra doses.  What may interact with this medicine?  -mineral oil  This list may not describe all possible interactions. Give your health care provider a list of all the medicines, herbs, non-prescription drugs, or dietary supplements you use. Also tell them if you smoke, drink alcohol, or use illegal drugs. Some items may interact with your medicine.  What should I watch for while using this medicine?  Do not use for more than one week without advice from your doctor or health care professional. Long-term use can make your body depend on the laxative for regular bowel movements, damage the bowel, cause malnutrition, and problems with the amounts of water and salts in your body. If your constipation keeps returning, check with your doctor or health care professional.  Drink plenty of water while taking this medicine. This will help fight constipation.  Stop using this medicine and contact your doctor or health care professional if you experience any rectal bleeding or do not have a bowel movement after use. These could be signs of a more serious condition.  What side effects may I notice from receiving this medicine?  Side effects that you should report to your doctor or health care professional as soon as possible:  -allergic reactions like skin rash, itching or hives, swelling of the face, lips, or tongue  -muscle weakness  -unusually weak or  tired  -unusual weight loss  Side effects that usually do not require medical attention (report to your doctor or health care professional if they continue or are bothersome):  -diarrhea  -discolored urine  -nausea, vomiting  -stomach cramps  -throat irritation  This list may not describe all possible side effects. Call your doctor for medical advice about side effects. You may report side effects to FDA at 9-674-FJP-7057.  Where should I keep my medicine?  Keep out of the reach of children.  Store at room temperature between 15 and 30 degrees C (59 and 86 degrees F). Throw away any unused medicine after the expiration date.  NOTE: This sheet is a summary. It may not cover all possible information. If you have questions about this medicine, talk to your doctor, pharmacist, or health care provider.  © 2014, [employer]. (4/9/2009 5:59:32 PM)

## 2017-10-30 NOTE — DISCHARGE PLANNING
SUKH pc to [facility] to get confirmation [facility] acceptance. Left vm message requesting call back.

## 2017-10-30 NOTE — PROGRESS NOTES
Pt's son has changed his mind, now states he is capable of taking care of pt's infusions in the outpatient setting.  Charge RN at bedside, confirmed this.  Pt to be discharged.

## 2017-10-30 NOTE — PROGRESS NOTES
Consents confirmed, vessel patency confirmed with ultrasound. Risks and benefits of procedure explained to patent/family and education regarding central line associated bloodstream infection provided. Questions and concerns addressed.     PICC placed in RUE per MD order with ultrasound guidance. 4 Fr, single lumen PICC placed in the basilic vein after 1 attempt(s). 1% lidocaine injected intradermally, 21 yuli micro introducer needle and modified Seldinger technique used. 36 cm catheter inserted with good blood return. Each lumen flushed without resistance with 10ml 0.9% normal saline. PICC line secured with STAT LOCK. Biopatch and Tegaderm applied.     PICC tip confirmed with 3CG technology.  Tracing placed in patient chart. Pt tolerated procedure well. Pt condition relayed to unit RN or ordering physician via this post procedure note in the EMR.    [employer] PICC Ref # [id number], Lot # [id number]

## 2017-10-30 NOTE — PROGRESS NOTES
Dr Taylor spoke with patient on the phone, hospitalist RN, social work, and RN spoke with pt at bedside. Per pt, he has a daughter who may be able to assist with PICC care and antibiotic infusions upon discharge. Social work to reach out to daughter to confirm this.

## 2017-10-30 NOTE — DISCHARGE SUMMARY
CHIEF COMPLAINT ON ADMISSION  Chief Complaint   Patient presents with   • Fall     Patient complains of falling out of chair tonight and has had multiple falls recently and here via EMS to find out why he keeps falling.       CODE STATUS  Full Code    HPI & HOSPITAL COURSE  Please review Dr. Vania Aggarwal, D.O. notes for further details of history of present illness, past medical/social/family histories, allergies and medications. Please review Dr. Harris Santacruz consultation notes.  * Atrial fibrillation (CMS-HCC)   Assessment & Plan    Presented with falls.  Has paroxysmal A fib and was found to be in Afib with RVR. It was thought this was the cause of his weakness and falls  Continue cardiac monitoring  Patient stat chads 2 score is 3, patient will benefit from full dose anticoagulation  Was starte don Lovenox full dose  Continue Cardizem when necessary heart rate greater than 120  Cardiology, Dr. Iglesias has been consulted  Echo showed normal EF.  Discussed with Cardiology. Because of high fall risk, they have opted NO full anticoagulation. Heparin gtt d/cynthia and this was transitioned to hepa SQ  I will discharge him on aspirin and Plavix instead. HR in the 60s without rate controllers.  Hospital course complicaed by sepsis and bacteremia (see below)  At discharge date he is afebrile, hemodynamically stable and wants to go home. He has declined SNF and prefers home health care. This was arranged.  Follow up with Zahra Yañez M.D. In 1-2 weeks  Follow up with Dr. Iglesias, EP Cardiology in 1-2 weeks for continued management of Afib          Bacteremia   Assessment & Plan    E. Coli and Klebsiella bacteremia, unclear source; cannot do GI workup as a source as he declines endoscopy  PICC line has been ordered, currently on Rocephin  On 10/27, awaiting PICC.  PICC placed on 10/30  Stop date for IV Rocephin 11/7/2017  He will be discharged to home health care with home infusion. He and his family refused SNF  At  discharge date, he was approved for home infusion, home health care. His son will also help give antibiotics. This has all been arranged.  FOllow up with Zahra Yañez M.D. In 1 week        NSTEMI (non-ST elevated myocardial infarction) (CMS-HCC)   Assessment & Plan    Heparin started. Dr. Iglesias, Cardiology consulted. He felt PE needs to be ruled out. Bulmaro Wheeler intiially declined NM scan  On 10/25 he agreed to NM scan and that showed NO lung clot. NM scan showed low probability for PE.  At this time, Dr. Iglesias prefers to do outpatient stress testing. I willdefer to him if he wants to do elective cardiac catheterization.  Meanwhile he will continue aspirin, Plavix, statin. His HR is 50-60s so I will hold off on beta blockers. He has blood pressure room, so I will start him on an ACEI and give him PRN nitro SL, advise him no Viagra.  Follow up with Zahra Yañez M.D. In 1-2 weeks  Follow up with Dr. Iglesias, EP Cardiology in 1-2 weeks for continued management of Afib        Sepsis (CMS-HCC)- (present on admission)   Assessment & Plan    This is sepsis (without associated acute organ dysfunction).   After admission patient spiked a fever to 102 with heart rate 112.  Sepsis protocol initiated.  Check urinalysis and blood cultures  Fluid bolus prn according to guidelines  Empiric rocephin until source ascertained.  Blood culture came back positive with E. Coli, Klebsiella  I consulted ID physician, Dr. Rapp  On IV Rocephin and PICC line.  Stop date 11/7/2017  Follow up with Zahra Yañez M.D. In 1-2 weeks        DM (diabetes mellitus) (CMS-HCC)- (present on admission)   Assessment & Plan    Follow-up A1c  Sliding scale insulin/Accu-Chek  Hold metformin  At discharge date may resume metformin  Home health care to do blood sugar logs  Follow up with Zahra Yañez M.D. In 1-2 weeks        Carotid artery stenosis- (present on admission)   Assessment & Plan    History of  CT of the head is negative  MRI in May  "2017 no new findings  CTA HandN showed carotid disease but no significant blockage or narrowing.  Continue statin and aspirin  Follow up with Zahra Yañez M.D. In 1-2 weeks        Advanced directives, counseling/discussion   Assessment & Plan    Discussed code statu. He wants to be FULL CODE. He clearly states \"Nobody wants to die, right?\"        Generalized weakness   Assessment & Plan    With P.Afib  R/o CVA  CT head is negative follow-up repeat MRI of the brain  Follow up CTA. Neck  Echocardiogram  Continue aspirin, Lovenox, statin  Blood pressure control  Neurology consult as needed  PT/OT        Glaucoma- (present on admission)   Assessment & Plan    Tinea eyedrops        Thrombocytopenia (HCC)- (present on admission)   Assessment & Plan    Monitor        BPH (benign prostatic hyperplasia)- (present on admission)   Assessment & Plan    Continue home meds            Discharge Physical Exam  Constitutional: He appears well-developed and well-nourished.   Frail elderly   HENT:   Head: Normocephalic and atraumatic.   Eyes: Conjunctivae and EOM are normal. No scleral icterus.   Neck: Normal range of motion. Neck supple.   Cardiovascular: Normal rate and regular rhythm.  Exam reveals no gallop and no friction rub.    No murmur heard.  Pulmonary/Chest: Effort normal and breath sounds normal. No respiratory distress. He has no wheezes. He has no rales.   Abdominal: Soft. Bowel sounds are normal. He exhibits no distension. There is no tenderness. There is no rebound and no guarding.   Musculoskeletal: He exhibits no edema or tenderness.   Neurological: He is alert.   Skin: Skin is warm. Rash (old ecchymoses) noted.   Psychiatric: He has a normal mood and affect. His behavior is normal.       Therefore, he is discharged in good and stable condition with close outpatient follow-up.    SPECIFIC OUTPATIENT FOLLOW-UP  Follow up with Zahra Yañez M.D. In 1-2 weeks  Follow up with Dr. Iglesias, EP Cardiology in 1-2 weeks for " continued management of Afib    DISCHARGE PROBLEM LIST  Principal Problem:    Bacteremia POA: Unknown  Active Problems:    Sepsis (CMS-[facility]) POA: Yes    Atrial fibrillation (CMS-HCC) POA: Unknown    NSTEMI (non-ST elevated myocardial infarction) (CMS-HCC) POA: Unknown    Carotid artery stenosis POA: Yes    DM (diabetes mellitus) (CMS-HCC) POA: Yes    BPH (benign prostatic hyperplasia) POA: Yes    Thrombocytopenia (HCC) POA: Yes    Glaucoma POA: Yes    Generalized weakness POA: Unknown    Advanced directives, counseling/discussion POA: Unknown  Resolved Problems:    * No resolved hospital problems. *      FOLLOW UP  Future Appointments  Date Time Provider Department Center   10/31/2017 9:00 AM Sammi Castañeda R.N. [facility] None   11/7/2017 3:20 PM KAYLIE [facility] None   1/4/2018 2:20 PM TANNER Kemp [address] [address]  [phone]          MEDICATIONS ON DISCHARGE   Bulmaro Wheeler   Home Medication Instructions SELVIN:[id number]    Printed on:10/30/17 2928   Medication Information                      acetaminophen (TYLENOL) 325 MG Tab  Take 2 Tabs by mouth every 6 hours as needed (Mild Pain; (Pain scale 1-3); Temp greater than 100.5 F).             ASCENSIA MICROFILL TEST strip  Test every day as directed             aspirin EC (ECOTRIN) 81 MG Tablet Delayed Response  Take 81 mg by mouth every day.             atorvastatin (LIPITOR) 10 MG Tab  Take 0.5 Tabs by mouth every day.             B Complex Vitamins (VITAMIN B COMPLEX PO)  Take 1 Tab by mouth every evening.             cefTRIAXone (ROCEPHIN) 2-2.22 GM-% IVPB  50 mL by Intravenous route every 24 hours for 8 days.             Cholecalciferol (VITAMIN D PO)  Take 1 Cap by mouth every day.             clopidogrel (PLAVIX) 75 MG Tab  Take 1 Tab by mouth every day.             finasteride (PROSCAR) 5 MG Tab  TAKE  ONE TABLET BY MOUTH EVERY MORNING             latanoprost  (XALATAN) 0.005 % Solution  Place 1 Drop in both eyes every day.             lisinopril (PRINIVIL) 5 MG Tab  Take 1 Tab by mouth every day.             metformin (GLUCOPHAGE) 500 MG Tab  Take 2 Tabs by mouth 2 times a day, with meals.             metoprolol (LOPRESSOR) 25 MG Tab  Take 1 Tab by mouth 2 Times a Day.             Multiple Vitamins-Minerals (CENTRUM SILVER PO)  Take 1 Tab by mouth every day.             Multiple Vitamins-Minerals (OCUVITE) Tab  Take 1 Tab by mouth every evening.             nitroglycerin (NITROSTAT) 0.4 MG SL Tab  Place 1 Tab under tongue as needed for Chest Pain.             Omega-3 Fatty Acids (FISH OIL) 1000 MG Cap capsule  Take 1,000 mg by mouth every day.             polyethylene glycol/lytes (MIRALAX) Pack  Take 1 Packet by mouth 1 time daily as needed (if sennosides and docusate ineffective after 24 hours; for constipation).             Probiotic Product (PROBIOTIC PO)  Take 1 Cap by mouth every evening.             Saccharomyces boulardii (PROBIOTIC) 250 MG Cap  Take  by mouth.             senna-docusate (PERICOLACE OR SENOKOT S) 8.6-50 MG Tab  Take 2 Tabs by mouth 2 times a day as needed for Constipation.             trazodone (DESYREL) 50 MG Tab  Take 1 Tab by mouth at bedtime as needed for Sleep.                 DIET  Orders Placed This Encounter   Procedures   • Diet Order     Standing Status:   Standing     Number of Occurrences:   1     Order Specific Question:   Diet:     Answer:   Diabetic [3]     Order Specific Question:   Diet:     Answer:   Cardiac [6]     Order Specific Question:   Calorie modifications:     Answer:   2500 kcals [7]     Order Specific Question:   Macronutrient modifications:     Answer:   Low Cholesterol [7]       ACTIVITY  As per home health PT and OT      CONSULTATIONS  Cardiology   ID    PROCEDURES  Ct-cta Head With & W/o-post Process    Result Date: 10/23/2017  10/23/2017 11:49 AM HISTORY/REASON FOR EXAM:  Weakness and frequent falls  TECHNIQUE/EXAM DESCRIPTION: CT angiogram of the [place] without and with contrast.  Initial precontrast images were obtained of the head from the skull base through the vertex.  Postcontrast images were obtained of the [place] following the power injection of nonionic contrast at 5.0 mL/sec. Thin-section helical images were obtained with overlapping reconstruction interval. Coronal and sagittal multiplanar volume reformats were generated.  3D angiographic images were reviewed on PACS.  Maximum intensity projection (MIP) images were generated and reviewed. 100 mL of Omnipaque 350 nonionic contrast was injected intravenously. Low dose optimization technique was utilized for this CT exam including automated exposure control and adjustment of the mA and/or kV according to patient size. COMPARISON:  10/23/2017 CT head 5:06 AM FINDINGS: CT head: No acute cranial hemorrhage, mass effect, or midline shift. No acute ischemia or mass effect is identified. There is diffuse atrophy. Periventricular white matter changes consistent with chronic small vessel disease. Ventricles and cisterns are patent. Paranasal sinuses and mastoid air cells are clear. CTA head: There are scattered calcifications in the intracranial segments of the right internal carotid artery. The right middle and anterior cerebral arteries are normal in course and caliber. No thrombus or aneurysm identified. There are scattered calcifications in the intracranial segment of the left internal carotid artery. The left middle and anterior through arteries are normal in course and caliber. No thrombus or aneurysm identified. The distal vertebral arteries are widely patent. The basilar artery is patent. There is a fetal origin of the right posterior cerebral artery. The dural venous sinuses are patent.     1.  No acute thrombus or aneurysm is identified. 2.  Diffuse atrophy and periventricular white matter changes, consistent with chronic small  vessel disease.    Ct-cta Neck With & W/o-post Processing    Result Date: 10/23/2017  10/23/2017 11:49 AM HISTORY/REASON FOR EXAM: Altered mental status. TECHNIQUE/EXAM DESCRIPTION: CT angiogram of the neck without and with contrast, with reconstructions. Initial precontrast images were obtained from the great vessels through the skull base. Postcontrast images were obtained of the neck from the great vessels through the skull base following the power injection of nonionic contrast at 5.0 mL/sec. Thin-section helical images were obtained with overlapping reconstruction interval. Coronal and oblique multiplanar volume reformats were generated. 3-D images were reviewed on a PACS workstation. Maximum intensity pixel shading reconstructions were performed. 100 mL of Omnipaque 350 nonionic contrast was injected intravenously. Cervical internal carotid artery percent stenosis is calculated using the standard method according to the NASCET criteria wherein a segment of uniform caliber mid or distal cervical internal carotid is used as the reference denominator. Low dose optimization technique was utilized for this CT exam including automated exposure control and adjustment of the mA and/or kV according to patient size. COMPARISON: 2/25/2014 FINDINGS: There is mild emphysematous change of the lungs. There is extensive atherosclerotic plaque involving the aorta and originating vessels. There is extensive plaque extending along the course of the common carotid arteries and the subclavian arteries bilaterally. There is less than 50% diameter narrowing of the common carotid artery on the left. There has been interval endarterectomy on the left with a widely patent internal carotid artery on the left. There is some atherosclerotic plaque of the intracranial internal carotid arteries. There is atherosclerotic plaque at the right carotid bifurcation and extending into the internal carotid artery on the right. This results in less  than 50% diameter narrowing. The vertebral arteries are patent bilaterally.     1.  Interval left internal carotid endarterectomy with no single widely patent left internal carotid artery. 2.  Atherosclerotic plaque involving the common carotid arteries bilaterally and the right internal carotid artery at and above the level of the bifurcation. This results in less than 50% diameter narrowing. 3.  Patent vertebral arteries bilaterally.    Ct-head W/o    Result Date: 10/23/2017  HISTORY/REASON FOR EXAM:  Loss of equilibrium followed by fall. TECHNIQUE/EXAM DESCRIPTION: CT scan of the head without contrast, 10/23/2017 4:57 AM. Contiguous 5 mm axial sections were obtained from the skull base through the vertex. Up to date radiation dose reduction adjustments have been utilized to meet ALARA standards for radiation dose reduction. COMPARISON:  5/2/2017 FINDINGS:   The ventricular system and cortical sulci are prominent, consistent with the patient's advanced age.  There is no midline shift or other mass effect. Patchy white matter lucencies are again seen bilaterally consistent with old small vessel ischemic changes or demyelination. There is no acute intra-axial abnormality or extra-axial fluid collection.  There is no intracranial hemorrhage.  The calvaria are intact. The visualized paranasal sinuses show no unusual opacity.     1.  No acute intracranial abnormality. 2.  Age-consistent atrophy and chronic white matter changes. INTERPRETING LOCATION:  [address] [place] [address], [id number]    Dx-chest-portable (1 View)    Result Date: 10/24/2017  10/24/2017 10:40 AM HISTORY/REASON FOR EXAM:  Shortness of breath. TECHNIQUE/EXAM DESCRIPTION AND NUMBER OF VIEWS: Single portable view of the chest. COMPARISON: 1 view chest history FINDINGS: The lungs are clear. Cardiac silhouette: normal in size. There is aortic atherosclerosis. Pleura: There are no pleural effusion or pneumothoraces. Osseous structures: There is severe bilateral  "glenohumeral joint osteoarthritis     No acute cardiopulmonary abnormality identified.    Dx-chest-portable (1 View)    Result Date: 10/23/2017  10/23/2017 4:51 AM HISTORY/REASON FOR EXAM:  Chest Pain TECHNIQUE/EXAM DESCRIPTION AND NUMBER OF VIEWS: Single portable view of the chest. COMPARISON: 5/12/2017 FINDINGS: The heart, mediastinum, and taylor are unremarkable. The lungs are well expanded and show no evidence of infiltrate or other acute process. There is no obvious pleural effusion. The bony thorax is grossly normal.     No acute cardiopulmonary abnormality.    Mr-brain-w/o    Result Date: 10/23/2017  10/23/2017 10:09 AM HISTORY/REASON FOR EXAM:  Ataxia. TECHNIQUE/EXAM DESCRIPTION: MRI of the brain without contrast. T1 sagittal, T2 fast spin-echo axial, T1 coronal, FLAIR coronal, diffusion-weighted and apparent diffusion coefficient (ADC map) axial images were obtained of the whole brain. The study was performed on a [employer] Signa 1.5 Kiah MRI scanner. COMPARISON:  None. FINDINGS: There is no acute infarct. There is no acute or chronic parenchymal hemorrhage. A few nonspecific T2 hyperintensities are noted in the subcortical and periventricular white matter. Moderate cerebral volume loss is seen. There is chronic lacunar  infarct in the left thalamus. There is mild atrophy of the midbrain causing \"hummingbird\" sign. There is moderate dilatation of the lateral and third ventricles. There is prominent flow void in the aqueduct. There is no extra-axial fluid collection, hemorrhage or mass. The visualized flow voids of the cerebral vasculature are unremarkable.  There is no large lesion identified in the expected course of the intracranial portions of the cranial nerves. The skull bones are normal. The paranasal sinuses are clear. The extracranial soft tissue including orbits appear grossly normal.     1.  Moderate dilatation of the ventricles. The ventricular dilatation is slightly more prominent than the associated " "cortical atrophy. There is also prominent flow void in the aqueduct. 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 However there are no other secondary signs of normal pressure hydrocephalus such as acute angulation of the frontal horns and tight frontoparietal sulci. Please correlate clinically. There has been no significant interval change. 2.  No acute infarct. 3.  Mild chronic microvascular ischemic disease. 4.  Moderate cerebral atrophy. 5.  There is mild atrophy of the midbrain causing a\"hummingbird\" sign. This is a nonspecific finding and can be seen in the patients with progressive supra bulbar palsy. Please correlate clinically.    Ir-picc Line Placement > Age 5    Result Date: 10/30/2017  HISTORY/REASON FOR EXAM:   PICC placement. TECHNIQUE/EXAM DESCRIPTION AND NUMBER OF VIEWS:   PICC line insertion with ultrasound guidance.  The procedure was performed using maximal sterile barrier technique including sterile gown, mask, cap, and donning of sterile gloves following appropriate hand hygiene and/or sterile scrub. Patient skin site was prepped with 2% Chlorhexidine solution. FINDINGS:  PICC line insertion with Ultrasound Guidance was performed by qualified nursing staff without the assistance of a Radiologist.  A follow up chest x-ray will be performed to determine appropriateness of PICC positioning.              Ultrasound-guided PICC placement performed by qualified nursing staff as above.     Nm-lung Vent/perf Imaging    Result Date: 10/25/2017  10/25/2017 1:45 PM HISTORY/REASON FOR EXAM:  Elevated troponin and chest pain TECHNIQUE/EXAM DESCRIPTION AND NUMBER OF VIEWS:  30.5 mCi aerosolized Tc 99m-DTPA was administered as a gas, followed by multiple projection imaging. 6.75 mCi Tc 99m-MAA was then administered intravenously followed by multiple projection imaging. COMPARISON:  Chest radiograph 10/24/2017 FINDINGS:     No focal consolidations are noted on chest radiograph. Some poorly " defined opacifications are noted in the left lung base. Perfusion images reveal no evidence of lobar perfusion defect. No segmental or significant subsegmental defects are noted. Ventilation images reveal no focal areas of normal ventilation that would correspond to abnormal perfusion.     1.  Low probability of pulmonary embolism.    Echocardiogram Comp W/o Cont    Result Date: 10/24/2017  Transthoracic Echo Report Echocardiography Laboratory CONCLUSIONS Normal left ventricular chamber size. Normal left ventricular systolic function. Left ventricular ejection fraction is visually estimated to be 65%. Trace mitral regurgitation. Mildly dilated left atrium. Aortic sclerosis without stenosis. Structurally normal tricuspid valve without significant stenosis or regurgitation. Normal right ventricular size and systolic function. No pericardial effusion seen. Compared to the images of the prior study done  16, no signficant change JT MARIA Exam Date:         10/24/2017                    12:52 Exam Location:     Inpatient Priority:          Routine Ordering Physician:        LEILA OVALLE Referring Physician:       [id number] Rodriguez Sonographer:               Josiah Nam RDCS,                            [id number] Age:    89     Gender:    M MRN:    [id number] :    [id number] BSA:    1.86   Ht (in):    68     Wt (lb):    160 Exam Type:     Complete Indications:     Paroxysmal atrial fibrillation ICD Codes:       I480 CPT Codes:       03604 BP:   142    /   72     HR:   72 Technical Quality:       Good MEASUREMENTS  (Male / Female) Normal Values 2D ECHO LV Diastolic Diameter PLAX        3.6 cm                4.2 - 5.9 / 3.9 - 5.3 cm LV Systolic Diameter PLAX         1.9 cm                2.1 - 4.0 cm IVS Diastolic Thickness           0.99 cm               LVPW Diastolic Thickness          0.84 cm               RV Diameter 4C                    3.4 cm                2.5 - 2.9 cm LVOT Diameter                     2.1  cm                RA Diameter                       5 cm                  Estimated LV Ejection Fraction    65 %                  LV Ejection Fraction MOD BP       72 %                  >= 55  % LV Ejection Fraction MOD 4C       71.6 %                LV Ejection Fraction MOD 2C       69.8 %                LA Volume Index                   35.8 cm³/m²           16 - 28 cm³/m² M-MODE Aortic Root Diameter MM           3 cm                  DOPPLER AV Peak Velocity                  1.7 m/s               AV Peak Gradient                  11.7 mmHg             AV Mean Gradient                  6.4 mmHg              LVOT Peak Velocity                0.95 m/s              AV Area Cont Eq vti               1.9 cm²               Mitral E Point Velocity           0.82 m/s              Mitral E to A Ratio               1.6                   MV Pressure Half Time             44.2 ms               MV Area PHT                       5 cm²                 MV Deceleration Time              152 ms                * Indicates values subject to auto-interpretation LV EF:  65    % FINDINGS Left Ventricle Normal left ventricular chamber size. Normal left ventricular wall thickness with sigmoid septum. Normal left ventricular systolic function. Left ventricular ejection fraction is visually estimated to be 65%. Normal regional wall motion. Grade II diastolic dysfunction. Right Ventricle Normal right ventricular size and systolic function. Right Atrium Normal right atrial size. Left Atrium Mildly dilated left atrium. Left atrial volume index is 36 mL/sq m. Mitral Valve Structurally normal mitral valve. No mitral stenosis.  Trace mitral regurgitation. Aortic Valve Aortic sclerosis without stenosis. No aortic insufficiency. Tricuspid Valve Structurally normal tricuspid valve without significant stenosis or regurgitation. Pulmonic Valve The pulmonic valve is not well visualized. No stenosis or regurgitation seen. Pericardium No pericardial  effusion seen. Aorta Normal ascending aorta. Jackie Bustillo M.D. (Electronically Signed) Final Date:     24 October 2017                 14:28      LABORATORY  Lab Results   Component Value Date/Time    SODIUM 133 (L) 10/26/2017 04:45 AM    POTASSIUM 3.5 (L) 10/26/2017 04:45 AM    CHLORIDE 102 10/26/2017 04:45 AM    CO2 24 10/26/2017 04:45 AM    GLUCOSE 99 10/26/2017 04:45 AM    BUN 13 10/26/2017 04:45 AM    CREATININE 0.67 10/26/2017 04:45 AM    CREATININE 1.0 02/23/2009 02:34 PM        Lab Results   Component Value Date/Time    WBC 9.4 10/30/2017 03:12 AM    HEMOGLOBIN 11.1 (L) 10/30/2017 03:12 AM    HEMATOCRIT 33.4 (L) 10/30/2017 03:12 AM    PLATELETCT 203 10/30/2017 03:12 AM        Total time of the discharge process exceeds 40 minutes

## 2017-10-30 NOTE — PROGRESS NOTES
Gricelda Coombs Fall Risk Assessment:     Last Known Fall: Within the last month  Mobility: Use of assistive device/requires assist of two people  Medications: Cardiovascular or central nervous system meds  Mental Status/LOC/Awareness: Awake, alert, and oriented to date, place, and person  Toileting Needs: Use of assistive device (Bedside commode, bedpan, urinal)  Volume/Electrolyte Status: No problems  Communication/Sensory: Visual (Glasses)/hearing deficit  Behavior: Appropriate behavior  Gricelda Coombs Fall Risk Total: 13  Fall Risk Level: MODERATE RISK    Universal Fall Precautions:  call light/belongings in reach, bed in low position and locked, wheelchairs and assistive devices out of sight, siderails up x 2, use non-slip footwear, adequate lighting, clutter free and spill free environment, educate on level of risk, educate to call for assistance    Fall Risk Level Interventions:    TRIAL (TELE 8, NEURO, MED MIAH 5) Moderate Fall Risk Interventions  Place yellow fall risk ID band on patient: verified  Provide patient/family education based on risk assessment : completed  Educate patient/family to call staff for assistance when getting out of bed: completed  Place fall precaution signage outside patient door: verified  Utilize bed/chair fall alarm: verifiedTRIAL (TELE 8, NEURO, [employer] MIAH 5) High Fall Risk Interventions  Place yellow fall risk ID band on patient: verified  Provide patient/family education based on risk assessment: completed  Educate patient/family to call staff for assistance when getting out of bed: completed  Place fall precaution signage outside patient door: verified  Place patient in room close to nursing station: verified  Utilize bed/chair fall alarm: verified  Notify charge of high risk for huddle: completed    Patient Specific Interventions:     Medication: review medications with patient and family  Mental Status/LOC/Awareness: utilize bed/chair fall alarm and reinforce the use of call  light  Toileting: instruct patient/family on the need to call for assistance when toileting  Volume/Electrolyte Status: monitor blood sugars and maintain appropriate blood sugar levels if diabetic and monitor abnormal lab values  Communication/Sensory: update plan of care on whiteboard  Behavioral: instruct/reinforce fall program rationale  Mobility: utilize bed/chair fall alarm and ensure bed is locked and in lowest position

## 2017-10-30 NOTE — CARE PLAN
Problem: Safety  Goal: Will remain free from falls  Outcome: PROGRESSING AS EXPECTED  Call bell within reach, bed in lowest position, bed alarm activated    Problem: Venous Thromboembolism (VTW)/Deep Vein Thrombosis (DVT) Prevention:  Goal: Patient will participate in Venous Thrombosis (VTE)/Deep Vein Thrombosis (DVT)Prevention Measures  Outcome: PROGRESSING AS EXPECTED

## 2017-10-30 NOTE — PROGRESS NOTES
Assumed care of patient. Bedside report received from KATINA Rock. Updated on POC, call light within reach and fall precautions in place. Bed locked and in lowest position. Patient asleep at this time. MAR, labs, orders reviewed.

## 2017-10-30 NOTE — PROGRESS NOTES
Assumed pt care @ 1900. Received bedside shift report. Plan of care for tonight reviewed with pt. No questions at this time. Safety precautions in place. Call bell use reinforced with pt and call bell within reach. Bed alarm activated. Will cont to monitor

## 2017-10-30 NOTE — DISCHARGE PLANNING
"SUKH spoke with RANDOLPH Her. RANDOLPH Her requested [facility] to [facility] ; has questions SW can probably answer.    SUKH pc to [facility] ([phone]). Per Rikki, needs to know \"what he is on and last dose time\".     SUKH pc to pt's RN. Per RN, 1st dose of Rocephin was Monday, Oct. 23 at 1500 and recent dose of Rocephin today at 0900. [facility] and notified RANDOLPH Her.   "

## 2017-10-30 NOTE — PROGRESS NOTES
"Pt's son informed RN that something \"blew in my eye\" and that he no longer sees well.  Per pt son, he is unsure if he will be able to help pt with infusions upon discharge. Social work and hospitalist notified.  "

## 2017-10-30 NOTE — PROGRESS NOTES
Gricelda Coombs Fall Risk Assessment:     Last Known Fall: Within the last month  Mobility: Use of assistive device/requires assist of two people  Medications: Cardiovascular or central nervous system meds  Mental Status/LOC/Awareness: Awake, alert, and oriented to date, place, and person  Toileting Needs: Use of assistive device (Bedside commode, bedpan, urinal)  Volume/Electrolyte Status: Use of IV fluids/tube feeds  Communication/Sensory: Visual (Glasses)/hearing deficit  Behavior: Appropriate behavior  Gricelda Coombs Fall Risk Total: 15  Fall Risk Level: HIGH RISK    Universal Fall Precautions:  call light/belongings in reach, bed in low position and locked, wheelchairs and assistive devices out of sight, siderails up x 2, use non-slip footwear, adequate lighting, clutter free and spill free environment, educate on level of risk, educate to call for assistance    Fall Risk Level Interventions:    TRIAL (TELE 8, NEURO, MED MIAH 5) Moderate Fall Risk Interventions  Place yellow fall risk ID band on patient: verified  Provide patient/family education based on risk assessment : completed  Educate patient/family to call staff for assistance when getting out of bed: completed  Place fall precaution signage outside patient door: verified  Utilize bed/chair fall alarm: verifiedTRIAL (TELE 8, NEURO, MED MIAH 5) High Fall Risk Interventions  Place yellow fall risk ID band on patient: verified  Provide patient/family education based on risk assessment: completed  Educate patient/family to call staff for assistance when getting out of bed: completed  Place fall precaution signage outside patient door: verified  Place patient in room close to nursing station: verified  Utilize bed/chair fall alarm: verified  Notify charge of high risk for huddle: verified    Patient Specific Interventions:     Medication: review medications with patient and family, assess for medications that can be discontinued or dosage decreased and limit  combination of prn medications  Mental Status/LOC/Awareness: reinforce falls education, check on patient hourly, utilize bed/chair fall alarm and reinforce the use of call light  Toileting: instruct patient/family on the need to call for assistance when toileting and do not leave patient unattended in bathroom/refer to toileting scripting  Volume/Electrolyte Status: monitor abnormal lab values and ensure IV fluids are appropriate  Communication/Sensory: update plan of care on whiteboard, ensure proper positioning when transferrng/ambulating and ensure patient has glasses/contacts and hearing aids/dentures  Behavioral: administer medication as ordered and instruct/reinforce fall program rationale  Mobility: utilize bed/chair fall alarm, ensure bed is locked and in lowest position and provide appropriate assistive device

## 2017-10-30 NOTE — DISCHARGE PLANNING
SUKH pc to pt's son to request pt's daughter's (Sammi) phone number. Per pt, he will contact his sister tonight, but his sister might help pt after work in the evenings. Pt confirmed he is having vision problems, but he is still able to drive and firmly stated he will take care of pt and wants to take pt home today. SUKH notified RN and Hospitalist RN. Per RN, will speak with Charge.    SUKH also spoke with Anysia. [facility]. SUKH notified RN.

## 2017-10-30 NOTE — THERAPY
"Physical Therapy Treatment completed.   Bed Mobility:  Supine to Sit:  (found up in and returned to chair)  Transfers: Sit to Stand: Contact Guard Assist  Gait: Level Of Assist: Contact Guard Assist with Front-Wheel Walker       Plan of Care: Will benefit from Physical Therapy 3 times per week  Discharge Recommendations: Equipment: No Equipment Needed.     Pt appears to be most limited by R knee discomfort and fatigue. He tends to drag R LE behind him during swing with increased gait distance. If family is able to give pt 24hr care/supervision, anticipate pt can return home with them. If not, may require placement as he is a fall risk.     See \"Rehab Therapy-Acute\" Patient Summary Report for complete documentation.       "

## 2017-10-31 ENCOUNTER — HOME CARE VISIT (OUTPATIENT)
Dept: HOME HEALTH SERVICES | Facility: HOME HEALTHCARE | Age: 82
End: 2017-10-31
Payer: MEDICARE

## 2017-10-31 VITALS
OXYGEN SATURATION: 97 % | DIASTOLIC BLOOD PRESSURE: 66 MMHG | RESPIRATION RATE: 14 BRPM | TEMPERATURE: 98.3 F | HEART RATE: 67 BPM | SYSTOLIC BLOOD PRESSURE: 114 MMHG

## 2017-10-31 PROCEDURE — 665001 SOC-HOME HEALTH

## 2017-10-31 PROCEDURE — G0162 HHC RN E&M PLAN SVS, 15 MIN: HCPCS

## 2017-10-31 ASSESSMENT — ACTIVITIES OF DAILY LIVING (ADL)
HOME_HEALTH_OASIS: 02
HOME_HEALTH_OASIS: 01
OASIS_M1830: 03

## 2017-10-31 ASSESSMENT — PATIENT HEALTH QUESTIONNAIRE - PHQ9
1. LITTLE INTEREST OR PLEASURE IN DOING THINGS: 00
2. FEELING DOWN, DEPRESSED, IRRITABLE, OR HOPELESS: 00

## 2017-10-31 ASSESSMENT — ENCOUNTER SYMPTOMS
NAUSEA: PT DENIES
VOMITING: PT DENIES

## 2017-10-31 NOTE — PROGRESS NOTES
Patient given discharge instructions and prescriptions. All questions answered. Patient IV and tele box removed. Patient belongings with patient. Patient discharged in wheelchair with staff member

## 2017-11-01 ENCOUNTER — HOME CARE VISIT (OUTPATIENT)
Dept: HOME HEALTH SERVICES | Facility: HOME HEALTHCARE | Age: 82
End: 2017-11-01
Payer: MEDICARE

## 2017-11-01 ENCOUNTER — PATIENT OUTREACH (OUTPATIENT)
Dept: HEALTH INFORMATION MANAGEMENT | Facility: OTHER | Age: 82
End: 2017-11-01

## 2017-11-01 ENCOUNTER — TELEPHONE (OUTPATIENT)
Dept: HEALTH INFORMATION MANAGEMENT | Facility: OTHER | Age: 82
End: 2017-11-01

## 2017-11-01 VITALS
TEMPERATURE: 97.3 F | RESPIRATION RATE: 17 BRPM | DIASTOLIC BLOOD PRESSURE: 55 MMHG | HEART RATE: 57 BPM | SYSTOLIC BLOOD PRESSURE: 92 MMHG | OXYGEN SATURATION: 98 %

## 2017-11-01 PROCEDURE — G0299 HHS/HOSPICE OF RN EA 15 MIN: HCPCS

## 2017-11-01 SDOH — ECONOMIC STABILITY: HOUSING INSECURITY: UNSAFE APPLIANCES: 0

## 2017-11-01 SDOH — ECONOMIC STABILITY: HOUSING INSECURITY: UNSAFE COOKING RANGE AREA: 0

## 2017-11-01 NOTE — PROGRESS NOTES
Referral received from [facility] for med rec. Medications reviewed against discharge summary. No problems identified.     Yaquelin Galdamez, PharmD

## 2017-11-01 NOTE — TELEPHONE ENCOUNTER
Medications reviewed against discharge summary. No problems identified.     Yaquelin Galdamez, PharmD

## 2017-11-02 ENCOUNTER — HOME CARE VISIT (OUTPATIENT)
Dept: HOME HEALTH SERVICES | Facility: HOME HEALTHCARE | Age: 82
End: 2017-11-02
Payer: MEDICARE

## 2017-11-02 VITALS
SYSTOLIC BLOOD PRESSURE: 114 MMHG | TEMPERATURE: 97.1 F | DIASTOLIC BLOOD PRESSURE: 56 MMHG | HEART RATE: 52 BPM | OXYGEN SATURATION: 98 % | RESPIRATION RATE: 17 BRPM

## 2017-11-02 PROCEDURE — G0156 HHCP-SVS OF AIDE,EA 15 MIN: HCPCS

## 2017-11-02 PROCEDURE — G0152 HHCP-SERV OF OT,EA 15 MIN: HCPCS

## 2017-11-03 ENCOUNTER — HOME CARE VISIT (OUTPATIENT)
Dept: HOME HEALTH SERVICES | Facility: HOME HEALTHCARE | Age: 82
End: 2017-11-03
Payer: MEDICARE

## 2017-11-03 SDOH — ECONOMIC STABILITY: HOUSING INSECURITY: UNSAFE APPLIANCES: 0

## 2017-11-03 SDOH — ECONOMIC STABILITY: HOUSING INSECURITY: UNSAFE COOKING RANGE AREA: 0

## 2017-11-03 ASSESSMENT — ENCOUNTER SYMPTOMS
NAUSEA: DENIES ANY
VOMITING: DENIES ANY

## 2017-11-06 ENCOUNTER — HOME CARE VISIT (OUTPATIENT)
Dept: HOME HEALTH SERVICES | Facility: HOME HEALTHCARE | Age: 82
End: 2017-11-06
Payer: MEDICARE

## 2017-11-06 ENCOUNTER — HOSPITAL ENCOUNTER (OUTPATIENT)
Facility: MEDICAL CENTER | Age: 82
End: 2017-11-06
Attending: FAMILY MEDICINE
Payer: MEDICARE

## 2017-11-06 VITALS
RESPIRATION RATE: 17 BRPM | HEART RATE: 52 BPM | OXYGEN SATURATION: 98 % | SYSTOLIC BLOOD PRESSURE: 114 MMHG | TEMPERATURE: 97.1 F | DIASTOLIC BLOOD PRESSURE: 56 MMHG

## 2017-11-06 VITALS
DIASTOLIC BLOOD PRESSURE: 60 MMHG | HEART RATE: 63 BPM | TEMPERATURE: 96.9 F | SYSTOLIC BLOOD PRESSURE: 90 MMHG | OXYGEN SATURATION: 97 % | RESPIRATION RATE: 16 BRPM

## 2017-11-06 VITALS
HEART RATE: 62 BPM | RESPIRATION RATE: 17 BRPM | TEMPERATURE: 97.3 F | SYSTOLIC BLOOD PRESSURE: 160 MMHG | DIASTOLIC BLOOD PRESSURE: 67 MMHG | OXYGEN SATURATION: 98 %

## 2017-11-06 LAB
ALBUMIN SERPL BCP-MCNC: 3.6 G/DL (ref 3.2–4.9)
ALP SERPL-CCNC: 81 U/L (ref 30–99)
ALT SERPL-CCNC: 22 U/L (ref 2–50)
AST SERPL-CCNC: 24 U/L (ref 12–45)
BASOPHILS # BLD AUTO: 0.4 % (ref 0–1.8)
BASOPHILS # BLD: 0.03 K/UL (ref 0–0.12)
BILIRUB CONJ SERPL-MCNC: 0.1 MG/DL (ref 0.1–0.5)
BILIRUB INDIRECT SERPL-MCNC: 0.2 MG/DL (ref 0–1)
BILIRUB SERPL-MCNC: 0.3 MG/DL (ref 0.1–1.5)
BUN SERPL-MCNC: 23 MG/DL (ref 8–22)
CALCIUM SERPL-MCNC: 9.2 MG/DL (ref 8.5–10.5)
CHLORIDE SERPL-SCNC: 93 MMOL/L (ref 96–112)
CO2 SERPL-SCNC: 23 MMOL/L (ref 20–33)
CREAT SERPL-MCNC: 0.72 MG/DL (ref 0.5–1.4)
EOSINOPHIL # BLD AUTO: 0.06 K/UL (ref 0–0.51)
EOSINOPHIL NFR BLD: 0.8 % (ref 0–6.9)
ERYTHROCYTE [DISTWIDTH] IN BLOOD BY AUTOMATED COUNT: 44.6 FL (ref 35.9–50)
GFR SERPL CREATININE-BSD FRML MDRD: >60 ML/MIN/1.73 M 2
GLUCOSE SERPL-MCNC: 138 MG/DL (ref 65–99)
HCT VFR BLD AUTO: 33.2 % (ref 42–52)
HGB BLD-MCNC: 11.2 G/DL (ref 14–18)
IMM GRANULOCYTES # BLD AUTO: 0.03 K/UL (ref 0–0.11)
IMM GRANULOCYTES NFR BLD AUTO: 0.4 % (ref 0–0.9)
LYMPHOCYTES # BLD AUTO: 1.15 K/UL (ref 1–4.8)
LYMPHOCYTES NFR BLD: 15.9 % (ref 22–41)
MCH RBC QN AUTO: 30 PG (ref 27–33)
MCHC RBC AUTO-ENTMCNC: 33.7 G/DL (ref 33.7–35.3)
MCV RBC AUTO: 89 FL (ref 81.4–97.8)
MONOCYTES # BLD AUTO: 0.73 K/UL (ref 0–0.85)
MONOCYTES NFR BLD AUTO: 10.1 % (ref 0–13.4)
NEUTROPHILS # BLD AUTO: 5.22 K/UL (ref 1.82–7.42)
NEUTROPHILS NFR BLD: 72.4 % (ref 44–72)
NRBC # BLD AUTO: 0 K/UL
NRBC BLD AUTO-RTO: 0 /100 WBC
PHOSPHATE SERPL-MCNC: 3.3 MG/DL (ref 2.5–4.5)
PLATELET # BLD AUTO: 261 K/UL (ref 164–446)
PMV BLD AUTO: 10.6 FL (ref 9–12.9)
POTASSIUM SERPL-SCNC: 4.5 MMOL/L (ref 3.6–5.5)
PROT SERPL-MCNC: 6.8 G/DL (ref 6–8.2)
RBC # BLD AUTO: 3.73 M/UL (ref 4.7–6.1)
SODIUM SERPL-SCNC: 125 MMOL/L (ref 135–145)
WBC # BLD AUTO: 7.2 K/UL (ref 4.8–10.8)

## 2017-11-06 PROCEDURE — 80076 HEPATIC FUNCTION PANEL: CPT | Mod: 59

## 2017-11-06 PROCEDURE — G0156 HHCP-SVS OF AIDE,EA 15 MIN: HCPCS

## 2017-11-06 PROCEDURE — 85025 COMPLETE CBC W/AUTO DIFF WBC: CPT

## 2017-11-06 PROCEDURE — G0151 HHCP-SERV OF PT,EA 15 MIN: HCPCS

## 2017-11-06 PROCEDURE — 80069 RENAL FUNCTION PANEL: CPT

## 2017-11-06 PROCEDURE — G0299 HHS/HOSPICE OF RN EA 15 MIN: HCPCS

## 2017-11-06 PROCEDURE — G0152 HHCP-SERV OF OT,EA 15 MIN: HCPCS

## 2017-11-06 ASSESSMENT — ACTIVITIES OF DAILY LIVING (ADL)
BATHING_ASSISTANCE: 5
HOUSEKEEPING_ASSISTANCE: 6
GROOMING_ASSISTANCE: 0
DRESSING_UB_ASSISTANCE: 4
MEAL_PREP_ASSISTANCE: 6
TELEPHONE_ASSISTANCE: 0
ORAL_CARE_ASSISTANCE: 0
DRESSING_LB_ASSISTANCE: 5
SHOPPING_ASSISTANCE: 6
TRANSPORTATION_ASSISTANCE: 6
TOILETING_ASSISTANCE: 0
LAUNDRY_ASSISTANCE: 6
EATING_ASSISTANCE: 1
BATHING_ASSISTIVE_EQUIPMENT_USED: SHOWER CHAIR, GRAB BARS, HANDHELD SHOWER
TOILETING_EQUIPMENT_USED: HIGH RISE TOILET

## 2017-11-06 ASSESSMENT — ENCOUNTER SYMPTOMS: DIFFICULTY THINKING: 1

## 2017-11-06 NOTE — Clinical Note
ACTION NEEDED PLEASE, COMPLETED IV CENTRIAXONE 11/07/17, PLEASE ADVICE IF WE NEED TO DO CULTURES TO CHECK BLOOD AND WHEN CAN WE PULL OUT PICC LINE     THANKS     TRINI FOREMAN, [phone]

## 2017-11-07 ENCOUNTER — OFFICE VISIT (OUTPATIENT)
Dept: CARDIOLOGY | Facility: MEDICAL CENTER | Age: 82
End: 2017-11-07
Payer: MEDICARE

## 2017-11-07 VITALS
OXYGEN SATURATION: 98 % | SYSTOLIC BLOOD PRESSURE: 160 MMHG | TEMPERATURE: 97.3 F | HEART RATE: 62 BPM | DIASTOLIC BLOOD PRESSURE: 67 MMHG | RESPIRATION RATE: 17 BRPM

## 2017-11-07 VITALS
HEART RATE: 64 BPM | SYSTOLIC BLOOD PRESSURE: 122 MMHG | OXYGEN SATURATION: 95 % | DIASTOLIC BLOOD PRESSURE: 68 MMHG | HEIGHT: 68 IN | WEIGHT: 163 LBS | BODY MASS INDEX: 24.71 KG/M2

## 2017-11-07 VITALS
TEMPERATURE: 96.9 F | RESPIRATION RATE: 16 BRPM | OXYGEN SATURATION: 97 % | DIASTOLIC BLOOD PRESSURE: 60 MMHG | HEART RATE: 63 BPM | SYSTOLIC BLOOD PRESSURE: 90 MMHG

## 2017-11-07 DIAGNOSIS — I48.0 PAROXYSMAL ATRIAL FIBRILLATION (HCC): ICD-10-CM

## 2017-11-07 DIAGNOSIS — E11.59 TYPE 2 DIABETES MELLITUS WITH OTHER CIRCULATORY COMPLICATION, WITHOUT LONG-TERM CURRENT USE OF INSULIN (HCC): ICD-10-CM

## 2017-11-07 DIAGNOSIS — I65.29 STENOSIS OF CAROTID ARTERY, UNSPECIFIED LATERALITY: ICD-10-CM

## 2017-11-07 PROBLEM — I21.4 NSTEMI (NON-ST ELEVATED MYOCARDIAL INFARCTION) (HCC): Status: RESOLVED | Noted: 2017-10-24 | Resolved: 2017-11-07

## 2017-11-07 PROBLEM — R78.81 BACTEREMIA: Status: RESOLVED | Noted: 2017-10-26 | Resolved: 2017-11-07

## 2017-11-07 PROCEDURE — 99214 OFFICE O/P EST MOD 30 MIN: CPT | Performed by: NURSE PRACTITIONER

## 2017-11-07 SDOH — ECONOMIC STABILITY: HOUSING INSECURITY: UNSAFE COOKING RANGE AREA: 0

## 2017-11-07 SDOH — ECONOMIC STABILITY: HOUSING INSECURITY: UNSAFE APPLIANCES: 0

## 2017-11-07 ASSESSMENT — ENCOUNTER SYMPTOMS
COUGH: 0
WEAKNESS: 1
FEVER: 0
PND: 0
PALPITATIONS: 0
ORTHOPNEA: 0
MEMORY LOSS: 1
MYALGIAS: 0
SHORTNESS OF BREATH: 0
ABDOMINAL PAIN: 0
DIZZINESS: 0
CLAUDICATION: 0
FALLS: 1

## 2017-11-07 ASSESSMENT — ACTIVITIES OF DAILY LIVING (ADL)
LAUNDRY_ASSISTANCE: 6
TELEPHONE_ASSISTANCE: 0
HOUSEKEEPING_ASSISTANCE: 6
TRANSPORTATION_ASSISTANCE: 6
SHOPPING_ASSISTANCE: 6
ADLS_COMMENTS: <!--EPICS-->SEE O.T EVALUATION<!--EPICE-->
MEAL_PREP_ASSISTANCE: 6
TOILETING_ASSISTANCE: 0

## 2017-11-07 NOTE — LETTER
[facility]   [address], [address]  [place] [address]  Phone: [phone]  Fax: [phone]              Bulmaro Wheeler  4/16/1927    Encounter Date: 11/7/2017    KAYLIE Lincoln          PROGRESS NOTE:  Subjective:   Bulmaro Wheeler is a 89 y.o. male who presents today for hospital follow up for sepsis with troponin elevation with mild chest pain noted by patient.    He is a new patient to our office, he would like to see Dr. Hernandez (his son's cardiologist). Hx of frequent falls, PAF, carotid disease with previous L CEA, HTN, DM, and weakness/debility.    During his stay, there was a low threshold for stress imaging due to patient's mental and physical state. His son continues to find him on the ground from falling or confused. He has had multiple infections that have caused him to become more frail and weak.    He does have memory loss. His son does most of the talking today.    He has mild edema in his legs.    No more chest pain per the patient and son.    Past Medical History:   Diagnosis Date   • Acute kidney failure, unspecified 9/27/2013   • Arrhythmia     in 80's, not recent   • Arthritis    • Backpain    • Carotid artery stenosis 2/18/2014   • CATARACT     surgery in 1985   • Diabetes    • Glaucoma     Interocular lenses placed in 1985   • Heart burn    • Hypertension    • Indigestion    • NSTEMI (non-ST elevated myocardial infarction) (CMS-[facility]) 10/24/2017   • Occlusion and stenosis of carotid artery without mention of cerebral infarction 3/11/2014   • Pneumonia    • Pyelonephritis October 2010   • Pyelonephritis    • Renal cyst 9/19/2013   • Sepsis 9/27/2013   • Urinary tract infection, site not specified 9/27/2013   • UTI (lower urinary tract infection) 9/1/2012     Past Surgical History:   Procedure Laterality Date   • CAROTID ENDARTERECTOMY  3/11/2014    Performed by Bob Antonio M.D. at [facility]   • ERCP  W/REMOVAL CALCULUS  2009   • COLONOSCOPY  1999   • CHOLECYSTECTOMY  1999   • EYE SURGERY  1980's    cataracts OU     Family History   Problem Relation Age of Onset   • Heart Disease Father    • Diabetes Father    • Alcohol/Drug Father    • Cancer Sister      ovarian   • Stroke Brother      age 90     History   Smoking Status   • Never Smoker   Smokeless Tobacco   • Never Used     No Known Allergies  Outpatient Encounter Prescriptions as of 11/7/2017   Medication Sig Dispense Refill   • Sodium Chloride Flush (NORMAL SALINE FLUSH) 0.9 % Solution 10 mL by Intravenous route every day. flush before and after IV antibiotic     • Heparin Sodium, Porcine, (HEPARIN LOCK FLUSH INJ) 5 mL by Intravenous route every day.     • acetaminophen (TYLENOL) 325 MG Tab Take 2 Tabs by mouth every 6 hours as needed (Mild Pain; (Pain scale 1-3); Temp greater than 100.5 F). 30 Tab 0   • cefTRIAXone (ROCEPHIN) 2-2.22 GM-% IVPB 50 mL by Intravenous route every 24 hours for 8 days. 400 mL 0   • clopidogrel (PLAVIX) 75 MG Tab Take 1 Tab by mouth every day. 30 Tab 0   • lisinopril (PRINIVIL) 5 MG Tab Take 1 Tab by mouth every day. 30 Tab 0   • metoprolol (LOPRESSOR) 25 MG Tab Take 1 Tab by mouth 2 Times a Day. 60 Tab 0   • nitroglycerin (NITROSTAT) 0.4 MG SL Tab Place 1 Tab under tongue as needed for Chest Pain. 25 Tab 0   • polyethylene glycol/lytes (MIRALAX) Pack Take 1 Packet by mouth 1 time daily as needed (if sennosides and docusate ineffective after 24 hours; for constipation).  3   • senna-docusate (PERICOLACE OR SENOKOT S) 8.6-50 MG Tab Take 2 Tabs by mouth 2 times a day as needed for Constipation.     • Saccharomyces boulardii (PROBIOTIC) 250 MG Cap Take  by mouth. 14 Cap    • latanoprost (XALATAN) 0.005 % Solution Place 1 Drop in both eyes every day. 1 Bottle 0   • trazodone (DESYREL) 50 MG Tab Take 1 Tab by mouth at bedtime as needed for Sleep. 90 Tab 3   • atorvastatin (LIPITOR) 10 MG Tab Take 0.5 Tabs by mouth every day. 45 Tab 3    "  • finasteride (PROSCAR) 5 MG Tab TAKE  ONE TABLET BY MOUTH EVERY MORNING 90 Tab 0   • ASCENSIA MICROFILL TEST strip Test every day as directed 100 Strip 11   • Omega-3 Fatty Acids (FISH OIL) 1000 MG Cap capsule Take 1,000 mg by mouth every day.     • metformin (GLUCOPHAGE) 500 MG Tab Take 2 Tabs by mouth 2 times a day, with meals. 360 Tab 3   • aspirin EC (ECOTRIN) 81 MG Tablet Delayed Response Take 81 mg by mouth every day.     • Probiotic Product (PROBIOTIC PO) Take 1 Cap by mouth every evening.     • B Complex Vitamins (VITAMIN B COMPLEX PO) Take 1 Tab by mouth every evening.     • Multiple Vitamins-Minerals (CENTRUM SILVER PO) Take 1 Tab by mouth every day.     • Cholecalciferol (VITAMIN D PO) Take 1 Cap by mouth every day.       No facility-administered encounter medications on file as of 11/7/2017.      Review of Systems   Constitutional: Positive for malaise/fatigue. Negative for fever.   Respiratory: Negative for cough and shortness of breath.    Cardiovascular: Positive for leg swelling. Negative for chest pain, palpitations, orthopnea, claudication and PND.   Gastrointestinal: Negative for abdominal pain.   Musculoskeletal: Positive for falls. Negative for myalgias.   Neurological: Positive for weakness. Negative for dizziness.   Psychiatric/Behavioral: Positive for memory loss.   All other systems reviewed and are negative.       Objective:   /68   Pulse 64   Ht 1.727 m (5' 8\")   Wt 73.9 kg (163 lb)   SpO2 95%   BMI 24.78 kg/m²      Physical Exam   Constitutional: He is oriented to person, place, and time. He appears well-developed and well-nourished. No distress.   HENT:   Head: Normocephalic and atraumatic.   Eyes: EOM are normal.   Neck: Normal range of motion. No JVD present.   Cardiovascular: Normal rate, regular rhythm, normal heart sounds and intact distal pulses.    No murmur heard.  Pulmonary/Chest: Effort normal and breath sounds normal. No respiratory distress. He has no wheezes. " He has no rales.   Abdominal: Soft. Bowel sounds are normal.   Musculoskeletal: He exhibits edema.   Electric scooter for mobility; edema 1+ pitting    Neurological: He is alert and oriented to person, place, and time.   Skin: Skin is warm and dry.   Psychiatric: He has a normal mood and affect.   Nursing note and vitals reviewed.      Assessment:     1. Paroxysmal atrial fibrillation (CMS-HCC)     2. Stenosis of carotid artery, unspecified laterality     3. Type 2 diabetes mellitus with other circulatory complication, without long-term current use of insulin (CMS-HCC)       Medical Decision Making:  Today's Assessment / Status / Plan:     1. PAF, rate controlled and asymptomatic. No OAC for frequent falls (risk outweighs benefit). Continue ASA, plavix. Metoprolol for rate control.    2. Carotid disease, moderate. L CEA. Follow with duplexes as warranted.    3. DM, managed by PCP.    4. Chest pain, no recurrence. Follow clinically and be more conservative on management. Patient and son agree to no stress imaging unless recurrence of chest pain returns, but would like to go with more conservative management at this time.    FU in clinic in 3-6 months with RS; sooner if warranted with worsening symptoms    Patient verbalizes understanding and agrees with the plan of care.     Collaborating MD: Sapna BENNETT        No Recipients

## 2017-11-08 ENCOUNTER — HOME CARE VISIT (OUTPATIENT)
Dept: HOME HEALTH SERVICES | Facility: HOME HEALTHCARE | Age: 82
End: 2017-11-08
Payer: MEDICARE

## 2017-11-08 ENCOUNTER — OFFICE VISIT (OUTPATIENT)
Dept: MEDICAL GROUP | Facility: PHYSICIAN GROUP | Age: 82
End: 2017-11-08
Payer: MEDICARE

## 2017-11-08 VITALS
HEART RATE: 64 BPM | RESPIRATION RATE: 16 BRPM | TEMPERATURE: 97.4 F | WEIGHT: 162 LBS | DIASTOLIC BLOOD PRESSURE: 60 MMHG | BODY MASS INDEX: 24.55 KG/M2 | HEIGHT: 68 IN | OXYGEN SATURATION: 97 % | SYSTOLIC BLOOD PRESSURE: 106 MMHG

## 2017-11-08 DIAGNOSIS — R78.81 BACTEREMIA: ICD-10-CM

## 2017-11-08 DIAGNOSIS — I48.0 PAROXYSMAL ATRIAL FIBRILLATION (HCC): ICD-10-CM

## 2017-11-08 DIAGNOSIS — E11.59 TYPE 2 DIABETES MELLITUS WITH OTHER CIRCULATORY COMPLICATION, WITHOUT LONG-TERM CURRENT USE OF INSULIN (HCC): ICD-10-CM

## 2017-11-08 DIAGNOSIS — I95.9 HYPOTENSION, UNSPECIFIED HYPOTENSION TYPE: ICD-10-CM

## 2017-11-08 PROCEDURE — G0495 RN CARE TRAIN/EDU IN HH: HCPCS

## 2017-11-08 PROCEDURE — 99214 OFFICE O/P EST MOD 30 MIN: CPT | Performed by: FAMILY MEDICINE

## 2017-11-08 RX ORDER — LISINOPRIL 5 MG/1
2.5 TABLET ORAL DAILY
Qty: 30 TAB | Refills: 0 | Status: ON HOLD
Start: 2017-11-08 | End: 2017-11-27

## 2017-11-08 SDOH — ECONOMIC STABILITY: HOUSING INSECURITY: UNSAFE COOKING RANGE AREA: 0

## 2017-11-08 SDOH — ECONOMIC STABILITY: HOUSING INSECURITY: UNSAFE APPLIANCES: 0

## 2017-11-08 ASSESSMENT — ENCOUNTER SYMPTOMS
VOMITING: DENIES ANY
NAUSEA: DENIES ANY

## 2017-11-08 NOTE — PROGRESS NOTES
Chief Complaint   Patient presents with   • Hospital Follow-up       HISTORY OF PRESENT ILLNESS: Patient is a 89 y.o. male established patient here today for the following concerns:      Bulmaro is brought in by his son today for hospital follow up.  He was having recurrent falls and home and increasing weakness.  Was found to have E.Coli and Klebsiella bacteremia of cryptic source and was placed on IV abx.  He finished course of 8 days of outpatient Rocephin.  No fevers, chills.  Energy level has been good.  His pressure is on the low side today and admits that he has been feeling just a bit lightheaded.  He has follow up with infectious disease to help determine next course and when to d/c the PICC line.      Christiano, son tells me that sugars have been much better and that he has reduced his metformin to 500 mg in the evening, 1000 mg in the am.  No further hypoglycemic episodes.     He also had consultation with cardiology as he had mild CP and tropinosis during admission.  He remains CP free.  Rate has been controlled with metoprolol and he remains on Plavix and aspirin.          Past Medical, Social, and Family history reviewed and updated in EPIC    Allergies:Review of patient's allergies indicates no known allergies.    Current Outpatient Prescriptions   Medication Sig Dispense Refill   • metformin (GLUCOPHAGE) 500 MG Tab 1000 mg in am and 500 in  Tab 0   • metoprolol (LOPRESSOR) 25 MG Tab Take 0.5 Tabs by mouth 2 Times a Day. 60 Tab 0   • lisinopril (PRINIVIL) 5 MG Tab Take 0.5 Tabs by mouth every day. 30 Tab 0   • Sodium Chloride Flush (NORMAL SALINE FLUSH) 0.9 % Solution 10 mL by Intravenous route every day. flush before and after IV antibiotic     • Heparin Sodium, Porcine, (HEPARIN LOCK FLUSH INJ) 5 mL by Intravenous route every day.     • acetaminophen (TYLENOL) 325 MG Tab Take 2 Tabs by mouth every 6 hours as needed (Mild Pain; (Pain scale 1-3); Temp greater than 100.5 F). 30 Tab 0   • clopidogrel  (PLAVIX) 75 MG Tab Take 1 Tab by mouth every day. 30 Tab 0   • nitroglycerin (NITROSTAT) 0.4 MG SL Tab Place 1 Tab under tongue as needed for Chest Pain. 25 Tab 0   • polyethylene glycol/lytes (MIRALAX) Pack Take 1 Packet by mouth 1 time daily as needed (if sennosides and docusate ineffective after 24 hours; for constipation).  3   • senna-docusate (PERICOLACE OR SENOKOT S) 8.6-50 MG Tab Take 2 Tabs by mouth 2 times a day as needed for Constipation.     • Saccharomyces boulardii (PROBIOTIC) 250 MG Cap Take  by mouth. 14 Cap    • latanoprost (XALATAN) 0.005 % Solution Place 1 Drop in both eyes every day. 1 Bottle 0   • trazodone (DESYREL) 50 MG Tab Take 1 Tab by mouth at bedtime as needed for Sleep. 90 Tab 3   • atorvastatin (LIPITOR) 10 MG Tab Take 0.5 Tabs by mouth every day. 45 Tab 3   • finasteride (PROSCAR) 5 MG Tab TAKE  ONE TABLET BY MOUTH EVERY MORNING 90 Tab 0   • ASCENSIA MICROFILL TEST strip Test every day as directed 100 Strip 11   • Omega-3 Fatty Acids (FISH OIL) 1000 MG Cap capsule Take 1,000 mg by mouth every day.     • aspirin EC (ECOTRIN) 81 MG Tablet Delayed Response Take 81 mg by mouth every day.     • Probiotic Product (PROBIOTIC PO) Take 1 Cap by mouth every evening.     • B Complex Vitamins (VITAMIN B COMPLEX PO) Take 1 Tab by mouth every evening.     • Multiple Vitamins-Minerals (CENTRUM SILVER PO) Take 1 Tab by mouth every day.     • Cholecalciferol (VITAMIN D PO) Take 1 Cap by mouth every day.       No current facility-administered medications for this visit.          ROS:  Review of Systems   Constitutional: Negative for fever, chills, weight loss and malaise/fatigue.   HENT: Negative for ear pain, nosebleeds, congestion, sore throat and neck pain.    Eyes: Negative for blurred vision.   Respiratory: Negative for cough, sputum production, shortness of breath and wheezing.    Cardiovascular: Negative for chest pain, palpitations,  and leg swelling.   Gastrointestinal: Negative for heartburn,  "nausea, vomiting, diarrhea and abdominal pain.   Genitourinary: Negative for dysuria, urgency and frequency.   Musculoskeletal: Negative for myalgias, back pain and joint pain.   Skin: Negative for rash and itching.   Neurological: Negative for dizziness, tingling, tremors, sensory change, focal weakness and headaches.   Endo/Heme/Allergies: Does not bruise/bleed easily.   Psychiatric/Behavioral: Negative for depression, anxiety, suicidal ideas, insomnia and memory loss.      Exam:  Blood pressure 106/60, pulse 64, temperature 36.3 °C (97.4 °F), resp. rate 16, height 1.727 m (5' 8\"), weight 73.5 kg (162 lb), SpO2 97 %.    General:  Well nourished, well developed in NAD  Head is grossly normal.  Neck: Supple without JVD   Pulmonary:  Normal effort.   Cardiovascular: Regular rate  Extremities: no clubbing, cyanosis, or edema.  Psych: affect appropriate    Please note that this dictation was created using voice recognition software. I have made every reasonable attempt to correct obvious errors, but I expect that there are errors of grammar and possibly content that I did not discover before finalizing the note.    Assessment/Plan:  1. Type 2 diabetes mellitus with other circulatory complication, without long-term current use of insulin (CMS-HCC)  - metformin (GLUCOPHAGE) 500 MG Tab; 1000 mg in am and 500 in PM  Dispense: 270 Tab; Refill: 0    2. Bacteremia  Completed full course of rocephin.  Follow up with ID.  Patient does have GI doctor if needed if suspecting GI source and would like to initiate work up.     3. Hypotension, unspecified hypotension type  Will reduce BP meds, continue to monitor closely.  If there is continued borderline hypotension will obtain repeat blood cultures and restart abx empirically.    4. Paroxysmal atrial fibrillation (CMS-[facility])  Continue current meds, reduce metoprolol (monitor HR)    3 week follow up, sooner prn.         "

## 2017-11-08 NOTE — PATIENT INSTRUCTIONS
MEDICATION CHANGES TODAY:  Metformin 1000 mg in am and 500 mg in PM  Reduce Metoprolol to 12.5 mg in am and PM  Reduce lisinopril to 2.5 mg once daily    Monitor home BP.

## 2017-11-09 ENCOUNTER — PATIENT MESSAGE (OUTPATIENT)
Dept: MEDICAL GROUP | Facility: PHYSICIAN GROUP | Age: 82
End: 2017-11-09

## 2017-11-09 ENCOUNTER — HOME CARE VISIT (OUTPATIENT)
Dept: HOME HEALTH SERVICES | Facility: HOME HEALTHCARE | Age: 82
End: 2017-11-09
Payer: MEDICARE

## 2017-11-09 ENCOUNTER — OFFICE VISIT (OUTPATIENT)
Dept: INFECTIOUS DISEASES | Facility: MEDICAL CENTER | Age: 82
End: 2017-11-09
Payer: MEDICARE

## 2017-11-09 VITALS
HEART RATE: 53 BPM | DIASTOLIC BLOOD PRESSURE: 50 MMHG | SYSTOLIC BLOOD PRESSURE: 110 MMHG | RESPIRATION RATE: 18 BRPM | TEMPERATURE: 98.1 F | OXYGEN SATURATION: 92 %

## 2017-11-09 VITALS
BODY MASS INDEX: 24.92 KG/M2 | DIASTOLIC BLOOD PRESSURE: 74 MMHG | HEART RATE: 78 BPM | OXYGEN SATURATION: 96 % | WEIGHT: 164.4 LBS | TEMPERATURE: 98.6 F | HEIGHT: 68 IN | SYSTOLIC BLOOD PRESSURE: 112 MMHG

## 2017-11-09 DIAGNOSIS — E87.1 HYPONATREMIA: ICD-10-CM

## 2017-11-09 DIAGNOSIS — R53.83 OTHER FATIGUE: ICD-10-CM

## 2017-11-09 DIAGNOSIS — A41.50 GRAM NEGATIVE SEPSIS (HCC): ICD-10-CM

## 2017-11-09 PROCEDURE — 99214 OFFICE O/P EST MOD 30 MIN: CPT | Performed by: NURSE PRACTITIONER

## 2017-11-09 PROCEDURE — G0157 HHC PT ASSISTANT EA 15: HCPCS

## 2017-11-09 NOTE — TELEPHONE ENCOUNTER
Was the patient seen in the last year in this department? Yes     Does patient have an active prescription for medications requested? No     Received Request Via: Pharmacy      Pt met protocol?: Yes, ov 11/8/17 labs 10/17

## 2017-11-09 NOTE — TELEPHONE ENCOUNTER
Patient has recently been seen by PCP within the last 6 months per protocol (11/17). Will refill supplies for 12 months.  Lab Results   Component Value Date/Time    HBA1C 7.9 (H) 10/23/2017 05:00 AM      Lab Results   Component Value Date/Time    MALBCRT 2,494 (H) 10/04/2017 09:37 AM    MICROALBUR 89.8 10/04/2017 09:37 AM      Lab Results   Component Value Date/Time    ALKPHOSPHAT 81 11/06/2017 12:20 PM    ASTSGOT 24 11/06/2017 12:20 PM    ALTSGPT 22 11/06/2017 12:20 PM    TBILIRUBIN 0.3 11/06/2017 12:20 PM

## 2017-11-09 NOTE — PROGRESS NOTES
"Subjective:     Chief Complaint   Patient presents with   • Hospital Follow-up      gram neg sepsis     Infectious Disease clinic follow up  Bulmaro Wheeler 89 y.o.male in clinic today for evaluation and management of gram-negative sepsis. Primary care provider: Zahra Yañez M.D. This is my first time meeting Mr Wheeler.  He is accompanied by his son, who states he is his caregiver. History of paroxysmal atrial fibrillation, coronary artery stenosis, diabetes   and prior pyelonephritis, UTI, renal failure,  GERD, hypertension, and Peripheral vascular disease.    Interval History: pt hospitalized 10/23/17-10/30/17 due to gram negative sepsis. He was admitted to the hospital after having multiple falls and general weakness. Blood cultures 10/23/17 Klebsiella and E Coli. Unknown source. Blood cultures 10/24/17 negative. Patient discharged home on Rocephin x 2 weeks.  Pt being followed by [facility] and [facility]. Last dose received 11/7/17.  PICC not yet removed.     Hospital records reviewed    Today 11/9/17: pt initially stating that he is feeling well, but then states \"I don't feel good, but I feel a little better.\" When asked about symptoms he reports fatigue stating,  \"I'm tired, that's about it.\"  He does state that the fatigue has improved since hospital DC.  Denies feeling generally ill, weakness, recent fall, fevers/chills,  headache, n/v/d. He denies CP, SOB. He states that he is urinating normally and having regular BMs. He states that he is drinking at least five 8oz glasses of water daily.       Past Medical History:   Diagnosis Date   • Acute kidney failure, unspecified 9/27/2013   • Arrhythmia     in 80's, not recent   • Arthritis    • Backpain    • Carotid artery stenosis 2/18/2014   • CATARACT     surgery in 1985   • Diabetes    • Glaucoma     Interocular lenses placed in 1985   • Heart burn    • Hypertension    • Indigestion    • NSTEMI (non-ST elevated myocardial infarction) " "(CMS-[facility]) 10/24/2017   • Occlusion and stenosis of carotid artery without mention of cerebral infarction 3/11/2014   • Pneumonia    • Pyelonephritis October 2010   • Pyelonephritis    • Renal cyst 9/19/2013   • Sepsis 9/27/2013   • Urinary tract infection, site not specified 9/27/2013   • UTI (lower urinary tract infection) 9/1/2012       Allergies: Review of patient's allergies indicates no known allergies.    Current medications and problem list reviewed with patient and updated in EPIC.    ROS  As documented above in my HPI       Objective:     PE:  /74   Pulse 78   Temp 37 °C (98.6 °F)   Ht 1.727 m (5' 8\")   Wt 74.6 kg (164 lb 6.4 oz)   SpO2 96%   BMI 25.00 kg/m²      Vital signs reviewed  Constitutional: patient is oriented to person, place, and time. He appears well-developed and well-nourished. No distress.   Eyes: Conjunctivae normal and EOM are normal. Pupils are equal, round, and reactive to light.   Mouth/Throat: Lips without lesions, good dentition, oropharynx is clear and moist.  Neck: Trachea midline. Normal range of motion. Neck supple. No masses  Cardiovascular: Normal rate, regular rhythm, normal heart sounds and intact distal pulses. No murmur, gallop, or friction rub. No edema.  Pulmonary/Chest: No respiratory distress. Unlabored respiratory effort, lungs clear to auscultation. No wheezes or rales.   Abdominal: Soft, non tender. BS + x 4. No masses or hepatosplenomegaly.   Musculoskeletal: Normal range of motion. No tenderness, swelling, erythema, deformity noted.  Neurological: He is alert and oriented to person, place, and time. No cranial nerve deficit. Coordination normal.   Skin: Skin is warm and dry. Good turgor. No rashes visable.  KAYLEN PICC in place- CDI. No erythema. Non tender.   PICC line DCd in clinic. Tip intact. No bleeding.    Psychiatric: He has a normal mood and affect. His behavior is normal.   To note: when I walked in the exam room, both the patient and his son were " sleeping.     WBC   Date/Time Value Ref Range Status   11/06/2017 12:20 PM 7.2 4.8 - 10.8 K/uL Final     RBC   Date/Time Value Ref Range Status   11/06/2017 12:20 PM 3.73 (L) 4.70 - 6.10 M/uL Final     Hemoglobin   Date/Time Value Ref Range Status   11/06/2017 12:20 PM 11.2 (L) 14.0 - 18.0 g/dL Final     Hematocrit   Date/Time Value Ref Range Status   11/06/2017 12:20 PM 33.2 (L) 42.0 - 52.0 % Final     MCV   Date/Time Value Ref Range Status   11/06/2017 12:20 PM 89.0 81.4 - 97.8 fL Final     MCH   Date/Time Value Ref Range Status   11/06/2017 12:20 PM 30.0 27.0 - 33.0 pg Final     MCHC   Date/Time Value Ref Range Status   11/06/2017 12:20 PM 33.7 33.7 - 35.3 g/dL Final     MPV   Date/Time Value Ref Range Status   11/06/2017 12:20 PM 10.6 9.0 - 12.9 fL Final        Sodium   Date/Time Value Ref Range Status   11/06/2017 12:20  (L) 135 - 145 mmol/L Final     Potassium   Date/Time Value Ref Range Status   11/06/2017 12:20 PM 4.5 3.6 - 5.5 mmol/L Final     Chloride   Date/Time Value Ref Range Status   11/06/2017 12:20 PM 93 (L) 96 - 112 mmol/L Final     Co2   Date/Time Value Ref Range Status   11/06/2017 12:20 PM 23 20 - 33 mmol/L Final     Glucose   Date/Time Value Ref Range Status   11/06/2017 12:20  (H) 65 - 99 mg/dL Final     Bun   Date/Time Value Ref Range Status   11/06/2017 12:20 PM 23 (H) 8 - 22 mg/dL Final     Creatinine   Date/Time Value Ref Range Status   11/06/2017 12:20 PM 0.72 0.50 - 1.40 mg/dL Final   02/23/2009 02:34 PM 1.0 0.5 - 1.4 mg/dL Final     Alkaline Phosphatase   Date/Time Value Ref Range Status   11/06/2017 12:20 PM 81 30 - 99 U/L Final     AST(SGOT)   Date/Time Value Ref Range Status   11/06/2017 12:20 PM 24 12 - 45 U/L Final     ALT(SGPT)   Date/Time Value Ref Range Status   11/06/2017 12:20 PM 22 2 - 50 U/L Final     Total Bilirubin   Date/Time Value Ref Range Status   11/06/2017 12:20 PM 0.3 0.1 - 1.5 mg/dL Final          Assessment and Plan:   The following treatment plan was  discussed with patient at length    1. Gram negative sepsis (CMS-HCC)     2. Fatigue     3. Hyponatremia       Pt completed antibiotics 11/7/17. PICC DCd in clinic.   Discussed with patient concern of fatigue. Labs results reviewed with patient and son at length.   Suspect that continued fatigue may be related to IV antibiotic. Discussed that the fatigue should be improving daily now that he is off antibiotics  Discussed importance of following up with PCP regarding hyponatremia. pts son stating PCP is aware and discussed starting salt tabs at last visit. pts son will call PCP today   Source of gram negative sepsis unknown. Dicussed GI FU ?GI source. Also, discussed at length that if continue fatigue patient needs to FU right away.  If worsening fatigue, confusion, weakness, fevers/chills, feeling generally ill, CP, SOB pt needs to go to the ER. Pt and son verbalized understanding.   I will call patient next week. pts son assured me that they would call sooner if fatigue persists.   Follow up: 1 week, RTC sooner if needed. FU with PCP for ongoing chronic medical conditions.

## 2017-11-10 ENCOUNTER — HOME CARE VISIT (OUTPATIENT)
Dept: HOME HEALTH SERVICES | Facility: HOME HEALTHCARE | Age: 82
End: 2017-11-10
Payer: MEDICARE

## 2017-11-10 VITALS
OXYGEN SATURATION: 98 % | RESPIRATION RATE: 20 BRPM | HEART RATE: 61 BPM | TEMPERATURE: 98.5 F | DIASTOLIC BLOOD PRESSURE: 80 MMHG | SYSTOLIC BLOOD PRESSURE: 120 MMHG

## 2017-11-10 PROCEDURE — G0156 HHCP-SVS OF AIDE,EA 15 MIN: HCPCS

## 2017-11-10 PROCEDURE — G0299 HHS/HOSPICE OF RN EA 15 MIN: HCPCS

## 2017-11-10 RX ORDER — SODIUM BICARBONATE 650 MG/1
650 TABLET ORAL 2 TIMES DAILY
Qty: 60 TAB | Refills: 1 | Status: SHIPPED | OUTPATIENT
Start: 2017-11-10 | End: 2017-12-12 | Stop reason: SDUPTHER

## 2017-11-12 SDOH — ECONOMIC STABILITY: HOUSING INSECURITY: UNSAFE APPLIANCES: 0

## 2017-11-12 SDOH — ECONOMIC STABILITY: HOUSING INSECURITY: UNSAFE COOKING RANGE AREA: 0

## 2017-11-12 ASSESSMENT — ENCOUNTER SYMPTOMS: RESPIRATORY SYMPTOMS COMMENTS: NO S/S OF RESP DISTRESS

## 2017-11-13 ENCOUNTER — HOSPITAL ENCOUNTER (EMERGENCY)
Facility: MEDICAL CENTER | Age: 82
End: 2017-11-13
Attending: EMERGENCY MEDICINE
Payer: MEDICARE

## 2017-11-13 ENCOUNTER — APPOINTMENT (OUTPATIENT)
Dept: RADIOLOGY | Facility: MEDICAL CENTER | Age: 82
End: 2017-11-13
Attending: EMERGENCY MEDICINE
Payer: MEDICARE

## 2017-11-13 ENCOUNTER — HOME CARE VISIT (OUTPATIENT)
Dept: HOME HEALTH SERVICES | Facility: HOME HEALTHCARE | Age: 82
End: 2017-11-13
Payer: MEDICARE

## 2017-11-13 VITALS
DIASTOLIC BLOOD PRESSURE: 58 MMHG | HEART RATE: 58 BPM | RESPIRATION RATE: 18 BRPM | OXYGEN SATURATION: 99 % | SYSTOLIC BLOOD PRESSURE: 130 MMHG | TEMPERATURE: 97.3 F

## 2017-11-13 VITALS
OXYGEN SATURATION: 98 % | SYSTOLIC BLOOD PRESSURE: 164 MMHG | DIASTOLIC BLOOD PRESSURE: 66 MMHG | TEMPERATURE: 98.1 F | RESPIRATION RATE: 17 BRPM | HEART RATE: 64 BPM

## 2017-11-13 VITALS
OXYGEN SATURATION: 97 % | TEMPERATURE: 98.2 F | HEIGHT: 68 IN | RESPIRATION RATE: 19 BRPM | DIASTOLIC BLOOD PRESSURE: 45 MMHG | SYSTOLIC BLOOD PRESSURE: 142 MMHG | HEART RATE: 63 BPM

## 2017-11-13 VITALS
SYSTOLIC BLOOD PRESSURE: 110 MMHG | RESPIRATION RATE: 18 BRPM | TEMPERATURE: 97.8 F | OXYGEN SATURATION: 98 % | DIASTOLIC BLOOD PRESSURE: 54 MMHG | HEART RATE: 71 BPM

## 2017-11-13 DIAGNOSIS — R06.02 SOB (SHORTNESS OF BREATH): ICD-10-CM

## 2017-11-13 DIAGNOSIS — E87.1 HYPONATREMIA: ICD-10-CM

## 2017-11-13 LAB
ALBUMIN SERPL BCP-MCNC: 3.9 G/DL (ref 3.2–4.9)
ALBUMIN/GLOB SERPL: 1.3 G/DL
ALP SERPL-CCNC: 71 U/L (ref 30–99)
ALT SERPL-CCNC: 16 U/L (ref 2–50)
ANION GAP SERPL CALC-SCNC: 7 MMOL/L (ref 0–11.9)
APTT PPP: 29.1 SEC (ref 24.7–36)
AST SERPL-CCNC: 20 U/L (ref 12–45)
BASOPHILS # BLD AUTO: 0.2 % (ref 0–1.8)
BASOPHILS # BLD: 0.02 K/UL (ref 0–0.12)
BILIRUB SERPL-MCNC: 0.4 MG/DL (ref 0.1–1.5)
BNP SERPL-MCNC: 149 PG/ML (ref 0–100)
BUN SERPL-MCNC: 28 MG/DL (ref 8–22)
CALCIUM SERPL-MCNC: 9.3 MG/DL (ref 8.5–10.5)
CHLORIDE SERPL-SCNC: 92 MMOL/L (ref 96–112)
CO2 SERPL-SCNC: 25 MMOL/L (ref 20–33)
CREAT SERPL-MCNC: 0.68 MG/DL (ref 0.5–1.4)
EKG IMPRESSION: NORMAL
EOSINOPHIL # BLD AUTO: 0.07 K/UL (ref 0–0.51)
EOSINOPHIL NFR BLD: 0.8 % (ref 0–6.9)
ERYTHROCYTE [DISTWIDTH] IN BLOOD BY AUTOMATED COUNT: 45.4 FL (ref 35.9–50)
GFR SERPL CREATININE-BSD FRML MDRD: >60 ML/MIN/1.73 M 2
GLOBULIN SER CALC-MCNC: 2.9 G/DL (ref 1.9–3.5)
GLUCOSE SERPL-MCNC: 152 MG/DL (ref 65–99)
HCT VFR BLD AUTO: 32.3 % (ref 42–52)
HGB BLD-MCNC: 11.2 G/DL (ref 14–18)
IMM GRANULOCYTES # BLD AUTO: 0.03 K/UL (ref 0–0.11)
IMM GRANULOCYTES NFR BLD AUTO: 0.4 % (ref 0–0.9)
INR PPP: 1.11 (ref 0.87–1.13)
LIPASE SERPL-CCNC: 22 U/L (ref 11–82)
LYMPHOCYTES # BLD AUTO: 0.96 K/UL (ref 1–4.8)
LYMPHOCYTES NFR BLD: 11.6 % (ref 22–41)
MCH RBC QN AUTO: 30.7 PG (ref 27–33)
MCHC RBC AUTO-ENTMCNC: 34.7 G/DL (ref 33.7–35.3)
MCV RBC AUTO: 88.5 FL (ref 81.4–97.8)
MONOCYTES # BLD AUTO: 0.79 K/UL (ref 0–0.85)
MONOCYTES NFR BLD AUTO: 9.5 % (ref 0–13.4)
NEUTROPHILS # BLD AUTO: 6.43 K/UL (ref 1.82–7.42)
NEUTROPHILS NFR BLD: 77.5 % (ref 44–72)
NRBC # BLD AUTO: 0 K/UL
NRBC BLD AUTO-RTO: 0 /100 WBC
PLATELET # BLD AUTO: 184 K/UL (ref 164–446)
PMV BLD AUTO: 10 FL (ref 9–12.9)
POTASSIUM SERPL-SCNC: 4.9 MMOL/L (ref 3.6–5.5)
PROT SERPL-MCNC: 6.8 G/DL (ref 6–8.2)
PROTHROMBIN TIME: 14 SEC (ref 12–14.6)
RBC # BLD AUTO: 3.65 M/UL (ref 4.7–6.1)
SODIUM SERPL-SCNC: 124 MMOL/L (ref 135–145)
TROPONIN I SERPL-MCNC: <0.01 NG/ML (ref 0–0.04)
WBC # BLD AUTO: 8.3 K/UL (ref 4.8–10.8)

## 2017-11-13 PROCEDURE — 83690 ASSAY OF LIPASE: CPT

## 2017-11-13 PROCEDURE — 700105 HCHG RX REV CODE 258: Performed by: EMERGENCY MEDICINE

## 2017-11-13 PROCEDURE — 85730 THROMBOPLASTIN TIME PARTIAL: CPT

## 2017-11-13 PROCEDURE — 84484 ASSAY OF TROPONIN QUANT: CPT

## 2017-11-13 PROCEDURE — G0152 HHCP-SERV OF OT,EA 15 MIN: HCPCS

## 2017-11-13 PROCEDURE — 83880 ASSAY OF NATRIURETIC PEPTIDE: CPT

## 2017-11-13 PROCEDURE — G0299 HHS/HOSPICE OF RN EA 15 MIN: HCPCS

## 2017-11-13 PROCEDURE — 99284 EMERGENCY DEPT VISIT MOD MDM: CPT

## 2017-11-13 PROCEDURE — 80053 COMPREHEN METABOLIC PANEL: CPT

## 2017-11-13 PROCEDURE — 93005 ELECTROCARDIOGRAM TRACING: CPT

## 2017-11-13 PROCEDURE — 93005 ELECTROCARDIOGRAM TRACING: CPT | Performed by: EMERGENCY MEDICINE

## 2017-11-13 PROCEDURE — 85025 COMPLETE CBC W/AUTO DIFF WBC: CPT

## 2017-11-13 PROCEDURE — 85610 PROTHROMBIN TIME: CPT

## 2017-11-13 PROCEDURE — 71010 DX-CHEST-LIMITED (1 VIEW): CPT

## 2017-11-13 RX ORDER — SODIUM CHLORIDE 9 MG/ML
500 INJECTION, SOLUTION INTRAVENOUS ONCE
Status: COMPLETED | OUTPATIENT
Start: 2017-11-13 | End: 2017-11-13

## 2017-11-13 RX ADMIN — SODIUM CHLORIDE 500 ML: 9 INJECTION, SOLUTION INTRAVENOUS at 20:57

## 2017-11-13 SDOH — ECONOMIC STABILITY: HOUSING INSECURITY: UNSAFE COOKING RANGE AREA: 0

## 2017-11-13 SDOH — ECONOMIC STABILITY: HOUSING INSECURITY: UNSAFE APPLIANCES: 0

## 2017-11-13 ASSESSMENT — PAIN SCALES - GENERAL: PAINLEVEL_OUTOF10: 0

## 2017-11-13 ASSESSMENT — ENCOUNTER SYMPTOMS
NAUSEA: DENIES ANY
VOMITING: DENIES ANY

## 2017-11-13 NOTE — ED NOTES
"Chief Complaint   Patient presents with   • Chest Pain   EKG done prior to triage. Started this morning, \"I couldn't breathe, I was gasping for air\". Hx MI.  Pt states he took an ASA this morning. Pt in no acute distress.     /45   Pulse 60   Temp 36.8 °C (98.2 °F) (Temporal)   Resp 18   Ht 1.727 m (5' 8\")   SpO2 97%     Pt Informed regarding triage process and verbalized understanding to inform triage tech or RN for any changes in condition.  Placed in [facility].    "

## 2017-11-14 ENCOUNTER — PATIENT OUTREACH (OUTPATIENT)
Dept: HEALTH INFORMATION MANAGEMENT | Facility: OTHER | Age: 82
End: 2017-11-14

## 2017-11-14 ENCOUNTER — HOME CARE VISIT (OUTPATIENT)
Dept: HOME HEALTH SERVICES | Facility: HOME HEALTHCARE | Age: 82
End: 2017-11-14
Payer: MEDICARE

## 2017-11-14 ENCOUNTER — OFFICE VISIT (OUTPATIENT)
Dept: MEDICAL GROUP | Facility: PHYSICIAN GROUP | Age: 82
End: 2017-11-14
Payer: MEDICARE

## 2017-11-14 VITALS
BODY MASS INDEX: 25.46 KG/M2 | RESPIRATION RATE: 14 BRPM | DIASTOLIC BLOOD PRESSURE: 60 MMHG | SYSTOLIC BLOOD PRESSURE: 108 MMHG | OXYGEN SATURATION: 97 % | WEIGHT: 168 LBS | HEIGHT: 68 IN | HEART RATE: 60 BPM | TEMPERATURE: 98 F

## 2017-11-14 VITALS
TEMPERATURE: 98.4 F | HEART RATE: 62 BPM | SYSTOLIC BLOOD PRESSURE: 150 MMHG | DIASTOLIC BLOOD PRESSURE: 60 MMHG | OXYGEN SATURATION: 97 % | RESPIRATION RATE: 16 BRPM

## 2017-11-14 DIAGNOSIS — R07.9 CHEST PAIN, UNSPECIFIED TYPE: ICD-10-CM

## 2017-11-14 DIAGNOSIS — R78.81 BACTEREMIA: ICD-10-CM

## 2017-11-14 DIAGNOSIS — I48.91 ATRIAL FIBRILLATION, UNSPECIFIED TYPE (HCC): ICD-10-CM

## 2017-11-14 DIAGNOSIS — E87.1 HYPONATREMIA: ICD-10-CM

## 2017-11-14 LAB — TROPONIN I SERPL-MCNC: <0.01 NG/ML (ref 0–0.04)

## 2017-11-14 PROCEDURE — G0180 MD CERTIFICATION HHA PATIENT: HCPCS | Performed by: FAMILY MEDICINE

## 2017-11-14 PROCEDURE — 99214 OFFICE O/P EST MOD 30 MIN: CPT | Performed by: FAMILY MEDICINE

## 2017-11-14 PROCEDURE — G0156 HHCP-SVS OF AIDE,EA 15 MIN: HCPCS

## 2017-11-14 NOTE — ED NOTES
RN spoke with pts son.  Son will be able to  patient around midnight.  Pt will be placed in senior lounge.  Son updated on POC.

## 2017-11-14 NOTE — ED NOTES
Pt wheeled to [facility].   tech notified of patients location.  Pt given urinal and blanket.    Patient was educated on discharge instructions.  Patient was informed about diagnosis, symptom management, risks, and home care instructions.  Patient verbalized understanding and signed discharge instructions. Copy of discharge instructions in chart.   Patient has personal belongings and PIV removed, tip intact.

## 2017-11-14 NOTE — DISCHARGE PLANNING
Medical Social Work    Referral: Family Contact    Intervention: MSW received a call from ERP who states that pt will likely need a ride home.  MSW contacted pt's son, Christiano ([phone]) who states that someone can come pick pt up.  However, pt's son is requesting a medical update on pt.  MSW transferred son's call to bedside RN for medical update.    Plan: Nothing further.

## 2017-11-14 NOTE — DISCHARGE INSTRUCTIONS
Hyponatremia  Hyponatremia is when the amount of salt (sodium) in your blood is too low. When sodium levels are low, your cells absorb extra water and they swell. The swelling happens throughout the body, but it mostly affects the brain.  CAUSES  This condition may be caused by:  · Heart, kidney, or liver problems.  · Thyroid problems.  · Adrenal gland problems.  · Metabolic conditions, such as syndrome of inappropriate antidiuretic hormone (SIADH).  · Severe vomiting and diarrhea.  · Certain medicines or illegal drugs.  · Dehydration.  · Drinking too much water.  · Eating a diet that is low in sodium.  · Large burns on your body.  · Sweating.  RISK FACTORS  This condition is more likely to develop in people who:  · Have long-term (chronic) kidney disease.  · Have heart failure.  · Have a medical condition that causes frequent or excessive diarrhea.  · Have metabolic conditions, such as [place] disease or SIADH.  · Take certain medicines that affect the sodium and fluid balance in the blood. Some of these medicine types include:  ¨ Diuretics.  ¨ NSAIDs.  ¨ Some opioid pain medicines.  ¨ Some antidepressants.  ¨ Some seizure prevention medicines.  SYMPTOMS   Symptoms of this condition include:  · Nausea and vomiting.  · Confusion.  · Lethargy.  · Agitation.  · Headache.  · Seizures.  · Unconsciousness.  · Appetite loss.  · Muscle weakness and cramping.  · Feeling weak or light-headed.  · Having a rapid heart rate.  · Fainting, in severe cases.  DIAGNOSIS  This condition is diagnosed with a medical history and physical exam. You will also have other tests, including:  · Blood tests.  · Urine tests.  TREATMENT  Treatment for this condition depends on the cause. Treatment may include:  · Fluids given through an IV tube that is inserted into one of your veins.  · Medicines to correct the sodium imbalance. If medicines are causing the condition, the medicines will need to be adjusted.  · Limiting water or fluid intake to  get the correct sodium balance.  HOME CARE INSTRUCTIONS  · Take medicines only as directed by your health care provider. Many medicines can make this condition worse. Talk with your health care provider about any medicines that you are currently taking.  · Carefully follow a recommended diet as directed by your health care provider.  · Carefully follow instructions from your health care provider about fluid restrictions.  · Keep all follow-up visits as directed by your health care provider. This is important.  · Do not drink alcohol.  SEEK MEDICAL CARE IF:  · You develop worsening nausea, fatigue, headache, confusion, or weakness.  · Your symptoms go away and then return.  · You have problems following the recommended diet.  SEEK IMMEDIATE MEDICAL CARE IF:  · You have a seizure.  · You faint.  · You have ongoing diarrhea or vomiting.     This information is not intended to replace advice given to you by your health care provider. Make sure you discuss any questions you have with your health care provider.     Document Released: 12/08/2003 Document Revised: 05/03/2016 Document Reviewed: 01/07/2016  [employer] Interactive Patient Education ©2016 [employer]      Shortness of Breath  Shortness of breath means you have trouble breathing. Shortness of breath needs medical care right away.  HOME CARE   · Do not smoke.  · Avoid being around chemicals or things (paint fumes, dust) that may bother your breathing.  · Rest as needed. Slowly begin your normal activities.  · Only take medicines as told by your doctor.  · Keep all doctor visits as told.  GET HELP RIGHT AWAY IF:   · Your shortness of breath gets worse.  · You feel lightheaded, pass out (faint), or have a cough that is not helped by medicine.  · You cough up blood.  · You have pain with breathing.  · You have pain in your chest, arms, shoulders, or belly (abdomen).  · You have a fever.  · You cannot walk up stairs or exercise the way you normally do.  · You do not  get better in the time expected.  · You have a hard time doing normal activities even with rest.  · You have problems with your medicines.  · You have any new symptoms.  MAKE SURE YOU:  · Understand these instructions.  · Will watch your condition.  · Will get help right away if you are not doing well or get worse.     This information is not intended to replace advice given to you by your health care provider. Make sure you discuss any questions you have with your health care provider.     Document Released: 06/05/2009 Document Revised: 12/23/2014 Document Reviewed: 03/04/2013  [employer] Interactive Patient Education ©2016 [employer]

## 2017-11-14 NOTE — ED NOTES
Pt wheeled to [facility].  RN agrees with triage note.  Pt states he is not currently in pain, denies SOB.

## 2017-11-14 NOTE — PROGRESS NOTES
Chief Complaint   Patient presents with   • Hospital Follow-up       HISTORY OF PRESENT ILLNESS: Patient is a 89 y.o. male established patient here today for the following concerns:    1. Hyponatremia  Here for ER follow up.  Was taken to the ER this weekend by his son for acute dyspnea and CP.  He underwent further evaluation in the ED and all was stable other than sodium which had dropped to 124.       2. Bacteremia  Hx of Klebsiella and ecoli in blood cultures s/p IV abx with rocephin.  Finished and picc line has been removed.  He has not had any fevers, but still feeling light headed at times.     3. Chest pain, unspecified type  Reports no further CP.  NO SOB now.        Past Medical, Social, and Family history reviewed and updated in EPIC    Allergies:Review of patient's allergies indicates no known allergies.    Current Outpatient Prescriptions   Medication Sig Dispense Refill   • sodium bicarbonate (SODIUM BICARBONATE) 650 MG Tab Take 1 Tab by mouth 2 times a day. 60 Tab 1   • [employer] MICROFILL TEST strip TEST AS DIRECTED TWICE A  Strip 3   • metformin (GLUCOPHAGE) 500 MG Tab 1000 mg in am and 500 in  Tab 0   • metoprolol (LOPRESSOR) 25 MG Tab Take 0.5 Tabs by mouth 2 Times a Day. 60 Tab 0   • lisinopril (PRINIVIL) 5 MG Tab Take 0.5 Tabs by mouth every day. 30 Tab 0   • Sodium Chloride Flush (NORMAL SALINE FLUSH) 0.9 % Solution 10 mL by Intravenous route every day. flush before and after IV antibiotic     • Heparin Sodium, Porcine, (HEPARIN LOCK FLUSH INJ) 5 mL by Intravenous route every day.     • acetaminophen (TYLENOL) 325 MG Tab Take 2 Tabs by mouth every 6 hours as needed (Mild Pain; (Pain scale 1-3); Temp greater than 100.5 F). 30 Tab 0   • clopidogrel (PLAVIX) 75 MG Tab Take 1 Tab by mouth every day. 30 Tab 0   • nitroglycerin (NITROSTAT) 0.4 MG SL Tab Place 1 Tab under tongue as needed for Chest Pain. 25 Tab 0   • polyethylene glycol/lytes (MIRALAX) Pack Take 1 Packet by mouth 1 time  daily as needed (if sennosides and docusate ineffective after 24 hours; for constipation).  3   • senna-docusate (PERICOLACE OR SENOKOT S) 8.6-50 MG Tab Take 2 Tabs by mouth 2 times a day as needed for Constipation.     • Saccharomyces boulardii (PROBIOTIC) 250 MG Cap Take  by mouth. 14 Cap    • latanoprost (XALATAN) 0.005 % Solution Place 1 Drop in both eyes every day. 1 Bottle 0   • atorvastatin (LIPITOR) 10 MG Tab Take 0.5 Tabs by mouth every day. 45 Tab 3   • Omega-3 Fatty Acids (FISH OIL) 1000 MG Cap capsule Take 1,000 mg by mouth every day.     • aspirin EC (ECOTRIN) 81 MG Tablet Delayed Response Take 81 mg by mouth every day.     • Probiotic Product (PROBIOTIC PO) Take 1 Cap by mouth every evening.     • B Complex Vitamins (VITAMIN B COMPLEX PO) Take 1 Tab by mouth every evening.     • Multiple Vitamins-Minerals (CENTRUM SILVER PO) Take 1 Tab by mouth every day.     • Cholecalciferol (VITAMIN D PO) Take 1 Cap by mouth every day.       No current facility-administered medications for this visit.          ROS:  Review of Systems   Constitutional: Negative for fever, chills, weight loss and +malaise/fatigue.   HENT: Negative for ear pain, nosebleeds, congestion, sore throat and neck pain.    Eyes: Negative for blurred vision.   Respiratory: Negative for cough, sputum production, shortness of breath and wheezing.    Cardiovascular: Negative for chest pain, palpitations,  and leg swelling.   Gastrointestinal: Negative for heartburn, nausea, vomiting, diarrhea and abdominal pain.   Genitourinary: Negative for dysuria, urgency and frequency.   Musculoskeletal: Negative for myalgias, back pain and joint pain.   Skin: Negative for rash and itching.   Neurological: Negative for dizziness, tingling, tremors, sensory change, focal weakness and headaches.   Endo/Heme/Allergies: Does not bruise/bleed easily.   Psychiatric/Behavioral: Negative for depression, anxiety, suicidal ideas, insomnia and memory loss.   "    Exam:  Blood pressure 108/60, pulse 60, temperature 36.7 °C (98 °F), resp. rate 14, height 1.727 m (5' 8\"), weight 76.2 kg (168 lb), SpO2 97 %.    General:  Well nourished, well developed in NAD  Head is grossly normal.  Neck: Supple without JVD   Pulmonary:  Normal effort.   Cardiovascular: Regular rate  Extremities: no clubbing, cyanosis, or edema.  Psych: affect appropriate      Please note that this dictation was created using voice recognition software. I have made every reasonable attempt to correct obvious errors, but I expect that there are errors of grammar and possibly content that I did not discover before finalizing the note.    Assessment/Plan:  1. Hyponatremia  Continue salt tabs, continue to closely monitor    2. Bacteremia  Awaiting repeat blood cultures per ID.  Off antibiotics.     3. Chest pain, unspecified type  Resolved.   Possibly related to hypotension.  He will hold Metoprolol and Lisinopril if BP <100/70    1 week follow up, sooner if needed.         "

## 2017-11-14 NOTE — ED PROVIDER NOTES
ED Provider Note    ED Provider Note      Primary care provider: Zahra Yañez M.D.    CHIEF COMPLAINT  Chief Complaint   Patient presents with   • Chest Pain       HPI  Bulmaro Wheeler is a 89 y.o. male who presents to the Emergency Department With chief complaint of shortness of breath. Patient stated that he had some minor chest pain approximately 8 AM this morning following menses and proceed shortness of breath throughout the day. He reports no exertional components he's had minimal cough nonproductive denies any fevers he states that chest pain is completely resolved and the shortness of breath proved. He was concerned that he may need oxygen. Patient recently discharged after extensive inpatient stay with exhaustive medical workup. Patient reports no headache he was recently admitted for weakness and dizziness he states that both of these been improving he feels much stronger than when he was in the hospital before. No abdominal pain across patient diarrhea dysuria hematuria melena hematochezia no lower extremity swelling he denies any nausea or diaphoresis throughout the day today no other acute complaints at this time.    REVIEW OF SYSTEMS  10 systems reviewed and otherwise negative, pertinent positives and negatives listed in the history of present illness.    PAST MEDICAL HISTORY   has a past medical history of Acute kidney failure, unspecified (9/27/2013); Arrhythmia; Arthritis; Backpain; Carotid artery stenosis (2/18/2014); CATARACT; Diabetes; Glaucoma; Heart burn; Hypertension; Indigestion; NSTEMI (non-ST elevated myocardial infarction) (CMS-HCC) (10/24/2017); Occlusion and stenosis of carotid artery without mention of cerebral infarction (3/11/2014); Pneumonia; Pyelonephritis (October 2010); Pyelonephritis; Renal cyst (9/19/2013); Sepsis (9/27/2013); Urinary tract infection, site not specified (9/27/2013); and UTI (lower urinary tract infection) (9/1/2012).    SURGICAL HISTORY   has a past  surgical history that includes ercp w/removal calculus (2009); eye surgery (1980's); colonoscopy (1999); cholecystectomy (1999); and carotid endarterectomy (3/11/2014).    SOCIAL HISTORY  Social History   Substance Use Topics   • Smoking status: Never Smoker   • Smokeless tobacco: Never Used   • Alcohol use No      Comment: hasnt drank since 1979      History   Drug Use No       FAMILY HISTORY  Non-Contributory    CURRENT MEDICATIONS  Home Medications     Reviewed by Kate Andrade R.N. (Registered Nurse) on 11/13/17 at 2016  Med List Status: <None>   Medication Last Dose Status   acetaminophen (TYLENOL) 325 MG Tab 11/9/2017 Active   ASCENSIA MICROFILL TEST strip 11/9/2017 Active   aspirin EC (ECOTRIN) 81 MG Tablet Delayed Response 11/9/2017 Active   atorvastatin (LIPITOR) 10 MG Tab 11/9/2017 Active   B Complex Vitamins (VITAMIN B COMPLEX PO) 11/9/2017 Active   Cholecalciferol (VITAMIN D PO) 11/9/2017 Active   clopidogrel (PLAVIX) 75 MG Tab 11/9/2017 Active   finasteride (PROSCAR) 5 MG Tab 11/9/2017 Active   Heparin Sodium, Porcine, (HEPARIN LOCK FLUSH INJ) 11/8/2017 Active   latanoprost (XALATAN) 0.005 % Solution 11/9/2017 Active   lisinopril (PRINIVIL) 5 MG Tab 11/9/2017 Active   metformin (GLUCOPHAGE) 500 MG Tab 11/9/2017 Active   metoprolol (LOPRESSOR) 25 MG Tab 11/9/2017 Active   Multiple Vitamins-Minerals (CENTRUM SILVER PO) 11/9/2017 Active   nitroglycerin (NITROSTAT) 0.4 MG SL Tab 11/9/2017 Active   Omega-3 Fatty Acids (FISH OIL) 1000 MG Cap capsule 11/9/2017 Active   polyethylene glycol/lytes (MIRALAX) Pack 11/9/2017 Active   Probiotic Product (PROBIOTIC PO) 11/8/2017 Active   Saccharomyces boulardii (PROBIOTIC) 250 MG Cap 11/8/2017 Active   senna-docusate (PERICOLACE OR SENOKOT S) 8.6-50 MG Tab 11/9/2017 Active   sodium bicarbonate (SODIUM BICARBONATE) 650 MG Tab  Active   Sodium Chloride Flush (NORMAL SALINE FLUSH) 0.9 % Solution 11/8/2017 Active   trazodone (DESYREL) 50 MG Tab 11/9/2017 Active        "         ALLERGIES  No Known Allergies    PHYSICAL EXAM  VITAL SIGNS: /45   Pulse 60   Temp 36.8 °C (98.2 °F) (Temporal)   Resp 18   Ht 1.727 m (5' 8\")   SpO2 97%   Pulse ox interpretation: I interpret this pulse ox as normal.  Constitutional: Alert and oriented x 3, Minimal Distress  HEENT: Atraumatic normocephalic, pupils are equal round reactive to light extraocular movements are intact. The nares is clear, external ears are normal, mouth shows moist mucous membranes  Neck: Supple, no JVD no tracheal deviation  Cardiovascular: Regular rate and rhythm no murmur rub or gallop 2+ pulses peripherally x4  Thorax & Lungs: No respiratory distress, no wheezes rales or rhonchi, No chest tenderness.   GI: Soft nontender nondistended positive bowel sounds, no peritoneal signs  Skin: Warm dry no acute rash or lesion  Musculoskeletal: Moving all extremities with full range and 5 of 5 strength, no acute  deformity  Neurologic: Cranial nerves III through XII are grossly intact, no sensory deficit, no cerebellar dysfunction   Psychiatric: Appropriate affect for situation at this time      DIAGNOSTIC STUDIES / PROCEDURES  LABS  Results for orders placed or performed during the hospital encounter of 11/13/17   Troponin   Result Value Ref Range    Troponin I <0.01 0.00 - 0.04 ng/mL   Btype Natriuretic Peptide   Result Value Ref Range    B Natriuretic Peptide 149 (H) 0 - 100 pg/mL   CBC with Differential   Result Value Ref Range    WBC 8.3 4.8 - 10.8 K/uL    RBC 3.65 (L) 4.70 - 6.10 M/uL    Hemoglobin 11.2 (L) 14.0 - 18.0 g/dL    Hematocrit 32.3 (L) 42.0 - 52.0 %    MCV 88.5 81.4 - 97.8 fL    MCH 30.7 27.0 - 33.0 pg    MCHC 34.7 33.7 - 35.3 g/dL    RDW 45.4 35.9 - 50.0 fL    Platelet Count 184 164 - 446 K/uL    MPV 10.0 9.0 - 12.9 fL    Neutrophils-Polys 77.50 (H) 44.00 - 72.00 %    Lymphocytes 11.60 (L) 22.00 - 41.00 %    Monocytes 9.50 0.00 - 13.40 %    Eosinophils 0.80 0.00 - 6.90 %    Basophils 0.20 0.00 - 1.80 %    " Immature Granulocytes 0.40 0.00 - 0.90 %    Nucleated RBC 0.00 /100 WBC    Neutrophils (Absolute) 6.43 1.82 - 7.42 K/uL    Lymphs (Absolute) 0.96 (L) 1.00 - 4.80 K/uL    Monos (Absolute) 0.79 0.00 - 0.85 K/uL    Eos (Absolute) 0.07 0.00 - 0.51 K/uL    Baso (Absolute) 0.02 0.00 - 0.12 K/uL    Immature Granulocytes (abs) 0.03 0.00 - 0.11 K/uL    NRBC (Absolute) 0.00 K/uL   Complete Metabolic Panel (CMP)   Result Value Ref Range    Sodium 124 (L) 135 - 145 mmol/L    Potassium 4.9 3.6 - 5.5 mmol/L    Chloride 92 (L) 96 - 112 mmol/L    Co2 25 20 - 33 mmol/L    Anion Gap 7.0 0.0 - 11.9    Glucose 152 (H) 65 - 99 mg/dL    Bun 28 (H) 8 - 22 mg/dL    Creatinine 0.68 0.50 - 1.40 mg/dL    Calcium 9.3 8.5 - 10.5 mg/dL    AST(SGOT) 20 12 - 45 U/L    ALT(SGPT) 16 2 - 50 U/L    Alkaline Phosphatase 71 30 - 99 U/L    Total Bilirubin 0.4 0.1 - 1.5 mg/dL    Albumin 3.9 3.2 - 4.9 g/dL    Total Protein 6.8 6.0 - 8.2 g/dL    Globulin 2.9 1.9 - 3.5 g/dL    A-G Ratio 1.3 g/dL   Prothrombin Time   Result Value Ref Range    PT 14.0 12.0 - 14.6 sec    INR 1.11 0.87 - 1.13   APTT   Result Value Ref Range    APTT 29.1 24.7 - 36.0 sec   Lipase   Result Value Ref Range    Lipase 22 11 - 82 U/L   ESTIMATED GFR   Result Value Ref Range    GFR If African American >60 >60 mL/min/1.73 m 2    GFR If Non African American >60 >60 mL/min/1.73 m 2   TROPONIN   Result Value Ref Range    Troponin I <0.01 0.00 - 0.04 ng/mL   EKG (ER)   Result Value Ref Range    Report       [facility] Emergency Dept.    Test Date:  2017  Pt Name:    JT MARIA               Department: ER  MRN:        [id number]                      Room:  Gender:     M                            Technician: [id number]  :        [id number]                   Requested By:ER TRIAGE PROTOCOL  Order #:    [id number]                    Reading MD:    Measurements  Intervals                                Axis  Rate:       63                           P:          72  NE:          188                          QRS:        62  QRSD:       86                           T:          56  QT:         400  QTc:        410    Interpretive Statements  SINUS RHYTHM  BASELINE WANDER IN LEAD(S) V3  Compared to ECG 10/23/2017 15:56:04  No significant changes         All labs reviewed by me.    EKG Interpretation  Interpreted by me    Normal sinus rhythm a rate of 68 no ST elevation no ST depression and T-wave inversion normal axis normal intervals and normal R-wave progression    RADIOLOGY  DX-CHEST-LIMITED (1 VIEW)   Final Result         1.  No acute cardiopulmonary disease.   2.  Atherosclerosis        The radiologist's interpretation of all radiological studies have been reviewed by me.    COURSE & MEDICAL DECISION MAKING  Pertinent Labs & Imaging studies reviewed. (See chart for details)    8:33 PM - Patient seen and examined at bedside.        Prescription monitoring program queried and unremarkable.    Patient noted to have slightly elevated blood pressure likely circumstantial secondary to presenting complaint. Referred to primary care physician for further evaluation.     Patient was given IV fluids based onHyponatremia    Medical Decision Making: A very pleasant 89-year-old male presents with shortness of breath he's had oxygen saturation 99% on room air throughout his time in the ER chest x-ray is unremarkable EKG is reassuring other labs as above patient does have fairly significant hyponatremia at 124 previous was 125 this is a chronic condition for which he takes sodium bicarbonate tablets and has not taken these today. He has no headache no dizziness he was given 500 mL to gently correct this without aggressive correction.  His chest pain was much greater than 6 hours prior to the presentation here need for repeat enzymes or EKG.   I had a lengthy discussion with patient considering pros and cons of inpatient versus outpatient therapy. Patient has a appointment with his primary care  "physician Dr. Yañez tomorrow. I discussed that with his recent exhaustive workup and with fairly unremarkable workup this evening I would prefer if he was discharged home to avoid possible complications were hospital-acquired infections. He's been stable throughout his entire time in the ER tonight. He is agreeable with this plan. He will follow-up with his primary care physician for reevaluation and recheck of hyponatremia. Patient is discharged home in stable condition.    /45   Pulse 63   Temp 36.8 °C (98.2 °F) (Temporal)   Resp 19   Ht 1.727 m (5' 8\")   SpO2 97%     Zahra Yañez M.D.  [address]  [address]  [place] [address] [phone]    In 1 day  As Scheduled    [facility], Emergency Dept  [address] [address] [phone]    immediately if symptoms worsen        FINAL IMPRESSION  1. Hyponatremia    2. SOB (shortness of breath), resolved           This dictation has been created using voice recognition software and/or scribes. The accuracy of the dictation is limited by the abilities of the software and the expertise of the scribes. I expect there may be some errors of grammar and possibly content. I made every attempt to manually correct the errors within my dictation. However, errors related to voice recognition software and/or scribes may still exist and should be interpreted within the appropriate context.            "

## 2017-11-15 ENCOUNTER — HOME CARE VISIT (OUTPATIENT)
Dept: HOME HEALTH SERVICES | Facility: HOME HEALTHCARE | Age: 82
End: 2017-11-15
Payer: MEDICARE

## 2017-11-15 ENCOUNTER — TELEPHONE (OUTPATIENT)
Dept: INFECTIOUS DISEASES | Facility: MEDICAL CENTER | Age: 82
End: 2017-11-15

## 2017-11-15 VITALS
HEART RATE: 60 BPM | TEMPERATURE: 97.2 F | RESPIRATION RATE: 18 BRPM | SYSTOLIC BLOOD PRESSURE: 115 MMHG | DIASTOLIC BLOOD PRESSURE: 54 MMHG | OXYGEN SATURATION: 98 %

## 2017-11-15 DIAGNOSIS — A41.50 GRAM NEGATIVE SEPSIS (HCC): ICD-10-CM

## 2017-11-15 PROCEDURE — G0151 HHCP-SERV OF PT,EA 15 MIN: HCPCS

## 2017-11-15 NOTE — TELEPHONE ENCOUNTER
I called patient to follow up regarding the fatigue. Pt stating that he is feeling well. He states that the fatigue has improved off antibiotics. Denies feeling ill, fevers/chills. No complaints at this time. FU PRN.

## 2017-11-16 ENCOUNTER — HOME CARE VISIT (OUTPATIENT)
Dept: HOME HEALTH SERVICES | Facility: HOME HEALTHCARE | Age: 82
End: 2017-11-16
Payer: MEDICARE

## 2017-11-16 VITALS
DIASTOLIC BLOOD PRESSURE: 54 MMHG | SYSTOLIC BLOOD PRESSURE: 116 MMHG | OXYGEN SATURATION: 94 % | TEMPERATURE: 97.6 F | HEART RATE: 63 BPM | RESPIRATION RATE: 18 BRPM

## 2017-11-16 PROCEDURE — G0152 HHCP-SERV OF OT,EA 15 MIN: HCPCS

## 2017-11-16 SDOH — ECONOMIC STABILITY: HOUSING INSECURITY: UNSAFE COOKING RANGE AREA: 0

## 2017-11-16 SDOH — ECONOMIC STABILITY: HOUSING INSECURITY: UNSAFE APPLIANCES: 0

## 2017-11-16 ASSESSMENT — ENCOUNTER SYMPTOMS
NAUSEA: DENIES ANY
VOMITING: NONE NOTED

## 2017-11-16 NOTE — PROGRESS NOTES
PCP mentioned need for repeat Blood cultures per ID. Pt treated for gram negative sepsis. Unknown source. I called and followed up with patient via phone earlier today who stated he was doing well. Reviewed Dr Yañez note from yesterday in which patient reported feeling light headed at time. Denies feeling ill, fevers/chills. Case discussed with Dr Alvares. FU blood cultures ordered. MA to notify patient.

## 2017-11-17 ENCOUNTER — HOME CARE VISIT (OUTPATIENT)
Dept: HOME HEALTH SERVICES | Facility: HOME HEALTHCARE | Age: 82
End: 2017-11-17
Payer: MEDICARE

## 2017-11-17 VITALS
RESPIRATION RATE: 16 BRPM | HEART RATE: 78 BPM | DIASTOLIC BLOOD PRESSURE: 44 MMHG | TEMPERATURE: 98 F | SYSTOLIC BLOOD PRESSURE: 102 MMHG | OXYGEN SATURATION: 98 %

## 2017-11-17 VITALS
OXYGEN SATURATION: 99 % | HEART RATE: 52 BPM | RESPIRATION RATE: 16 BRPM | TEMPERATURE: 98.3 F | SYSTOLIC BLOOD PRESSURE: 158 MMHG | DIASTOLIC BLOOD PRESSURE: 64 MMHG

## 2017-11-17 PROCEDURE — G0156 HHCP-SVS OF AIDE,EA 15 MIN: HCPCS

## 2017-11-17 PROCEDURE — G0493 RN CARE EA 15 MIN HH/HOSPICE: HCPCS

## 2017-11-17 ASSESSMENT — ENCOUNTER SYMPTOMS
NAUSEA: PT DENIES
VOMITING: PT DENIES

## 2017-11-19 VITALS
HEART RATE: 81 BPM | RESPIRATION RATE: 16 BRPM | DIASTOLIC BLOOD PRESSURE: 52 MMHG | SYSTOLIC BLOOD PRESSURE: 88 MMHG | TEMPERATURE: 97.2 F | OXYGEN SATURATION: 98 %

## 2017-11-20 ENCOUNTER — TELEPHONE (OUTPATIENT)
Dept: MEDICAL GROUP | Facility: PHYSICIAN GROUP | Age: 82
End: 2017-11-20

## 2017-11-20 ENCOUNTER — HOME CARE VISIT (OUTPATIENT)
Dept: HOME HEALTH SERVICES | Facility: HOME HEALTHCARE | Age: 82
End: 2017-11-20
Payer: MEDICARE

## 2017-11-20 VITALS
RESPIRATION RATE: 17 BRPM | SYSTOLIC BLOOD PRESSURE: 122 MMHG | DIASTOLIC BLOOD PRESSURE: 50 MMHG | HEART RATE: 63 BPM | TEMPERATURE: 99.1 F | OXYGEN SATURATION: 94 %

## 2017-11-20 VITALS
RESPIRATION RATE: 17 BRPM | SYSTOLIC BLOOD PRESSURE: 122 MMHG | HEART RATE: 88 BPM | DIASTOLIC BLOOD PRESSURE: 50 MMHG | TEMPERATURE: 99 F | OXYGEN SATURATION: 90 % | BODY MASS INDEX: 22.5 KG/M2 | WEIGHT: 148 LBS

## 2017-11-20 PROCEDURE — G0152 HHCP-SERV OF OT,EA 15 MIN: HCPCS

## 2017-11-20 PROCEDURE — G0151 HHCP-SERV OF PT,EA 15 MIN: HCPCS

## 2017-11-20 PROCEDURE — G0299 HHS/HOSPICE OF RN EA 15 MIN: HCPCS

## 2017-11-20 NOTE — TELEPHONE ENCOUNTER
Please call and check on Bulmaro.  I received notice that he had low blood pressure and leg swelling.  If needed we can see him.

## 2017-11-21 ENCOUNTER — HOME CARE VISIT (OUTPATIENT)
Dept: HOME HEALTH SERVICES | Facility: HOME HEALTHCARE | Age: 82
End: 2017-11-21
Payer: MEDICARE

## 2017-11-21 VITALS
DIASTOLIC BLOOD PRESSURE: 78 MMHG | OXYGEN SATURATION: 98 % | HEART RATE: 64 BPM | TEMPERATURE: 97.3 F | SYSTOLIC BLOOD PRESSURE: 161 MMHG | RESPIRATION RATE: 17 BRPM

## 2017-11-21 PROCEDURE — G0156 HHCP-SVS OF AIDE,EA 15 MIN: HCPCS

## 2017-11-22 ENCOUNTER — HOME CARE VISIT (OUTPATIENT)
Dept: HOME HEALTH SERVICES | Facility: HOME HEALTHCARE | Age: 82
End: 2017-11-22
Payer: MEDICARE

## 2017-11-22 PROCEDURE — G0151 HHCP-SERV OF PT,EA 15 MIN: HCPCS

## 2017-11-23 ENCOUNTER — HOME CARE VISIT (OUTPATIENT)
Dept: HOME HEALTH SERVICES | Facility: HOME HEALTHCARE | Age: 82
End: 2017-11-23
Payer: MEDICARE

## 2017-11-23 VITALS
TEMPERATURE: 98.9 F | OXYGEN SATURATION: 95 % | DIASTOLIC BLOOD PRESSURE: 64 MMHG | HEART RATE: 72 BPM | SYSTOLIC BLOOD PRESSURE: 122 MMHG | RESPIRATION RATE: 18 BRPM

## 2017-11-23 PROCEDURE — G0152 HHCP-SERV OF OT,EA 15 MIN: HCPCS

## 2017-11-23 PROCEDURE — G0496 LPN CARE TRAIN/EDU IN HH: HCPCS

## 2017-11-23 SDOH — ECONOMIC STABILITY: HOUSING INSECURITY: UNSAFE COOKING RANGE AREA: 0

## 2017-11-23 SDOH — ECONOMIC STABILITY: HOUSING INSECURITY: UNSAFE APPLIANCES: 0

## 2017-11-24 ENCOUNTER — HOME CARE VISIT (OUTPATIENT)
Dept: HOME HEALTH SERVICES | Facility: HOME HEALTHCARE | Age: 82
End: 2017-11-24
Payer: MEDICARE

## 2017-11-24 VITALS
OXYGEN SATURATION: 94 % | DIASTOLIC BLOOD PRESSURE: 50 MMHG | TEMPERATURE: 99 F | HEART RATE: 63 BPM | RESPIRATION RATE: 17 BRPM | SYSTOLIC BLOOD PRESSURE: 122 MMHG

## 2017-11-24 VITALS
HEART RATE: 60 BPM | TEMPERATURE: 97.9 F | DIASTOLIC BLOOD PRESSURE: 60 MMHG | OXYGEN SATURATION: 96 % | SYSTOLIC BLOOD PRESSURE: 148 MMHG | RESPIRATION RATE: 17 BRPM

## 2017-11-24 PROCEDURE — G0156 HHCP-SVS OF AIDE,EA 15 MIN: HCPCS

## 2017-11-24 SDOH — ECONOMIC STABILITY: HOUSING INSECURITY: UNSAFE COOKING RANGE AREA: 0

## 2017-11-24 SDOH — ECONOMIC STABILITY: HOUSING INSECURITY: UNSAFE APPLIANCES: 0

## 2017-11-24 ASSESSMENT — ENCOUNTER SYMPTOMS
VOMITING: NONE NOTED
NAUSEA: NONE PER PATIENT

## 2017-11-25 ENCOUNTER — RESOLUTE PROFESSIONAL BILLING HOSPITAL PROF FEE (OUTPATIENT)
Dept: HOSPITALIST | Facility: MEDICAL CENTER | Age: 82
End: 2017-11-25
Payer: MEDICARE

## 2017-11-25 ENCOUNTER — HOSPITAL ENCOUNTER (OUTPATIENT)
Facility: MEDICAL CENTER | Age: 82
End: 2017-11-27
Attending: EMERGENCY MEDICINE | Admitting: INTERNAL MEDICINE
Payer: MEDICARE

## 2017-11-25 ENCOUNTER — APPOINTMENT (OUTPATIENT)
Dept: RADIOLOGY | Facility: MEDICAL CENTER | Age: 82
End: 2017-11-25
Attending: EMERGENCY MEDICINE
Payer: MEDICARE

## 2017-11-25 VITALS
TEMPERATURE: 98.4 F | HEART RATE: 74 BPM | SYSTOLIC BLOOD PRESSURE: 124 MMHG | RESPIRATION RATE: 18 BRPM | OXYGEN SATURATION: 94 % | DIASTOLIC BLOOD PRESSURE: 64 MMHG

## 2017-11-25 DIAGNOSIS — R55 SYNCOPE, UNSPECIFIED SYNCOPE TYPE: ICD-10-CM

## 2017-11-25 DIAGNOSIS — R41.0 DISORIENTATION: ICD-10-CM

## 2017-11-25 DIAGNOSIS — R53.1 WEAKNESS: ICD-10-CM

## 2017-11-25 DIAGNOSIS — N30.00 ACUTE CYSTITIS WITHOUT HEMATURIA: ICD-10-CM

## 2017-11-25 DIAGNOSIS — R79.89 ELEVATED TROPONIN: ICD-10-CM

## 2017-11-25 DIAGNOSIS — E87.1 HYPONATREMIA: ICD-10-CM

## 2017-11-25 LAB
ALBUMIN SERPL BCP-MCNC: 4.1 G/DL (ref 3.2–4.9)
ALBUMIN/GLOB SERPL: 1.6 G/DL
ALP SERPL-CCNC: 60 U/L (ref 30–99)
ALT SERPL-CCNC: 17 U/L (ref 2–50)
ANION GAP SERPL CALC-SCNC: 9 MMOL/L (ref 0–11.9)
APPEARANCE UR: CLEAR
AST SERPL-CCNC: 24 U/L (ref 12–45)
BACTERIA #/AREA URNS HPF: NEGATIVE /HPF
BASOPHILS # BLD AUTO: 0.1 % (ref 0–1.8)
BASOPHILS # BLD: 0.01 K/UL (ref 0–0.12)
BILIRUB SERPL-MCNC: 0.7 MG/DL (ref 0.1–1.5)
BILIRUB UR QL STRIP.AUTO: NEGATIVE
BLOOD CULTURE HOLD CXBCH: NORMAL
BLOOD CULTURE HOLD CXBCH: NORMAL
BUN SERPL-MCNC: 26 MG/DL (ref 8–22)
CALCIUM SERPL-MCNC: 9.6 MG/DL (ref 8.5–10.5)
CHLORIDE SERPL-SCNC: 94 MMOL/L (ref 96–112)
CO2 SERPL-SCNC: 23 MMOL/L (ref 20–33)
COLOR UR: YELLOW
COMMENT 1642: NORMAL
CREAT SERPL-MCNC: 1.13 MG/DL (ref 0.5–1.4)
CULTURE IF INDICATED INDCX: YES UA CULTURE
DEPRECATED D DIMER PPP IA-ACNC: 315 NG/ML(D-DU)
EOSINOPHIL # BLD AUTO: 0.01 K/UL (ref 0–0.51)
EOSINOPHIL NFR BLD: 0.1 % (ref 0–6.9)
EPI CELLS #/AREA URNS HPF: ABNORMAL /HPF
ERYTHROCYTE [DISTWIDTH] IN BLOOD BY AUTOMATED COUNT: 46.7 FL (ref 35.9–50)
EST. AVERAGE GLUCOSE BLD GHB EST-MCNC: 160 MG/DL
GFR SERPL CREATININE-BSD FRML MDRD: >60 ML/MIN/1.73 M 2
GLOBULIN SER CALC-MCNC: 2.6 G/DL (ref 1.9–3.5)
GLUCOSE BLD-MCNC: 221 MG/DL (ref 65–99)
GLUCOSE SERPL-MCNC: 267 MG/DL (ref 65–99)
GLUCOSE UR STRIP.AUTO-MCNC: 500 MG/DL
HBA1C MFR BLD: 7.2 % (ref 0–5.6)
HCT VFR BLD AUTO: 36.7 % (ref 42–52)
HGB BLD-MCNC: 12.5 G/DL (ref 14–18)
HYALINE CASTS #/AREA URNS LPF: ABNORMAL /LPF
IMM GRANULOCYTES # BLD AUTO: 0.03 K/UL (ref 0–0.11)
IMM GRANULOCYTES NFR BLD AUTO: 0.3 % (ref 0–0.9)
KETONES UR STRIP.AUTO-MCNC: NEGATIVE MG/DL
LACTATE BLD-SCNC: 2.1 MMOL/L (ref 0.5–2)
LEUKOCYTE ESTERASE UR QL STRIP.AUTO: ABNORMAL
LYMPHOCYTES # BLD AUTO: 0.43 K/UL (ref 1–4.8)
LYMPHOCYTES NFR BLD: 4.3 % (ref 22–41)
MCH RBC QN AUTO: 30.4 PG (ref 27–33)
MCHC RBC AUTO-ENTMCNC: 34.1 G/DL (ref 33.7–35.3)
MCV RBC AUTO: 89.3 FL (ref 81.4–97.8)
MICRO URNS: ABNORMAL
MONOCYTES # BLD AUTO: 0.42 K/UL (ref 0–0.85)
MONOCYTES NFR BLD AUTO: 4.2 % (ref 0–13.4)
MORPHOLOGY BLD-IMP: NORMAL
NEUTROPHILS # BLD AUTO: 9.01 K/UL (ref 1.82–7.42)
NEUTROPHILS NFR BLD: 91 % (ref 44–72)
NITRITE UR QL STRIP.AUTO: NEGATIVE
NRBC # BLD AUTO: 0 K/UL
NRBC BLD AUTO-RTO: 0 /100 WBC
PH UR STRIP.AUTO: 7.5 [PH]
PLATELET # BLD AUTO: 176 K/UL (ref 164–446)
PMV BLD AUTO: 10.1 FL (ref 9–12.9)
POTASSIUM SERPL-SCNC: 4.4 MMOL/L (ref 3.6–5.5)
PROT SERPL-MCNC: 6.7 G/DL (ref 6–8.2)
PROT UR QL STRIP: NEGATIVE MG/DL
RBC # BLD AUTO: 4.11 M/UL (ref 4.7–6.1)
RBC # URNS HPF: ABNORMAL /HPF
RBC UR QL AUTO: NEGATIVE
SODIUM SERPL-SCNC: 126 MMOL/L (ref 135–145)
SP GR UR STRIP.AUTO: 1.01
TROPONIN I SERPL-MCNC: 0.11 NG/ML (ref 0–0.04)
UROBILINOGEN UR STRIP.AUTO-MCNC: 0.2 MG/DL
WBC # BLD AUTO: 9.9 K/UL (ref 4.8–10.8)
WBC #/AREA URNS HPF: ABNORMAL /HPF

## 2017-11-25 PROCEDURE — 85025 COMPLETE CBC W/AUTO DIFF WBC: CPT

## 2017-11-25 PROCEDURE — 93005 ELECTROCARDIOGRAM TRACING: CPT | Performed by: EMERGENCY MEDICINE

## 2017-11-25 PROCEDURE — 85379 FIBRIN DEGRADATION QUANT: CPT

## 2017-11-25 PROCEDURE — 84484 ASSAY OF TROPONIN QUANT: CPT

## 2017-11-25 PROCEDURE — 96360 HYDRATION IV INFUSION INIT: CPT

## 2017-11-25 PROCEDURE — 99285 EMERGENCY DEPT VISIT HI MDM: CPT

## 2017-11-25 PROCEDURE — 80053 COMPREHEN METABOLIC PANEL: CPT

## 2017-11-25 PROCEDURE — 87077 CULTURE AEROBIC IDENTIFY: CPT

## 2017-11-25 PROCEDURE — 70450 CT HEAD/BRAIN W/O DYE: CPT

## 2017-11-25 PROCEDURE — 700111 HCHG RX REV CODE 636 W/ 250 OVERRIDE (IP): Performed by: INTERNAL MEDICINE

## 2017-11-25 PROCEDURE — 700102 HCHG RX REV CODE 250 W/ 637 OVERRIDE(OP): Performed by: INTERNAL MEDICINE

## 2017-11-25 PROCEDURE — 94760 N-INVAS EAR/PLS OXIMETRY 1: CPT

## 2017-11-25 PROCEDURE — 87086 URINE CULTURE/COLONY COUNT: CPT

## 2017-11-25 PROCEDURE — 99220 PR INITIAL OBSERVATION CARE,LEVL III: CPT | Performed by: INTERNAL MEDICINE

## 2017-11-25 PROCEDURE — 36415 COLL VENOUS BLD VENIPUNCTURE: CPT

## 2017-11-25 PROCEDURE — A9270 NON-COVERED ITEM OR SERVICE: HCPCS | Performed by: INTERNAL MEDICINE

## 2017-11-25 PROCEDURE — G0378 HOSPITAL OBSERVATION PER HR: HCPCS

## 2017-11-25 PROCEDURE — 82962 GLUCOSE BLOOD TEST: CPT

## 2017-11-25 PROCEDURE — 83605 ASSAY OF LACTIC ACID: CPT

## 2017-11-25 PROCEDURE — 87186 SC STD MICRODIL/AGAR DIL: CPT

## 2017-11-25 PROCEDURE — 700105 HCHG RX REV CODE 258: Performed by: EMERGENCY MEDICINE

## 2017-11-25 PROCEDURE — 83036 HEMOGLOBIN GLYCOSYLATED A1C: CPT

## 2017-11-25 PROCEDURE — 81001 URINALYSIS AUTO W/SCOPE: CPT

## 2017-11-25 RX ORDER — AMOXICILLIN 250 MG
2 CAPSULE ORAL 2 TIMES DAILY
Status: DISCONTINUED | OUTPATIENT
Start: 2017-11-26 | End: 2017-11-27 | Stop reason: HOSPADM

## 2017-11-25 RX ORDER — SODIUM CHLORIDE 9 MG/ML
1000 INJECTION, SOLUTION INTRAVENOUS ONCE
Status: COMPLETED | OUTPATIENT
Start: 2017-11-25 | End: 2017-11-25

## 2017-11-25 RX ORDER — CLOPIDOGREL BISULFATE 75 MG/1
75 TABLET ORAL DAILY
Status: DISCONTINUED | OUTPATIENT
Start: 2017-11-25 | End: 2017-11-27 | Stop reason: HOSPADM

## 2017-11-25 RX ORDER — BISACODYL 10 MG
10 SUPPOSITORY, RECTAL RECTAL
Status: DISCONTINUED | OUTPATIENT
Start: 2017-11-25 | End: 2017-11-27 | Stop reason: HOSPADM

## 2017-11-25 RX ORDER — CHLORAL HYDRATE 500 MG
1000 CAPSULE ORAL DAILY
Status: DISCONTINUED | OUTPATIENT
Start: 2017-11-25 | End: 2017-11-27 | Stop reason: HOSPADM

## 2017-11-25 RX ORDER — ATORVASTATIN CALCIUM 10 MG/1
5 TABLET, FILM COATED ORAL
Status: DISCONTINUED | OUTPATIENT
Start: 2017-11-25 | End: 2017-11-27 | Stop reason: HOSPADM

## 2017-11-25 RX ORDER — SODIUM BICARBONATE 650 MG/1
650 TABLET ORAL 2 TIMES DAILY
Status: DISCONTINUED | OUTPATIENT
Start: 2017-11-25 | End: 2017-11-27 | Stop reason: HOSPADM

## 2017-11-25 RX ORDER — B-COMPLEX WITH VITAMIN C
1 TABLET ORAL EVERY EVENING
Status: DISCONTINUED | OUTPATIENT
Start: 2017-11-25 | End: 2017-11-25

## 2017-11-25 RX ORDER — POLYETHYLENE GLYCOL 3350 17 G/17G
1 POWDER, FOR SOLUTION ORAL
Status: DISCONTINUED | OUTPATIENT
Start: 2017-11-25 | End: 2017-11-27 | Stop reason: HOSPADM

## 2017-11-25 RX ORDER — HEPARIN SODIUM 5000 [USP'U]/ML
5000 INJECTION, SOLUTION INTRAVENOUS; SUBCUTANEOUS EVERY 8 HOURS
Status: DISCONTINUED | OUTPATIENT
Start: 2017-11-25 | End: 2017-11-27 | Stop reason: HOSPADM

## 2017-11-25 RX ORDER — LATANOPROST 50 UG/ML
1 SOLUTION/ DROPS OPHTHALMIC DAILY
Status: DISCONTINUED | OUTPATIENT
Start: 2017-11-25 | End: 2017-11-27 | Stop reason: HOSPADM

## 2017-11-25 RX ORDER — DEXTROSE MONOHYDRATE 25 G/50ML
25 INJECTION, SOLUTION INTRAVENOUS
Status: DISCONTINUED | OUTPATIENT
Start: 2017-11-25 | End: 2017-11-27 | Stop reason: HOSPADM

## 2017-11-25 RX ORDER — M-VIT,TX,IRON,MINS/CALC/FOLIC 27MG-0.4MG
1 TABLET ORAL DAILY
Status: DISCONTINUED | OUTPATIENT
Start: 2017-11-25 | End: 2017-11-27 | Stop reason: HOSPADM

## 2017-11-25 RX ORDER — MULTIVIT WITH MINERALS/LUTEIN
1 TABLET ORAL DAILY
Status: DISCONTINUED | OUTPATIENT
Start: 2017-11-25 | End: 2017-11-25

## 2017-11-25 RX ORDER — LISINOPRIL 2.5 MG/1
2.5 TABLET ORAL DAILY
Status: DISCONTINUED | OUTPATIENT
Start: 2017-11-25 | End: 2017-11-25

## 2017-11-25 RX ADMIN — CLOPIDOGREL 75 MG: 75 TABLET, FILM COATED ORAL at 21:20

## 2017-11-25 RX ADMIN — INSULIN HUMAN 3 UNITS: 100 INJECTION, SOLUTION PARENTERAL at 22:51

## 2017-11-25 RX ADMIN — MULTIPLE VITAMINS W/ MINERALS TAB 1 TABLET: TAB at 21:25

## 2017-11-25 RX ADMIN — HEPARIN SODIUM 5000 UNITS: 5000 INJECTION, SOLUTION INTRAVENOUS; SUBCUTANEOUS at 21:21

## 2017-11-25 RX ADMIN — CHOLECALCIFEROL TAB 10 MCG (400 UNIT) 400 UNITS: 10 TAB at 22:50

## 2017-11-25 RX ADMIN — METOPROLOL TARTRATE 12.5 MG: 25 TABLET, FILM COATED ORAL at 21:21

## 2017-11-25 RX ADMIN — SODIUM BICARBONATE 650 MG: 650 TABLET ORAL at 22:50

## 2017-11-25 RX ADMIN — ATORVASTATIN CALCIUM 5 MG: 10 TABLET, FILM COATED ORAL at 21:21

## 2017-11-25 RX ADMIN — ASPIRIN 81 MG: 81 TABLET, COATED ORAL at 21:25

## 2017-11-25 RX ADMIN — LATANOPROST 1 DROP: 50 SOLUTION OPHTHALMIC at 22:51

## 2017-11-25 RX ADMIN — OMEGA-3 FATTY ACIDS CAP 1000 MG 1000 MG: 1000 CAP at 21:25

## 2017-11-25 RX ADMIN — SODIUM CHLORIDE 1000 ML: 9 INJECTION, SOLUTION INTRAVENOUS at 14:08

## 2017-11-25 SDOH — ECONOMIC STABILITY: HOUSING INSECURITY: UNSAFE APPLIANCES: 0

## 2017-11-25 SDOH — ECONOMIC STABILITY: HOUSING INSECURITY: UNSAFE COOKING RANGE AREA: 0

## 2017-11-25 ASSESSMENT — PATIENT HEALTH QUESTIONNAIRE - PHQ9
1. LITTLE INTEREST OR PLEASURE IN DOING THINGS: NOT AT ALL
SUM OF ALL RESPONSES TO PHQ9 QUESTIONS 1 AND 2: 0
SUM OF ALL RESPONSES TO PHQ QUESTIONS 1-9: 0
2. FEELING DOWN, DEPRESSED, IRRITABLE, OR HOPELESS: NOT AT ALL

## 2017-11-25 ASSESSMENT — PAIN SCALES - GENERAL
PAINLEVEL_OUTOF10: 0

## 2017-11-25 ASSESSMENT — LIFESTYLE VARIABLES
EVER_SMOKED: NEVER
ALCOHOL_USE: NO

## 2017-11-25 ASSESSMENT — ENCOUNTER SYMPTOMS: RESPIRATORY SYMPTOMS COMMENTS: PT LUNGS CLEAR IN ALL FIELDS, NO ADVANTAGEOUS SOUND NOTED.

## 2017-11-25 NOTE — ED NOTES
Late entry for 1515 pt incontinent of a large amount of urine and stool, pt cleaned, linens changed, brief on pt, pt daughter updated on POC

## 2017-11-25 NOTE — ED PROVIDER NOTES
ED Provider Note    Scribed for Az Lu M.D. by Cassi New. 11/25/2017  1:54 PM    Primary care provider: Zahra Yañez M.D.  Means of arrival: Walk-in  History obtained from: Patient  History limited by: None    CHIEF COMPLAINT  Chief Complaint   Patient presents with   • Weakness   • ALOC     HPI  Bulmaro Wheeler is a 89 y.o. male who presents to the Emergency Department after his son called EMS because he was concerned that he was weaker than usual and not acting like himself. The patient himself has no complaints. He denies any recent falls, weakness, abdominal pain or dysuria. He denies any pain. His daughter states that he had a syncopal episode this morning. The patient lives by himself in [place], and he states he believes he ate today. He is not on any blood thinners. The patient is a full code. He was recently put on sodium pills when he was last in the hospital.     REVIEW OF SYSTEMS  Pertinent negatives include no falls, weakness, abdominal pain, dysuria. As above, all other systems reviewed and are negative.   See HPI for further details. C.    PAST MEDICAL HISTORY   has a past medical history of Acute kidney failure, unspecified (9/27/2013); Arrhythmia; Arthritis; Backpain; Carotid artery stenosis (2/18/2014); CATARACT; Diabetes; Glaucoma; Heart burn; Hypertension; Indigestion; NSTEMI (non-ST elevated myocardial infarction) (CMS-HCC) (10/24/2017); Occlusion and stenosis of carotid artery without mention of cerebral infarction (3/11/2014); Pneumonia; Pyelonephritis (October 2010); Pyelonephritis; Renal cyst (9/19/2013); Sepsis (9/27/2013); Urinary tract infection, site not specified (9/27/2013); and UTI (lower urinary tract infection) (9/1/2012).    SURGICAL HISTORY   has a past surgical history that includes ercp w/removal calculus (2009); eye surgery (1980's); colonoscopy (1999); cholecystectomy (1999); and carotid endarterectomy (3/11/2014).    SOCIAL HISTORY  Social History    Substance Use Topics   • Smoking status: Never Smoker   • Smokeless tobacco: Never Used   • Alcohol use No      Comment: hasnt drank since 1979      History   Drug Use No     FAMILY HISTORY  Family History   Problem Relation Age of Onset   • Heart Disease Father    • Diabetes Father    • Alcohol/Drug Father    • Cancer Sister      ovarian   • Stroke Brother      age 90     CURRENT MEDICATIONS  Current Outpatient Prescriptions on File Prior to Encounter   Medication Sig Dispense Refill   • sodium bicarbonate (SODIUM BICARBONATE) 650 MG Tab Take 1 Tab by mouth 2 times a day. 60 Tab 1   • ASCENSIA MICROFILL TEST strip TEST AS DIRECTED TWICE A  Strip 3   • metformin (GLUCOPHAGE) 500 MG Tab 1000 mg in am and 500 in  Tab 0   • metoprolol (LOPRESSOR) 25 MG Tab Take 0.5 Tabs by mouth 2 Times a Day. 60 Tab 0   • lisinopril (PRINIVIL) 5 MG Tab Take 0.5 Tabs by mouth every day. 30 Tab 0   • Sodium Chloride Flush (NORMAL SALINE FLUSH) 0.9 % Solution 10 mL by Intravenous route every day. flush before and after IV antibiotic     • Heparin Sodium, Porcine, (HEPARIN LOCK FLUSH INJ) 5 mL by Intravenous route every day.     • acetaminophen (TYLENOL) 325 MG Tab Take 2 Tabs by mouth every 6 hours as needed (Mild Pain; (Pain scale 1-3); Temp greater than 100.5 F). 30 Tab 0   • clopidogrel (PLAVIX) 75 MG Tab Take 1 Tab by mouth every day. 30 Tab 0   • nitroglycerin (NITROSTAT) 0.4 MG SL Tab Place 1 Tab under tongue as needed for Chest Pain. 25 Tab 0   • polyethylene glycol/lytes (MIRALAX) Pack Take 1 Packet by mouth 1 time daily as needed (if sennosides and docusate ineffective after 24 hours; for constipation).  3   • senna-docusate (PERICOLACE OR SENOKOT S) 8.6-50 MG Tab Take 2 Tabs by mouth 2 times a day as needed for Constipation.     • Saccharomyces boulardii (PROBIOTIC) 250 MG Cap Take  by mouth. 14 Cap    • latanoprost (XALATAN) 0.005 % Solution Place 1 Drop in both eyes every day. 1 Bottle 0   • atorvastatin  "(LIPITOR) 10 MG Tab Take 0.5 Tabs by mouth every day. 45 Tab 3   • Omega-3 Fatty Acids (FISH OIL) 1000 MG Cap capsule Take 1,000 mg by mouth every day.     • aspirin EC (ECOTRIN) 81 MG Tablet Delayed Response Take 81 mg by mouth every day.     • Probiotic Product (PROBIOTIC PO) Take 1 Cap by mouth every evening.     • B Complex Vitamins (VITAMIN B COMPLEX PO) Take 1 Tab by mouth every evening.     • Multiple Vitamins-Minerals (CENTRUM SILVER PO) Take 1 Tab by mouth every day.     • Cholecalciferol (VITAMIN D PO) Take 1 Cap by mouth every day.       ALLERGIES  No Known Allergies    PHYSICAL EXAM  VITAL SIGNS: /63   Pulse 71   Temp 36.5 °C (97.7 °F)   Resp 18   Ht 1.727 m (5' 8\")   Wt 71 kg (156 lb 8.4 oz)   BMI 23.80 kg/m²   Constitutional: Debilitated  HENT: Normocephalic, Atraumatic, Bilateral external ears normal, Oropharynx is clear mucous membranes are dry. No oral exudates or nasal discharge.   Eyes: Pupils are equal round and reactive, EOMI, Conjunctiva normal, No discharge.   Neck: Normal range of motion, No tenderness, Supple, No stridor. No meningismus.  Lymphatic: No lymphadenopathy noted.   Cardiovascular: Regular rate and rhythm without murmur rub or gallop.  Thorax & Lungs: Clear breath sounds bilaterally without wheezes, rhonchi or rales. There is no chest wall tenderness.   Abdomen: Soft non-tender non-distended. There is no rebound or guarding. No organomegaly is appreciated. Bowel sounds are normal.  Skin: Normal without rash.   Back: No CVA or spinal tenderness.   Extremities: Intact distal pulses, No edema, No tenderness, No cyanosis, No clubbing. Capillary refill is less than 2 seconds.  Musculoskeletal: Good range of motion in all major joints. No tenderness to palpation or major deformities noted.   Neurologic: Alert & oriented x 3, symmetric, Normal motor function, Normal sensory function, No focal deficits noted. Reflexes are normal.  Psychiatric: Affect normal, Judgment normal, " Mood normal. There is no suicidal ideation or patient reported hallucinations.     DIAGNOSTIC STUDIES / PROCEDURES    LABS  Labs Reviewed   CBC WITH DIFFERENTIAL - Abnormal; Notable for the following:        Result Value    RBC 4.11 (*)     Hemoglobin 12.5 (*)     Hematocrit 36.7 (*)     Neutrophils-Polys 91.00 (*)     Lymphocytes 4.30 (*)     Neutrophils (Absolute) 9.01 (*)     Lymphs (Absolute) 0.43 (*)     All other components within normal limits   COMP METABOLIC PANEL - Abnormal; Notable for the following:     Sodium 126 (*)     Chloride 94 (*)     Glucose 267 (*)     Bun 26 (*)     All other components within normal limits   LACTIC ACID - Abnormal; Notable for the following:     Lactic Acid 2.1 (*)     All other components within normal limits   TROPONIN - Abnormal; Notable for the following:     Troponin I 0.11 (*)     All other components within normal limits   PERIPHERAL SMEAR REVIEW   DIFFERENTIAL COMMENT   ESTIMATED GFR   BLOOD CULTURE,HOLD   BLOOD CULTURE,HOLD   URINALYSIS,CULTURE IF INDICATED      All labs reviewed by me.      RADIOLOGY  CT-HEAD W/O   Final Result         1. No acute intracranial findings. No evidence of acute intracranial hemorrhage or mass lesion.      2. White matter lucencies most consistent with chronic small vessel ischemic change (versus demyelination or gliosis).                 The radiologist's interpretation of all radiological studies have been reviewed by me.    COURSE & MEDICAL DECISION MAKING  Nursing notes, VS, PMSFHx reviewed in chart.    Obtained and reviewed past medical records indicated that the patient was seen in this facility on 11/13 and had shortness of breath presentation at that time. His white count was normal, but his sodium was 124.    1:54 PM Patient seen and examined at bedside. The patient himself has no complaints, but his son is concerned that he is weaker and altered. Ordered for CT of head, CBC with differential, lactic acid, CMP, urinalysis labs to  evaluate. Patient was treated with IV fluids x 1 L for his symptoms.      2:25 PM Patient's daughter arrived to the ED and states that the patient had a syncopal episode this morning. EKG and troponin were ordered.     3:42 PM ED testing reveals that the patient hyponatremic at 126, and his troponin is elevated at 0.11. Given these results and the fact that he had a syncopal episode, we will admit the patient for a cardiac work-up. His daughter states that the patient is a full code, and that the last time he was in the hospital he expressed that he wanted to be a full code. The patient and his daughter understand need for admission and are agreeable to plan.     3:45 PM ED testing reveals no acute intracranial findings.     4:09 PM Paged hospitalist     4:10 PM Obtained and reviewed past medical records which indicated that the patient had an echo done 10/26/17 which showed patient had an ejection fraction of 65% with grade 2 diastolic dysfunciton.    4:20 PM - Phone consult with [facility] Hospitalist, Dr. Taylor, who agreed to admit the patient to the hospital. The patient needs to be assessed in terms of echo. He is not a good candidate for CDU given his elevation of troponin. Family is updated on his plan of care    DISPOSITION:  Patient will be admitted to [facility] in guarded condition.    FINAL IMPRESSION  1. Weakness    2. Disorientation    3. Hyponatremia    4. Elevated troponin        Cassi MANSFIELD (Scribe), am scribing for, and in the presence of, Az Lu M.D..    Electronically signed by: Cassi New (Scribe), 11/25/2017    IAz M.D. personally performed the services described in this documentation, as scribed by Cassi New in my presence, and it is both accurate and complete.    The note accurately reflects work and decisions made by me.  Az Lu  11/25/2017  5:03 PM

## 2017-11-26 PROBLEM — I21.4 NON-STEMI (NON-ST ELEVATED MYOCARDIAL INFARCTION) (HCC): Status: ACTIVE | Noted: 2017-11-26

## 2017-11-26 LAB
ANION GAP SERPL CALC-SCNC: 9 MMOL/L (ref 0–11.9)
BUN SERPL-MCNC: 18 MG/DL (ref 8–22)
CALCIUM SERPL-MCNC: 9.3 MG/DL (ref 8.5–10.5)
CHLORIDE SERPL-SCNC: 97 MMOL/L (ref 96–112)
CHOLEST SERPL-MCNC: 74 MG/DL (ref 100–199)
CO2 SERPL-SCNC: 26 MMOL/L (ref 20–33)
CREAT SERPL-MCNC: 0.79 MG/DL (ref 0.5–1.4)
ERYTHROCYTE [DISTWIDTH] IN BLOOD BY AUTOMATED COUNT: 46.4 FL (ref 35.9–50)
GFR SERPL CREATININE-BSD FRML MDRD: >60 ML/MIN/1.73 M 2
GLUCOSE BLD-MCNC: 101 MG/DL (ref 65–99)
GLUCOSE BLD-MCNC: 146 MG/DL (ref 65–99)
GLUCOSE BLD-MCNC: 199 MG/DL (ref 65–99)
GLUCOSE BLD-MCNC: 86 MG/DL (ref 65–99)
GLUCOSE SERPL-MCNC: 74 MG/DL (ref 65–99)
HCT VFR BLD AUTO: 33.3 % (ref 42–52)
HDLC SERPL-MCNC: 40 MG/DL
HGB BLD-MCNC: 11.7 G/DL (ref 14–18)
LDLC SERPL CALC-MCNC: 28 MG/DL
LV EJECT FRACT  99904: 60
LV EJECT FRACT MOD 2C 99903: 57.55
LV EJECT FRACT MOD 4C 99902: 66.14
LV EJECT FRACT MOD BP 99901: 61.35
MCH RBC QN AUTO: 31 PG (ref 27–33)
MCHC RBC AUTO-ENTMCNC: 35.1 G/DL (ref 33.7–35.3)
MCV RBC AUTO: 88.1 FL (ref 81.4–97.8)
OSMOLALITY SERPL: 275 MOSM/KG H2O (ref 278–298)
OSMOLALITY UR: 272 MOSM/KG H2O (ref 300–900)
PLATELET # BLD AUTO: 152 K/UL (ref 164–446)
PMV BLD AUTO: 9.8 FL (ref 9–12.9)
POTASSIUM SERPL-SCNC: 3.7 MMOL/L (ref 3.6–5.5)
RBC # BLD AUTO: 3.78 M/UL (ref 4.7–6.1)
SODIUM SERPL-SCNC: 132 MMOL/L (ref 135–145)
SODIUM UR-SCNC: 55 MMOL/L
TRIGL SERPL-MCNC: 32 MG/DL (ref 0–149)
TROPONIN I SERPL-MCNC: 0.18 NG/ML (ref 0–0.04)
TROPONIN I SERPL-MCNC: 0.34 NG/ML (ref 0–0.04)
TROPONIN I SERPL-MCNC: 0.35 NG/ML (ref 0–0.04)
WBC # BLD AUTO: 8.3 K/UL (ref 4.8–10.8)

## 2017-11-26 PROCEDURE — 83935 ASSAY OF URINE OSMOLALITY: CPT

## 2017-11-26 PROCEDURE — 700111 HCHG RX REV CODE 636 W/ 250 OVERRIDE (IP): Performed by: HOSPITALIST

## 2017-11-26 PROCEDURE — 80061 LIPID PANEL: CPT

## 2017-11-26 PROCEDURE — A9270 NON-COVERED ITEM OR SERVICE: HCPCS | Performed by: INTERNAL MEDICINE

## 2017-11-26 PROCEDURE — 99226 PR SUBSEQUENT OBSERVATION CARE,LEVEL III: CPT | Performed by: HOSPITALIST

## 2017-11-26 PROCEDURE — A9270 NON-COVERED ITEM OR SERVICE: HCPCS | Performed by: HOSPITALIST

## 2017-11-26 PROCEDURE — 82962 GLUCOSE BLOOD TEST: CPT | Mod: 91

## 2017-11-26 PROCEDURE — 80048 BASIC METABOLIC PNL TOTAL CA: CPT

## 2017-11-26 PROCEDURE — 700102 HCHG RX REV CODE 250 W/ 637 OVERRIDE(OP): Performed by: INTERNAL MEDICINE

## 2017-11-26 PROCEDURE — G8998 SWALLOW D/C STATUS: HCPCS | Mod: CH

## 2017-11-26 PROCEDURE — G0378 HOSPITAL OBSERVATION PER HR: HCPCS

## 2017-11-26 PROCEDURE — 84300 ASSAY OF URINE SODIUM: CPT

## 2017-11-26 PROCEDURE — 84484 ASSAY OF TROPONIN QUANT: CPT

## 2017-11-26 PROCEDURE — 96374 THER/PROPH/DIAG INJ IV PUSH: CPT

## 2017-11-26 PROCEDURE — 36415 COLL VENOUS BLD VENIPUNCTURE: CPT

## 2017-11-26 PROCEDURE — 700111 HCHG RX REV CODE 636 W/ 250 OVERRIDE (IP): Performed by: INTERNAL MEDICINE

## 2017-11-26 PROCEDURE — 83930 ASSAY OF BLOOD OSMOLALITY: CPT

## 2017-11-26 PROCEDURE — 92610 EVALUATE SWALLOWING FUNCTION: CPT

## 2017-11-26 PROCEDURE — 85027 COMPLETE CBC AUTOMATED: CPT

## 2017-11-26 PROCEDURE — 93306 TTE W/DOPPLER COMPLETE: CPT

## 2017-11-26 PROCEDURE — 93306 TTE W/DOPPLER COMPLETE: CPT | Mod: 26 | Performed by: INTERNAL MEDICINE

## 2017-11-26 PROCEDURE — G8997 SWALLOW GOAL STATUS: HCPCS | Mod: CH

## 2017-11-26 PROCEDURE — G8996 SWALLOW CURRENT STATUS: HCPCS | Mod: CH

## 2017-11-26 PROCEDURE — 700102 HCHG RX REV CODE 250 W/ 637 OVERRIDE(OP): Performed by: HOSPITALIST

## 2017-11-26 RX ORDER — ENALAPRILAT 1.25 MG/ML
1.25 INJECTION INTRAVENOUS EVERY 6 HOURS PRN
Status: DISCONTINUED | OUTPATIENT
Start: 2017-11-26 | End: 2017-11-27 | Stop reason: HOSPADM

## 2017-11-26 RX ADMIN — SODIUM BICARBONATE 650 MG: 650 TABLET ORAL at 21:00

## 2017-11-26 RX ADMIN — CLOPIDOGREL 75 MG: 75 TABLET, FILM COATED ORAL at 09:02

## 2017-11-26 RX ADMIN — MULTIPLE VITAMINS W/ MINERALS TAB 1 TABLET: TAB at 09:02

## 2017-11-26 RX ADMIN — SODIUM BICARBONATE 650 MG: 650 TABLET ORAL at 09:02

## 2017-11-26 RX ADMIN — CHOLECALCIFEROL TAB 10 MCG (400 UNIT) 400 UNITS: 10 TAB at 10:36

## 2017-11-26 RX ADMIN — ENALAPRILAT 1.25 MG: 1.25 INJECTION INTRAVENOUS at 23:50

## 2017-11-26 RX ADMIN — INSULIN HUMAN 2 UNITS: 100 INJECTION, SOLUTION PARENTERAL at 21:10

## 2017-11-26 RX ADMIN — ASPIRIN 81 MG: 81 TABLET, COATED ORAL at 09:02

## 2017-11-26 RX ADMIN — ATORVASTATIN CALCIUM 5 MG: 10 TABLET, FILM COATED ORAL at 21:10

## 2017-11-26 RX ADMIN — OMEGA-3 FATTY ACIDS CAP 1000 MG 1000 MG: 1000 CAP at 09:02

## 2017-11-26 RX ADMIN — METOPROLOL TARTRATE 12.5 MG: 25 TABLET, FILM COATED ORAL at 09:03

## 2017-11-26 RX ADMIN — LATANOPROST 1 DROP: 50 SOLUTION OPHTHALMIC at 09:01

## 2017-11-26 RX ADMIN — HEPARIN SODIUM 5000 UNITS: 5000 INJECTION, SOLUTION INTRAVENOUS; SUBCUTANEOUS at 16:52

## 2017-11-26 RX ADMIN — STANDARDIZED SENNA CONCENTRATE AND DOCUSATE SODIUM 2 TABLET: 8.6; 5 TABLET, FILM COATED ORAL at 21:10

## 2017-11-26 RX ADMIN — HEPARIN SODIUM 5000 UNITS: 5000 INJECTION, SOLUTION INTRAVENOUS; SUBCUTANEOUS at 21:10

## 2017-11-26 RX ADMIN — HEPARIN SODIUM 5000 UNITS: 5000 INJECTION, SOLUTION INTRAVENOUS; SUBCUTANEOUS at 06:49

## 2017-11-26 RX ADMIN — METOPROLOL TARTRATE 25 MG: 25 TABLET, FILM COATED ORAL at 21:10

## 2017-11-26 ASSESSMENT — LIFESTYLE VARIABLES: DO YOU DRINK ALCOHOL: NO

## 2017-11-26 ASSESSMENT — PATIENT HEALTH QUESTIONNAIRE - PHQ9
2. FEELING DOWN, DEPRESSED, IRRITABLE, OR HOPELESS: NOT AT ALL
1. LITTLE INTEREST OR PLEASURE IN DOING THINGS: NOT AT ALL
SUM OF ALL RESPONSES TO PHQ QUESTIONS 1-9: 0
SUM OF ALL RESPONSES TO PHQ9 QUESTIONS 1 AND 2: 0

## 2017-11-26 ASSESSMENT — PAIN SCALES - GENERAL
PAINLEVEL_OUTOF10: 0

## 2017-11-26 NOTE — ASSESSMENT & PLAN NOTE
-accus with sliding scale coverage  -diabetic diet  -diabetic education  -follow glycohemoglobin levels long term, hemoglobin A1c 7.2.  -continue with oral antihyperglycemics, patient at home is on metformin.  -monitor for hypoglycemic episodes and adjust control if he should get low  -Patient's syncope certainly could be from hypoglycemia. Sugar levels will need to be carefully monitored.

## 2017-11-26 NOTE — PROGRESS NOTES
Patient transferred from ED via gurney. Report received from [place].   Assumed care of patient.   Pt is a/o, safety check performed, all possessions and call bell within reach.   POC and doctors orders addressed as needed.

## 2017-11-26 NOTE — ASSESSMENT & PLAN NOTE
Has improved from 126-132 with fluid restriction. Certainly a component of his syncope could be hyponatremia however less likely given the level of sodium and would have to be much worse for him to syncopized from this.

## 2017-11-26 NOTE — ASSESSMENT & PLAN NOTE
Non-ST elevation myocardial infarction continued to's point with medical optimization included beta blocker aspirin heparin.

## 2017-11-26 NOTE — ED NOTES
The Medication Reconciliation process has been completed by interviewing the patient's son via telephone who reported that the PICC has been removed and the ABX were finished a couple weeks ago.     Allergies have been reviewed  Antibiotic use in 30 days - IV ABX at home - [facility]    Home Pharmacy:  [facility]

## 2017-11-26 NOTE — PROGRESS NOTES
2 RN skin check done. Scattered scabs on Bilat lower extremities. All other areas of skin clean, dry and intact.

## 2017-11-26 NOTE — PROGRESS NOTES
Assumed care of pt. Bedside report received from night R.N. Pt is sleeping comfortable in bed. VSS. All needs met. Bed low and locked, call light in reach.

## 2017-11-26 NOTE — THERAPY
"  Speech Language Therapy Clinical Swallow Evaluation completed.  Functional Status: Patient seen for CSE this date. Discussion with RN revealed she was concerned about his ability to tolerate a solid diet as he did not have his dentures present. Family has since brought his dentures and they were in for the evaluation. The appear to fit well. CXR did not identify any cardiopulmonary disorder. Patient agreeable to assessment. He was seated upright at 90 degrees. He was oriented to person, location, and where he lives, but stated the year incorrectly (1986). He participated in oral motor exam with only verbal cueing and was WFL. PO trials of ice chips, thin water, pureed consistency, soft mechanical, and hard dry mechanicals. He did not demonstrate any s/sx of aspiration or difficulty with any consistency. He did have frequent burping but patient and family report this is typical for him. He required some mild assistance for self-feeding and stated he would like assistance for tray set up and cutting meats.    Recommendations - Diet: Diet / Liquid Recommendation: Regular, Thin Liquid                          Strategies: Assistance needed for meal tray set-up and Head of Bed at 90 Degrees                          Medication Administration: Medication Administration : Whole with Liquid Wash, Float Whole with Puree    Plan of Care: Patient with no further skilled SLP needs in the acute care setting at this time    Post-Acute Therapy: Currently anticipate no further skilled therapy needs once patient is discharged from the inpatient setting.    See \"Rehab Therapy-Acute\" Patient Summary Report for complete documentation.     "

## 2017-11-26 NOTE — ASSESSMENT & PLAN NOTE
It is unclear why the patient is having recurrent syncopal events. Orthostatics at this point were requested, due to lack of cooperation patient cannot do it completely but the patient sitting and supine blood pressure and heart rate at this point are stable without any orthostatic appearance. Patient at this point has a negative workup for neurological cardiac disease. We will at this point check endocrine's possibilities including a.m. cortisol as well as hemoglobin A1c was checked which is normal.

## 2017-11-26 NOTE — PROGRESS NOTES
[facility] Hospitalist Progress Note    Date of Service: 2017    Chief Complaint  89 y.o. male admitted 2017 with syncope.    Interval Problem Update  Mr. Wheeler is a 89-year-old gentleman brought in by the family because of recurrent syncopal event. Patient has had these events multiple times and at this point he still metric monitored. Possibilities at this point have been excluded on the previous admissions include neurological disease. Certainly the patient is a diabetic and hypoglycemia could play a role in this. It is unknown what his blood sugars are when he passes out. Certainly the patient is on a beta blocker and it is possible that the patient has a bradycardia tach syndrome with his atrial fibrillation which will need to be elucidated with telemetric monitoring patient may need an outpatient Holter monitor. For now the patient also has a non-ST elevation MI and we will trend and monitor troponins cardiology at this point is following. We'll check endocrine levels for cortisol in a.m.    Consultants/Specialty  Cardiology    Disposition  To be determined        Review of Systems   Unable to perform ROS: Acuity of condition      Physical Exam  Laboratory/Imaging   Hemodynamics  Temp (24hrs), Av.9 °C (98.4 °F), Min:36.6 °C (97.8 °F), Max:37.5 °C (99.5 °F)   Temperature: 36.6 °C (97.8 °F)  Pulse  Av  Min: 52  Max: 84 Heart Rate (Monitored): 78  Blood Pressure : 159/75, NIBP: (!) 185/71      Respiratory      Respiration: 16, Pulse Oximetry: 92 %             Fluids    Intake/Output Summary (Last 24 hours) at 17 1416  Last data filed at 17 1100   Gross per 24 hour   Intake              880 ml   Output             2050 ml   Net            -1170 ml       Nutrition  Orders Placed This Encounter   Procedures   • Diet Order     Standing Status:   Standing     Number of Occurrences:   1     Order Specific Question:   Diet:     Answer:   Diabetic [3]     Order Specific Question:   Diet:      Answer:   Cardiac [6]     Order Specific Question:   Consistency/Fluid modifications:     Answer:   1000 ml Fluid Restriction [7]     Physical Exam   Constitutional: He appears well-developed and well-nourished. He is cooperative.   HENT:   Head: Normocephalic and atraumatic.   Right Ear: External ear normal.   Left Ear: External ear normal.   Mouth/Throat: No oropharyngeal exudate.   Eyes: Conjunctivae and EOM are normal. Pupils are equal, round, and reactive to light. Right eye exhibits no discharge. Left eye exhibits no discharge.   Neck: Normal range of motion. Neck supple. No JVD present. No thyromegaly present.   Cardiovascular: Normal rate and regular rhythm.    No murmur heard.  Pulmonary/Chest: Effort normal and breath sounds normal. He has no wheezes. He has no rales. He exhibits no tenderness.   Abdominal: Soft. Bowel sounds are normal. He exhibits no distension and no mass. There is no tenderness. There is no rebound and no guarding.   Genitourinary:   Genitourinary Comments: Aj catheter   Musculoskeletal: Normal range of motion. He exhibits no edema or tenderness.   Lymphadenopathy:     He has no cervical adenopathy.   Neurological: He is alert. He has normal reflexes. He is disoriented. No cranial nerve deficit. GCS eye subscore is 4. GCS verbal subscore is 4. GCS motor subscore is 6.   Skin: Skin is warm and dry. No rash noted. No erythema.   Psychiatric: His speech is delayed. He is slowed. Cognition and memory are impaired.   Nursing note and vitals reviewed.      Recent Labs      11/25/17   1350  11/26/17   0247   WBC  9.9  8.3   RBC  4.11*  3.78*   HEMOGLOBIN  12.5*  11.7*   HEMATOCRIT  36.7*  33.3*   MCV  89.3  88.1   MCH  30.4  31.0   MCHC  34.1  35.1   RDW  46.7  46.4   PLATELETCT  176  152*   MPV  10.1  9.8     Recent Labs      11/25/17   1350  11/26/17   0247   SODIUM  126*  132*   POTASSIUM  4.4  3.7   CHLORIDE  94*  97   CO2  23  26   GLUCOSE  267*  74   BUN  26*  18   CREATININE   1.13  0.79   CALCIUM  9.6  9.3             Recent Labs      11/26/17   0247   TRIGLYCERIDE  32   HDL  40   LDL  28          Assessment/Plan     * Syncope- (present on admission)   Assessment & Plan    It is unclear why the patient is having recurrent syncopal events. Orthostatics at this point were requested, due to lack of cooperation patient cannot do it completely but the patient sitting and supine blood pressure and heart rate at this point are stable without any orthostatic appearance. Patient at this point has a negative workup for neurological cardiac disease. We will at this point check endocrine's possibilities including a.m. cortisol as well as hemoglobin A1c was checked which is normal.          DM (diabetes mellitus) (CMS-HCC)- (present on admission)   Assessment & Plan    -accus with sliding scale coverage  -diabetic diet  -diabetic education  -follow glycohemoglobin levels long term, hemoglobin A1c 7.2.  -continue with oral antihyperglycemics, patient at home is on metformin.  -monitor for hypoglycemic episodes and adjust control if he should get low  -Patient's syncope certainly could be from hypoglycemia. Sugar levels will need to be carefully monitored.        Hyponatremia- (present on admission)   Assessment & Plan    Has improved from 126-132 with fluid restriction. Certainly a component of his syncope could be hyponatremia however less likely given the level of sodium and would have to be much worse for him to syncopized from this.        Carotid artery stenosis- (present on admission)   Assessment & Plan    Continue at this point with Lipitor and aspirin.        Non-STEMI (non-ST elevated myocardial infarction) (CMS-HCC)- (present on admission)   Assessment & Plan    Continue with beta blocker aspirin statin cardiology is following.        Elevated troponin- (present on admission)   Assessment & Plan    Non-ST elevation myocardial infarction continued to's point with medical optimization  included beta blocker aspirin heparin.        Paroxysmal atrial fibrillation (CMS-HCC)- (present on admission)   Assessment & Plan    Currently his sinus but he is not anticoagulated due to high fall risk and he is rate controlled with metoprolol. Patient on metoprolol certainly could be having bradycardic episodes that may cause syncope. If that's the case and we do find that he has tachybradycardia syndrome he may be a candidate for a pacemaker.        Advanced directives, counseling/discussion- (present on admission)   Assessment & Plan    Patient remains on full code status per family.        BPH (benign prostatic hyperplasia)- (present on admission)   Assessment & Plan    Continue at this point with medical optimization Aj if necessary.        Thrombocytopenia ([facility])- (present on admission)   Assessment & Plan    Monitor platelet levels at this point a right around 150.            Reviewed items::  EKG reviewed, Labs reviewed, Medications reviewed and Radiology images reviewed  Aj catheter::  No Aj  DVT prophylaxis pharmacological::  Heparin  Ulcer Prophylaxis::  Not indicated

## 2017-11-26 NOTE — ASSESSMENT & PLAN NOTE
Currently his sinus but he is not anticoagulated due to high fall risk and he is rate controlled with metoprolol. Patient on metoprolol certainly could be having bradycardic episodes that may cause syncope. If that's the case and we do find that he has tachybradycardia syndrome he may be a candidate for a pacemaker.

## 2017-11-26 NOTE — H&P
Hospital Medicine History and Physical    Date of Service  11/25/2017    Chief Complaint  Chief Complaint   Patient presents with   • Weakness   • ALOC       History of Presenting Illness  89 y.o. male who presented 11/25/2017 withSyncope. He has cognitive deficits and therefore is not a good historian. His family had left the ER but per collateral report his family noted that he was confused and therefore brought him to the emergency room. Per that report he may have lost conscious for a few seconds. The patient himself denies this and therefore tells me that he does not know why she is here. He did say felt lightheaded. On orientation questions are slightly soft however was actually able to state the date, his name, the president. Despite this he denies any heart problems or stroke however on his past medical history he does have these diagnoses. He therefore is not reliable. He uses a walker at home.  ED is afebrile and hemodynamically stable. CT head did not show any acute bleed. EKG sinus UrinalysisIs not indicative of UTI. He is not hypoglycemic. He was hyponatremic but this is chronic.  When I saw him ED he is in no acute distress. Neurologic result of thought but otherwise grossly nonfocal. He seemed to be alert and oriented ×3. Auscultation to me is clear other than a subtle murmur.    Primary Care Physician  Zahra Yañez M.D.    Consultants      Code Status  Full    Review of Systems  Review of Systems   Unable to perform ROS: Mental acuity    not reliable.     Past Medical History  Past Medical History:   Diagnosis Date   • NSTEMI (non-ST elevated myocardial infarction) (CMS-[facility]) 10/24/2017   • Occlusion and stenosis of carotid artery without mention of cerebral infarction 3/11/2014   • Carotid artery stenosis 2/18/2014   • Urinary tract infection, site not specified 9/27/2013   • Sepsis 9/27/2013   • Acute kidney failure, unspecified 9/27/2013   • Renal cyst 9/19/2013   • UTI (lower urinary tract  infection) 9/1/2012   • Pyelonephritis October 2010   • Arrhythmia     in 80's, not recent   • Arthritis    • Backpain    • CATARACT     surgery in 1985   • Diabetes    • Glaucoma     Interocular lenses placed in 1985   • Heart burn    • Hypertension    • Indigestion    • Pneumonia    • Pyelonephritis        Surgical History  Past Surgical History:   Procedure Laterality Date   • CAROTID ENDARTERECTOMY  3/11/2014    Performed by Bob Antonio M.D. at SURGERY [facility]   • ERCP W/REMOVAL CALCULUS  2009   • COLONOSCOPY  1999   • CHOLECYSTECTOMY  1999   • EYE SURGERY  1980's    cataracts OU       Medications  No current facility-administered medications on file prior to encounter.      Current Outpatient Prescriptions on File Prior to Encounter   Medication Sig Dispense Refill   • sodium bicarbonate (SODIUM BICARBONATE) 650 MG Tab Take 1 Tab by mouth 2 times a day. 60 Tab 1   • metformin (GLUCOPHAGE) 500 MG Tab 1000 mg in am and 500 in  Tab 0   • metoprolol (LOPRESSOR) 25 MG Tab Take 0.5 Tabs by mouth 2 Times a Day. 60 Tab 0   • lisinopril (PRINIVIL) 5 MG Tab Take 0.5 Tabs by mouth every day. 30 Tab 0   • clopidogrel (PLAVIX) 75 MG Tab Take 1 Tab by mouth every day. 30 Tab 0   • latanoprost (XALATAN) 0.005 % Solution Place 1 Drop in both eyes every day. 1 Bottle 0   • atorvastatin (LIPITOR) 10 MG Tab Take 0.5 Tabs by mouth every day. 45 Tab 3   • Omega-3 Fatty Acids (FISH OIL) 1000 MG Cap capsule Take 1,000 mg by mouth every day.     • aspirin EC (ECOTRIN) 81 MG Tablet Delayed Response Take 81 mg by mouth every day.     • B Complex Vitamins (VITAMIN B COMPLEX PO) Take 1 Tab by mouth every evening.     • Multiple Vitamins-Minerals (CENTRUM SILVER PO) Take 1 Tab by mouth every day.     • Cholecalciferol (VITAMIN D PO) Take 1 Cap by mouth every day.         Family History  Family History   Problem Relation Age of Onset   • Heart Disease Father    • Diabetes Father    • Alcohol/Drug Father    • Cancer  Sister      ovarian   • Stroke Brother      age 90       Social History  Social History   Substance Use Topics   • Smoking status: Never Smoker   • Smokeless tobacco: Never Used   • Alcohol use No      Comment: hasnt drank since        Allergies  No Known Allergies     Physical Exam  Laboratory   Hemodynamics  Temp (24hrs), Av.9 °C (98.4 °F), Min:36.5 °C (97.7 °F), Max:37.2 °C (99 °F)   Temperature: 37.2 °C (99 °F)  Pulse  Av.6  Min: 65  Max: 84 Heart Rate (Monitored): 78  Blood Pressure : 142/64, NIBP: (!) 185/71      Respiratory      Respiration: 16, Pulse Oximetry: 92 %             Physical Exam   Constitutional: He appears well-developed and well-nourished.   Frail and elderly   HENT:   Head: Normocephalic and atraumatic.   Eyes: Conjunctivae and EOM are normal. No scleral icterus.   Neck: Normal range of motion. Neck supple.   Cardiovascular: Normal rate and regular rhythm.  Exam reveals no gallop and no friction rub.    Murmur heard.  Pulmonary/Chest: Effort normal and breath sounds normal. No respiratory distress. He has no wheezes. He has no rales.   Abdominal: Soft. Bowel sounds are normal. He exhibits no distension. There is no tenderness. There is no rebound and no guarding.   Musculoskeletal: He exhibits no edema or tenderness.   Neurological: He is alert.   Skin: Skin is warm.   Psychiatric: He has a normal mood and affect. His behavior is normal.       Recent Labs      17   1350   WBC  9.9   RBC  4.11*   HEMOGLOBIN  12.5*   HEMATOCRIT  36.7*   MCV  89.3   MCH  30.4   MCHC  34.1   RDW  46.7   PLATELETCT  176   MPV  10.1     Recent Labs      17   1350   SODIUM  126*   POTASSIUM  4.4   CHLORIDE  94*   CO2  23   GLUCOSE  267*   BUN  26*   CREATININE  1.13   CALCIUM  9.6     Recent Labs      17   1350   ALTSGPT  17   ASTSGOT  24   ALKPHOSPHAT  60   TBILIRUBIN  0.7   GLUCOSE  267*                 Lab Results   Component Value Date    TROPONINI 0.11 (H) 2017      Urinalysis:    Lab Results  Component Value Date/Time   SPECGRAVITY 1.008 11/25/2017 1542   GLUCOSEUR 500 (A) 11/25/2017 1542   KETONES Negative 11/25/2017 1542   NITRITE Negative 11/25/2017 1542   WBCURINE 2-5 (A) 11/25/2017 1542   RBCURINE 0-2 (A) 11/25/2017 1542   BACTERIA Negative 11/25/2017 1542   EPITHELCELL Few 11/25/2017 1542        Imaging  Ct-head W/o    Result Date: 11/25/2017 11/25/2017 3:17 PM HISTORY/REASON FOR EXAM:  Altered Mental Status Weakness. TECHNIQUE/EXAM DESCRIPTION AND NUMBER OF VIEWS: CT of the head without contrast. The study was performed on a helical multidetector CT scanner. Contiguous 2.5 mm axial sections were obtained from the skull base through the vertex. Up to date radiation dose reduction adjustments have been utilized to meet ALARA standards for radiation dose reduction. COMPARISON:  10/23/2017 FINDINGS: There is no evidence of acute intracranial hemorrhage, mass, mass-effect or shift of midline structures. Gray-white matter differentiation is grossly preserved. There is hypoattenuation of the periventricular and subcortical white matter, in keeping with chronic microangiopathic ischemic changes. Ventricle size and brain parenchymal volume are appropriate for this patient's stated age. The basal cisterns are patent. There are no abnormal extra-axial fluid collections. No depressed or widely  calvarial fracture is seen. The visualized paranasal sinuses and temporal bone structures are aerated. ___________________________________     1. No acute intracranial findings. No evidence of acute intracranial hemorrhage or mass lesion. 2. White matter lucencies most consistent with chronic small vessel ischemic change (versus demyelination or gliosis).     Dx-chest-limited (1 View)    Result Date: 11/13/2017 11/13/2017 8:14 PM HISTORY/REASON FOR EXAM:  Chest Pain TECHNIQUE/EXAM DESCRIPTION:  Single AP view of the chest. COMPARISON: October 24, 2017 FINDINGS: Overlying  cardiac leads are present. The cardiac silhouette appears within normal limits. Atherosclerotic calcification of the aorta is noted.  The central pulmonary vasculature appears normal. The lungs appear well expanded bilaterally.  Bilateral lungs are clear. No significant pleural effusions are identified. Degenerative changes in the bilateral shoulders are seen, right greater than left     1.  No acute cardiopulmonary disease. 2.  Atherosclerosis    Ir-picc Line Placement > Age 5    Result Date: 10/30/2017  HISTORY/REASON FOR EXAM:   PICC placement. TECHNIQUE/EXAM DESCRIPTION AND NUMBER OF VIEWS:   PICC line insertion with ultrasound guidance.  The procedure was performed using maximal sterile barrier technique including sterile gown, mask, cap, and donning of sterile gloves following appropriate hand hygiene and/or sterile scrub. Patient skin site was prepped with 2% Chlorhexidine solution. FINDINGS:  PICC line insertion with Ultrasound Guidance was performed by qualified nursing staff without the assistance of a Radiologist.  A follow up chest x-ray will be performed to determine appropriateness of PICC positioning.              Ultrasound-guided PICC placement performed by qualified nursing staff as above.      Assessment/Plan     I anticipate this patient is appropriate for observation status at this time.    * Syncope- (present on admission)   Assessment & Plan    Had stroke workup in October 2017 including CTA and MRI showing no hemodynamically significant carotid disease.  EKG sinus        DM (diabetes mellitus) (CMS-HCC)- (present on admission)   Assessment & Plan    Obtain A1c. Add sliding scale. Hold oral hypoglycemics.        Hyponatremia- (present on admission)   Assessment & Plan    Chronic and moderate.  Volume resuscitate with normal saline. When more euvolemic then fuid restriction and avoid free water.  Sodium studies ordered.        Elevated troponin   Assessment & Plan    Mild elevation. Likely  secondary to an NSTEMI type 2/demand ischemia or port wine clearance from kidney disease.  Trend troponins. Telemetry. Repeat echo.        Paroxysmal atrial fibrillation (CMS-HCC)- (present on admission)   Assessment & Plan    Currently sinus.  Not on anticoagulation. Was not put on anticoagulation by cardiology because of high fall risk.  On the metoprolol.        Advanced directives, counseling/discussion- (present on admission)   Assessment & Plan    According to ED phsycian, he and family had a discussion and they want him FULL CODE  Bulmaro Wheeler however has poor insight and cognitive deficits.             VTE prophylaxis:Pharmacologic    I spent 75 minutes, reviewing the chart, notes, vitals, labs, imaging, ordering labs, evaluating Bulmaro Wheeler for assessment, enacting the plan above. 50% of the time was spent in counseling Bulmaro Wheeler and  answering questions. Discussed with ED physician. Medical decision making is therefore complex. Time was devoted to counseling and coordinating care including review of records, pertinent lab data and studies, as well as discussing diagnostic evaluation and work up, planned therapeutic interventions and future disposition of care. Where indicated, the assessment and plan reflect discussion of patient with consultants, other healthcare providers, family members, and additional research needed to obtain further information in formulating the plan of care for Bulmaro Wheeler.   Sections of this note have been dictated using Dragon Speech recognition software. While care and attention has been placed to ensure the accuracy of this note, there can be occasional typographical errors that may be missed and I apologize if this situation occurs. Please take these errors into context. If questions occur please do not hesitate to call or notify the author of this note for clarification.

## 2017-11-26 NOTE — PROGRESS NOTES
Pt. Resting in bed, A&O x 3, disoriented to event. No complaints of pain. Disscussed POC with patient and family. Call light within reach. Family at bedside.

## 2017-11-27 ENCOUNTER — PATIENT OUTREACH (OUTPATIENT)
Dept: HEALTH INFORMATION MANAGEMENT | Facility: OTHER | Age: 82
End: 2017-11-27

## 2017-11-27 ENCOUNTER — HOME CARE VISIT (OUTPATIENT)
Dept: HOME HEALTH SERVICES | Facility: HOME HEALTHCARE | Age: 82
End: 2017-11-27
Payer: MEDICARE

## 2017-11-27 ENCOUNTER — HOME CARE VISIT (OUTPATIENT)
Dept: HOME HEALTH SERVICES | Facility: HOME HEALTHCARE | Age: 82
End: 2017-11-27

## 2017-11-27 VITALS
DIASTOLIC BLOOD PRESSURE: 51 MMHG | HEIGHT: 68 IN | BODY MASS INDEX: 24.62 KG/M2 | SYSTOLIC BLOOD PRESSURE: 146 MMHG | RESPIRATION RATE: 17 BRPM | WEIGHT: 162.48 LBS | OXYGEN SATURATION: 94 % | TEMPERATURE: 97.2 F | HEART RATE: 56 BPM

## 2017-11-27 LAB
ANION GAP SERPL CALC-SCNC: 9 MMOL/L (ref 0–11.9)
BACTERIA UR CULT: ABNORMAL
BACTERIA UR CULT: ABNORMAL
BUN SERPL-MCNC: 18 MG/DL (ref 8–22)
CALCIUM SERPL-MCNC: 9.1 MG/DL (ref 8.5–10.5)
CHLORIDE SERPL-SCNC: 96 MMOL/L (ref 96–112)
CO2 SERPL-SCNC: 24 MMOL/L (ref 20–33)
CREAT SERPL-MCNC: 0.76 MG/DL (ref 0.5–1.4)
GFR SERPL CREATININE-BSD FRML MDRD: >60 ML/MIN/1.73 M 2
GLUCOSE BLD-MCNC: 189 MG/DL (ref 65–99)
GLUCOSE BLD-MCNC: 83 MG/DL (ref 65–99)
GLUCOSE SERPL-MCNC: 89 MG/DL (ref 65–99)
POTASSIUM SERPL-SCNC: 3.5 MMOL/L (ref 3.6–5.5)
SIGNIFICANT IND 70042: ABNORMAL
SITE SITE: ABNORMAL
SODIUM SERPL-SCNC: 129 MMOL/L (ref 135–145)
SOURCE SOURCE: ABNORMAL

## 2017-11-27 PROCEDURE — G8978 MOBILITY CURRENT STATUS: HCPCS | Mod: CK

## 2017-11-27 PROCEDURE — A9270 NON-COVERED ITEM OR SERVICE: HCPCS | Performed by: HOSPITALIST

## 2017-11-27 PROCEDURE — 80048 BASIC METABOLIC PNL TOTAL CA: CPT

## 2017-11-27 PROCEDURE — 36415 COLL VENOUS BLD VENIPUNCTURE: CPT

## 2017-11-27 PROCEDURE — 82962 GLUCOSE BLOOD TEST: CPT | Mod: 91

## 2017-11-27 PROCEDURE — 99217 PR OBSERVATION CARE DISCHARGE: CPT | Performed by: HOSPITALIST

## 2017-11-27 PROCEDURE — G8979 MOBILITY GOAL STATUS: HCPCS | Mod: CI

## 2017-11-27 PROCEDURE — 700102 HCHG RX REV CODE 250 W/ 637 OVERRIDE(OP): Performed by: INTERNAL MEDICINE

## 2017-11-27 PROCEDURE — 97161 PT EVAL LOW COMPLEX 20 MIN: CPT

## 2017-11-27 PROCEDURE — 700102 HCHG RX REV CODE 250 W/ 637 OVERRIDE(OP): Performed by: HOSPITALIST

## 2017-11-27 PROCEDURE — A9270 NON-COVERED ITEM OR SERVICE: HCPCS | Performed by: INTERNAL MEDICINE

## 2017-11-27 PROCEDURE — 700111 HCHG RX REV CODE 636 W/ 250 OVERRIDE (IP): Performed by: INTERNAL MEDICINE

## 2017-11-27 PROCEDURE — G0378 HOSPITAL OBSERVATION PER HR: HCPCS

## 2017-11-27 RX ORDER — LISINOPRIL 5 MG/1
5 TABLET ORAL DAILY
Qty: 30 TAB | Refills: 0 | Status: SHIPPED | OUTPATIENT
Start: 2017-11-27 | End: 2018-02-22 | Stop reason: SDUPTHER

## 2017-11-27 RX ORDER — AMOXICILLIN AND CLAVULANATE POTASSIUM 875; 125 MG/1; MG/1
1 TABLET, FILM COATED ORAL EVERY 12 HOURS
Status: DISCONTINUED | OUTPATIENT
Start: 2017-11-27 | End: 2017-11-27 | Stop reason: HOSPADM

## 2017-11-27 RX ORDER — AMOXICILLIN AND CLAVULANATE POTASSIUM 875; 125 MG/1; MG/1
1 TABLET, FILM COATED ORAL EVERY 12 HOURS
Qty: 14 TAB | Refills: 0 | Status: SHIPPED | OUTPATIENT
Start: 2017-11-27 | End: 2017-12-04

## 2017-11-27 RX ORDER — AMLODIPINE BESYLATE 5 MG/1
5 TABLET ORAL DAILY
Qty: 30 TAB | Refills: 3 | Status: SHIPPED | OUTPATIENT
Start: 2017-11-27 | End: 2017-11-29

## 2017-11-27 RX ADMIN — LATANOPROST 1 DROP: 50 SOLUTION OPHTHALMIC at 09:20

## 2017-11-27 RX ADMIN — SODIUM BICARBONATE 650 MG: 650 TABLET ORAL at 09:19

## 2017-11-27 RX ADMIN — CLOPIDOGREL 75 MG: 75 TABLET, FILM COATED ORAL at 09:18

## 2017-11-27 RX ADMIN — HEPARIN SODIUM 5000 UNITS: 5000 INJECTION, SOLUTION INTRAVENOUS; SUBCUTANEOUS at 06:29

## 2017-11-27 RX ADMIN — ASPIRIN 81 MG: 81 TABLET, COATED ORAL at 09:19

## 2017-11-27 RX ADMIN — MULTIPLE VITAMINS W/ MINERALS TAB 1 TABLET: TAB at 09:19

## 2017-11-27 RX ADMIN — OMEGA-3 FATTY ACIDS CAP 1000 MG 1000 MG: 1000 CAP at 09:19

## 2017-11-27 RX ADMIN — CHOLECALCIFEROL TAB 10 MCG (400 UNIT) 400 UNITS: 10 TAB at 09:19

## 2017-11-27 RX ADMIN — STANDARDIZED SENNA CONCENTRATE AND DOCUSATE SODIUM 2 TABLET: 8.6; 5 TABLET, FILM COATED ORAL at 09:19

## 2017-11-27 RX ADMIN — AMOXICILLIN AND CLAVULANATE POTASSIUM 1 TABLET: 875; 125 TABLET, FILM COATED ORAL at 09:18

## 2017-11-27 ASSESSMENT — COGNITIVE AND FUNCTIONAL STATUS - GENERAL
STANDING UP FROM CHAIR USING ARMS: A LITTLE
MOVING TO AND FROM BED TO CHAIR: A LITTLE
MOBILITY SCORE: 16
CLIMB 3 TO 5 STEPS WITH RAILING: A LOT
TURNING FROM BACK TO SIDE WHILE IN FLAT BAD: A LITTLE
WALKING IN HOSPITAL ROOM: A LOT
SUGGESTED CMS G CODE MODIFIER MOBILITY: CK
MOVING FROM LYING ON BACK TO SITTING ON SIDE OF FLAT BED: A LITTLE

## 2017-11-27 ASSESSMENT — PAIN SCALES - GENERAL
PAINLEVEL_OUTOF10: 0
PAINLEVEL_OUTOF10: 0

## 2017-11-27 ASSESSMENT — LIFESTYLE VARIABLES: DO YOU DRINK ALCOHOL: NO

## 2017-11-27 ASSESSMENT — GAIT ASSESSMENTS
GAIT LEVEL OF ASSIST: CONTACT GUARD ASSIST
ASSISTIVE DEVICE: FRONT WHEEL WALKER
DISTANCE (FEET): 50

## 2017-11-27 NOTE — DISCHARGE PLANNING
Medical Social Work    Met with pt at bedside to obtain HH choice. Pt agreeable to resuming with [facility]

## 2017-11-27 NOTE — CARE PLAN
Problem: Safety  Goal: Will remain free from falls    Intervention: Assess risk factors for falls  Up CGA with fww

## 2017-11-27 NOTE — CARE PLAN
Problem: Knowledge Deficit  Goal: Knowledge of disease process/condition, treatment plan, diagnostic tests, and medications will improve    Intervention: Explain information regarding disease process/condition, treatment plan, diagnostic tests, and medications and document in education  Dc home with HH

## 2017-11-27 NOTE — DISCHARGE PLANNING
[facility] received a HH choice form. Per the choice form the referral has been sent to [facility]

## 2017-11-27 NOTE — THERAPY
"89 y/o male adm for syncope. dx with UTI. Pt with functional mobility deficits, altered balance and gait pattern, fall risk. Acute PT to address aforemntioned problems for ptto achieve higher level of function.    Physical Therapy Evaluation completed.   Bed Mobility:  Supine to Sit: Moderate Assist  Transfers: Sit to Stand: Contact Guard Assist  Gait: Level Of Assist: Contact Guard Assist with Front-Wheel Walker       Plan of Care: Will benefit from Physical Therapy 3 times per week  Discharge Recommendations: Equipment: Will Continue to Assess for Equipment Needs. Post-acute therapy Discharge to home with outpatient or home health for additional skilled therapy services vs SNF    See \"Rehab Therapy-Acute\" Patient Summary Report for complete documentation.     "

## 2017-11-27 NOTE — CARE PLAN
Problem: Safety  Goal: Will remain free from injury  Outcome: PROGRESSING AS EXPECTED  Patient educated on patient safety and fall precautions. Patient verbalized and demonstrated understanding of education. Patient will use call light for assistance.

## 2017-11-27 NOTE — CARE PLAN
Problem: Urinary Elimination:  Goal: Ability to reestablish a normal urinary elimination pattern will improve  Outcome: PROGRESSING SLOWER THAN EXPECTED  Voided well, condom cath ( maybe too much urination)    Problem: Pain Management  Goal: Pain level will decrease to patient's comfort goal  Outcome: PROGRESSING AS EXPECTED  No pain med needed

## 2017-11-27 NOTE — PROGRESS NOTES
Pt. Resting in bed. No complaints of pain. Tele box on, bed low and locked, bed alarm on. Call light within reach.

## 2017-11-27 NOTE — DISCHARGE INSTRUCTIONS
Discharge Instructions    Discharged to home by car with relative. Discharged via wheelchair, hospital escort: Yes.  Special equipment needed: Walker    Be sure to schedule a follow-up appointment with your primary care doctor or any specialists as instructed.     Discharge Plan:   Influenza Vaccine Indication: Not indicated: Previously immunized this influenza season and > 8 years of age    I understand that a diet low in cholesterol, fat, and sodium is recommended for good health. Unless I have been given specific instructions below for another diet, I accept this instruction as my diet prescription.   Other diet:    Special Instructions:   Discharge patient Home   Diet regular with 9-13 servings of fruits and vegetables   Activities as tolerated   Follow ups with PCP in 7-10 days, call for appointment   Meds per med rec sheet   No smoking, no alcohol, no caffeine   Wear seat belt in motorized vehicle   Take medications as perscribed   Keep appointments   If symptoms worsen call PCP, 911 or urgent care  Home health ordered    · Is patient discharged on Warfarin / Coumadin?   No     · Is patient Post Blood Transfusion?  No    Depression / Suicide Risk    As you are discharged from this [facility] facility, it is important to learn how to keep safe from harming yourself.    Recognize the warning signs:  · Abrupt changes in personality, positive or negative- including increase in energy   · Giving away possessions  · Change in eating patterns- significant weight changes-  positive or negative  · Change in sleeping patterns- unable to sleep or sleeping all the time   · Unwillingness or inability to communicate  · Depression  · Unusual sadness, discouragement and loneliness  · Talk of wanting to die  · Neglect of personal appearance   · Rebelliousness- reckless behavior  · Withdrawal from people/activities they love  · Confusion- inability to concentrate     If you or a loved one observes any of these behaviors or  has concerns about self-harm, here's what you can do:  · Talk about it- your feelings and reasons for harming yourself  · Remove any means that you might use to hurt yourself (examples: pills, rope, extension cords, firearm)  · Get professional help from the community (Mental Health, Substance Abuse, psychological counseling)  · Do not be alone:Call your Safe Contact- someone whom you trust who will be there for you.  · Call your local CRISIS HOTLINE 654-6527 or 822-329-7690  · Call your local Children's Mobile Crisis Response Team [place] (480) 839-6213 or www.Femasys  · Call the toll free National Suicide Prevention Hotlines   · National Suicide Prevention Lifeline 497-799-BHCH (6183)  · National Hope Line Network 800-SUICIDE (783-5001)

## 2017-11-27 NOTE — FACE TO FACE
Face to Face Supporting Documentation - Home Health    The encounter with this patient was in whole or in part the primary reason for home health admission.    Date of encounter:   Patient:                    MRN:                       YOB: 2017  Bulmaro Wheeler  [id number]  4/16/1927     Home health to see patient for:  Skilled Nursing care for assessment, interventions & education and Physical Therapy evaluation and treatment    Skilled need for:  Exacerbation of Chronic Disease State UTI    Skilled nursing interventions to include:  Comment: home therapy including mediations    Homebound status evidenced by:  Need the aid of supportive devices such as crutches, canes, wheelchairs or walkers. Leaving home requires a considerable and taxing effort. There is a normal inability to leave the home.    Community Physician to provide follow up care: Zahra Yañez M.D.     Optional Interventions? Yes, Details: PT/OT/SPEECH/mediations      I certify the face to face encounter for this home health care referral meets the CMS requirements and the encounter/clinical assessment with the patient was, in whole, or in part, for the medical condition(s) listed above, which is the primary reason for home health care. Based on my clinical findings: the service(s) are medically necessary, support the need for home health care, and the homebound criteria are met.  I certify that this patient has had a face to face encounter by myself.  Mary Grace Medeiros M.D. - NPI: [id number]

## 2017-11-27 NOTE — DISCHARGE SUMMARY
CHIEF COMPLAINT ON ADMISSION  Chief Complaint   Patient presents with   • Weakness   • ALOC       CODE STATUS  Full Code    HPI & HOSPITAL COURSE  This is a 89 y.o. male here with altered level of consciousness and syncope. Patient was found to have a UTI with group D enterococcus. The patient was placed on Augmentin which she will finish for 7 day course. Patient for the syncope had an evaluation. The patient has had multiple admissions for syncope and so far the syncope seems to be secondary to a combination of hypoglycemia and bradycardia. Patient's medications at this point need to be adjusted and this we have decreased the amount of medication he is receiving especially discontinue the beta blocker pain. The patient does need at this point hypertensive management as we have added Norvasc and he is on the lisinopril. At this point patient should not be having marcy blockade unless he is going to get a pacemaker. Patient at this point will need to be carefully monitored on his blood sugars as if he is not eating he does become hypoglycemic and this will also accentuate these syncopal events. I spoke with the son who at this point says that they have 2 home health nurses as well as physical therapist at home and he is quite comfortable with taking the patient home. Patient does at this point will be discharged home with outpatient follow-ups and management.        Therefore, he is discharged in good and stable condition with close outpatient follow-up.    SPECIFIC OUTPATIENT FOLLOW-UP  Follow-up with the primary care physician.    DISCHARGE PROBLEM LIST  Principal Problem:    Syncope POA: Yes  Active Problems:    Carotid artery stenosis POA: Yes    Hyponatremia POA: Yes    DM (diabetes mellitus) (CMS-[facility]) POA: Yes    Thrombocytopenia ([facility]) POA: Yes    BPH (benign prostatic hyperplasia) POA: Yes    Advanced directives, counseling/discussion POA: Yes    Paroxysmal atrial fibrillation (CMS-HCC) POA: Yes    Elevated  troponin POA: Yes    Non-STEMI (non-ST elevated myocardial infarction) (CMS-HCC) POA: Yes  Resolved Problems:    * No resolved hospital problems. *      FOLLOW UP  Future Appointments  Date Time Provider Department Center   11/27/2017 11:00 AM Liwayway Hopson, R.N. [facility] None   11/27/2017 6:15 PM JAMEEL MataNSarthakA. [facility] None   11/29/2017 1:00 PM TANNER Kemp [place]   11/30/2017 To Be Determined Liwayway Hopson, R.N. [facility] None   11/30/2017 2:15 PM VICKY MataA. [facility] None   12/4/2017 To Be Determined JAMEEL MataN.A. [facility] None   12/4/2017 To Be Determined Liwayway Hopson, R.N. [facility] None   12/7/2017 To Be Determined JAMEEL MataN.A. [facility] None   12/7/2017 To Be Determined Liwayway Hopson, R.N. [facility] None   12/11/2017 To Be Determined NISHA Mata.N.A. [facility] None   12/11/2017 To Be Determined Liwayway Hopson, R.N. [facility] None   12/14/2017 To Be Determined Radha Garner C.N.A. [facility] None   12/14/2017 To Be Determined Liwayway Hopson, R.N. [facility] None   12/18/2017 To Be Determined JAMEEL MataN.A. [facility] None   12/18/2017 To Be Determined Liwayway Hopson, R.N. [facility] None   12/21/2017 To Be Determined NISHA Mata.N.A. [facility] None   12/21/2017 To Be Determined Liwayway Hopson, R.N. [facility] None   12/25/2017 To Be Determined Liwayway Hopson, R.N. [facility] None   12[facility]28/2017 [facility] None   1/4/2018 2:20 PM TANNER Kemp [place]     No follow-up provider specified.    MEDICATIONS ON DISCHARGE   Bulmaro Wheeler   Home Medication Instructions SELVIN:[id number]    Printed on:11/27/17 2346   Medication Information                      amlodipine (NORVASC) 5 MG Tab  Take 1 Tab by mouth every day.             amoxicillin-clavulanate (AUGMENTIN) 875-125 MG Tab  Take 1 Tab by mouth every 12 hours for 7 days.             aspirin EC (ECOTRIN) 81 MG Tablet  Delayed Response  Take 81 mg by mouth every day.             atorvastatin (LIPITOR) 10 MG Tab  Take 0.5 Tabs by mouth every day.             B Complex Vitamins (VITAMIN B COMPLEX PO)  Take 1 Tab by mouth every evening.             Cholecalciferol (VITAMIN D PO)  Take 1 Cap by mouth every day.             clopidogrel (PLAVIX) 75 MG Tab  Take 1 Tab by mouth every day.             latanoprost (XALATAN) 0.005 % Solution  Place 1 Drop in both eyes every day.             lisinopril (PRINIVIL) 5 MG Tab  Take 1 Tab by mouth every day.             metformin (GLUCOPHAGE) 500 MG Tab  1000 mg in am and 500 in PM             Multiple Vitamins-Minerals (CENTRUM SILVER PO)  Take 1 Tab by mouth every day.             Omega-3 Fatty Acids (FISH OIL) 1000 MG Cap capsule  Take 1,000 mg by mouth every day.             sodium bicarbonate (SODIUM BICARBONATE) 650 MG Tab  Take 1 Tab by mouth 2 times a day.             Buffered salt tabs 492 mg po 4 times daily    DIET  Orders Placed This Encounter   Procedures   • Diet Order     Standing Status:   Standing     Number of Occurrences:   1     Order Specific Question:   Diet:     Answer:   Diabetic [3]     Order Specific Question:   Diet:     Answer:   Cardiac [6]     Order Specific Question:   Consistency/Fluid modifications:     Answer:   1000 ml Fluid Restriction [7]       ACTIVITY  As tolerated.  Weight bearing as tolerated      CONSULTATIONS  None    PROCEDURES  None    LABORATORY  Lab Results   Component Value Date/Time    SODIUM 132 (L) 11/26/2017 02:47 AM    POTASSIUM 3.7 11/26/2017 02:47 AM    CHLORIDE 97 11/26/2017 02:47 AM    CO2 26 11/26/2017 02:47 AM    GLUCOSE 74 11/26/2017 02:47 AM    BUN 18 11/26/2017 02:47 AM    CREATININE 0.79 11/26/2017 02:47 AM    CREATININE 1.0 02/23/2009 02:34 PM        Lab Results   Component Value Date/Time    WBC 8.3 11/26/2017 02:47 AM    HEMOGLOBIN 11.7 (L) 11/26/2017 02:47 AM    HEMATOCRIT 33.3 (L) 11/26/2017 02:47 AM    PLATELETCT 152 (L)  11/26/2017 02:47 AM        Total time of the discharge process exceeds 45 minutes

## 2017-11-27 NOTE — PROGRESS NOTES
Tech arrived to room to perform EEG.  Pt is dressed and bed is stripped.  Nurse (Raymundo) indicates pt has been discharged and will be leaving shortly.  EEG will not be needed.

## 2017-11-27 NOTE — DISCHARGE PLANNING
Care Transition Team Discharge Planning    Anticipated Discharge Information  Anticipated discharge disposition: [facility]  Discharge Address: [address], [place]  Discharge Contact Phone Number: [phone]    Care Transition Team Interventions  Were Resources Provided?: Yes  Medical Referrals: Home Health referral ([facility] )

## 2017-11-27 NOTE — PROGRESS NOTES
Seen by therapy this am, [facility] recommendation, seen by Dr. Medeiros, spoke with son over the phone and HH preferred, POC discussed, BP meds adjusted, oral antibiotics for UTI, needs attended.

## 2017-11-28 ENCOUNTER — PATIENT OUTREACH (OUTPATIENT)
Dept: HEALTH INFORMATION MANAGEMENT | Facility: OTHER | Age: 82
End: 2017-11-28

## 2017-11-28 NOTE — PROGRESS NOTES
Patient currently receiving home health services.     Assigned myself to care team as Care Coordinator, following remotely until home health discharges.  Upon home health discharge will complete assessment for care coordination services.

## 2017-11-29 ENCOUNTER — OFFICE VISIT (OUTPATIENT)
Dept: MEDICAL GROUP | Facility: PHYSICIAN GROUP | Age: 82
End: 2017-11-29
Payer: MEDICARE

## 2017-11-29 VITALS
HEART RATE: 59 BPM | DIASTOLIC BLOOD PRESSURE: 50 MMHG | RESPIRATION RATE: 16 BRPM | TEMPERATURE: 98.7 F | SYSTOLIC BLOOD PRESSURE: 130 MMHG | OXYGEN SATURATION: 95 %

## 2017-11-29 VITALS
WEIGHT: 160 LBS | SYSTOLIC BLOOD PRESSURE: 110 MMHG | DIASTOLIC BLOOD PRESSURE: 58 MMHG | TEMPERATURE: 97.2 F | HEIGHT: 68 IN | HEART RATE: 66 BPM | RESPIRATION RATE: 14 BRPM | OXYGEN SATURATION: 98 % | BODY MASS INDEX: 24.25 KG/M2

## 2017-11-29 DIAGNOSIS — N39.0 RECURRENT UTI: ICD-10-CM

## 2017-11-29 DIAGNOSIS — E87.1 HYPONATREMIA: ICD-10-CM

## 2017-11-29 DIAGNOSIS — N10 ACUTE PYELONEPHRITIS: ICD-10-CM

## 2017-11-29 PROCEDURE — 99214 OFFICE O/P EST MOD 30 MIN: CPT | Performed by: FAMILY MEDICINE

## 2017-11-29 ASSESSMENT — ACTIVITIES OF DAILY LIVING (ADL)
DRESSING_LB_ASSISTANCE: 0
HOUSEKEEPING_ASSISTANCE: 6
MEAL_PREP_ASSISTANCE: 6
EATING_ASSISTANCE: 0
TRANSPORTATION_ASSISTANCE: 6
DRESSING_UB_ASSISTANCE: 0
TELEPHONE_ASSISTANCE: 0
GROOMING_ASSISTANCE: 0
SHOPPING_ASSISTANCE: 6
TOILETING_ASSISTANCE: 0
BATHING_ASSISTANCE: 4
LAUNDRY_ASSISTANCE: 6
ORAL_CARE_ASSISTANCE: 0

## 2017-11-30 ENCOUNTER — HOME CARE VISIT (OUTPATIENT)
Dept: HOME HEALTH SERVICES | Facility: HOME HEALTHCARE | Age: 82
End: 2017-11-30
Payer: MEDICARE

## 2017-11-30 ENCOUNTER — TELEPHONE (OUTPATIENT)
Dept: MEDICAL GROUP | Facility: PHYSICIAN GROUP | Age: 82
End: 2017-11-30

## 2017-11-30 VITALS
SYSTOLIC BLOOD PRESSURE: 148 MMHG | TEMPERATURE: 97.1 F | RESPIRATION RATE: 18 BRPM | DIASTOLIC BLOOD PRESSURE: 65 MMHG | OXYGEN SATURATION: 97 % | HEART RATE: 66 BPM

## 2017-11-30 VITALS
OXYGEN SATURATION: 95 % | TEMPERATURE: 97.1 F | RESPIRATION RATE: 18 BRPM | SYSTOLIC BLOOD PRESSURE: 124 MMHG | HEART RATE: 68 BPM | DIASTOLIC BLOOD PRESSURE: 60 MMHG

## 2017-11-30 PROCEDURE — G0156 HHCP-SVS OF AIDE,EA 15 MIN: HCPCS

## 2017-11-30 PROCEDURE — G0495 RN CARE TRAIN/EDU IN HH: HCPCS

## 2017-11-30 SDOH — ECONOMIC STABILITY: HOUSING INSECURITY: UNSAFE COOKING RANGE AREA: 0

## 2017-11-30 SDOH — ECONOMIC STABILITY: HOUSING INSECURITY: UNSAFE APPLIANCES: 0

## 2017-11-30 ASSESSMENT — ENCOUNTER SYMPTOMS
NAUSEA: DENIED
VOMITING: DENIED

## 2017-11-30 NOTE — TELEPHONE ENCOUNTER
VOICEMAIL  1. Caller Name: Bhupinder Velasquez                      Call Back Number: [phone]    2. Message: pharmacy states they do not have D Mannose powder and they cannot order any.  They are asking if you want to change medication or try a different pharmacy    3. Patient approves office to leave a detailed voicemail/MyChart message: N\A

## 2017-11-30 NOTE — TELEPHONE ENCOUNTER
Please ask the pharmacy to serrano it for Bulmaro, if its not affordable, then I don't want them to order it.

## 2017-12-04 ENCOUNTER — HOME CARE VISIT (OUTPATIENT)
Dept: HOME HEALTH SERVICES | Facility: HOME HEALTHCARE | Age: 82
End: 2017-12-04
Payer: MEDICARE

## 2017-12-04 VITALS
DIASTOLIC BLOOD PRESSURE: 62 MMHG | HEART RATE: 65 BPM | SYSTOLIC BLOOD PRESSURE: 108 MMHG | TEMPERATURE: 98.8 F | OXYGEN SATURATION: 96 % | RESPIRATION RATE: 18 BRPM

## 2017-12-04 PROCEDURE — G0300 HHS/HOSPICE OF LPN EA 15 MIN: HCPCS

## 2017-12-04 PROCEDURE — G0156 HHCP-SVS OF AIDE,EA 15 MIN: HCPCS

## 2017-12-07 ENCOUNTER — HOME CARE VISIT (OUTPATIENT)
Dept: HOME HEALTH SERVICES | Facility: HOME HEALTHCARE | Age: 82
End: 2017-12-07
Payer: MEDICARE

## 2017-12-07 VITALS
SYSTOLIC BLOOD PRESSURE: 120 MMHG | DIASTOLIC BLOOD PRESSURE: 53 MMHG | OXYGEN SATURATION: 98 % | TEMPERATURE: 98.3 F | RESPIRATION RATE: 18 BRPM | HEART RATE: 63 BPM

## 2017-12-07 PROCEDURE — G0156 HHCP-SVS OF AIDE,EA 15 MIN: HCPCS

## 2017-12-07 PROCEDURE — G0299 HHS/HOSPICE OF RN EA 15 MIN: HCPCS

## 2017-12-10 VITALS
SYSTOLIC BLOOD PRESSURE: 95 MMHG | DIASTOLIC BLOOD PRESSURE: 50 MMHG | RESPIRATION RATE: 15 BRPM | OXYGEN SATURATION: 96 % | HEART RATE: 69 BPM | TEMPERATURE: 97 F

## 2017-12-10 SDOH — ECONOMIC STABILITY: HOUSING INSECURITY: UNSAFE COOKING RANGE AREA: 0

## 2017-12-10 SDOH — ECONOMIC STABILITY: HOUSING INSECURITY: UNSAFE APPLIANCES: 0

## 2017-12-10 ASSESSMENT — ENCOUNTER SYMPTOMS
NAUSEA: DENIES ANY
VOMITING: DENIES ANY

## 2017-12-11 ENCOUNTER — HOSPITAL ENCOUNTER (OUTPATIENT)
Dept: LAB | Facility: MEDICAL CENTER | Age: 82
End: 2017-12-11
Attending: FAMILY MEDICINE
Payer: MEDICARE

## 2017-12-11 ENCOUNTER — HOME CARE VISIT (OUTPATIENT)
Dept: HOME HEALTH SERVICES | Facility: HOME HEALTHCARE | Age: 82
End: 2017-12-11
Payer: MEDICARE

## 2017-12-11 VITALS
DIASTOLIC BLOOD PRESSURE: 80 MMHG | SYSTOLIC BLOOD PRESSURE: 150 MMHG | TEMPERATURE: 98.1 F | RESPIRATION RATE: 18 BRPM | HEART RATE: 74 BPM | OXYGEN SATURATION: 97 %

## 2017-12-11 VITALS
OXYGEN SATURATION: 96 % | TEMPERATURE: 98.1 F | DIASTOLIC BLOOD PRESSURE: 70 MMHG | SYSTOLIC BLOOD PRESSURE: 150 MMHG | HEART RATE: 79 BPM | RESPIRATION RATE: 18 BRPM

## 2017-12-11 DIAGNOSIS — E87.1 HYPONATREMIA: ICD-10-CM

## 2017-12-11 DIAGNOSIS — N10 ACUTE PYELONEPHRITIS: ICD-10-CM

## 2017-12-11 DIAGNOSIS — N39.0 RECURRENT UTI: ICD-10-CM

## 2017-12-11 LAB
ANION GAP SERPL CALC-SCNC: 7 MMOL/L (ref 0–11.9)
APPEARANCE UR: CLEAR
BILIRUB UR QL STRIP.AUTO: NEGATIVE
BUN SERPL-MCNC: 22 MG/DL (ref 8–22)
CALCIUM SERPL-MCNC: 9.7 MG/DL (ref 8.5–10.5)
CHLORIDE SERPL-SCNC: 94 MMOL/L (ref 96–112)
CO2 SERPL-SCNC: 25 MMOL/L (ref 20–33)
COLOR UR: YELLOW
CREAT SERPL-MCNC: 0.79 MG/DL (ref 0.5–1.4)
GFR SERPL CREATININE-BSD FRML MDRD: >60 ML/MIN/1.73 M 2
GLUCOSE SERPL-MCNC: 221 MG/DL (ref 65–99)
GLUCOSE UR STRIP.AUTO-MCNC: 250 MG/DL
KETONES UR STRIP.AUTO-MCNC: NEGATIVE MG/DL
LEUKOCYTE ESTERASE UR QL STRIP.AUTO: NEGATIVE
MICRO URNS: ABNORMAL
NITRITE UR QL STRIP.AUTO: NEGATIVE
PH UR STRIP.AUTO: 7 [PH]
POTASSIUM SERPL-SCNC: 4.1 MMOL/L (ref 3.6–5.5)
PROT UR QL STRIP: NEGATIVE MG/DL
RBC UR QL AUTO: NEGATIVE
SODIUM SERPL-SCNC: 126 MMOL/L (ref 135–145)
SP GR UR STRIP.AUTO: 1.01
UROBILINOGEN UR STRIP.AUTO-MCNC: 0.2 MG/DL

## 2017-12-11 PROCEDURE — 81003 URINALYSIS AUTO W/O SCOPE: CPT

## 2017-12-11 PROCEDURE — G0156 HHCP-SVS OF AIDE,EA 15 MIN: HCPCS

## 2017-12-11 PROCEDURE — 80048 BASIC METABOLIC PNL TOTAL CA: CPT

## 2017-12-11 PROCEDURE — 87086 URINE CULTURE/COLONY COUNT: CPT

## 2017-12-11 PROCEDURE — 36415 COLL VENOUS BLD VENIPUNCTURE: CPT

## 2017-12-11 PROCEDURE — G0299 HHS/HOSPICE OF RN EA 15 MIN: HCPCS

## 2017-12-11 SDOH — ECONOMIC STABILITY: HOUSING INSECURITY: UNSAFE APPLIANCES: 0

## 2017-12-11 SDOH — ECONOMIC STABILITY: HOUSING INSECURITY: UNSAFE COOKING RANGE AREA: 0

## 2017-12-11 ASSESSMENT — ENCOUNTER SYMPTOMS
VOMITING: PT DENIES ANY EMESIS AT THIS TIME
NAUSEA: PT DENIES ANY NAUSEA AT THIS TIME
RESPIRATORY SYMPTOMS COMMENTS: PT IS NOT EXPERIENCING ANY RESPIRATORY DISTRESS AT THIS TIME

## 2017-12-12 ENCOUNTER — TELEPHONE (OUTPATIENT)
Dept: MEDICAL GROUP | Facility: PHYSICIAN GROUP | Age: 82
End: 2017-12-12

## 2017-12-12 RX ORDER — SODIUM BICARBONATE 650 MG/1
650 TABLET ORAL 4 TIMES DAILY
Qty: 120 TAB | Refills: 3 | Status: SHIPPED | OUTPATIENT
Start: 2017-12-12 | End: 2017-12-15 | Stop reason: SDUPTHER

## 2017-12-13 LAB
BACTERIA UR CULT: NORMAL
SIGNIFICANT IND 70042: NORMAL
SOURCE SOURCE: NORMAL

## 2017-12-13 NOTE — TELEPHONE ENCOUNTER
Christiano stated that he is taking 4 a day.  The name is Thermotabs.  He was also taking sodium bicarbonate 650 mg.  He just ran out of this today.  Please advise.  Thank you

## 2017-12-13 NOTE — TELEPHONE ENCOUNTER
----- Message from Zahra Yañez M.D. sent at 12/12/2017  1:58 PM PST -----  Sodium is getting low again, is he taking the salt tablets?

## 2017-12-14 ENCOUNTER — HOME CARE VISIT (OUTPATIENT)
Dept: HOME HEALTH SERVICES | Facility: HOME HEALTHCARE | Age: 82
End: 2017-12-14
Payer: MEDICARE

## 2017-12-14 PROCEDURE — G0156 HHCP-SVS OF AIDE,EA 15 MIN: HCPCS

## 2017-12-15 ENCOUNTER — OFFICE VISIT (OUTPATIENT)
Dept: MEDICAL GROUP | Facility: PHYSICIAN GROUP | Age: 82
End: 2017-12-15
Payer: MEDICARE

## 2017-12-15 ENCOUNTER — HOSPITAL ENCOUNTER (OUTPATIENT)
Facility: MEDICAL CENTER | Age: 82
End: 2017-12-15
Attending: FAMILY MEDICINE
Payer: MEDICARE

## 2017-12-15 VITALS
DIASTOLIC BLOOD PRESSURE: 70 MMHG | HEART RATE: 66 BPM | TEMPERATURE: 97.1 F | OXYGEN SATURATION: 99 % | BODY MASS INDEX: 24.55 KG/M2 | WEIGHT: 162 LBS | HEIGHT: 68 IN | RESPIRATION RATE: 18 BRPM | SYSTOLIC BLOOD PRESSURE: 122 MMHG

## 2017-12-15 VITALS
SYSTOLIC BLOOD PRESSURE: 132 MMHG | HEART RATE: 67 BPM | TEMPERATURE: 98.3 F | RESPIRATION RATE: 16 BRPM | OXYGEN SATURATION: 98 % | DIASTOLIC BLOOD PRESSURE: 68 MMHG

## 2017-12-15 DIAGNOSIS — N39.0 RECURRENT UTI: ICD-10-CM

## 2017-12-15 DIAGNOSIS — E87.1 HYPONATREMIA: ICD-10-CM

## 2017-12-15 DIAGNOSIS — R55 SYNCOPE, UNSPECIFIED SYNCOPE TYPE: ICD-10-CM

## 2017-12-15 LAB
APPEARANCE UR: CLEAR
BILIRUB UR QL STRIP.AUTO: NEGATIVE
COLOR UR: YELLOW
CULTURE IF INDICATED INDCX: NO UA CULTURE
GLUCOSE UR STRIP.AUTO-MCNC: NEGATIVE MG/DL
KETONES UR STRIP.AUTO-MCNC: NEGATIVE MG/DL
LEUKOCYTE ESTERASE UR QL STRIP.AUTO: NEGATIVE
MICRO URNS: NORMAL
NITRITE UR QL STRIP.AUTO: NEGATIVE
PH UR STRIP.AUTO: 7.5 [PH]
PROT UR QL STRIP: NEGATIVE MG/DL
RBC UR QL AUTO: NEGATIVE
SP GR UR STRIP.AUTO: 1.01
UROBILINOGEN UR STRIP.AUTO-MCNC: 0.2 MG/DL

## 2017-12-15 PROCEDURE — 99214 OFFICE O/P EST MOD 30 MIN: CPT | Performed by: FAMILY MEDICINE

## 2017-12-15 PROCEDURE — 81003 URINALYSIS AUTO W/O SCOPE: CPT

## 2017-12-15 RX ORDER — SODIUM BICARBONATE 650 MG/1
650 TABLET ORAL 4 TIMES DAILY
Qty: 540 TAB | Refills: 3 | Status: ON HOLD | OUTPATIENT
Start: 2017-12-15 | End: 2018-05-09

## 2017-12-15 NOTE — PROGRESS NOTES
Chief Complaint   Patient presents with   • Follow-Up     2 wk fv    • Medication Management     sodium       HISTORY OF PRESENT ILLNESS: Patient is a 89 y.o. male established patient here today for the following concerns:    1. Hyponatremia  2. Recurrent UTI  3. Syncope, unspecified syncope type  Bulmaro is here today for follow up on recurrent syncope due to dehydration and urosepsis.  He reports that the has not had any further episodes of dysuria.  Urology has started him on D mannose for prophylaxis.  He denies any further dizziness.  BP has been much improved.        Past Medical, Social, and Family history reviewed and updated in EPIC    Allergies:Patient has no known allergies.    Current Outpatient Prescriptions   Medication Sig Dispense Refill   • sodium bicarbonate (SODIUM BICARBONATE) 650 MG Tab Take 1 Tab by mouth 4 times a day. 540 Tab 3   • D-Mannose Powder Take 1 g by mouth every day. 30 g 11   • lisinopril (PRINIVIL) 5 MG Tab Take 1 Tab by mouth every day. 30 Tab 0   • Oral Electrolytes (BUFFERED SALT) 482 MG Tab Take 1 Tab by mouth 4 times a day. 120 Tab 3   • metformin (GLUCOPHAGE) 500 MG Tab 1000 mg in am and 500 in  Tab 0   • clopidogrel (PLAVIX) 75 MG Tab Take 1 Tab by mouth every day. 30 Tab 0   • latanoprost (XALATAN) 0.005 % Solution Place 1 Drop in both eyes every day. 1 Bottle 0   • atorvastatin (LIPITOR) 10 MG Tab Take 0.5 Tabs by mouth every day. 45 Tab 3   • Omega-3 Fatty Acids (FISH OIL) 1000 MG Cap capsule Take 1,000 mg by mouth every day.     • aspirin EC (ECOTRIN) 81 MG Tablet Delayed Response Take 81 mg by mouth every day.     • B Complex Vitamins (VITAMIN B COMPLEX PO) Take 1 Tab by mouth every evening.     • Multiple Vitamins-Minerals (CENTRUM SILVER PO) Take 1 Tab by mouth every day.     • Cholecalciferol (VITAMIN D PO) Take 1 Cap by mouth every day.       No current facility-administered medications for this visit.          ROS:  Review of Systems   Constitutional: Negative  "for fever, chills, weight loss and malaise/fatigue.   HENT: Negative for ear pain, nosebleeds, congestion, sore throat and neck pain.    Eyes: Negative for blurred vision.   Respiratory: Negative for cough, sputum production, shortness of breath and wheezing.    Cardiovascular: Negative for chest pain, palpitations,  and leg swelling.   Gastrointestinal: Negative for heartburn, nausea, vomiting, diarrhea and abdominal pain.   Genitourinary: Negative for dysuria, urgency and frequency.   Musculoskeletal: Negative for myalgias, back pain and joint pain.   Skin: Negative for rash and itching.   Neurological: Negative for dizziness, tingling, tremors, sensory change, focal weakness and headaches.   Endo/Heme/Allergies: Does not bruise/bleed easily.   Psychiatric/Behavioral: Negative for depression, anxiety, suicidal ideas, insomnia and memory loss.      Exam:  Blood pressure 122/70, pulse 66, temperature 36.2 °C (97.1 °F), resp. rate 18, height 1.727 m (5' 8\"), weight 73.5 kg (162 lb), SpO2 99 %.    General:  Well nourished, well developed in NAD  Head is grossly normal.  Neck: Supple without JVD   Pulmonary:  Normal effort.   Cardiovascular: Regular rate  Extremities: no clubbing, cyanosis, or edema.  Psych: affect appropriate      Please note that this dictation was created using voice recognition software. I have made every reasonable attempt to correct obvious errors, but I expect that there are errors of grammar and possibly content that I did not discover before finalizing the note.    Assessment/Plan:  1. Hyponatremia  Increase sodium tabs  - sodium bicarbonate (SODIUM BICARBONATE) 650 MG Tab; Take 1 Tab by mouth 4 times a day.  Dispense: 540 Tab; Refill: 3  - BASIC METABOLIC PANEL; Future  Recheck levels in 2 weeks.     2. Recurrent UTI  Awaiting recheck on urine   - URINALYSIS,CULTURE IF INDICATED; Standing    3. Syncope, unspecified syncope type  Resolved.  No further episodes.  Watching hydration levels " .    Follow up 1 month

## 2017-12-18 ENCOUNTER — HOME CARE VISIT (OUTPATIENT)
Dept: HOME HEALTH SERVICES | Facility: HOME HEALTHCARE | Age: 82
End: 2017-12-18
Payer: MEDICARE

## 2017-12-18 ENCOUNTER — PATIENT OUTREACH (OUTPATIENT)
Dept: HEALTH INFORMATION MANAGEMENT | Facility: OTHER | Age: 82
End: 2017-12-18

## 2017-12-18 PROCEDURE — G0299 HHS/HOSPICE OF RN EA 15 MIN: HCPCS

## 2017-12-18 PROCEDURE — G0162 HHC RN E&M PLAN SVS, 15 MIN: HCPCS

## 2017-12-19 ENCOUNTER — PATIENT OUTREACH (OUTPATIENT)
Dept: HEALTH INFORMATION MANAGEMENT | Facility: OTHER | Age: 82
End: 2017-12-19

## 2017-12-19 VITALS
SYSTOLIC BLOOD PRESSURE: 130 MMHG | OXYGEN SATURATION: 98 % | TEMPERATURE: 97.9 F | RESPIRATION RATE: 18 BRPM | DIASTOLIC BLOOD PRESSURE: 78 MMHG | HEART RATE: 66 BPM

## 2017-12-19 SDOH — ECONOMIC STABILITY: HOUSING INSECURITY: UNSAFE APPLIANCES: 0

## 2017-12-19 SDOH — ECONOMIC STABILITY: HOUSING INSECURITY: UNSAFE COOKING RANGE AREA: 0

## 2017-12-19 ASSESSMENT — ENCOUNTER SYMPTOMS
NAUSEA: PT DENIES ANY NAUSEA AT THIS TIME
RESPIRATORY SYMPTOMS COMMENTS: PT IS NOT EXPERIENCING ANY RESPIRATORY DISTRESS AT THIS TIME
VOMITING: PT DENIES ANY EMESIS AT THIS TIME

## 2017-12-19 NOTE — PROGRESS NOTES
Verified with [facility] that patient is still receiving services and is scheduled through 12/28. Will review that date.

## 2017-12-20 ASSESSMENT — ACTIVITIES OF DAILY LIVING (ADL)
HOME_HEALTH_OASIS: 01
OASIS_M1830: 03
HOME_HEALTH_OASIS: 02

## 2018-01-10 ENCOUNTER — OFFICE VISIT (OUTPATIENT)
Dept: MEDICAL GROUP | Facility: PHYSICIAN GROUP | Age: 83
End: 2018-01-10
Payer: MEDICARE

## 2018-01-10 ENCOUNTER — HOSPITAL ENCOUNTER (OUTPATIENT)
Dept: LAB | Facility: MEDICAL CENTER | Age: 83
End: 2018-01-10
Attending: FAMILY MEDICINE
Payer: MEDICARE

## 2018-01-10 VITALS
DIASTOLIC BLOOD PRESSURE: 80 MMHG | SYSTOLIC BLOOD PRESSURE: 134 MMHG | WEIGHT: 166 LBS | TEMPERATURE: 97.3 F | OXYGEN SATURATION: 98 % | HEART RATE: 60 BPM | HEIGHT: 68 IN | RESPIRATION RATE: 16 BRPM | BODY MASS INDEX: 25.16 KG/M2

## 2018-01-10 DIAGNOSIS — E87.1 HYPONATREMIA: ICD-10-CM

## 2018-01-10 DIAGNOSIS — I48.0 PAROXYSMAL ATRIAL FIBRILLATION (HCC): ICD-10-CM

## 2018-01-10 DIAGNOSIS — R55 SYNCOPE, UNSPECIFIED SYNCOPE TYPE: ICD-10-CM

## 2018-01-10 LAB
ANION GAP SERPL CALC-SCNC: 7 MMOL/L (ref 0–11.9)
APPEARANCE UR: ABNORMAL
BILIRUB UR STRIP-MCNC: NEGATIVE MG/DL
BUN SERPL-MCNC: 9 MG/DL (ref 8–22)
CALCIUM SERPL-MCNC: 9.7 MG/DL (ref 8.5–10.5)
CHLORIDE SERPL-SCNC: 98 MMOL/L (ref 96–112)
CO2 SERPL-SCNC: 28 MMOL/L (ref 20–33)
COLOR UR AUTO: YELLOW
CREAT SERPL-MCNC: 0.78 MG/DL (ref 0.5–1.4)
GLUCOSE SERPL-MCNC: 216 MG/DL (ref 65–99)
GLUCOSE UR STRIP.AUTO-MCNC: 2000 MG/DL
KETONES UR STRIP.AUTO-MCNC: NEGATIVE MG/DL
LEUKOCYTE ESTERASE UR QL STRIP.AUTO: ABNORMAL
NITRITE UR QL STRIP.AUTO: NEGATIVE
PH UR STRIP.AUTO: 8.5 [PH] (ref 5–8)
POTASSIUM SERPL-SCNC: 4.4 MMOL/L (ref 3.6–5.5)
PROT UR QL STRIP: 100 MG/DL
RBC UR QL AUTO: ABNORMAL
SODIUM SERPL-SCNC: 133 MMOL/L (ref 135–145)
SP GR UR STRIP.AUTO: 1
UROBILINOGEN UR STRIP-MCNC: NEGATIVE MG/DL

## 2018-01-10 PROCEDURE — 80048 BASIC METABOLIC PNL TOTAL CA: CPT

## 2018-01-10 PROCEDURE — 81002 URINALYSIS NONAUTO W/O SCOPE: CPT | Performed by: FAMILY MEDICINE

## 2018-01-10 PROCEDURE — 36415 COLL VENOUS BLD VENIPUNCTURE: CPT

## 2018-01-10 PROCEDURE — 99214 OFFICE O/P EST MOD 30 MIN: CPT | Performed by: FAMILY MEDICINE

## 2018-01-10 NOTE — PROGRESS NOTES
Chief Complaint   Patient presents with   • Follow-Up       HISTORY OF PRESENT ILLNESS: Patient is a 89 y.o. male established patient here today for the following concerns:    1. Syncope, unspecified syncope type  2. Paroxysmal atrial fibrillation (CMS-HCC)  3. Hyponatremia  Here today for follow up.  Requests UA to help r/o new infection, no new symptoms.  Has had recurrent infection with enteric organisms.  He is now on D-Mannose for prophylaxis and is following with urology.  He has also been taken off much of his antihypertensive medication with much improvement in his BP and no further dizziness or syncope.        Past Medical, Social, and Family history reviewed and updated in EPIC    Allergies:Patient has no known allergies.    Current Outpatient Prescriptions   Medication Sig Dispense Refill   • sodium bicarbonate (SODIUM BICARBONATE) 650 MG Tab Take 1 Tab by mouth 4 times a day. 540 Tab 3   • D-Mannose Powder Take 1 g by mouth every day. 30 g 11   • lisinopril (PRINIVIL) 5 MG Tab Take 1 Tab by mouth every day. 30 Tab 0   • metformin (GLUCOPHAGE) 500 MG Tab 1000 mg in am and 500 in  Tab 0   • clopidogrel (PLAVIX) 75 MG Tab Take 1 Tab by mouth every day. 30 Tab 0   • latanoprost (XALATAN) 0.005 % Solution Place 1 Drop in both eyes every day. 1 Bottle 0   • atorvastatin (LIPITOR) 10 MG Tab Take 0.5 Tabs by mouth every day. 45 Tab 3   • Omega-3 Fatty Acids (FISH OIL) 1000 MG Cap capsule Take 1,000 mg by mouth every day.     • aspirin EC (ECOTRIN) 81 MG Tablet Delayed Response Take 81 mg by mouth every day.     • B Complex Vitamins (VITAMIN B COMPLEX PO) Take 1 Tab by mouth every evening.     • Multiple Vitamins-Minerals (CENTRUM SILVER PO) Take 1 Tab by mouth every day.     • Cholecalciferol (VITAMIN D PO) Take 1 Cap by mouth every day.       No current facility-administered medications for this visit.          ROS:  Review of Systems   Constitutional: Negative for fever, chills, weight loss and  "malaise/fatigue.   HENT: Negative for ear pain, nosebleeds, congestion, sore throat and neck pain.    Eyes: Negative for blurred vision.   Respiratory: Negative for cough, sputum production, shortness of breath and wheezing.    Cardiovascular: Negative for chest pain, palpitations,  and leg swelling.   Gastrointestinal: Negative for heartburn, nausea, vomiting, diarrhea and abdominal pain.   Genitourinary: Negative for dysuria, urgency and frequency.   Musculoskeletal: Negative for myalgias, back pain and joint pain.   Skin: Negative for rash and itching.   Neurological: Negative for dizziness, tingling, tremors, sensory change, focal weakness and headaches.   Endo/Heme/Allergies: Does not bruise/bleed easily.   Psychiatric/Behavioral: Negative for depression, anxiety, suicidal ideas, insomnia and memory loss.      Exam:  Blood pressure 134/80, pulse 60, temperature 36.3 °C (97.3 °F), resp. rate 16, height 1.727 m (5' 8\"), weight 75.3 kg (166 lb), SpO2 98 %.    General:  Well nourished, well developed in NAD  Head is grossly normal.  Neck: Supple without JVD   Pulmonary:  Normal effort.   Cardiovascular: Regular rate  Extremities: no clubbing, cyanosis, or edema.  Psych: affect appropriate      Please note that this dictation was created using voice recognition software. I have made every reasonable attempt to correct obvious errors, but I expect that there are errors of grammar and possibly content that I did not discover before finalizing the note.    Assessment/Plan:  1. Syncope, unspecified syncope type  No recurrent episodes.  Continue to hold BP medications.   Patient does need help with organizing meds at home as his son has recently lost vision  - REFERRAL TO HOME HEALTH    2. Paroxysmal atrial fibrillation (CMS-[facility])  Continue current medications.  Rate Is controlled.    - REFERRAL TO HOME HEALTH    3. Hyponatremia  Labs req reprinted today.    - REFERRAL TO HOME HEALTH  - POCT Urinalysis, follow urine " culture.

## 2018-01-11 ENCOUNTER — HOME HEALTH ADMISSION (OUTPATIENT)
Dept: HOME HEALTH SERVICES | Facility: HOME HEALTHCARE | Age: 83
End: 2018-01-11
Payer: MEDICARE

## 2018-01-12 ENCOUNTER — PATIENT OUTREACH (OUTPATIENT)
Dept: HEALTH INFORMATION MANAGEMENT | Facility: OTHER | Age: 83
End: 2018-01-12

## 2018-01-12 NOTE — PROGRESS NOTES
Left VM message requesting call back to discuss Care Coordination program as home health has discharged patient latter December.

## 2018-01-15 ENCOUNTER — PATIENT OUTREACH (OUTPATIENT)
Dept: HEALTH INFORMATION MANAGEMENT | Facility: OTHER | Age: 83
End: 2018-01-15

## 2018-01-17 ENCOUNTER — PATIENT OUTREACH (OUTPATIENT)
Dept: HEALTH INFORMATION MANAGEMENT | Facility: OTHER | Age: 83
End: 2018-01-17

## 2018-01-17 NOTE — PROGRESS NOTES
Have left three messages requesting call back to discuss Care Coordination program. Will send letter and brochure should patient be interested at some point in time.

## 2018-02-01 ENCOUNTER — PATIENT OUTREACH (OUTPATIENT)
Dept: HEALTH INFORMATION MANAGEMENT | Facility: OTHER | Age: 83
End: 2018-02-01

## 2018-02-01 NOTE — PROGRESS NOTES
1. Attempt #: 1    2. [facility] Verified: yes    3. Verify PCP: yes    4. Care Team Updated:       •   DME Company (gait device, O2, CPAP, etc.): NO       •   Other Specialists (eye doctor, derm, GYN, cardiology, endo, etc): NO    5.  Reviewed/Updated the following with patient:       •   Communication Preference Obtained? NO       •   Preferred Pharmacy? NO       •   Preferred Lab? NO       •   Family History (document living status of immediate family members and if + hx of cancer, diabetes, hypertension, hyperlipidemia, heart attack, stroke)NO-PT WAS HAVING TROUBLE HEARING ME UNABLE TO REVIEW  6. [employer] Activation: already active    7. [employer] Miriam: no    8. Annual Wellness Visit Scheduling  Scheduling Status:Scheduled      9. Care Gap Scheduling (Attempt to Schedule EACH Overdue Care Gap!)     Health Maintenance Due   Topic Date Due   • Annual Wellness Visit  04/16/1927        Scheduled patient for Annual Wellness Visit    10. Patient was advised: “This is a free wellness visit. The provider will screen for medical conditions to help you stay healthy. If you have other concerns to address you may be asked to discuss these at a separate visit or there may be an additional fee.”     11. Patient was informed to arrive 15 min prior to their scheduled appointment and bring in their medication bottles.

## 2018-02-07 ENCOUNTER — OFFICE VISIT (OUTPATIENT)
Dept: URGENT CARE | Facility: PHYSICIAN GROUP | Age: 83
End: 2018-02-07
Payer: MEDICARE

## 2018-02-07 ENCOUNTER — HOSPITAL ENCOUNTER (OUTPATIENT)
Facility: MEDICAL CENTER | Age: 83
End: 2018-02-07
Attending: EMERGENCY MEDICINE
Payer: MEDICARE

## 2018-02-07 ENCOUNTER — HOSPITAL ENCOUNTER (OUTPATIENT)
Dept: RADIOLOGY | Facility: MEDICAL CENTER | Age: 83
End: 2018-02-07
Attending: EMERGENCY MEDICINE
Payer: MEDICARE

## 2018-02-07 VITALS
BODY MASS INDEX: 25.16 KG/M2 | TEMPERATURE: 98.4 F | DIASTOLIC BLOOD PRESSURE: 72 MMHG | HEART RATE: 86 BPM | WEIGHT: 166 LBS | RESPIRATION RATE: 16 BRPM | OXYGEN SATURATION: 97 % | HEIGHT: 68 IN | SYSTOLIC BLOOD PRESSURE: 142 MMHG

## 2018-02-07 DIAGNOSIS — N30.01 ACUTE CYSTITIS WITH HEMATURIA: ICD-10-CM

## 2018-02-07 DIAGNOSIS — R10.9 FLANK PAIN: ICD-10-CM

## 2018-02-07 PROCEDURE — 99214 OFFICE O/P EST MOD 30 MIN: CPT | Performed by: EMERGENCY MEDICINE

## 2018-02-07 PROCEDURE — 74176 CT ABD & PELVIS W/O CONTRAST: CPT

## 2018-02-07 PROCEDURE — 87086 URINE CULTURE/COLONY COUNT: CPT

## 2018-02-07 RX ORDER — SULFAMETHOXAZOLE AND TRIMETHOPRIM 800; 160 MG/1; MG/1
1 TABLET ORAL EVERY 12 HOURS
Qty: 20 TAB | Refills: 0 | Status: SHIPPED | OUTPATIENT
Start: 2018-02-07 | End: 2018-02-10

## 2018-02-07 RX ORDER — SULFAMETHOXAZOLE AND TRIMETHOPRIM 800; 160 MG/1; MG/1
1 TABLET ORAL EVERY 12 HOURS
Qty: 20 TAB | Refills: 1 | Status: SHIPPED | OUTPATIENT
Start: 2018-02-07 | End: 2018-02-22

## 2018-02-07 ASSESSMENT — ENCOUNTER SYMPTOMS
EYE REDNESS: 0
EYE DISCHARGE: 0
NAUSEA: 0
CHILLS: 0
FLANK PAIN: 1
SPEECH CHANGE: 0
COUGH: 0
WEAKNESS: 0
VOMITING: 0
SENSORY CHANGE: 0
FEVER: 0
DIAPHORESIS: 0
HEMOPTYSIS: 0

## 2018-02-08 DIAGNOSIS — R10.9 FLANK PAIN: ICD-10-CM

## 2018-02-08 NOTE — PROGRESS NOTES
Subjective:      Bulmaro Wheeler is a 89 y.o. male who presents with Urinary Frequency (x2 days)            HPI  Pt with 4 days of flank pain on the right , hx of kidney stones, states that he has had urinary tract infections in the past that have been missed. Patient denies any fever chills nausea vomiting or diarrhea has not noted that there is blood in his urine even though it is significantly bloody on dip.    .No Known Allergies   Social History     Social History   • Marital status:      Spouse name: N/A   • Number of children: N/A   • Years of education: N/A     Occupational History   • Not on file.     Social History Main Topics   • Smoking status: Never Smoker   • Smokeless tobacco: Never Used   • Alcohol use No      Comment: hasnt drank since 1979   • Drug use: No   • Sexual activity: No      Comment:  retired     Other Topics Concern   • Not on file     Social History Narrative   • No narrative on file     Past Medical History:   Diagnosis Date   • Acute kidney failure, unspecified 9/27/2013   • Arrhythmia     in 80's, not recent   • Arthritis    • Backpain    • Carotid artery stenosis 2/18/2014   • CATARACT     surgery in 1985   • Diabetes    • Glaucoma     Interocular lenses placed in 1985   • Heart burn    • Hypertension    • Indigestion    • NSTEMI (non-ST elevated myocardial infarction) (CMS-[facility]) 10/24/2017   • Occlusion and stenosis of carotid artery without mention of cerebral infarction 3/11/2014   • Pneumonia    • Pyelonephritis October 2010   • Pyelonephritis    • Renal cyst 9/19/2013   • Sepsis 9/27/2013   • Urinary tract infection, site not specified 9/27/2013   • UTI (lower urinary tract infection) 9/1/2012     Current Outpatient Prescriptions on File Prior to Visit   Medication Sig Dispense Refill   • sodium bicarbonate (SODIUM BICARBONATE) 650 MG Tab Take 1 Tab by mouth 4 times a day. 540 Tab 3   • D-Mannose Powder Take 1 g by mouth every day. 30 g 11   • lisinopril  "(PRINIVIL) 5 MG Tab Take 1 Tab by mouth every day. 30 Tab 0   • metformin (GLUCOPHAGE) 500 MG Tab 1000 mg in am and 500 in  Tab 0   • clopidogrel (PLAVIX) 75 MG Tab Take 1 Tab by mouth every day. 30 Tab 0   • latanoprost (XALATAN) 0.005 % Solution Place 1 Drop in both eyes every day. 1 Bottle 0   • atorvastatin (LIPITOR) 10 MG Tab Take 0.5 Tabs by mouth every day. 45 Tab 3   • Omega-3 Fatty Acids (FISH OIL) 1000 MG Cap capsule Take 1,000 mg by mouth every day.     • aspirin EC (ECOTRIN) 81 MG Tablet Delayed Response Take 81 mg by mouth every day.     • B Complex Vitamins (VITAMIN B COMPLEX PO) Take 1 Tab by mouth every evening.     • Multiple Vitamins-Minerals (CENTRUM SILVER PO) Take 1 Tab by mouth every day.     • Cholecalciferol (VITAMIN D PO) Take 1 Cap by mouth every day.       No current facility-administered medications on file prior to visit.    family history includes Alcohol/Drug in his father; Cancer in his sister; Diabetes in his father; Heart Disease in his father; Stroke in his brother.  Review of Systems   Constitutional: Negative for chills, diaphoresis and fever.        Patient is restricted to an electric wheelchair/cart.   HENT: Negative for ear discharge and nosebleeds.    Eyes: Negative for discharge and redness.   Respiratory: Negative for cough and hemoptysis.    Cardiovascular: Negative for chest pain.   Gastrointestinal: Negative for nausea and vomiting.   Genitourinary: Positive for flank pain (right-sided flank pain. Her 4 days).   Skin: Negative for rash.   Neurological: Negative for sensory change, speech change and weakness.          Objective:     /72   Pulse 86   Temp 36.9 °C (98.4 °F)   Resp 16   Ht 1.727 m (5' 8\")   Wt 75.3 kg (166 lb)   SpO2 97%   BMI 25.24 kg/m²      Physical Exam   Constitutional: He appears well-developed and well-nourished. No distress.   Very pleasant cooperative male   HENT:   Head: Normocephalic and atraumatic.   Right Ear: External ear " normal.   Eyes: Conjunctivae are normal. Right eye exhibits no discharge. Left eye exhibits no discharge.   Cardiovascular: Normal rate.    Pulmonary/Chest: Effort normal and breath sounds normal.   Abdominal: Soft. He exhibits no distension. There is no tenderness.   Right-sided flank pain.   Genitourinary: Rectum normal and prostate normal. Rectal exam shows guaiac negative stool.   Genitourinary Comments: No evidence of any prostate tenderness guaiac negative stools.   Musculoskeletal: He exhibits no tenderness.   Neurological: He is alert.   Skin: He is not diaphoretic.   Psychiatric: He has a normal mood and affect. His behavior is normal.   Nursing note and vitals reviewed.         Point-of-care urine positive heme negative leukocytes negative nitrates    Urine culture pending     Assessment/Plan:     1. Flank pain  Patient's CT shows no evidence of any renal calculus I'm going to initiate antibiotics until we get a urine culture back will call him with results. He'll push fluids return for worsening flank pain vomiting or fever. Because of his past medical history of missed UTIs I will follow him carefully.  - CT-RENAL COLIC EVALUATION(A/P W/O); Future  - URINE CULTURE(NEW); Future  - POCT Urinalysis

## 2018-02-09 ENCOUNTER — RESOLUTE PROFESSIONAL BILLING HOSPITAL PROF FEE (OUTPATIENT)
Dept: HOSPITALIST | Facility: MEDICAL CENTER | Age: 83
End: 2018-02-09
Payer: MEDICARE

## 2018-02-09 ENCOUNTER — HOSPITAL ENCOUNTER (OUTPATIENT)
Facility: MEDICAL CENTER | Age: 83
End: 2018-02-13
Attending: EMERGENCY MEDICINE | Admitting: INTERNAL MEDICINE
Payer: MEDICARE

## 2018-02-09 DIAGNOSIS — R53.1 WEAKNESS: ICD-10-CM

## 2018-02-09 DIAGNOSIS — N17.9 AKI (ACUTE KIDNEY INJURY) (HCC): ICD-10-CM

## 2018-02-09 PROBLEM — I25.10 CAD (CORONARY ARTERY DISEASE): Status: ACTIVE | Noted: 2018-02-09

## 2018-02-09 PROBLEM — E11.65 DIABETES MELLITUS WITH HYPERGLYCEMIA (HCC): Status: ACTIVE | Noted: 2017-05-13

## 2018-02-09 LAB
ALBUMIN SERPL BCP-MCNC: 3.8 G/DL (ref 3.2–4.9)
ALBUMIN/GLOB SERPL: 1.3 G/DL
ALP SERPL-CCNC: 83 U/L (ref 30–99)
ALT SERPL-CCNC: 12 U/L (ref 2–50)
ANION GAP SERPL CALC-SCNC: 15 MMOL/L (ref 0–11.9)
APPEARANCE UR: CLEAR
AST SERPL-CCNC: 20 U/L (ref 12–45)
BASOPHILS # BLD AUTO: 0.1 % (ref 0–1.8)
BASOPHILS # BLD: 0.01 K/UL (ref 0–0.12)
BILIRUB SERPL-MCNC: 0.7 MG/DL (ref 0.1–1.5)
BLOOD CULTURE HOLD CXBCH: NORMAL
BUN SERPL-MCNC: 21 MG/DL (ref 8–22)
CALCIUM SERPL-MCNC: 9.2 MG/DL (ref 8.5–10.5)
CHLORIDE SERPL-SCNC: 100 MMOL/L (ref 96–112)
CO2 SERPL-SCNC: 24 MMOL/L (ref 20–33)
COLOR UR AUTO: YELLOW
CREAT SERPL-MCNC: 1.5 MG/DL (ref 0.5–1.4)
EKG IMPRESSION: NORMAL
EOSINOPHIL # BLD AUTO: 0 K/UL (ref 0–0.51)
EOSINOPHIL NFR BLD: 0 % (ref 0–6.9)
ERYTHROCYTE [DISTWIDTH] IN BLOOD BY AUTOMATED COUNT: 45.9 FL (ref 35.9–50)
GLOBULIN SER CALC-MCNC: 3 G/DL (ref 1.9–3.5)
GLUCOSE SERPL-MCNC: 237 MG/DL (ref 65–99)
GLUCOSE UR QL STRIP.AUTO: 100 MG/DL
HCT VFR BLD AUTO: 39.2 % (ref 42–52)
HGB BLD-MCNC: 13.3 G/DL (ref 14–18)
IMM GRANULOCYTES # BLD AUTO: 0.05 K/UL (ref 0–0.11)
IMM GRANULOCYTES NFR BLD AUTO: 0.5 % (ref 0–0.9)
KETONES UR QL STRIP.AUTO: 15 MG/DL
LEUKOCYTE ESTERASE UR QL STRIP.AUTO: NEGATIVE
LIPASE SERPL-CCNC: 6 U/L (ref 11–82)
LYMPHOCYTES # BLD AUTO: 0.53 K/UL (ref 1–4.8)
LYMPHOCYTES NFR BLD: 5.3 % (ref 22–41)
MCH RBC QN AUTO: 31 PG (ref 27–33)
MCHC RBC AUTO-ENTMCNC: 33.9 G/DL (ref 33.7–35.3)
MCV RBC AUTO: 91.4 FL (ref 81.4–97.8)
MONOCYTES # BLD AUTO: 0.42 K/UL (ref 0–0.85)
MONOCYTES NFR BLD AUTO: 4.2 % (ref 0–13.4)
NEUTROPHILS # BLD AUTO: 9.02 K/UL (ref 1.82–7.42)
NEUTROPHILS NFR BLD: 89.9 % (ref 44–72)
NITRITE UR QL STRIP.AUTO: NEGATIVE
NRBC # BLD AUTO: 0 K/UL
NRBC BLD-RTO: 0 /100 WBC
PH UR STRIP.AUTO: 6 [PH]
PLATELET # BLD AUTO: 167 K/UL (ref 164–446)
PMV BLD AUTO: 10.5 FL (ref 9–12.9)
POTASSIUM SERPL-SCNC: 3.8 MMOL/L (ref 3.6–5.5)
PROT SERPL-MCNC: 6.8 G/DL (ref 6–8.2)
PROT UR QL STRIP: 100 MG/DL
RBC # BLD AUTO: 4.29 M/UL (ref 4.7–6.1)
RBC UR QL AUTO: ABNORMAL
SODIUM SERPL-SCNC: 139 MMOL/L (ref 135–145)
SP GR UR: 1.02
TROPONIN I SERPL-MCNC: 0.03 NG/ML (ref 0–0.04)
WBC # BLD AUTO: 10 K/UL (ref 4.8–10.8)

## 2018-02-09 PROCEDURE — 87040 BLOOD CULTURE FOR BACTERIA: CPT

## 2018-02-09 PROCEDURE — 81001 URINALYSIS AUTO W/SCOPE: CPT

## 2018-02-09 PROCEDURE — G0378 HOSPITAL OBSERVATION PER HR: HCPCS

## 2018-02-09 PROCEDURE — 83690 ASSAY OF LIPASE: CPT

## 2018-02-09 PROCEDURE — 80053 COMPREHEN METABOLIC PANEL: CPT

## 2018-02-09 PROCEDURE — 99220 PR INITIAL OBSERVATION CARE,LEVL III: CPT | Performed by: INTERNAL MEDICINE

## 2018-02-09 PROCEDURE — 99285 EMERGENCY DEPT VISIT HI MDM: CPT

## 2018-02-09 PROCEDURE — 93005 ELECTROCARDIOGRAM TRACING: CPT | Performed by: EMERGENCY MEDICINE

## 2018-02-09 PROCEDURE — 83036 HEMOGLOBIN GLYCOSYLATED A1C: CPT

## 2018-02-09 PROCEDURE — 84484 ASSAY OF TROPONIN QUANT: CPT

## 2018-02-09 PROCEDURE — 93005 ELECTROCARDIOGRAM TRACING: CPT

## 2018-02-09 PROCEDURE — 85025 COMPLETE CBC W/AUTO DIFF WBC: CPT

## 2018-02-09 PROCEDURE — 700105 HCHG RX REV CODE 258: Performed by: EMERGENCY MEDICINE

## 2018-02-09 PROCEDURE — 81002 URINALYSIS NONAUTO W/O SCOPE: CPT

## 2018-02-09 RX ORDER — LISINOPRIL 5 MG/1
5 TABLET ORAL DAILY
Status: DISCONTINUED | OUTPATIENT
Start: 2018-02-10 | End: 2018-02-13 | Stop reason: HOSPADM

## 2018-02-09 RX ORDER — LATANOPROST 50 UG/ML
1 SOLUTION/ DROPS OPHTHALMIC
Status: DISCONTINUED | OUTPATIENT
Start: 2018-02-10 | End: 2018-02-13 | Stop reason: HOSPADM

## 2018-02-09 RX ORDER — SODIUM CHLORIDE 9 MG/ML
INJECTION, SOLUTION INTRAVENOUS CONTINUOUS
Status: DISCONTINUED | OUTPATIENT
Start: 2018-02-10 | End: 2018-02-11

## 2018-02-09 RX ORDER — ACETAMINOPHEN 325 MG/1
650 TABLET ORAL EVERY 6 HOURS PRN
Status: DISCONTINUED | OUTPATIENT
Start: 2018-02-09 | End: 2018-02-13 | Stop reason: HOSPADM

## 2018-02-09 RX ORDER — POLYETHYLENE GLYCOL 3350 17 G/17G
1 POWDER, FOR SOLUTION ORAL
Status: DISCONTINUED | OUTPATIENT
Start: 2018-02-09 | End: 2018-02-10

## 2018-02-09 RX ORDER — SODIUM BICARBONATE 650 MG/1
650 TABLET ORAL 4 TIMES DAILY
Status: DISCONTINUED | OUTPATIENT
Start: 2018-02-10 | End: 2018-02-13 | Stop reason: HOSPADM

## 2018-02-09 RX ORDER — AMOXICILLIN 250 MG
2 CAPSULE ORAL 2 TIMES DAILY
Status: DISCONTINUED | OUTPATIENT
Start: 2018-02-10 | End: 2018-02-10

## 2018-02-09 RX ORDER — ATORVASTATIN CALCIUM 10 MG/1
5 TABLET, FILM COATED ORAL DAILY
Status: DISCONTINUED | OUTPATIENT
Start: 2018-02-10 | End: 2018-02-13 | Stop reason: HOSPADM

## 2018-02-09 RX ORDER — ONDANSETRON 4 MG/1
4 TABLET, ORALLY DISINTEGRATING ORAL EVERY 4 HOURS PRN
Status: DISCONTINUED | OUTPATIENT
Start: 2018-02-09 | End: 2018-02-13 | Stop reason: HOSPADM

## 2018-02-09 RX ORDER — SODIUM CHLORIDE 9 MG/ML
500 INJECTION, SOLUTION INTRAVENOUS ONCE
Status: COMPLETED | OUTPATIENT
Start: 2018-02-09 | End: 2018-02-09

## 2018-02-09 RX ORDER — LABETALOL HYDROCHLORIDE 5 MG/ML
10 INJECTION, SOLUTION INTRAVENOUS EVERY 4 HOURS PRN
Status: DISCONTINUED | OUTPATIENT
Start: 2018-02-09 | End: 2018-02-13 | Stop reason: HOSPADM

## 2018-02-09 RX ORDER — HEPARIN SODIUM 5000 [USP'U]/ML
5000 INJECTION, SOLUTION INTRAVENOUS; SUBCUTANEOUS EVERY 8 HOURS
Status: DISCONTINUED | OUTPATIENT
Start: 2018-02-10 | End: 2018-02-13 | Stop reason: HOSPADM

## 2018-02-09 RX ORDER — CLOPIDOGREL BISULFATE 75 MG/1
75 TABLET ORAL DAILY
Status: DISCONTINUED | OUTPATIENT
Start: 2018-02-10 | End: 2018-02-13 | Stop reason: HOSPADM

## 2018-02-09 RX ORDER — ONDANSETRON 2 MG/ML
4 INJECTION INTRAMUSCULAR; INTRAVENOUS EVERY 4 HOURS PRN
Status: DISCONTINUED | OUTPATIENT
Start: 2018-02-09 | End: 2018-02-13 | Stop reason: HOSPADM

## 2018-02-09 RX ORDER — BISACODYL 10 MG
10 SUPPOSITORY, RECTAL RECTAL
Status: DISCONTINUED | OUTPATIENT
Start: 2018-02-09 | End: 2018-02-10

## 2018-02-09 RX ORDER — DEXTROSE MONOHYDRATE 25 G/50ML
25 INJECTION, SOLUTION INTRAVENOUS
Status: DISCONTINUED | OUTPATIENT
Start: 2018-02-09 | End: 2018-02-10

## 2018-02-09 RX ORDER — ONDANSETRON 2 MG/ML
4 INJECTION INTRAMUSCULAR; INTRAVENOUS ONCE
Status: DISCONTINUED | OUTPATIENT
Start: 2018-02-09 | End: 2018-02-09

## 2018-02-09 RX ADMIN — SODIUM CHLORIDE 500 ML: 9 INJECTION, SOLUTION INTRAVENOUS at 22:45

## 2018-02-09 ASSESSMENT — PAIN SCALES - GENERAL: PAINLEVEL_OUTOF10: 0

## 2018-02-10 ENCOUNTER — APPOINTMENT (OUTPATIENT)
Dept: RADIOLOGY | Facility: MEDICAL CENTER | Age: 83
End: 2018-02-10
Attending: INTERNAL MEDICINE
Payer: MEDICARE

## 2018-02-10 PROBLEM — E78.5 HLD (HYPERLIPIDEMIA): Status: ACTIVE | Noted: 2018-02-10

## 2018-02-10 PROBLEM — N30.90 CYSTITIS: Status: ACTIVE | Noted: 2018-02-10

## 2018-02-10 PROBLEM — D72.829 LEUKOCYTOSIS: Status: ACTIVE | Noted: 2018-02-10

## 2018-02-10 LAB
ANION GAP SERPL CALC-SCNC: 11 MMOL/L (ref 0–11.9)
APPEARANCE UR: CLEAR
BACTERIA #/AREA URNS HPF: NEGATIVE /HPF
BACTERIA UR CULT: NORMAL
BILIRUB UR QL STRIP.AUTO: NEGATIVE
BUN SERPL-MCNC: 22 MG/DL (ref 8–22)
CALCIUM SERPL-MCNC: 8.9 MG/DL (ref 8.5–10.5)
CHLORIDE SERPL-SCNC: 103 MMOL/L (ref 96–112)
CO2 SERPL-SCNC: 24 MMOL/L (ref 20–33)
COLOR UR: YELLOW
CREAT SERPL-MCNC: 1.12 MG/DL (ref 0.5–1.4)
CRP SERPL HS-MCNC: 2.09 MG/DL (ref 0–0.75)
CULTURE IF INDICATED INDCX: NO UA CULTURE
EPI CELLS #/AREA URNS HPF: ABNORMAL /HPF
ERYTHROCYTE [DISTWIDTH] IN BLOOD BY AUTOMATED COUNT: 46.5 FL (ref 35.9–50)
ERYTHROCYTE [SEDIMENTATION RATE] IN BLOOD BY WESTERGREN METHOD: 20 MM/HOUR (ref 0–20)
EST. AVERAGE GLUCOSE BLD GHB EST-MCNC: 200 MG/DL
GLUCOSE BLD-MCNC: 177 MG/DL (ref 65–99)
GLUCOSE BLD-MCNC: 205 MG/DL (ref 65–99)
GLUCOSE BLD-MCNC: 227 MG/DL (ref 65–99)
GLUCOSE SERPL-MCNC: 191 MG/DL (ref 65–99)
GLUCOSE UR STRIP.AUTO-MCNC: 250 MG/DL
HBA1C MFR BLD: 8.6 % (ref 0–5.6)
HCT VFR BLD AUTO: 38.1 % (ref 42–52)
HGB BLD-MCNC: 12.5 G/DL (ref 14–18)
KETONES UR STRIP.AUTO-MCNC: ABNORMAL MG/DL
LEUKOCYTE ESTERASE UR QL STRIP.AUTO: NEGATIVE
MCH RBC QN AUTO: 30.1 PG (ref 27–33)
MCHC RBC AUTO-ENTMCNC: 32.8 G/DL (ref 33.7–35.3)
MCV RBC AUTO: 91.8 FL (ref 81.4–97.8)
MICRO URNS: ABNORMAL
NITRITE UR QL STRIP.AUTO: NEGATIVE
PH UR STRIP.AUTO: 6 [PH]
PLATELET # BLD AUTO: 150 K/UL (ref 164–446)
PMV BLD AUTO: 9.9 FL (ref 9–12.9)
POTASSIUM SERPL-SCNC: 3.5 MMOL/L (ref 3.6–5.5)
PROCALCITONIN SERPL-MCNC: <0.05 NG/ML
PROT UR QL STRIP: 100 MG/DL
RBC # BLD AUTO: 4.15 M/UL (ref 4.7–6.1)
RBC # URNS HPF: ABNORMAL /HPF
RBC UR QL AUTO: ABNORMAL
SIGNIFICANT IND 70042: NORMAL
SITE SITE: NORMAL
SODIUM SERPL-SCNC: 138 MMOL/L (ref 135–145)
SOURCE SOURCE: NORMAL
SP GR UR STRIP.AUTO: 1.02
UROBILINOGEN UR STRIP.AUTO-MCNC: 1 MG/DL
WBC # BLD AUTO: 11.4 K/UL (ref 4.8–10.8)
WBC #/AREA URNS HPF: ABNORMAL /HPF

## 2018-02-10 PROCEDURE — 700102 HCHG RX REV CODE 250 W/ 637 OVERRIDE(OP): Performed by: INTERNAL MEDICINE

## 2018-02-10 PROCEDURE — G0378 HOSPITAL OBSERVATION PER HR: HCPCS

## 2018-02-10 PROCEDURE — 99226 PR SUBSEQUENT OBSERVATION CARE,LEVEL III: CPT | Performed by: INTERNAL MEDICINE

## 2018-02-10 PROCEDURE — 85027 COMPLETE CBC AUTOMATED: CPT

## 2018-02-10 PROCEDURE — A9270 NON-COVERED ITEM OR SERVICE: HCPCS | Performed by: INTERNAL MEDICINE

## 2018-02-10 PROCEDURE — 84145 PROCALCITONIN (PCT): CPT

## 2018-02-10 PROCEDURE — 82962 GLUCOSE BLOOD TEST: CPT

## 2018-02-10 PROCEDURE — 71045 X-RAY EXAM CHEST 1 VIEW: CPT

## 2018-02-10 PROCEDURE — 80048 BASIC METABOLIC PNL TOTAL CA: CPT

## 2018-02-10 PROCEDURE — 86140 C-REACTIVE PROTEIN: CPT

## 2018-02-10 PROCEDURE — 700105 HCHG RX REV CODE 258: Performed by: INTERNAL MEDICINE

## 2018-02-10 PROCEDURE — 700111 HCHG RX REV CODE 636 W/ 250 OVERRIDE (IP): Performed by: INTERNAL MEDICINE

## 2018-02-10 PROCEDURE — 85652 RBC SED RATE AUTOMATED: CPT

## 2018-02-10 PROCEDURE — 36415 COLL VENOUS BLD VENIPUNCTURE: CPT

## 2018-02-10 PROCEDURE — 87040 BLOOD CULTURE FOR BACTERIA: CPT

## 2018-02-10 RX ORDER — POTASSIUM CHLORIDE 20 MEQ/1
40 TABLET, EXTENDED RELEASE ORAL EVERY 4 HOURS
Status: COMPLETED | OUTPATIENT
Start: 2018-02-10 | End: 2018-02-10

## 2018-02-10 RX ORDER — DEXTROSE MONOHYDRATE 25 G/50ML
25 INJECTION, SOLUTION INTRAVENOUS
Status: DISCONTINUED | OUTPATIENT
Start: 2018-02-10 | End: 2018-02-11

## 2018-02-10 RX ORDER — NITROFURANTOIN 25; 75 MG/1; MG/1
100 CAPSULE ORAL 2 TIMES DAILY WITH MEALS
Status: COMPLETED | OUTPATIENT
Start: 2018-02-10 | End: 2018-02-12

## 2018-02-10 RX ADMIN — SODIUM BICARBONATE 650 MG: 650 TABLET ORAL at 08:51

## 2018-02-10 RX ADMIN — SODIUM BICARBONATE 650 MG: 650 TABLET ORAL at 17:24

## 2018-02-10 RX ADMIN — SODIUM CHLORIDE: 9 INJECTION, SOLUTION INTRAVENOUS at 02:34

## 2018-02-10 RX ADMIN — CLOPIDOGREL 75 MG: 75 TABLET, FILM COATED ORAL at 08:52

## 2018-02-10 RX ADMIN — ASPIRIN 81 MG: 81 TABLET, COATED ORAL at 08:53

## 2018-02-10 RX ADMIN — HEPARIN SODIUM 5000 UNITS: 5000 INJECTION, SOLUTION INTRAVENOUS; SUBCUTANEOUS at 05:52

## 2018-02-10 RX ADMIN — SODIUM BICARBONATE 650 MG: 650 TABLET ORAL at 12:38

## 2018-02-10 RX ADMIN — STANDARDIZED SENNA CONCENTRATE AND DOCUSATE SODIUM 2 TABLET: 8.6; 5 TABLET, FILM COATED ORAL at 08:52

## 2018-02-10 RX ADMIN — HEPARIN SODIUM 5000 UNITS: 5000 INJECTION, SOLUTION INTRAVENOUS; SUBCUTANEOUS at 19:55

## 2018-02-10 RX ADMIN — LATANOPROST 1 DROP: 50 SOLUTION OPHTHALMIC at 19:55

## 2018-02-10 RX ADMIN — POTASSIUM CHLORIDE 40 MEQ: 1500 TABLET, EXTENDED RELEASE ORAL at 17:24

## 2018-02-10 RX ADMIN — ATORVASTATIN CALCIUM 5 MG: 10 TABLET, FILM COATED ORAL at 08:51

## 2018-02-10 RX ADMIN — HEPARIN SODIUM 5000 UNITS: 5000 INJECTION, SOLUTION INTRAVENOUS; SUBCUTANEOUS at 14:42

## 2018-02-10 RX ADMIN — INSULIN HUMAN 3 UNITS: 100 INJECTION, SOLUTION PARENTERAL at 08:56

## 2018-02-10 RX ADMIN — LISINOPRIL 5 MG: 5 TABLET ORAL at 08:52

## 2018-02-10 RX ADMIN — SODIUM CHLORIDE: 9 INJECTION, SOLUTION INTRAVENOUS at 12:30

## 2018-02-10 RX ADMIN — INSULIN HUMAN 3 UNITS: 100 INJECTION, SOLUTION PARENTERAL at 17:29

## 2018-02-10 RX ADMIN — POTASSIUM CHLORIDE 40 MEQ: 1500 TABLET, EXTENDED RELEASE ORAL at 14:42

## 2018-02-10 RX ADMIN — NITROFURANTOIN (MONOHYDRATE/MACROCRYSTALS) 100 MG: 75; 25 CAPSULE ORAL at 17:25

## 2018-02-10 RX ADMIN — SODIUM CHLORIDE: 9 INJECTION, SOLUTION INTRAVENOUS at 21:00

## 2018-02-10 RX ADMIN — SODIUM BICARBONATE 650 MG: 650 TABLET ORAL at 19:54

## 2018-02-10 RX ADMIN — INSULIN HUMAN 3 UNITS: 100 INJECTION, SOLUTION PARENTERAL at 19:56

## 2018-02-10 ASSESSMENT — LIFESTYLE VARIABLES
SUBSTANCE_ABUSE: 0
DO YOU DRINK ALCOHOL: NO

## 2018-02-10 ASSESSMENT — ENCOUNTER SYMPTOMS
HALLUCINATIONS: 0
ORTHOPNEA: 0
SENSORY CHANGE: 0
NAUSEA: 0
SPEECH CHANGE: 0
INSOMNIA: 0
NECK PAIN: 0
TREMORS: 0
CHILLS: 0
SHORTNESS OF BREATH: 0
FALLS: 0
FOCAL WEAKNESS: 0
COUGH: 0
BACK PAIN: 0
BLOOD IN STOOL: 0
HEMOPTYSIS: 0
DIAPHORESIS: 0
NERVOUS/ANXIOUS: 0
PND: 0
DIARRHEA: 0
DEPRESSION: 0
CLAUDICATION: 0
FEVER: 0
DIZZINESS: 0
MEMORY LOSS: 0
BLURRED VISION: 0
LOSS OF CONSCIOUSNESS: 0
FLANK PAIN: 0
SEIZURES: 0
MYALGIAS: 0
SPUTUM PRODUCTION: 0
WEAKNESS: 1
PALPITATIONS: 0
HEADACHES: 0
DOUBLE VISION: 0
CONSTIPATION: 0
WHEEZING: 0
HEARTBURN: 0
ABDOMINAL PAIN: 0
FALLS: 1
TINGLING: 0
VOMITING: 0
STRIDOR: 0

## 2018-02-10 ASSESSMENT — PAIN SCALES - GENERAL
PAINLEVEL_OUTOF10: 0

## 2018-02-10 NOTE — ED NOTES
Med rec partially complete per pt at bedside  Allergies reviewed - NKDA  Reviewed med rec with patient and he was able to verify that he is still taking most everything, except he is unsure if he is taking Plavix. Left on med rec  Pt started a 10 day course of Bactrim yesterday (2/10), pt verified as well.  Pt does not think he took his medications today  Will need to follow up with pt's son in the morning, as he is the one that manages pt's medications

## 2018-02-10 NOTE — ASSESSMENT & PLAN NOTE
Likely metabolic / dehydration related  PT/OT evaluation  Recommend post acute placement - SNF consult requested  B12 deficiency is noted  Will treat B12 deficient  Post acute placement as clinically appropriate

## 2018-02-10 NOTE — ASSESSMENT & PLAN NOTE
Resolved  No other concerns for infection apart from cystitis  Macrobid for potential cystitis  Monitor for infectious concerns

## 2018-02-10 NOTE — PROGRESS NOTES
Assumed care of pt at 0700. Received report from RN. Pt A&Ox2-3 (Disoriented to time and event). Assessment complete. Labs reviewed. Pt denies pain at this time. Pt denies chest pain and SOB. Pt using bedside urinal, pt offered bed pan d/t unknown of mobility and strength. MD round completed, pt to be hydrated and possible D/C tomorrow per MD. Pt and RN discussed plan of care. Pt questions answered. Pt needs are met at this time. Bed in lowest and locked position. Call light within reach. Upper bed rails up. Hourly rounding in place.

## 2018-02-10 NOTE — PROGRESS NOTES
[facility] Hospitalist Progress Note    Date of Service: 2/10/2018    Chief Complaint  89 y.o. male admitted 2/9/2018 with Weakness. On 02/07/2018 he was evaluated in urgent care with urinary frequency, flank pain. CT renal was negative. He was diagnosed with cystitis / hematuria and started on bactrim. No presented with weakness. Found to have MARLI.     Interval Problem Update  Patient seen and evaluated on rounds  Poor historian  Reports being in the hospital. Unaware of month. Aware this is 2018.   Cannot tell me why he is in the hospital  Reports weakness  No other complaints per patient  Advised nursing to ambulate patient  Obtain PT/OT evaluation  Continue IVF hydration  Monitor for infectious concerns  Start nitrofurantoin  Results of outpatient UA not available to me, neither noted in UC note    Consultants/Specialty  None    Disposition  Obtain PT/OT evaluation to determine disposition needs        Review of Systems   Constitutional: Positive for malaise/fatigue. Negative for chills, diaphoresis and fever.   HENT: Negative for hearing loss and tinnitus.    Eyes: Negative for blurred vision and double vision.   Respiratory: Negative for cough, hemoptysis, sputum production, shortness of breath and wheezing.    Cardiovascular: Negative for chest pain, palpitations, orthopnea, claudication, leg swelling and PND.   Gastrointestinal: Negative for abdominal pain, blood in stool, constipation, diarrhea, heartburn, melena, nausea and vomiting.   Genitourinary: Negative for dysuria, flank pain, frequency, hematuria and urgency.   Musculoskeletal: Positive for falls. Negative for back pain, joint pain, myalgias and neck pain.   Skin: Negative for itching and rash.   Neurological: Positive for weakness. Negative for dizziness, tingling, tremors, sensory change, speech change, focal weakness, seizures, loss of consciousness and headaches.   Psychiatric/Behavioral: Negative for depression, hallucinations, memory loss,  substance abuse and suicidal ideas. The patient is not nervous/anxious and does not have insomnia.       Physical Exam  Laboratory/Imaging   Hemodynamics  Temp (24hrs), Av.7 °C (98.1 °F), Min:36.2 °C (97.2 °F), Max:37.3 °C (99.1 °F)   Temperature: 37.3 °C (99.1 °F)  Pulse  Av.4  Min: 62  Max: 81 Heart Rate (Monitored): 82  Blood Pressure : 151/63, NIBP: 139/73      Respiratory      Respiration: 16, Pulse Oximetry: 92 %        RUL Breath Sounds: Clear, RML Breath Sounds: Clear, RLL Breath Sounds: Diminished, HUNTER Breath Sounds: Clear, LLL Breath Sounds: Diminished    Fluids    Intake/Output Summary (Last 24 hours) at 02/10/18 1226  Last data filed at 02/10/18 1000   Gross per 24 hour   Intake              360 ml   Output              350 ml   Net               10 ml       Nutrition  Orders Placed This Encounter   Procedures   • Diet Order     Standing Status:   Standing     Number of Occurrences:   1     Order Specific Question:   Diet:     Answer:   Diabetic [3]     Physical Exam   Constitutional: He is oriented to person, place, and time. He appears well-developed and well-nourished. No distress.   HENT:   Head: Normocephalic.   Mouth/Throat: Oropharynx is clear and moist. No oropharyngeal exudate.   Eyes: Conjunctivae are normal. Pupils are equal, round, and reactive to light. No scleral icterus.   Neck: Normal range of motion. No JVD present.   Cardiovascular: Normal rate, regular rhythm and normal heart sounds.    No murmur heard.  Pulmonary/Chest: No stridor. No respiratory distress. He has no wheezes.   Abdominal: Soft. Bowel sounds are normal. He exhibits no distension. There is no tenderness. There is no rebound and no guarding.   Musculoskeletal: He exhibits no edema, tenderness or deformity.   Neurological: He is alert and oriented to person, place, and time. He has normal reflexes. No cranial nerve deficit.   Skin: Skin is warm and dry. He is not diaphoretic.   Psychiatric: He has a normal mood  and affect. He is slowed. He exhibits abnormal recent memory and abnormal remote memory.       Recent Labs      02/09/18   1835  02/10/18   0240   WBC  10.0  11.4*   RBC  4.29*  4.15*   HEMOGLOBIN  13.3*  12.5*   HEMATOCRIT  39.2*  38.1*   MCV  91.4  91.8   MCH  31.0  30.1   MCHC  33.9  32.8*   RDW  45.9  46.5   PLATELETCT  167  150*   MPV  10.5  9.9     Recent Labs      02/09/18   1835  02/10/18   0240   SODIUM  139  138   POTASSIUM  3.8  3.5*   CHLORIDE  100  103   CO2  24  24   GLUCOSE  237*  191*   BUN  21  22   CREATININE  1.50*  1.12   CALCIUM  9.2  8.9                      Assessment/Plan     Generalized weakness- (present on admission)   Assessment & Plan    IVF hydration  Likely metabolic / dehydration related  Monitor for infectious concerns  PT/OT evaluation  Check B12, folate and TSH  Post acute placement as clinically appropriate        Leukocytosis   Assessment & Plan    Check blood cultures x 2, CXR  ESR, CRP, Pro calcitonin   Macrobid for potential cystitis  Monitor for infectious concerns  Repeat CBC in am tomorrow        MARLI (acute kidney injury) (CMS-HCC)- (present on admission)   Assessment & Plan    Pre renal vs bactrim related  Holding bactrim at this time  Continue gentle IVF hydration  Monitor renal function / Avoid nephrotoxins and dose medications renally        Cystitis- (present on admission)   Assessment & Plan    He was diagnosed with this in the outpatient setting  With hematuria  Stop bactrim  His cultures are negative  Will transition to PO macrobid  Recommend outpatient follow up with urology regarding hematuria        HLD (hyperlipidemia)- (present on admission)   Assessment & Plan    Atorvastatin        CAD (coronary artery disease)- (present on admission)   Assessment & Plan    Hx of  No acute issues  Continue DAPT, Statin        Glaucoma- (present on admission)   Assessment & Plan    Continue at home regimen of eye drops        Paroxysmal atrial fibrillation (CMS-HCC)- (present  on admission)   Assessment & Plan    Not AC  No acute concerns at this time  Patient to maintain outpatient follow up        Type II diabetes mellitus (CMS-[facility])- (present on admission)   Assessment & Plan    Uncontrolled on presentation with hyperglycemia  Last known A1C of 7.2  Pending A1C now  On metformin monotherapy at baseline  Holding oral hypoglycemic agents  SSI 2  Titrate to achieve normoglycemic control        Hyponatremia- (present on admission)   Assessment & Plan    Chronic on sodium bicarb for this per outpatient providers  Stable currently        Carotid artery stenosis- (present on admission)   Assessment & Plan    Continue DAPT, Statin  Risk factor modification  Outpatient follow up        Hypertension- (present on admission)   Assessment & Plan    Lisinopril            Reviewed items::  Labs reviewed, Medications reviewed and Radiology images reviewed  Aj catheter::  No Aj  DVT prophylaxis pharmacological::  Heparin  DVT prophylaxis - mechanical:  SCDs  Antibiotics:  Treating active infection/contamination beyond 24 hours perioperative coverage

## 2018-02-10 NOTE — ASSESSMENT & PLAN NOTE
Pre renal vs bactrim related  Holding bactrim at this time  Resoled now  Monitor renal function / Avoid nephrotoxins and dose medications renally

## 2018-02-10 NOTE — CARE PLAN
Problem: Safety  Goal: Will remain free from falls  Outcome: PROGRESSING AS EXPECTED  Pt high fall risk, pt wearing treaded socks, bed locked and in lowest position, bed alarm is on.  Pt call light and phone within reach.

## 2018-02-10 NOTE — CARE PLAN
Problem: Infection  Goal: Will remain free from infection  Outcome: PROGRESSING AS EXPECTED  Pt educated on importance of hand hygiene. Pt educated on S/S of infection.     Problem: Skin Integrity  Goal: Risk for impaired skin integrity will decrease  Outcome: PROGRESSING AS EXPECTED  Pt turns self in bed. Pt in use of bedside urinal.

## 2018-02-10 NOTE — ED TRIAGE NOTES
Pt arrived via ems, per ems pt lives with son, pt diagnosed yesterday with UTI and prescribed sulfameth/tmp 800/160mg. Pt started taking medicine today. pts son feels pts mentation is still altered and would like to be seen. U/a pt sitting up caox3 speaking in full sentences no distress, no sob,no cp, no abd pain. Pt states he has no complaints .

## 2018-02-10 NOTE — ED PROVIDER NOTES
ED Provider Note    CHIEF COMPLAINT  Chief Complaint   Patient presents with   • UTI   • ALOC       HPI  Bulmaro Wheeler is a 89 y.o. male here for evaluation of UTI. The patient was seen for evaluation of altered mental status, and confusion yesterday. He was diagnosed with a UTI, and was given antibiotic. He started the antibiotic today, and has only had a couple of doses. He has no fever, no vomiting, no chest pain, no shortness of breath. No other history is available, but son should be returning shortly.    PAST MEDICAL HISTORY   has a past medical history of Acute kidney failure, unspecified (9/27/2013); Arrhythmia; Arthritis; Backpain; Carotid artery stenosis (2/18/2014); CATARACT; Diabetes; Glaucoma; Heart burn; Hypertension; Indigestion; NSTEMI (non-ST elevated myocardial infarction) (CMS-HCC) (10/24/2017); Occlusion and stenosis of carotid artery without mention of cerebral infarction (3/11/2014); Pneumonia; Pyelonephritis (October 2010); Pyelonephritis; Renal cyst (9/19/2013); Sepsis (9/27/2013); Urinary tract infection, site not specified (9/27/2013); and UTI (lower urinary tract infection) (9/1/2012).    SOCIAL HISTORY  Social History     Social History Main Topics   • Smoking status: Never Smoker   • Smokeless tobacco: Never Used   • Alcohol use No      Comment: hasnt drank since 1979   • Drug use: No   • Sexual activity: No      Comment:  retired       SURGICAL HISTORY   has a past surgical history that includes ercp w/removal calculus (2009); eye surgery (1980's); colonoscopy (1999); cholecystectomy (1999); and carotid endarterectomy (3/11/2014).    CURRENT MEDICATIONS  Home Medications    **Home medications have not yet been reviewed for this encounter**         ALLERGIES  No Known Allergies    REVIEW OF SYSTEMS  See HPI for further details. Review of systems as above, otherwise all other systems are negative.     PHYSICAL EXAM  Constitutional: Well developed, well nourished. Mild acute  distress.  HEENT: atraumatic. Posterior pharynx clear and moist.  Eyes:  EOMI. Normal sclera.  Neck: Supple, Full range of motion, nontender.  Chest/Pulmonary: clear to ausculation. Symmetrical expansion.   Cardio: Regular rate and rhythm with no murmur.   Abdomen: Soft, nontender. No peritoneal signs. No guarding. No palpable masses.  Musculoskeletal: No deformity, no edema, neurovascular intact.   Neuro: Clear speech, cooperative, cranial nerves II-XII grossly intact.  Psych: Agitated    Results for orders placed or performed during the hospital encounter of 02/09/18   CBC WITH DIFFERENTIAL   Result Value Ref Range    WBC 10.0 4.8 - 10.8 K/uL    RBC 4.29 (L) 4.70 - 6.10 M/uL    Hemoglobin 13.3 (L) 14.0 - 18.0 g/dL    Hematocrit 39.2 (L) 42.0 - 52.0 %    MCV 91.4 81.4 - 97.8 fL    MCH 31.0 27.0 - 33.0 pg    MCHC 33.9 33.7 - 35.3 g/dL    RDW 45.9 35.9 - 50.0 fL    Platelet Count 167 164 - 446 K/uL    MPV 10.5 9.0 - 12.9 fL    Neutrophils-Polys 89.90 (H) 44.00 - 72.00 %    Lymphocytes 5.30 (L) 22.00 - 41.00 %    Monocytes 4.20 0.00 - 13.40 %    Eosinophils 0.00 0.00 - 6.90 %    Basophils 0.10 0.00 - 1.80 %    Immature Granulocytes 0.50 0.00 - 0.90 %    Nucleated RBC 0.00 /100 WBC    Neutrophils (Absolute) 9.02 (H) 1.82 - 7.42 K/uL    Lymphs (Absolute) 0.53 (L) 1.00 - 4.80 K/uL    Monos (Absolute) 0.42 0.00 - 0.85 K/uL    Eos (Absolute) 0.00 0.00 - 0.51 K/uL    Baso (Absolute) 0.01 0.00 - 0.12 K/uL    Immature Granulocytes (abs) 0.05 0.00 - 0.11 K/uL    NRBC (Absolute) 0.00 K/uL   COMP METABOLIC PANEL   Result Value Ref Range    Sodium 139 135 - 145 mmol/L    Potassium 3.8 3.6 - 5.5 mmol/L    Chloride 100 96 - 112 mmol/L    Co2 24 20 - 33 mmol/L    Anion Gap 15.0 (H) 0.0 - 11.9    Glucose 237 (H) 65 - 99 mg/dL    Bun 21 8 - 22 mg/dL    Creatinine 1.50 (H) 0.50 - 1.40 mg/dL    Calcium 9.2 8.5 - 10.5 mg/dL    AST(SGOT) 20 12 - 45 U/L    ALT(SGPT) 12 2 - 50 U/L    Alkaline Phosphatase 83 30 - 99 U/L    Total Bilirubin  0.7 0.1 - 1.5 mg/dL    Albumin 3.8 3.2 - 4.9 g/dL    Total Protein 6.8 6.0 - 8.2 g/dL    Globulin 3.0 1.9 - 3.5 g/dL    A-G Ratio 1.3 g/dL   LIPASE   Result Value Ref Range    Lipase 6 (L) 11 - 82 U/L   TROPONIN   Result Value Ref Range    Troponin I 0.03 0.00 - 0.04 ng/mL   URINALYSIS,CULTURE IF INDICATED   Result Value Ref Range    Color Yellow     Character Clear     Specific Gravity 1.021 <1.035    Ph 6.0 5.0 - 8.0    Glucose 250 (A) Negative mg/dL    Ketones Trace (A) Negative mg/dL    Protein 100 (A) Negative mg/dL    Bilirubin Negative Negative    Urobilinogen, Urine 1.0 Negative    Nitrite Negative Negative    Leukocyte Esterase Negative Negative    Occult Blood Trace (A) Negative    Micro Urine Req Microscopic    ESTIMATED GFR   Result Value Ref Range    GFR If  53 (A) >60 mL/min/1.73 m 2    GFR If Non  44 (A) >60 mL/min/1.73 m 2   BLOOD CULTURE,HOLD   Result Value Ref Range    Blood Culture Hold Collected    POC UA   Result Value Ref Range    POC Color Yellow     POC Appearance Clear     POC Glucose 100 (A) Negative mg/dL    POC Ketones 15 (A) Negative mg/dL    POC Specific Gravity 1.025 1.005 - 1.030    POC Blood Small (A) Negative    POC Urine PH 6.0 5.0 - 8.0    POC Protein 100 (A) Negative mg/dL    POC Nitrites Negative Negative    POC Leukocyte Esterase Negative Negative     No orders to display         PROCEDURES     MEDICAL RECORD  I have reviewed patient's medical record and pertinent results are listed above.    COURSE & MEDICAL DECISION MAKING  I have reviewed any medical record information, laboratory studies and radiographic results as noted above.    11:51 PM  Dr. Carter will admit the pt for generalized weakness.  No family is currently present.     FINAL IMPRESSION  1. Weakness          Electronically signed by: Daniel Thacker, 2/9/2018 10:20 PM

## 2018-02-10 NOTE — PROGRESS NOTES
Pt received from ED via gurney accompanied by transporter.  Pt transferred to bed utilizing slide board and unit staff.  Pt assessed, A&O x2, disoriented to time and events.  Pt has no complaints of pain at this time.  2 RN skin assessment complete, skin intact.  Updated pt on unit routine including call light system, hourly rounding, white board information, need for bed alarm, and visitors policy.  Bed in lowest position, locked, bed alarm active, pt wearing yellow treaded socks, and pt instructed on need to call for assistance prior to getting out of bed.  Call light, phone, and personal belongs within reach, white board updated.

## 2018-02-10 NOTE — PROGRESS NOTES
2 RN skin check completed.  Pt wears glasses, no wounds, suspected wounds, pressure injuries, or suspected pressure injuries noted.

## 2018-02-10 NOTE — H&P
" Hospital Medicine History and Physical    Date of Service  2/9/2018    Chief Complaint  Chief Complaint   Patient presents with   • UTI   • ALOC       History of Presenting Illness  89 y.o. male who presented 2/9/2018 with weakness. Patient likely has some dementia at baseline, at this time no family is present, patient is a poor historian. Patient has no complaints currently, does not really know why he is here he understands where he is but not sure why, when asked if he's been weak lately he says\" maybe a little\". Patient had recently gone to a provider, given Bactrim for a urinary tract infection which he no longer has. Currently patient is noted to have elevated creatinine, anion gap without acidosis and uncontrolled glucose.   Primary Care Physician  Zahra Yañez M.D.    Consultants  None    Code Status  Full    Review of Systems  Review of Systems   Constitutional: Negative for chills, fever and malaise/fatigue.   HENT: Negative for congestion.    Respiratory: Negative for cough, sputum production, shortness of breath and stridor.    Cardiovascular: Negative for chest pain, palpitations and leg swelling.   Gastrointestinal: Negative for abdominal pain, constipation, diarrhea, nausea and vomiting.   Genitourinary: Negative for dysuria and urgency.   Musculoskeletal: Negative for falls and myalgias.   Neurological: Positive for weakness. Negative for dizziness, tingling, loss of consciousness and headaches.   Psychiatric/Behavioral: Negative for depression and suicidal ideas.   All other systems reviewed and are negative.       Past Medical History  Past Medical History:   Diagnosis Date   • NSTEMI (non-ST elevated myocardial infarction) (CMS-[facility]) 10/24/2017   • Occlusion and stenosis of carotid artery without mention of cerebral infarction 3/11/2014   • Carotid artery stenosis 2/18/2014   • Urinary tract infection, site not specified 9/27/2013   • Sepsis 9/27/2013   • Acute kidney failure, unspecified " 9/27/2013   • Renal cyst 9/19/2013   • UTI (lower urinary tract infection) 9/1/2012   • Pyelonephritis October 2010   • Arrhythmia     in 80's, not recent   • Arthritis    • Backpain    • CATARACT     surgery in 1985   • Diabetes    • Glaucoma     Interocular lenses placed in 1985   • Heart burn    • Hypertension    • Indigestion    • Pneumonia    • Pyelonephritis        Surgical History  Past Surgical History:   Procedure Laterality Date   • CAROTID ENDARTERECTOMY  3/11/2014    Performed by Bob Antonio M.D. at SUR[facility]ERY [facility] ORS   • ERCP W/REMOVAL CALCULUS  2009   • COLONOSCOPY  1999   • CHOLECYSTECTOMY  1999   • EYE SURGERY  1980's    cataracts OU       Medications  No current facility-administered medications on file prior to encounter.      Current Outpatient Prescriptions on File Prior to Encounter   Medication Sig Dispense Refill   • sulfamethoxazole-trimethoprim (BACTRIM DS) 800-160 MG tablet Take 1 Tab by mouth every 12 hours. Stay hydrated 20 Tab 1   • sulfamethoxazole-trimethoprim (BACTRIM DS) 800-160 MG tablet Take 1 Tab by mouth every 12 hours. Stay hydrated 20 Tab 0   • sodium bicarbonate (SODIUM BICARBONATE) 650 MG Tab Take 1 Tab by mouth 4 times a day. 540 Tab 3   • D-Mannose Powder Take 1 g by mouth every day. 30 g 11   • lisinopril (PRINIVIL) 5 MG Tab Take 1 Tab by mouth every day. 30 Tab 0   • metformin (GLUCOPHAGE) 500 MG Tab 1000 mg in am and 500 in  Tab 0   • clopidogrel (PLAVIX) 75 MG Tab Take 1 Tab by mouth every day. 30 Tab 0   • latanoprost (XALATAN) 0.005 % Solution Place 1 Drop in both eyes every day. 1 Bottle 0   • atorvastatin (LIPITOR) 10 MG Tab Take 0.5 Tabs by mouth every day. 45 Tab 3   • Omega-3 Fatty Acids (FISH OIL) 1000 MG Cap capsule Take 1,000 mg by mouth every day.     • aspirin EC (ECOTRIN) 81 MG Tablet Delayed Response Take 81 mg by mouth every day.     • B Complex Vitamins (VITAMIN B COMPLEX PO) Take 1 Tab by mouth every evening.     • Multiple  Vitamins-Minerals (CENTRUM SILVER PO) Take 1 Tab by mouth every day.     • Cholecalciferol (VITAMIN D PO) Take 1 Cap by mouth every day.         Family History  Family History   Problem Relation Age of Onset   • Heart Disease Father    • Diabetes Father    • Alcohol/Drug Father    • Cancer Sister      ovarian   • Stroke Brother      age 90       Social History  Social History   Substance Use Topics   • Smoking status: Never Smoker   • Smokeless tobacco: Never Used   • Alcohol use No      Comment: hasnt drank since        Allergies  No Known Allergies     Physical Exam  Laboratory   Hemodynamics  Temp (24hrs), Av.5 °C (97.7 °F), Min:36.5 °C (97.7 °F), Max:36.5 °C (97.7 °F)   Temperature: 36.5 °C (97.7 °F)  Pulse  Av.7  Min: 72  Max: 81 Heart Rate (Monitored): 74  Blood Pressure : 144/75, NIBP: 144/51      Respiratory      Respiration: 16, Pulse Oximetry: 93 %             Physical Exam   Constitutional: He is oriented to person, place, and time. He appears well-developed.  Non-toxic appearance. No distress.   HENT:   Head: Normocephalic and atraumatic.   Mouth/Throat: Oropharynx is clear and moist. No oropharyngeal exudate.   Eyes: Right eye exhibits no discharge. Left eye exhibits no discharge.   Neck: Neck supple. No tracheal deviation, no edema and no erythema present.   Cardiovascular: Normal rate and regular rhythm.  Exam reveals no gallop and no friction rub.    No murmur heard.  Pulmonary/Chest: Effort normal and breath sounds normal. No stridor. No respiratory distress. He has no wheezes. He has no rales. He exhibits no tenderness.   Abdominal: Soft. Bowel sounds are normal. He exhibits no distension. There is no tenderness.   Musculoskeletal: He exhibits no edema or tenderness.   Bilateral LE weakness   Lymphadenopathy:     He has no cervical adenopathy.   Neurological: He is alert and oriented to person, place, and time. No cranial nerve deficit.   Skin: Skin is warm and dry. No rash noted. He  is not diaphoretic. No erythema.   Psychiatric: He is slowed. Cognition and memory are impaired.   Nursing note and vitals reviewed.      Recent Labs      02/09/18   1835   WBC  10.0   RBC  4.29*   HEMOGLOBIN  13.3*   HEMATOCRIT  39.2*   MCV  91.4   MCH  31.0   MCHC  33.9   RDW  45.9   PLATELETCT  167   MPV  10.5     Recent Labs      02/09/18   1835   SODIUM  139   POTASSIUM  3.8   CHLORIDE  100   CO2  24   GLUCOSE  237*   BUN  21   CREATININE  1.50*   CALCIUM  9.2     Recent Labs      02/09/18   1835   ALTSGPT  12   ASTSGOT  20   ALKPHOSPHAT  83   TBILIRUBIN  0.7   LIPASE  6*   GLUCOSE  237*                 Lab Results   Component Value Date    TROPONINI 0.03 02/09/2018     Urinalysis:    Lab Results  Component Value Date/Time   SPECGRAVITY 1.021 02/09/2018 2320   GLUCOSEUR 250 (A) 02/09/2018 2320   KETONES Trace (A) 02/09/2018 2320   NITRITE Negative 02/09/2018 2320   WBCURINE 2-5 (A) 11/25/2017 1542   RBCURINE 0-2 (A) 11/25/2017 1542   BACTERIA Negative 11/25/2017 1542   EPITHELCELL Few 11/25/2017 1542        Imaging  None , did have outpatient CT abdomen on 2/7 with nothing acute    Assessment/Plan     I anticipate this patient is appropriate for observation status at this time.    Generalized weakness- (present on admission)   Assessment & Plan    - Possibly due to urinary tract infection if that was the case, doesn't appear to have urinary tract infection any longer, not complaining of any symptoms, so could be residual weakness from this  - Unlikely cardiac in nature, did have echocardiogram in 11/17 with preserved ejection fraction and no significant valvular disorders, he is also not having any cardiac complaints  - Denies any fall or trauma  - Obtain physical and occupational therapy recommendations  - He is having some kidney issues, he is also dehydrated, both of these could be affecting his strength        Diabetes mellitus with hyperglycemia (CMS-[facility])- (present on admission)   Assessment & Plan    -  Uncontrollable with a glucose greater than 260  - Patient now has an anion gap and ketones in his urine, not acidotic yet  - Start insulin sliding scale, adjust as needed  - Obtain hemoglobin A1c  - Concerning that the patient was beginning to have diabetic ketoacidosis, if so patient will no longer be a metformin candidate however since he didn't metformin may still be appropriate at an increased dose, currently it's not appropriate regardless due to his worsening kidney function        Acute renal failure (ARF) (CMS-HCC)- (present on admission)   Assessment & Plan    - Possibly dehydration versus Bactrim  - Patient does appear dry, will give IV fluids, repeat BMP in the morning  - Patient currently does not have urinary tract infection, asymptomatic for him, will not continue Bactrim, took it for at least a day        CAD (coronary artery disease)   Assessment & Plan    - Medical management  - No current chest pain, no changes on EKG        Hypertension- (present on admission)   Assessment & Plan    - Controlled on home lisinopril, continue  - Place when necessary labetalol  - Adjust as needed            VTE prophylaxis: Heparin .

## 2018-02-10 NOTE — ASSESSMENT & PLAN NOTE
Uncontrolled on presentation with hyperglycemia  A1C now of 8.6  On metformin monotherapy at baseline  Renal function is stable now  No contraindications to use of metformin at this time  He will not be a good candidate for use of oral ROSENTHAL  Transition to PO metformin / Januvia  F/U Outpatient PCP

## 2018-02-11 LAB
ALBUMIN SERPL BCP-MCNC: 3 G/DL (ref 3.2–4.9)
ALBUMIN/GLOB SERPL: 1.3 G/DL
ALP SERPL-CCNC: 63 U/L (ref 30–99)
ALT SERPL-CCNC: 11 U/L (ref 2–50)
ANION GAP SERPL CALC-SCNC: 8 MMOL/L (ref 0–11.9)
APTT PPP: 35.4 SEC (ref 24.7–36)
AST SERPL-CCNC: 21 U/L (ref 12–45)
BASOPHILS # BLD AUTO: 0.1 % (ref 0–1.8)
BASOPHILS # BLD: 0.01 K/UL (ref 0–0.12)
BILIRUB SERPL-MCNC: 0.6 MG/DL (ref 0.1–1.5)
BUN SERPL-MCNC: 16 MG/DL (ref 8–22)
CALCIUM SERPL-MCNC: 8.4 MG/DL (ref 8.5–10.5)
CHLORIDE SERPL-SCNC: 105 MMOL/L (ref 96–112)
CO2 SERPL-SCNC: 26 MMOL/L (ref 20–33)
CREAT SERPL-MCNC: 0.75 MG/DL (ref 0.5–1.4)
EOSINOPHIL # BLD AUTO: 0.07 K/UL (ref 0–0.51)
EOSINOPHIL NFR BLD: 0.9 % (ref 0–6.9)
ERYTHROCYTE [DISTWIDTH] IN BLOOD BY AUTOMATED COUNT: 44.3 FL (ref 35.9–50)
FOLATE SERPL-MCNC: 20.1 NG/ML
GLOBULIN SER CALC-MCNC: 2.4 G/DL (ref 1.9–3.5)
GLUCOSE BLD-MCNC: 134 MG/DL (ref 65–99)
GLUCOSE BLD-MCNC: 202 MG/DL (ref 65–99)
GLUCOSE BLD-MCNC: 203 MG/DL (ref 65–99)
GLUCOSE BLD-MCNC: 257 MG/DL (ref 65–99)
GLUCOSE SERPL-MCNC: 121 MG/DL (ref 65–99)
HCT VFR BLD AUTO: 33.2 % (ref 42–52)
HGB BLD-MCNC: 11.6 G/DL (ref 14–18)
IMM GRANULOCYTES # BLD AUTO: 0.02 K/UL (ref 0–0.11)
IMM GRANULOCYTES NFR BLD AUTO: 0.3 % (ref 0–0.9)
INR PPP: 1.16 (ref 0.87–1.13)
LYMPHOCYTES # BLD AUTO: 1.11 K/UL (ref 1–4.8)
LYMPHOCYTES NFR BLD: 14.1 % (ref 22–41)
MAGNESIUM SERPL-MCNC: 1.3 MG/DL (ref 1.5–2.5)
MCH RBC QN AUTO: 31.2 PG (ref 27–33)
MCHC RBC AUTO-ENTMCNC: 34.9 G/DL (ref 33.7–35.3)
MCV RBC AUTO: 89.2 FL (ref 81.4–97.8)
MONOCYTES # BLD AUTO: 0.83 K/UL (ref 0–0.85)
MONOCYTES NFR BLD AUTO: 10.6 % (ref 0–13.4)
NEUTROPHILS # BLD AUTO: 5.81 K/UL (ref 1.82–7.42)
NEUTROPHILS NFR BLD: 74 % (ref 44–72)
NRBC # BLD AUTO: 0 K/UL
NRBC BLD-RTO: 0 /100 WBC
PHOSPHATE SERPL-MCNC: 2.2 MG/DL (ref 2.5–4.5)
PLATELET # BLD AUTO: 128 K/UL (ref 164–446)
PMV BLD AUTO: 10.1 FL (ref 9–12.9)
POTASSIUM SERPL-SCNC: 3.3 MMOL/L (ref 3.6–5.5)
PROT SERPL-MCNC: 5.4 G/DL (ref 6–8.2)
PROTHROMBIN TIME: 14.5 SEC (ref 12–14.6)
RBC # BLD AUTO: 3.72 M/UL (ref 4.7–6.1)
SODIUM SERPL-SCNC: 139 MMOL/L (ref 135–145)
TSH SERPL DL<=0.005 MIU/L-ACNC: 0.68 UIU/ML (ref 0.38–5.33)
VIT B12 SERPL-MCNC: 107 PG/ML (ref 211–911)
WBC # BLD AUTO: 7.9 K/UL (ref 4.8–10.8)

## 2018-02-11 PROCEDURE — 86255 FLUORESCENT ANTIBODY SCREEN: CPT

## 2018-02-11 PROCEDURE — 84443 ASSAY THYROID STIM HORMONE: CPT

## 2018-02-11 PROCEDURE — 83735 ASSAY OF MAGNESIUM: CPT

## 2018-02-11 PROCEDURE — 700102 HCHG RX REV CODE 250 W/ 637 OVERRIDE(OP): Performed by: INTERNAL MEDICINE

## 2018-02-11 PROCEDURE — 700111 HCHG RX REV CODE 636 W/ 250 OVERRIDE (IP): Performed by: INTERNAL MEDICINE

## 2018-02-11 PROCEDURE — 82607 VITAMIN B-12: CPT

## 2018-02-11 PROCEDURE — A9270 NON-COVERED ITEM OR SERVICE: HCPCS | Performed by: INTERNAL MEDICINE

## 2018-02-11 PROCEDURE — 85610 PROTHROMBIN TIME: CPT

## 2018-02-11 PROCEDURE — 36415 COLL VENOUS BLD VENIPUNCTURE: CPT

## 2018-02-11 PROCEDURE — 85730 THROMBOPLASTIN TIME PARTIAL: CPT

## 2018-02-11 PROCEDURE — 85025 COMPLETE CBC W/AUTO DIFF WBC: CPT

## 2018-02-11 PROCEDURE — 86340 INTRINSIC FACTOR ANTIBODY: CPT

## 2018-02-11 PROCEDURE — 700101 HCHG RX REV CODE 250: Performed by: INTERNAL MEDICINE

## 2018-02-11 PROCEDURE — 99225 PR SUBSEQUENT OBSERVATION CARE,LEVEL II: CPT | Performed by: INTERNAL MEDICINE

## 2018-02-11 PROCEDURE — 82962 GLUCOSE BLOOD TEST: CPT | Mod: 91

## 2018-02-11 PROCEDURE — 80053 COMPREHEN METABOLIC PANEL: CPT

## 2018-02-11 PROCEDURE — 82746 ASSAY OF FOLIC ACID SERUM: CPT

## 2018-02-11 PROCEDURE — 84100 ASSAY OF PHOSPHORUS: CPT

## 2018-02-11 PROCEDURE — 700105 HCHG RX REV CODE 258: Performed by: INTERNAL MEDICINE

## 2018-02-11 PROCEDURE — G0378 HOSPITAL OBSERVATION PER HR: HCPCS

## 2018-02-11 RX ORDER — MAGNESIUM SULFATE HEPTAHYDRATE 40 MG/ML
4 INJECTION, SOLUTION INTRAVENOUS ONCE
Status: COMPLETED | OUTPATIENT
Start: 2018-02-11 | End: 2018-02-11

## 2018-02-11 RX ORDER — CYANOCOBALAMIN 1000 UG/ML
1000 INJECTION, SOLUTION INTRAMUSCULAR; SUBCUTANEOUS ONCE
Status: COMPLETED | OUTPATIENT
Start: 2018-02-11 | End: 2018-02-11

## 2018-02-11 RX ORDER — CHOLECALCIFEROL (VITAMIN D3) 125 MCG
1000 CAPSULE ORAL DAILY
Status: DISCONTINUED | OUTPATIENT
Start: 2018-02-11 | End: 2018-02-13 | Stop reason: HOSPADM

## 2018-02-11 RX ORDER — POTASSIUM CHLORIDE 20 MEQ/1
40 TABLET, EXTENDED RELEASE ORAL ONCE
Status: COMPLETED | OUTPATIENT
Start: 2018-02-11 | End: 2018-02-11

## 2018-02-11 RX ADMIN — NITROFURANTOIN (MONOHYDRATE/MACROCRYSTALS) 100 MG: 75; 25 CAPSULE ORAL at 08:27

## 2018-02-11 RX ADMIN — HEPARIN SODIUM 5000 UNITS: 5000 INJECTION, SOLUTION INTRAVENOUS; SUBCUTANEOUS at 14:08

## 2018-02-11 RX ADMIN — SODIUM BICARBONATE 650 MG: 650 TABLET ORAL at 14:08

## 2018-02-11 RX ADMIN — LISINOPRIL 5 MG: 5 TABLET ORAL at 08:26

## 2018-02-11 RX ADMIN — POTASSIUM CHLORIDE 40 MEQ: 1500 TABLET, EXTENDED RELEASE ORAL at 11:07

## 2018-02-11 RX ADMIN — ASPIRIN 81 MG: 81 TABLET, COATED ORAL at 08:26

## 2018-02-11 RX ADMIN — INSULIN HUMAN 3 UNITS: 100 INJECTION, SOLUTION PARENTERAL at 12:14

## 2018-02-11 RX ADMIN — MAGNESIUM SULFATE IN WATER 4 G: 40 INJECTION, SOLUTION INTRAVENOUS at 10:45

## 2018-02-11 RX ADMIN — METFORMIN HYDROCHLORIDE 1000 MG: 500 TABLET, FILM COATED ORAL at 20:49

## 2018-02-11 RX ADMIN — LATANOPROST 1 DROP: 50 SOLUTION OPHTHALMIC at 20:50

## 2018-02-11 RX ADMIN — SODIUM BICARBONATE 650 MG: 650 TABLET ORAL at 22:46

## 2018-02-11 RX ADMIN — NITROFURANTOIN (MONOHYDRATE/MACROCRYSTALS) 100 MG: 75; 25 CAPSULE ORAL at 17:52

## 2018-02-11 RX ADMIN — HEPARIN SODIUM 5000 UNITS: 5000 INJECTION, SOLUTION INTRAVENOUS; SUBCUTANEOUS at 20:48

## 2018-02-11 RX ADMIN — CLOPIDOGREL 75 MG: 75 TABLET, FILM COATED ORAL at 08:26

## 2018-02-11 RX ADMIN — HEPARIN SODIUM 5000 UNITS: 5000 INJECTION, SOLUTION INTRAVENOUS; SUBCUTANEOUS at 06:16

## 2018-02-11 RX ADMIN — SITAGLIPTIN 100 MG: 100 TABLET, FILM COATED ORAL at 14:09

## 2018-02-11 RX ADMIN — CYANOCOBALAMIN 1000 MCG: 1000 INJECTION, SOLUTION INTRAMUSCULAR; SUBCUTANEOUS at 10:45

## 2018-02-11 RX ADMIN — SODIUM BICARBONATE 650 MG: 650 TABLET ORAL at 08:27

## 2018-02-11 RX ADMIN — ATORVASTATIN CALCIUM 5 MG: 10 TABLET, FILM COATED ORAL at 08:26

## 2018-02-11 RX ADMIN — POTASSIUM PHOSPHATE, MONOBASIC AND POTASSIUM PHOSPHATE, DIBASIC 30 MMOL: 224; 236 INJECTION, SOLUTION INTRAVENOUS at 10:45

## 2018-02-11 RX ADMIN — SODIUM BICARBONATE 650 MG: 650 TABLET ORAL at 17:52

## 2018-02-11 RX ADMIN — Medication 1000 MCG: at 10:46

## 2018-02-11 ASSESSMENT — ENCOUNTER SYMPTOMS
TINGLING: 0
FOCAL WEAKNESS: 0
DEPRESSION: 0
SPEECH CHANGE: 0
DIARRHEA: 0
SPUTUM PRODUCTION: 0
COUGH: 0
SEIZURES: 0
VOMITING: 0
PALPITATIONS: 0
DIZZINESS: 0
WEAKNESS: 1
MEMORY LOSS: 0
LOSS OF CONSCIOUSNESS: 0
HALLUCINATIONS: 0
MYALGIAS: 0
FLANK PAIN: 0
SHORTNESS OF BREATH: 0
DOUBLE VISION: 0
HEADACHES: 0
DIAPHORESIS: 0
CLAUDICATION: 0
HEARTBURN: 0
NAUSEA: 0
BLOOD IN STOOL: 0
WHEEZING: 0
INSOMNIA: 0
NERVOUS/ANXIOUS: 0
SENSORY CHANGE: 0
CONSTIPATION: 0
ABDOMINAL PAIN: 0
BACK PAIN: 0
PND: 0
FALLS: 1
CHILLS: 0
FEVER: 0
ORTHOPNEA: 0
HEMOPTYSIS: 0
BLURRED VISION: 0
NECK PAIN: 0
TREMORS: 0

## 2018-02-11 ASSESSMENT — LIFESTYLE VARIABLES: SUBSTANCE_ABUSE: 0

## 2018-02-11 ASSESSMENT — PAIN SCALES - GENERAL: PAINLEVEL_OUTOF10: 0

## 2018-02-11 NOTE — PROGRESS NOTES
[facility]    Date of Service: 2/11/2018    Chief Complaint  89 y.o. male admitted 2/9/2018 with Weakness. On 02/07/2018 he was evaluated in urgent care with urinary frequency, flank pain. CT renal was negative. He was diagnosed with cystitis / hematuria and started on bactrim. Now presented with weakness. Found to have MARLI. This could have been pre renal or bactrim related. Resolved with IVF hydration. Found to have B12 deficiency.     Interval Problem Update  Patient seen and evaluated on rounds  Poor historian  Reports being in the hospital. Unaware of month. Aware this is 2018.   Mentation more improved compared to yesterday  Reports weakness - B12 def is noted  Pending PT/OT evaluation at this time  No other complaints per patient  Advised nursing to ambulate patient  Obtain PT/OT evaluation  Can stop IVF hydration  Replace K, Mg and Ph  Recheck BMP, Mg and Ph in am tomorrow  Monitor for infectious concerns  Continue nitrofurantoin  Results of outpatient UA not available to me, neither noted in UC note    Consultants/Specialty  None    Disposition  Obtain PT/OT evaluation to determine disposition needs        Review of Systems   Constitutional: Positive for malaise/fatigue. Negative for chills, diaphoresis and fever.   HENT: Negative for hearing loss and tinnitus.    Eyes: Negative for blurred vision and double vision.   Respiratory: Negative for cough, hemoptysis, sputum production, shortness of breath and wheezing.    Cardiovascular: Negative for chest pain, palpitations, orthopnea, claudication, leg swelling and PND.   Gastrointestinal: Negative for abdominal pain, blood in stool, constipation, diarrhea, heartburn, melena, nausea and vomiting.   Genitourinary: Negative for dysuria, flank pain, frequency, hematuria and urgency.   Musculoskeletal: Positive for falls. Negative for back pain, joint pain, myalgias and neck pain.   Skin: Negative for itching and rash.   Neurological: Positive  for weakness. Negative for dizziness, tingling, tremors, sensory change, speech change, focal weakness, seizures, loss of consciousness and headaches.   Psychiatric/Behavioral: Negative for depression, hallucinations, memory loss, substance abuse and suicidal ideas. The patient is not nervous/anxious and does not have insomnia.       Physical Exam  Laboratory/Imaging   Hemodynamics  Temp (24hrs), Av.8 °C (98.3 °F), Min:36.6 °C (97.8 °F), Max:37.2 °C (98.9 °F)   Temperature: 36.6 °C (97.8 °F)  Pulse  Av.2  Min: 62  Max: 89    Blood Pressure : 153/70      Respiratory      Respiration: 20, Pulse Oximetry: 96 %        RUL Breath Sounds: Clear, RML Breath Sounds: Clear, RLL Breath Sounds: Diminished, HUNTER Breath Sounds: Clear, LLL Breath Sounds: Diminished    Fluids    Intake/Output Summary (Last 24 hours) at 18 1254  Last data filed at 18 1100   Gross per 24 hour   Intake                0 ml   Output             3520 ml   Net            -3520 ml       Nutrition  Orders Placed This Encounter   Procedures   • Diet Order     Standing Status:   Standing     Number of Occurrences:   1     Order Specific Question:   Diet:     Answer:   Diabetic [3]     Physical Exam   Constitutional: He is oriented to person, place, and time. He appears well-developed and well-nourished. No distress.   HENT:   Head: Normocephalic.   Mouth/Throat: Oropharynx is clear and moist. No oropharyngeal exudate.   Eyes: Conjunctivae are normal. Pupils are equal, round, and reactive to light. No scleral icterus.   Neck: Normal range of motion. No JVD present.   Cardiovascular: Normal rate, regular rhythm and normal heart sounds.    No murmur heard.  Pulmonary/Chest: No stridor. No respiratory distress. He has no wheezes.   Abdominal: Soft. Bowel sounds are normal. He exhibits no distension. There is no tenderness. There is no rebound and no guarding.   Musculoskeletal: He exhibits no edema, tenderness or deformity.   Neurological: He  is alert and oriented to person, place, and time. He has normal reflexes. No cranial nerve deficit.   Skin: Skin is warm and dry. He is not diaphoretic.   Psychiatric: He has a normal mood and affect. He is slowed. He exhibits abnormal recent memory and abnormal remote memory.       Recent Labs      02/09/18   1835  02/10/18   0240  02/11/18   0311   WBC  10.0  11.4*  7.9   RBC  4.29*  4.15*  3.72*   HEMOGLOBIN  13.3*  12.5*  11.6*   HEMATOCRIT  39.2*  38.1*  33.2*   MCV  91.4  91.8  89.2   MCH  31.0  30.1  31.2   MCHC  33.9  32.8*  34.9   RDW  45.9  46.5  44.3   PLATELETCT  167  150*  128*   MPV  10.5  9.9  10.1     Recent Labs      02/09/18   1835  02/10/18   0240  02/11/18   0311   SODIUM  139  138  139   POTASSIUM  3.8  3.5*  3.3*   CHLORIDE  100  103  105   CO2  24  24  26   GLUCOSE  237*  191*  121*   BUN  21  22  16   CREATININE  1.50*  1.12  0.75   CALCIUM  9.2  8.9  8.4*     Recent Labs      02/11/18   0311   APTT  35.4   INR  1.16*                  Assessment/Plan     Generalized weakness- (present on admission)   Assessment & Plan    Likely metabolic / dehydration related  Monitor for infectious concerns  PT/OT evaluation  B12 deficiency is noted  Will treat B12 deficient  Post acute placement as clinically appropriate        Leukocytosis   Assessment & Plan    Resolved  No other concerns for infection apart from cystitis  Macrobid for potential cystitis  Monitor for infectious concerns          MARLI (acute kidney injury) (CMS-HCC)- (present on admission)   Assessment & Plan    Pre renal vs bactrim related  Holding bactrim at this time  Resoled now  Monitor renal function / Avoid nephrotoxins and dose medications renally        B12 deficiency- (present on admission)   Assessment & Plan    I will check Intrinsic factor abs and parietal cell abs  IM b12 x 1 during hospital stay  Start PO supplementation  If concern for pernicious anemia consider life long supplementation with IM  F/U work up send  If  discharged with pending work up, PCP to follow up        Cystitis- (present on admission)   Assessment & Plan    He was diagnosed with this in the outpatient setting  With hematuria  We stopped his bactrim  His cultures are negative  Will transition to PO macrobid  Recommend outpatient follow up with urology regarding hematuria        HLD (hyperlipidemia)- (present on admission)   Assessment & Plan    Atorvastatin        CAD (coronary artery disease)- (present on admission)   Assessment & Plan    Hx of  No acute issues  Continue DAPT, Statin        Glaucoma- (present on admission)   Assessment & Plan    Continue at home regimen of eye drops        Paroxysmal atrial fibrillation (CMS-HCC)- (present on admission)   Assessment & Plan    Not AC  No acute concerns at this time  Patient to maintain outpatient follow up        Type II diabetes mellitus (CMS-HCC)- (present on admission)   Assessment & Plan    Uncontrolled on presentation with hyperglycemia  A1C now of 8.6  On metformin monotherapy at baseline  Renal function is stable now  No contraindications to use of metformin at this time  He will not be a good candidate for use of oral ROSENTHAL  Transition to PO metformin / Januvia  F/U Outpatient PCP         Hyponatremia- (present on admission)   Assessment & Plan    Chronic on sodium bicarb for this per outpatient providers  Stable currently        Carotid artery stenosis- (present on admission)   Assessment & Plan    Continue DAPT, Statin  Risk factor modification  Outpatient follow up        Hypertension- (present on admission)   Assessment & Plan    Lisinopril            Reviewed items::  Labs reviewed, Medications reviewed and Radiology images reviewed  Aj catheter::  No Aj  DVT prophylaxis pharmacological::  Heparin  DVT prophylaxis - mechanical:  SCDs  Antibiotics:  Treating active infection/contamination beyond 24 hours perioperative coverage

## 2018-02-11 NOTE — PROGRESS NOTES
"Assumed care of pt at 1845; pt in chair and denies complaint. Pt smells of urine but denies needing to be changed. Pt transferred to the bed assist of 1 and given nighttime medications. Pt was steady on feet but is a definite 1 person assist. Pt denies needing to sit on the toilet to attempt bowel movement.     Pt is alert and oriented to person, place, and situation (\"states, because I've been weak, but they're going to let me go home tomorrow.\") RN told pt that he may go home tomorrow if PT/OT think that is a safe option.    At 21:00 complete bed change performed due to large loose bowel incontinence. Pt wiped and cleaned, new gown on pt.     Social work consult placed--pt states his toilet seat is too low.  "

## 2018-02-11 NOTE — CARE PLAN
Problem: Safety  Goal: Will remain free from injury    Intervention: Provide assistance with mobility  Pt encouraged to call when in need of assistance. Pt verbalizes understanding.

## 2018-02-11 NOTE — CARE PLAN
Problem: Bowel/Gastric:  Goal: Normal bowel function is maintained or improved    Intervention: Educate patient and significant other/support system about signs and symptoms of constipation and interventions to implement  Pt encouraged to use restroom when staff offers to help him to the toilet. Pt agrees after 4th episode of loose stool in bed.

## 2018-02-11 NOTE — PROGRESS NOTES
"Pt called at 0645 stating he is \"short of breath.\" O2 level assessed and pt was resting at 90% on room air. Pt placed on 2L O2 NC and O2 level paula to 94%. Pt states it \"feels a little bit better.\"  "

## 2018-02-11 NOTE — ASSESSMENT & PLAN NOTE
I will check Intrinsic factor abs and parietal cell abs - results pending  IM b12 x 1 during hospital stay on 02/11/2017  Start PO supplementation  If concern for pernicious anemia consider life long supplementation with IM  F/U work up send  If discharged with pending work up, PCP / SNF physician to follow up

## 2018-02-12 LAB
ANION GAP SERPL CALC-SCNC: 8 MMOL/L (ref 0–11.9)
BUN SERPL-MCNC: 13 MG/DL (ref 8–22)
CALCIUM SERPL-MCNC: 8.8 MG/DL (ref 8.5–10.5)
CHLORIDE SERPL-SCNC: 102 MMOL/L (ref 96–112)
CO2 SERPL-SCNC: 28 MMOL/L (ref 20–33)
CREAT SERPL-MCNC: 0.74 MG/DL (ref 0.5–1.4)
GLUCOSE SERPL-MCNC: 154 MG/DL (ref 65–99)
MAGNESIUM SERPL-MCNC: 1.7 MG/DL (ref 1.5–2.5)
PHOSPHATE SERPL-MCNC: 3.2 MG/DL (ref 2.5–4.5)
POTASSIUM SERPL-SCNC: 3.7 MMOL/L (ref 3.6–5.5)
SODIUM SERPL-SCNC: 138 MMOL/L (ref 135–145)

## 2018-02-12 PROCEDURE — G8987 SELF CARE CURRENT STATUS: HCPCS | Mod: CK

## 2018-02-12 PROCEDURE — 97162 PT EVAL MOD COMPLEX 30 MIN: CPT

## 2018-02-12 PROCEDURE — 96376 TX/PRO/DX INJ SAME DRUG ADON: CPT

## 2018-02-12 PROCEDURE — 84100 ASSAY OF PHOSPHORUS: CPT

## 2018-02-12 PROCEDURE — 83735 ASSAY OF MAGNESIUM: CPT

## 2018-02-12 PROCEDURE — A9270 NON-COVERED ITEM OR SERVICE: HCPCS | Performed by: INTERNAL MEDICINE

## 2018-02-12 PROCEDURE — 700102 HCHG RX REV CODE 250 W/ 637 OVERRIDE(OP): Performed by: INTERNAL MEDICINE

## 2018-02-12 PROCEDURE — G8979 MOBILITY GOAL STATUS: HCPCS | Mod: CI

## 2018-02-12 PROCEDURE — G8978 MOBILITY CURRENT STATUS: HCPCS | Mod: CK

## 2018-02-12 PROCEDURE — 80048 BASIC METABOLIC PNL TOTAL CA: CPT

## 2018-02-12 PROCEDURE — G0378 HOSPITAL OBSERVATION PER HR: HCPCS

## 2018-02-12 PROCEDURE — 96374 THER/PROPH/DIAG INJ IV PUSH: CPT

## 2018-02-12 PROCEDURE — 700111 HCHG RX REV CODE 636 W/ 250 OVERRIDE (IP): Performed by: INTERNAL MEDICINE

## 2018-02-12 PROCEDURE — G8988 SELF CARE GOAL STATUS: HCPCS | Mod: CI

## 2018-02-12 PROCEDURE — 97166 OT EVAL MOD COMPLEX 45 MIN: CPT

## 2018-02-12 PROCEDURE — 700101 HCHG RX REV CODE 250: Performed by: INTERNAL MEDICINE

## 2018-02-12 PROCEDURE — 99225 PR SUBSEQUENT OBSERVATION CARE,LEVEL II: CPT | Performed by: INTERNAL MEDICINE

## 2018-02-12 PROCEDURE — 302242 IV POLE: Performed by: INTERNAL MEDICINE

## 2018-02-12 PROCEDURE — 36415 COLL VENOUS BLD VENIPUNCTURE: CPT

## 2018-02-12 RX ORDER — POTASSIUM CHLORIDE 20 MEQ/1
40 TABLET, EXTENDED RELEASE ORAL EVERY 4 HOURS
Status: COMPLETED | OUTPATIENT
Start: 2018-02-12 | End: 2018-02-12

## 2018-02-12 RX ORDER — NITROFURANTOIN 25; 75 MG/1; MG/1
100 CAPSULE ORAL 2 TIMES DAILY WITH MEALS
Status: DISCONTINUED | OUTPATIENT
Start: 2018-02-12 | End: 2018-02-13 | Stop reason: HOSPADM

## 2018-02-12 RX ORDER — MAGNESIUM SULFATE HEPTAHYDRATE 40 MG/ML
4 INJECTION, SOLUTION INTRAVENOUS ONCE
Status: COMPLETED | OUTPATIENT
Start: 2018-02-12 | End: 2018-02-12

## 2018-02-12 RX ADMIN — CLOPIDOGREL 75 MG: 75 TABLET, FILM COATED ORAL at 09:49

## 2018-02-12 RX ADMIN — ATORVASTATIN CALCIUM 5 MG: 10 TABLET, FILM COATED ORAL at 09:50

## 2018-02-12 RX ADMIN — LISINOPRIL 5 MG: 5 TABLET ORAL at 09:49

## 2018-02-12 RX ADMIN — SODIUM BICARBONATE 650 MG: 650 TABLET ORAL at 17:25

## 2018-02-12 RX ADMIN — NITROFURANTOIN (MONOHYDRATE/MACROCRYSTALS) 100 MG: 75; 25 CAPSULE ORAL at 17:25

## 2018-02-12 RX ADMIN — LABETALOL HYDROCHLORIDE 10 MG: 5 INJECTION, SOLUTION INTRAVENOUS at 21:23

## 2018-02-12 RX ADMIN — SODIUM BICARBONATE 650 MG: 650 TABLET ORAL at 09:49

## 2018-02-12 RX ADMIN — HEPARIN SODIUM 5000 UNITS: 5000 INJECTION, SOLUTION INTRAVENOUS; SUBCUTANEOUS at 06:38

## 2018-02-12 RX ADMIN — METFORMIN HYDROCHLORIDE 1000 MG: 500 TABLET, FILM COATED ORAL at 09:50

## 2018-02-12 RX ADMIN — POTASSIUM CHLORIDE 40 MEQ: 1500 TABLET, EXTENDED RELEASE ORAL at 14:52

## 2018-02-12 RX ADMIN — LATANOPROST 1 DROP: 50 SOLUTION OPHTHALMIC at 21:15

## 2018-02-12 RX ADMIN — ASPIRIN 81 MG: 81 TABLET, COATED ORAL at 09:50

## 2018-02-12 RX ADMIN — HEPARIN SODIUM 5000 UNITS: 5000 INJECTION, SOLUTION INTRAVENOUS; SUBCUTANEOUS at 21:14

## 2018-02-12 RX ADMIN — SODIUM BICARBONATE 650 MG: 650 TABLET ORAL at 14:51

## 2018-02-12 RX ADMIN — Medication 1000 MCG: at 09:49

## 2018-02-12 RX ADMIN — NITROFURANTOIN (MONOHYDRATE/MACROCRYSTALS) 100 MG: 75; 25 CAPSULE ORAL at 09:49

## 2018-02-12 RX ADMIN — METFORMIN HYDROCHLORIDE 1000 MG: 500 TABLET, FILM COATED ORAL at 17:25

## 2018-02-12 RX ADMIN — POTASSIUM CHLORIDE 40 MEQ: 1500 TABLET, EXTENDED RELEASE ORAL at 09:49

## 2018-02-12 RX ADMIN — LABETALOL HYDROCHLORIDE 10 MG: 5 INJECTION, SOLUTION INTRAVENOUS at 23:33

## 2018-02-12 RX ADMIN — HEPARIN SODIUM 5000 UNITS: 5000 INJECTION, SOLUTION INTRAVENOUS; SUBCUTANEOUS at 14:52

## 2018-02-12 RX ADMIN — MAGNESIUM SULFATE IN WATER 4 G: 40 INJECTION, SOLUTION INTRAVENOUS at 09:50

## 2018-02-12 RX ADMIN — NITROFURANTOIN (MONOHYDRATE/MACROCRYSTALS) 100 MG: 75; 25 CAPSULE ORAL at 11:27

## 2018-02-12 RX ADMIN — SITAGLIPTIN 100 MG: 100 TABLET, FILM COATED ORAL at 09:49

## 2018-02-12 RX ADMIN — SODIUM BICARBONATE 650 MG: 650 TABLET ORAL at 21:15

## 2018-02-12 ASSESSMENT — ENCOUNTER SYMPTOMS
PALPITATIONS: 0
DOUBLE VISION: 0
ABDOMINAL PAIN: 0
HALLUCINATIONS: 0
VOMITING: 0
LOSS OF CONSCIOUSNESS: 0
BLOOD IN STOOL: 0
FALLS: 1
DEPRESSION: 0
SEIZURES: 0
SPEECH CHANGE: 0
INSOMNIA: 0
CONSTIPATION: 0
MYALGIAS: 0
NERVOUS/ANXIOUS: 0
MEMORY LOSS: 0
HEADACHES: 0
COUGH: 0
HEMOPTYSIS: 0
BACK PAIN: 0
FOCAL WEAKNESS: 0
PND: 0
DIAPHORESIS: 0
WHEEZING: 0
FEVER: 0
NECK PAIN: 0
ORTHOPNEA: 0
TREMORS: 0
CHILLS: 0
HEARTBURN: 0
DIARRHEA: 0
SPUTUM PRODUCTION: 0
CLAUDICATION: 0
NAUSEA: 0
TINGLING: 0
SHORTNESS OF BREATH: 0
SENSORY CHANGE: 0
BLURRED VISION: 0
WEAKNESS: 1
DIZZINESS: 0
FLANK PAIN: 0

## 2018-02-12 ASSESSMENT — COGNITIVE AND FUNCTIONAL STATUS - GENERAL
WALKING IN HOSPITAL ROOM: A LITTLE
HELP NEEDED FOR BATHING: A LITTLE
MOVING FROM LYING ON BACK TO SITTING ON SIDE OF FLAT BED: UNABLE
MOBILITY SCORE: 15
TOILETING: A LITTLE
DAILY ACTIVITIY SCORE: 19
DRESSING REGULAR UPPER BODY CLOTHING: A LITTLE
PERSONAL GROOMING: A LITTLE
CLIMB 3 TO 5 STEPS WITH RAILING: A LOT
SUGGESTED CMS G CODE MODIFIER MOBILITY: CK
DRESSING REGULAR LOWER BODY CLOTHING: A LITTLE
STANDING UP FROM CHAIR USING ARMS: A LITTLE
SUGGESTED CMS G CODE MODIFIER DAILY ACTIVITY: CK
TURNING FROM BACK TO SIDE WHILE IN FLAT BAD: A LITTLE
MOVING TO AND FROM BED TO CHAIR: A LITTLE

## 2018-02-12 ASSESSMENT — LIFESTYLE VARIABLES
DO YOU DRINK ALCOHOL: NO
SUBSTANCE_ABUSE: 0
DO YOU DRINK ALCOHOL: NO

## 2018-02-12 ASSESSMENT — GAIT ASSESSMENTS
GAIT LEVEL OF ASSIST: CONTACT GUARD ASSIST
ASSISTIVE DEVICE: FRONT WHEEL WALKER
DISTANCE (FEET): 75
DEVIATION: BRADYKINETIC;SHUFFLED GAIT;DECREASED HEEL STRIKE;DECREASED TOE OFF

## 2018-02-12 ASSESSMENT — PAIN SCALES - GENERAL
PAINLEVEL_OUTOF10: 0

## 2018-02-12 ASSESSMENT — ACTIVITIES OF DAILY LIVING (ADL): TOILETING: INDEPENDENT

## 2018-02-12 NOTE — DISCHARGE PLANNING
SW met with pt at bedside, pt's daughter Ursula also present. Pt has elected to send referral to [facility]. Choice completed.   Pt is SCP and OBS and will require insurance auth.

## 2018-02-12 NOTE — PROGRESS NOTES
Assumed care of this patient @ 0700. Patient had an incontinent BM today; small, normal. Patient was up to chair for lunch. Patient pulled out IV; restarted IV in right arm. IV mag and potassium today. Patient states he has eye surgery for glaucoma scheduled tomorrow; PT/OT still needing to eval. Paged therapy and they said they couldn't guarantee they could see him today, but will tomorrow.

## 2018-02-12 NOTE — PROGRESS NOTES
Case discussed with Son Christiano who will come and see the patient and decide home vs SNF    Kath Rodgers M.D.  02/12/18  11:38 AM

## 2018-02-12 NOTE — CARE PLAN
Problem: Safety  Goal: Will remain free from injury    Intervention: Provide assistance with mobility  Pt encouraged to call when in need of assistance. Pt verbalized understanding. Needs reinforcement.

## 2018-02-12 NOTE — DISCHARGE PLANNING
SW placed call to pt's daughter Ursula ([phone]) and notified of acceptance to [facility]. Ursula was agreeable.  SUKH met with pt at bedside. Pt agreeable to transfer tomorrow 2/13. COBRA signed, IMM delivered.

## 2018-02-12 NOTE — THERAPY
"Occupational Therapy Evaluation completed.   Functional Status:  Min A supine>sit EOB, min A LB dressing, min A sit>stand (CGA initially, but increasing difficulty w/fatigue), walked to sink in room w/CGA attempted grooming at sink in standing, but was losing balance backwards, placed in chair w/CGA completed grooming w/SBA seated. Left up in chair w/alarm on and call light and tray table w/in reach; RN aware of OT session   Plan of Care: Will benefit from Occupational Therapy 4 times per week  Discharge Recommendations:  Equipment: Will Continue to Assess for Equipment Needs. Post-acute therapy Discharge to a transitional care facility for continued skilled therapy services.    89 yr old male admitted for generalized weakness hx of dementia, UTI, CAD, DM and HTN. Pt presents w/decreased activity tolerance, balance and safety awareness. Pt reports he lives w/his adult son who assists w/IADL's. At present recommend post acute placement prior to d/c home to maximize independence and safety     See \"Rehab Therapy-Acute\" Patient Summary Report for complete documentation.    "

## 2018-02-12 NOTE — THERAPY
"Physical Therapy Evaluation completed.   Bed Mobility:  Supine to Sit:  (Up in chair)  Transfers: Sit to Stand: Minimal Assist  Gait: Level Of Assist: Contact Guard Assist with Front-Wheel Walker       Plan of Care: Will benefit from Physical Therapy 3 times per week and Plan to complete next treatment by Wednesday 2/14  Discharge Recommendations: Equipment: Will Continue to Assess for Equipment Needs. Post-acute therapy Discharge to a transitional care facility for continued skilled therapy services.    Pt is a 89 year old male admitted to the hospital with altered mentation and weakness. Pt with recent UTI and was on antibiotics. Pt also with history of dementia, HTN and CAD. Pt reports that he lives with son who can assist with ADL's but is not able to physically assist with mobility. Pt does report multiple falls at home but did not give details regarding when falls occurred or frequency of falls. At time of initial evaluation, pt presents with decreased functional strength, decreased functional mobility, impaired balance, gait deviations and poor awareness of deficits. Pt did perform 1 sit to stand with CGA, however, pt attempting to tie pants and had LOB forward, requiring assist from PT to recover balance. Pt ambulated short distance in hallway with close CGA. No overt LOB but pt ambulated with forward flexed trunk, shuffled gait with poor heel strike and toe and poor awareness of obstacles in the environment. Pt would benefit from skilled PT intervention while in the acute care setting to address the listed deficits and improve mobility prior to DC. Pt would benefit from post acute transitional care in the SNF setting based on CLOF and pt being a high fall risk.     See \"Rehab Therapy-Acute\" Patient Summary Report for complete documentation.     "

## 2018-02-12 NOTE — DISCHARGE PLANNING
Transitional Care Navigator:    PT/OT pending.  Pt has utilized [facility] several times in the past and may benefit from referral as appropriate.

## 2018-02-12 NOTE — DISCHARGE PLANNING
Received choice form from SUKH Bianca for patients SNF services, referral has been sent to [facility] per patient request.

## 2018-02-12 NOTE — CARE PLAN
Problem: Urinary Elimination:  Goal: Ability to reestablish a normal urinary elimination pattern will improve    Intervention: Assist patient to sit on commode or toilet for voiding  Pt encouraged to call to get up to the bathroom with assistance. Verbalizes understanding. Uses urinal. Pt educated on proper use of urinal to minimize spilling. Pt verbalizes and demonstrates proper use of urinal.

## 2018-02-12 NOTE — PROGRESS NOTES
Bedside report received. Assumed care of pt.  Pt able to make needs known.  Pt shows no s/s of distress.  Resting comfortably in chair bedside.  OT has already seen pt this morning, recommending SNF.  PT to eval soon.  Pt confused, does not want snf or rehab then the next minute, verbalizes understanding as to need for placement.  Fall precautions in place, call light and belongings within reach.  Hourly rounding in place.

## 2018-02-12 NOTE — PROGRESS NOTES
[facility] Hospitalist Progress Note    Date of Service: 2/12/2018    Chief Complaint  89 y.o. male admitted 2/9/2018 with Weakness. On 02/07/2018 he was evaluated in urgent care with urinary frequency, flank pain. CT renal was negative. He was diagnosed with cystitis / hematuria and started on bactrim. Now presented with weakness. Found to have MARLI. This could have been pre renal or bactrim related. Resolved with IVF hydration. Found to have B12 deficiency. Recommend Post acute placement at this time.     Interval Problem Update  Patient seen and evaluated on rounds  Poor historian  Reports being in the hospital. Unaware of month. Aware this is 2018 but calls it 1918.   Mentation remains improved  Reports weakness - B12 def is noted  Pending PT/OT evaluation at this time  Discussed with OT, patient will benefit from SNF  Orders for SNF placed  No other complaints per patient  Advised nursing to ambulate patient  Continue nitrofurantoin  Results of outpatient UA not available to me, neither noted in UC note    Consultants/Specialty  None    Disposition  Recommend post acute placement to SNF  Orders for SNF placed        Review of Systems   Constitutional: Positive for malaise/fatigue. Negative for chills, diaphoresis and fever.   HENT: Negative for hearing loss and tinnitus.    Eyes: Negative for blurred vision and double vision.   Respiratory: Negative for cough, hemoptysis, sputum production, shortness of breath and wheezing.    Cardiovascular: Negative for chest pain, palpitations, orthopnea, claudication, leg swelling and PND.   Gastrointestinal: Negative for abdominal pain, blood in stool, constipation, diarrhea, heartburn, melena, nausea and vomiting.   Genitourinary: Negative for dysuria, flank pain, frequency, hematuria and urgency.   Musculoskeletal: Positive for falls. Negative for back pain, joint pain, myalgias and neck pain.   Skin: Negative for itching and rash.   Neurological: Positive for weakness.  Negative for dizziness, tingling, tremors, sensory change, speech change, focal weakness, seizures, loss of consciousness and headaches.   Psychiatric/Behavioral: Negative for depression, hallucinations, memory loss, substance abuse and suicidal ideas. The patient is not nervous/anxious and does not have insomnia.       Physical Exam  Laboratory/Imaging   Hemodynamics  Temp (24hrs), Av.7 °C (98 °F), Min:36.4 °C (97.5 °F), Max:36.9 °C (98.4 °F)   Temperature: 36.8 °C (98.2 °F)  Pulse  Av.9  Min: 62  Max: 89    Blood Pressure : 122/59      Respiratory      Respiration: 18, Pulse Oximetry: 95 %        RUL Breath Sounds: Clear, RML Breath Sounds: Clear, RLL Breath Sounds: Diminished, HUNTER Breath Sounds: Clear, LLL Breath Sounds: Diminished    Fluids    Intake/Output Summary (Last 24 hours) at 18 1028  Last data filed at 18 0911   Gross per 24 hour   Intake                0 ml   Output             1050 ml   Net            -1050 ml       Nutrition  Orders Placed This Encounter   Procedures   • Diet Order     Standing Status:   Standing     Number of Occurrences:   1     Order Specific Question:   Diet:     Answer:   Diabetic [3]     Physical Exam   Constitutional: He is oriented to person, place, and time. He appears well-developed and well-nourished. No distress.   HENT:   Head: Normocephalic.   Mouth/Throat: Oropharynx is clear and moist. No oropharyngeal exudate.   Eyes: Conjunctivae are normal. Pupils are equal, round, and reactive to light. No scleral icterus.   Neck: Normal range of motion. No JVD present.   Cardiovascular: Normal rate, regular rhythm and normal heart sounds.    No murmur heard.  Pulmonary/Chest: No stridor. No respiratory distress. He has no wheezes.   Abdominal: Soft. Bowel sounds are normal. He exhibits no distension. There is no tenderness. There is no rebound and no guarding.   Musculoskeletal: He exhibits no edema, tenderness or deformity.   Neurological: He is alert and  oriented to person, place, and time. He has normal reflexes. No cranial nerve deficit.   Skin: Skin is warm and dry. He is not diaphoretic.   Psychiatric: He has a normal mood and affect. He is slowed. He exhibits abnormal recent memory and abnormal remote memory.       Recent Labs      02/09/18   1835  02/10/18   0240  02/11/18   0311   WBC  10.0  11.4*  7.9   RBC  4.29*  4.15*  3.72*   HEMOGLOBIN  13.3*  12.5*  11.6*   HEMATOCRIT  39.2*  38.1*  33.2*   MCV  91.4  91.8  89.2   MCH  31.0  30.1  31.2   MCHC  33.9  32.8*  34.9   RDW  45.9  46.5  44.3   PLATELETCT  167  150*  128*   MPV  10.5  9.9  10.1     Recent Labs      02/10/18   0240  02/11/18   0311  02/12/18   0427   SODIUM  138  139  138   POTASSIUM  3.5*  3.3*  3.7   CHLORIDE  103  105  102   CO2  24  26  28   GLUCOSE  191*  121*  154*   BUN  22  16  13   CREATININE  1.12  0.75  0.74   CALCIUM  8.9  8.4*  8.8     Recent Labs      02/11/18   0311   APTT  35.4   INR  1.16*                  Assessment/Plan     Generalized weakness- (present on admission)   Assessment & Plan    Likely metabolic / dehydration related  PT/OT evaluation  Recommend post acute placement - SNF consult requested  B12 deficiency is noted  Will treat B12 deficient  Post acute placement as clinically appropriate        B12 deficiency- (present on admission)   Assessment & Plan    I will check Intrinsic factor abs and parietal cell abs - results pending  IM b12 x 1 during hospital stay on 02/11/2017  Start PO supplementation  If concern for pernicious anemia consider life long supplementation with IM  F/U work up send  If discharged with pending work up, PCP / SNF physician to follow up        Leukocytosis   Assessment & Plan    Resolved  No other concerns for infection apart from cystitis  Macrobid for potential cystitis  Monitor for infectious concerns          Cystitis- (present on admission)   Assessment & Plan    He was diagnosed with this in the outpatient setting  With hematuria  We  stopped his bactrim  His cultures are negative  Will transition to PO macrobid. Stop dates in place  Recommend outpatient follow up with urology regarding hematuria        HLD (hyperlipidemia)- (present on admission)   Assessment & Plan    Atorvastatin        CAD (coronary artery disease)- (present on admission)   Assessment & Plan    Hx of  No acute issues  Continue DAPT, Statin        Glaucoma- (present on admission)   Assessment & Plan    Continue at home regimen of eye drops        Paroxysmal atrial fibrillation (CMS-HCC)- (present on admission)   Assessment & Plan    Not AC  No acute concerns at this time  Patient to maintain outpatient follow up        Type II diabetes mellitus (CMS-HCC)- (present on admission)   Assessment & Plan    Uncontrolled on presentation with hyperglycemia  A1C now of 8.6  On metformin monotherapy at baseline  Renal function is stable now  No contraindications to use of metformin at this time  He will not be a good candidate for use of oral ROSENTHAL  Transition to PO metformin / Januvia  F/U Outpatient PCP         Hyponatremia- (present on admission)   Assessment & Plan    Chronic on sodium bicarb for this per outpatient providers  Stable currently        Carotid artery stenosis- (present on admission)   Assessment & Plan    Continue DAPT, Statin  Risk factor modification  Outpatient follow up        MARLI (acute kidney injury) (CMS-HCC)- (present on admission)   Assessment & Plan    Pre renal vs bactrim related  Holding bactrim at this time  Resoled now  Monitor renal function / Avoid nephrotoxins and dose medications renally        Hypertension- (present on admission)   Assessment & Plan    Lisinopril            Reviewed items::  Labs reviewed, Medications reviewed and Radiology images reviewed  Aj catheter::  No Aj  DVT prophylaxis pharmacological::  Heparin  DVT prophylaxis - mechanical:  SCDs  Antibiotics:  Treating active infection/contamination beyond 24 hours perioperative  coverage

## 2018-02-13 VITALS
WEIGHT: 164.02 LBS | OXYGEN SATURATION: 95 % | HEART RATE: 68 BPM | HEIGHT: 68 IN | BODY MASS INDEX: 24.86 KG/M2 | DIASTOLIC BLOOD PRESSURE: 67 MMHG | RESPIRATION RATE: 18 BRPM | SYSTOLIC BLOOD PRESSURE: 147 MMHG | TEMPERATURE: 98.2 F

## 2018-02-13 PROBLEM — N30.90 CYSTITIS: Status: RESOLVED | Noted: 2018-02-10 | Resolved: 2018-02-13

## 2018-02-13 PROBLEM — D72.829 LEUKOCYTOSIS: Status: RESOLVED | Noted: 2018-02-10 | Resolved: 2018-02-13

## 2018-02-13 PROCEDURE — 700102 HCHG RX REV CODE 250 W/ 637 OVERRIDE(OP): Performed by: INTERNAL MEDICINE

## 2018-02-13 PROCEDURE — 99217 PR OBSERVATION CARE DISCHARGE: CPT | Performed by: HOSPITALIST

## 2018-02-13 PROCEDURE — G0378 HOSPITAL OBSERVATION PER HR: HCPCS

## 2018-02-13 PROCEDURE — 700111 HCHG RX REV CODE 636 W/ 250 OVERRIDE (IP): Performed by: INTERNAL MEDICINE

## 2018-02-13 PROCEDURE — A9270 NON-COVERED ITEM OR SERVICE: HCPCS | Performed by: INTERNAL MEDICINE

## 2018-02-13 PROCEDURE — 96376 TX/PRO/DX INJ SAME DRUG ADON: CPT

## 2018-02-13 PROCEDURE — 96375 TX/PRO/DX INJ NEW DRUG ADDON: CPT

## 2018-02-13 PROCEDURE — 700101 HCHG RX REV CODE 250: Performed by: INTERNAL MEDICINE

## 2018-02-13 RX ORDER — HYDRALAZINE HYDROCHLORIDE 20 MG/ML
10 INJECTION INTRAMUSCULAR; INTRAVENOUS ONCE
Status: COMPLETED | OUTPATIENT
Start: 2018-02-13 | End: 2018-02-13

## 2018-02-13 RX ADMIN — HEPARIN SODIUM 5000 UNITS: 5000 INJECTION, SOLUTION INTRAVENOUS; SUBCUTANEOUS at 04:16

## 2018-02-13 RX ADMIN — ASPIRIN 81 MG: 81 TABLET, COATED ORAL at 09:16

## 2018-02-13 RX ADMIN — LABETALOL HYDROCHLORIDE 10 MG: 5 INJECTION, SOLUTION INTRAVENOUS at 15:02

## 2018-02-13 RX ADMIN — SITAGLIPTIN 100 MG: 100 TABLET, FILM COATED ORAL at 09:16

## 2018-02-13 RX ADMIN — Medication 1000 MCG: at 09:16

## 2018-02-13 RX ADMIN — NITROFURANTOIN (MONOHYDRATE/MACROCRYSTALS) 100 MG: 75; 25 CAPSULE ORAL at 09:16

## 2018-02-13 RX ADMIN — ATORVASTATIN CALCIUM 5 MG: 10 TABLET, FILM COATED ORAL at 09:17

## 2018-02-13 RX ADMIN — METFORMIN HYDROCHLORIDE 1000 MG: 500 TABLET, FILM COATED ORAL at 09:16

## 2018-02-13 RX ADMIN — CLOPIDOGREL 75 MG: 75 TABLET, FILM COATED ORAL at 09:17

## 2018-02-13 RX ADMIN — SODIUM BICARBONATE 650 MG: 650 TABLET ORAL at 14:15

## 2018-02-13 RX ADMIN — HEPARIN SODIUM 5000 UNITS: 5000 INJECTION, SOLUTION INTRAVENOUS; SUBCUTANEOUS at 14:15

## 2018-02-13 RX ADMIN — SODIUM BICARBONATE 650 MG: 650 TABLET ORAL at 09:17

## 2018-02-13 RX ADMIN — HYDRALAZINE HYDROCHLORIDE 10 MG: 20 INJECTION INTRAMUSCULAR; INTRAVENOUS at 04:14

## 2018-02-13 RX ADMIN — LISINOPRIL 5 MG: 5 TABLET ORAL at 09:16

## 2018-02-13 ASSESSMENT — LIFESTYLE VARIABLES
EVER_SMOKED: NEVER
ALCOHOL_USE: NO

## 2018-02-13 ASSESSMENT — PAIN SCALES - GENERAL
PAINLEVEL_OUTOF10: 0

## 2018-02-13 ASSESSMENT — PATIENT HEALTH QUESTIONNAIRE - PHQ9
SUM OF ALL RESPONSES TO PHQ QUESTIONS 1-9: 0
1. LITTLE INTEREST OR PLEASURE IN DOING THINGS: NOT AT ALL
SUM OF ALL RESPONSES TO PHQ9 QUESTIONS 1 AND 2: 0
2. FEELING DOWN, DEPRESSED, IRRITABLE, OR HOPELESS: NOT AT ALL

## 2018-02-13 NOTE — DISCHARGE PLANNING
Received transportation request from [facility] Val[facility] for patient to transfer to [place]. Call placed to [facility] with [place] and transport time has been arranged for 1600.     [facility] has been notified.

## 2018-02-13 NOTE — CARE PLAN
Problem: Knowledge Deficit  Goal: Knowledge of disease process/condition, treatment plan, diagnostic tests, and medications will improve    Intervention: Assess knowledge level of disease process/condition, treatment plan, diagnostic tests, and medications  Patient demonstrating good understanding of use of Labetalol to control BP spikes.

## 2018-02-13 NOTE — CARE PLAN
Problem: Skin Integrity  Goal: Risk for impaired skin integrity will decrease    Intervention: Implement precautions to protect skin integrity in collaboration with the interdisciplinary team  Pillows in use to pad bony prominences.

## 2018-02-13 NOTE — PROGRESS NOTES
Notified MD Brittny patient's BP unresponsive to Labetalol all night. See MAR and Vitals Flowsheet. Apresoline 10mg IV x 1 ordered.

## 2018-02-13 NOTE — DISCHARGE SUMMARY
"CHIEF COMPLAINT ON ADMISSION  Chief Complaint   Patient presents with   • UTI   • ALOC       CODE STATUS  Full Code    HPI & HOSPITAL COURSE  This is a 89 y.o. year old male here with \"weakness. Patient likely has some dementia at baseline, at this time no family is present, patient is a poor historian. Patient has no complaints currently, does not really know why he is here he understands where he is but not sure why, when asked if he's been weak lately he says\" maybe a little\". Patient had recently gone to a provider, given Bactrim for a urinary tract infection which he no longer has. Currently patient is noted to have elevated creatinine, anion gap without acidosis and uncontrolled glucose\"    He was diagnosed with cystitis previously and started on Bactrim, and found to have MARLI felt to be related to either his renal insufficiency or the Bactrim. This was stopped and he improved. He was hydrated. He is much improved and anxious for discharge.    Therefore, he is discharged in good and stable condition for further post-acute management.       DISCHARGE PROBLEM LIST  Active Problems:    Generalized weakness POA: Yes    B12 deficiency POA: Yes    Hypertension POA: Yes    Carotid artery stenosis POA: Yes    Type II diabetes mellitus (CMS-HCC) POA: Yes    Paroxysmal atrial fibrillation (CMS-[facility]) POA: Yes    Glaucoma POA: Yes    CAD (coronary artery disease) POA: Yes    HLD (hyperlipidemia) POA: Yes  Resolved Problems:    MARLI (acute kidney injury) (CMS-[facility]) POA: Yes      Overview: ICD-10 transition    Hyponatremia POA: Yes    Cystitis POA: Yes    Leukocytosis POA: No      FOLLOW UP  Future Appointments  Date Time Provider Department Center   2/22/2018 1:40 PM Zahra Yañez M.D. [facility]  [address] [facility] [address]  [phone]          MEDICATIONS ON DISCHARGE   Bulmaro [facility]   Home Medication Instructions SELVIN:[id number]    Printed on:02/13/18 1235 "   Medication Information                      aspirin EC (ECOTRIN) 81 MG Tablet Delayed Response  Take 81 mg by mouth every day.             atorvastatin (LIPITOR) 10 MG Tab  Take 0.5 Tabs by mouth every day.             B Complex Vitamins (VITAMIN B COMPLEX PO)  Take 1 Tab by mouth every day.             Cholecalciferol (VITAMIN D PO)  Take 1 Cap by mouth every day.             clopidogrel (PLAVIX) 75 MG Tab  Take 1 Tab by mouth every day.             latanoprost (XALATAN) 0.005 % Solution  Place 1 Drop in both eyes every day.             lisinopril (PRINIVIL) 5 MG Tab  Take 1 Tab by mouth every day.             metformin (GLUCOPHAGE) 500 MG Tab  1000 mg in am and 500 in PM             Multiple Vitamins-Minerals (CENTRUM SILVER PO)  Take 1 Tab by mouth every day.             sodium bicarbonate (SODIUM BICARBONATE) 650 MG Tab  Take 1 Tab by mouth 4 times a day.             sulfamethoxazole-trimethoprim (BACTRIM DS) 800-160 MG tablet  Take 1 Tab by mouth every 12 hours. Stay hydrated                 DIET  Orders Placed This Encounter   Procedures   • Diet Order     Standing Status:   Standing     Number of Occurrences:   1     Order Specific Question:   Diet:     Answer:   Diabetic [3]       ACTIVITY  As tolerated and directed by skilled nursing.      LINES, DRAINS, AND WOUNDS  This is an automated list. Peripheral IVs will be removed prior to discharge.  PIV Group Left Forearm 22g (Active)   Line Secured Taped;Transparent 2/12/2018  9:00 PM   Site Condition / Description Clean;Dry;Intact 2/12/2018  9:00 PM   Dressing Type / Description Clean;Dry;Intact 2/12/2018  9:00 PM   Dressing Status Observed 2/12/2018  9:00 PM   Saline Locked Yes 2/12/2018  9:00 AM   Infiltration Grading Used by [facility] and [facility] 0 2/12/2018  9:00 PM   Phlebitis Scale (Used by [facility]) 0 2/12/2018  9:00 PM   Date Primary Tubing Changed 02/12/18 2/12/2018  9:00 AM                     MENTAL STATUS ON TRANSFER  Level of Consciousness:  Alert  Orientation : Oriented x 4  Speech: Speech Clear    CONSULTATIONS  None    PROCEDURES  None    LABORATORY  Lab Results   Component Value Date/Time    SODIUM 138 02/12/2018 04:27 AM    POTASSIUM 3.7 02/12/2018 04:27 AM    CHLORIDE 102 02/12/2018 04:27 AM    CO2 28 02/12/2018 04:27 AM    GLUCOSE 154 (H) 02/12/2018 04:27 AM    BUN 13 02/12/2018 04:27 AM    CREATININE 0.74 02/12/2018 04:27 AM    CREATININE 1.0 02/23/2009 02:34 PM        Lab Results   Component Value Date/Time    WBC 7.9 02/11/2018 03:11 AM    HEMOGLOBIN 11.6 (L) 02/11/2018 03:11 AM    HEMATOCRIT 33.2 (L) 02/11/2018 03:11 AM    PLATELETCT 128 (L) 02/11/2018 03:11 AM        Total time of the discharge process exceeds 36 minutes.

## 2018-02-13 NOTE — DISCHARGE INSTRUCTIONS
Discharge Instructions    Discharged to other by medical transportation with escort. Discharged via wheelchair, hospital escort: Yes.  Special equipment needed: Not Applicable    Be sure to schedule a follow-up appointment with your primary care doctor or any specialists as instructed.     Discharge Plan:   Influenza Vaccine Indication: Not indicated: Previously immunized this influenza season and > 8 years of age    I understand that a diet low in cholesterol, fat, and sodium is recommended for good health. Unless I have been given specific instructions below for another diet, I accept this instruction as my diet prescription.   Other diet: Diabetic     Special Instructions: None    · Is patient discharged on Warfarin / Coumadin?   No     Depression / Suicide Risk    As you are discharged from this [facility] facility, it is important to learn how to keep safe from harming yourself.    Recognize the warning signs:  · Abrupt changes in personality, positive or negative- including increase in energy   · Giving away possessions  · Change in eating patterns- significant weight changes-  positive or negative  · Change in sleeping patterns- unable to sleep or sleeping all the time   · Unwillingness or inability to communicate  · Depression  · Unusual sadness, discouragement and loneliness  · Talk of wanting to die  · Neglect of personal appearance   · Rebelliousness- reckless behavior  · Withdrawal from people/activities they love  · Confusion- inability to concentrate     If you or a loved one observes any of these behaviors or has concerns about self-harm, here's what you can do:  · Talk about it- your feelings and reasons for harming yourself  · Remove any means that you might use to hurt yourself (examples: pills, rope, extension cords, firearm)  · Get professional help from the community (Mental Health, Substance Abuse, psychological counseling)  · Do not be alone:Call your Safe Contact- someone whom you trust  who will be there for you.  · Call your local CRISIS HOTLINE 812-5265 or 706-815-6370  · Call your local Children's Mobile Crisis Response Team [place] (882) 792-1669 or www.[employer]  · Call the toll free National Suicide Prevention Hotlines   · National Suicide Prevention Lifeline 381-834-FDVU (1844)  · National [employer] Line Network 800-SUICIDE (128-1974)

## 2018-02-14 LAB
IF BLOCK AB SER QL RIA: NEGATIVE
PCA IGG SER-ACNC: 1.4 UNITS (ref 0–24.9)

## 2018-02-14 NOTE — PROGRESS NOTES
Assumed patient care at 0700. Received report from night shift. Assessment completed. POC discussed with pt, verbalizes understanding. A&Ox4. Denies pain at this time. No SOB or in any acute distress. Ambulates with assistance of one and use of FWW. Bed alarm on, pt wearing treaded socks. Personal possessions and call light placed within reach. Pt up to chair for breakfast, denies any additional needs at this time.

## 2018-02-14 NOTE — PROGRESS NOTES
PT discharged to [facility] via [facility]. Confused patient paperwork completed and placed in chart. COBRA provided to [facility] staff. PIV DC'ed. Discharge instructions given. Patient verbalized understanding, 2nd copy of AVS signed and placed in patient chart. All belongings with patient. Per pt, shoes and red sweatshirt taken home by daughter.

## 2018-02-15 ENCOUNTER — TELEPHONE (OUTPATIENT)
Dept: MEDICAL GROUP | Facility: PHYSICIAN GROUP | Age: 83
End: 2018-02-15

## 2018-02-15 LAB
BACTERIA BLD CULT: NORMAL
SIGNIFICANT IND 70042: NORMAL
SITE SITE: NORMAL
SOURCE SOURCE: NORMAL

## 2018-02-16 NOTE — TELEPHONE ENCOUNTER
ANNUAL WELLNESS VISIT PRE-VISIT PLANNING WITH OUTREACH    1.  Immunizations were updated in Epic using Web[employer]?:Yes       •  WebIZ Recommendations: Patient is up to date on all vaccines       •  Is patient due for Tdap? NO       •  Is patient due for Shingles?NO    2.  MDX printed and highlighted for Provider? YES   Future Appointments       Provider Department Center    2/22/2018 1:40 PM Zahra Yañez M.D.; [facility]  [facility]

## 2018-02-21 ENCOUNTER — TELEPHONE (OUTPATIENT)
Dept: MEDICAL GROUP | Facility: PHYSICIAN GROUP | Age: 83
End: 2018-02-21

## 2018-02-21 NOTE — TELEPHONE ENCOUNTER
1. Caller Name: [facility]                                         Call Back Number:       Patient approves a detailed voicemail message: N\A    Milad states they have been unable to get a hold of pt and will need to delay start of care until 02/24/18.

## 2018-02-22 ENCOUNTER — OFFICE VISIT (OUTPATIENT)
Dept: MEDICAL GROUP | Facility: PHYSICIAN GROUP | Age: 83
End: 2018-02-22
Payer: MEDICARE

## 2018-02-22 VITALS
WEIGHT: 160 LBS | TEMPERATURE: 99.5 F | SYSTOLIC BLOOD PRESSURE: 118 MMHG | OXYGEN SATURATION: 95 % | HEIGHT: 68 IN | BODY MASS INDEX: 24.25 KG/M2 | DIASTOLIC BLOOD PRESSURE: 58 MMHG | HEART RATE: 74 BPM

## 2018-02-22 DIAGNOSIS — E87.1 HYPONATREMIA: ICD-10-CM

## 2018-02-22 DIAGNOSIS — M48.07 SPINAL STENOSIS OF LUMBOSACRAL REGION: ICD-10-CM

## 2018-02-22 DIAGNOSIS — E78.2 MIXED HYPERLIPIDEMIA: ICD-10-CM

## 2018-02-22 DIAGNOSIS — K43.9 HERNIA OF ABDOMINAL WALL: ICD-10-CM

## 2018-02-22 DIAGNOSIS — E11.9 TYPE 2 DIABETES MELLITUS WITHOUT COMPLICATION, WITHOUT LONG-TERM CURRENT USE OF INSULIN (HCC): ICD-10-CM

## 2018-02-22 DIAGNOSIS — G89.29 CHRONIC LOW BACK PAIN, UNSPECIFIED BACK PAIN LATERALITY, WITH SCIATICA PRESENCE UNSPECIFIED: ICD-10-CM

## 2018-02-22 DIAGNOSIS — I48.0 PAROXYSMAL ATRIAL FIBRILLATION (HCC): ICD-10-CM

## 2018-02-22 DIAGNOSIS — E53.8 B12 DEFICIENCY: ICD-10-CM

## 2018-02-22 DIAGNOSIS — Z71.89 ADVANCED DIRECTIVES, COUNSELING/DISCUSSION: ICD-10-CM

## 2018-02-22 DIAGNOSIS — E55.9 VITAMIN D DEFICIENCY: ICD-10-CM

## 2018-02-22 DIAGNOSIS — F03.90 DEMENTIA WITHOUT BEHAVIORAL DISTURBANCE, UNSPECIFIED DEMENTIA TYPE: ICD-10-CM

## 2018-02-22 DIAGNOSIS — N40.1 BENIGN PROSTATIC HYPERPLASIA WITH LOWER URINARY TRACT SYMPTOMS, SYMPTOM DETAILS UNSPECIFIED: ICD-10-CM

## 2018-02-22 DIAGNOSIS — I25.84 CORONARY ARTERY DISEASE DUE TO CALCIFIED CORONARY LESION: ICD-10-CM

## 2018-02-22 DIAGNOSIS — I10 ESSENTIAL HYPERTENSION: ICD-10-CM

## 2018-02-22 DIAGNOSIS — H40.9 GLAUCOMA, UNSPECIFIED GLAUCOMA TYPE, UNSPECIFIED LATERALITY: ICD-10-CM

## 2018-02-22 DIAGNOSIS — I21.4 NON-STEMI (NON-ST ELEVATED MYOCARDIAL INFARCTION) (HCC): ICD-10-CM

## 2018-02-22 DIAGNOSIS — I65.29 STENOSIS OF CAROTID ARTERY, UNSPECIFIED LATERALITY: ICD-10-CM

## 2018-02-22 DIAGNOSIS — I25.10 CORONARY ARTERY DISEASE DUE TO CALCIFIED CORONARY LESION: ICD-10-CM

## 2018-02-22 DIAGNOSIS — D69.6 THROMBOCYTOPENIA (HCC): ICD-10-CM

## 2018-02-22 DIAGNOSIS — I70.0 ATHEROSCLEROSIS OF AORTA (HCC): ICD-10-CM

## 2018-02-22 DIAGNOSIS — N28.1 RENAL CYST: ICD-10-CM

## 2018-02-22 DIAGNOSIS — M54.5 CHRONIC LOW BACK PAIN, UNSPECIFIED BACK PAIN LATERALITY, WITH SCIATICA PRESENCE UNSPECIFIED: ICD-10-CM

## 2018-02-22 PROBLEM — R55 SYNCOPE: Status: RESOLVED | Noted: 2017-05-12 | Resolved: 2018-02-22

## 2018-02-22 PROBLEM — R53.1 GENERALIZED WEAKNESS: Status: RESOLVED | Noted: 2017-10-23 | Resolved: 2018-02-22

## 2018-02-22 PROBLEM — R79.89 ELEVATED TROPONIN: Status: RESOLVED | Noted: 2017-11-25 | Resolved: 2018-02-22

## 2018-02-22 PROBLEM — I48.91 ATRIAL FIBRILLATION (HCC): Status: RESOLVED | Noted: 2017-10-23 | Resolved: 2018-02-22

## 2018-02-22 PROCEDURE — G0439 PPPS, SUBSEQ VISIT: HCPCS | Performed by: FAMILY MEDICINE

## 2018-02-22 RX ORDER — LISINOPRIL 2.5 MG/1
2.5 TABLET ORAL DAILY
Qty: 90 TAB | Refills: 3 | Status: SHIPPED | OUTPATIENT
Start: 2018-02-22

## 2018-02-22 RX ORDER — AMLODIPINE BESYLATE 5 MG/1
5 TABLET ORAL DAILY
COMMUNITY

## 2018-02-22 RX ORDER — CYANOCOBALAMIN 1000 UG/ML
1000 INJECTION, SOLUTION INTRAMUSCULAR; SUBCUTANEOUS
Status: DISCONTINUED | OUTPATIENT
Start: 2018-02-22 | End: 2018-03-28

## 2018-02-22 ASSESSMENT — PATIENT HEALTH QUESTIONNAIRE - PHQ9: CLINICAL INTERPRETATION OF PHQ2 SCORE: 0

## 2018-02-22 ASSESSMENT — ACTIVITIES OF DAILY LIVING (ADL): BATHING_REQUIRES_ASSISTANCE: 0

## 2018-02-22 NOTE — PROGRESS NOTES
Chief Complaint   Patient presents with   • Annual Wellness Visit         HPI:  Bulmaro is a 89 y.o. here for Medicare Annual Wellness Visit    He was also recently hospitalized and therefore those records are reviewed and his medications.  Was hospitalized with hypotension, dehydration and hyponatremia manifested as weakness.      Patient Active Problem List    Diagnosis Date Noted   • Generalized weakness 10/23/2017     Priority: High   • Syncope 05/12/2017     Priority: High   • Atrial fibrillation (CMS-HCC) 10/23/2017     Priority: Medium   • B12 deficiency 09/25/2012     Priority: Medium   • HLD (hyperlipidemia) 02/10/2018     Priority: Low   • CAD (coronary artery disease) 02/09/2018     Priority: Low   • Advanced directives, counseling/discussion 10/24/2017     Priority: Low   • Glaucoma 10/19/2017     Priority: Low   • Paroxysmal atrial fibrillation (CMS-HCC) 11/27/2016     Priority: Low   • Spinal stenosis of lumbosacral region 09/07/2016     Priority: Low   • Vitamin D deficiency 09/07/2016     Priority: Low   • BPH (benign prostatic hyperplasia) 03/24/2016     Priority: Low   • Type II diabetes mellitus (CMS-HCC) 03/24/2016     Priority: Low   • Hernia of abdominal wall 09/21/2015     Priority: Low   • Carotid artery stenosis 02/18/2014     Priority: Low   • Renal cyst 09/19/2013     Priority: Low   • Thrombocytopenia (HCC) 03/15/2013     Priority: Low   • Chronic back pain 09/01/2012     Priority: Low   • Hypertension 10/28/2010     Priority: Low   • Non-STEMI (non-ST elevated myocardial infarction) (CMS-HCC) 11/26/2017   • Elevated troponin 11/25/2017       Current Outpatient Prescriptions   Medication Sig Dispense Refill   • amLODIPine (NORVASC) 5 MG Tab Take 5 mg by mouth every day.     • sodium bicarbonate (SODIUM BICARBONATE) 650 MG Tab Take 1 Tab by mouth 4 times a day. 540 Tab 3   • lisinopril (PRINIVIL) 5 MG Tab Take 1 Tab by mouth every day. 30 Tab 0   • metformin (GLUCOPHAGE) 500 MG Tab 1000 mg in  am and 500 in  Tab 0   • latanoprost (XALATAN) 0.005 % Solution Place 1 Drop in both eyes every day. 1 Bottle 0   • atorvastatin (LIPITOR) 10 MG Tab Take 0.5 Tabs by mouth every day. 45 Tab 3   • aspirin EC (ECOTRIN) 81 MG Tablet Delayed Response Take 81 mg by mouth every day.     • B Complex Vitamins (VITAMIN B COMPLEX PO) Take 1 Tab by mouth every day.     • Multiple Vitamins-Minerals (CENTRUM SILVER PO) Take 1 Tab by mouth every day.     • Cholecalciferol (VITAMIN D PO) Take 1 Cap by mouth every day.     • sulfamethoxazole-trimethoprim (BACTRIM DS) 800-160 MG tablet Take 1 Tab by mouth every 12 hours. Stay hydrated 20 Tab 1   • clopidogrel (PLAVIX) 75 MG Tab Take 1 Tab by mouth every day. 30 Tab 0     No current facility-administered medications for this visit.         Patient is taking medications as noted in medication list.  Current supplements as per medication list.     Allergies: Patient has no known allergies.    Current social contact/activities: friends and family     Is patient current with immunizations? Yes.    He  reports that he has never smoked. He has never used smokeless tobacco. He reports that he does not drink alcohol or use drugs.  Counseling given: Not Answered        DPA/Advanced directive: Patient does not have an Advanced Directive.  A packet and workshop information was given on Advanced Directives.    ROS:    Gait: Uses a walker, cane, scooter   Ostomy: no   Other tubes: no   Amputations: no   Chronic oxygen use no   Last eye exam 2 weeks ago   Wears hearing aids: no   : Reports urinary leakage during the last 6 months that has not interfered at all with their daily activities or sleep.      Screening:        Depression Screening    Little interest or pleasure in doing things?  0 - not at all  Feeling down, depressed, or hopeless? 0 - not at all  Patient Health Questionnaire Score: 0    If depressive symptoms identified deferred to follow up visit unless specifically addressed  in assessment and plan.    Interpretation of PHQ-9 Total Score   Score Severity   1-4 No Depression   5-9 Mild Depression   10-14 Moderate Depression   15-19 Moderately Severe Depression   20-27 Severe Depression    Screening for Cognitive Impairment    Three Minute Recall (apple, watch, dudley)  1/3    Draw clock face with all 12 numbers set to the hand to show 10 minutes past 11 o'clock  0 1/5  If cognitive concerns identified, deferred for follow up unless specifically addressed in assessment and plan.    Fall Risk Assessment    Has the patient had two or more falls in the last year or any fall with injury in the last year?  No  If fall risk identified, deferred for follow up unless specifically addressed in assessment and plan.    Safety Assessment    Throw rugs on floor.  No  Handrails on all stairs.  Yes  Good lighting in all hallways.  Yes  Difficulty hearing.  No  Patient counseled about all safety risks that were identified.    Functional Assessment ADLs    Are there any barriers preventing you from cooking for yourself or meeting nutritional needs?  No.    Are there any barriers preventing you from driving safely or obtaining transportation?  No.    Are there any barriers preventing you from using a telephone or calling for help?  No.    Are there any barriers preventing you from shopping?  No.    Are there any barriers preventing you from taking care of your own finances?  No.    Are there any barriers preventing you from managing your medications?  Yes. Family help  Are there any barriers preventing you from showering/bathing yourself?  No.    Are you currently engaging any exercise or physical activity?  Yes.  PT    Health Maintenance Summary                Annual Wellness Visit Overdue 4/16/1927     IMM ZOSTER VACCINE Postponed 5/25/2018 Originally 4/16/1987. Insurance/Financial    DIABETES MONOFILAMENT / LE EXAM Next Due 4/4/2018      Done 4/4/2017 AMB DIABETIC MONOFILAMENT LOWER EXTREMITY EXAM      Patient has more history with this topic...    A1C SCREENING Next Due 8/9/2018      Done 2/9/2018 HEMOGLOBIN A1C      Patient has more history with this topic...    URINE ACR / MICROALBUMIN Next Due 10/4/2018      Done 10/4/2017 MICROALBUMIN CREAT RATIO URINE      Patient has more history with this topic...    FASTING LIPID PROFILE Next Due 11/26/2018      Done 11/26/2017 LIPID PROFILE      Patient has more history with this topic...    RETINAL SCREENING Next Due 12/14/2018      Done 12/14/2017 REFERRAL FOR RETINAL SCREENING EXAM     Patient has more history with this topic...    SERUM CREATININE Next Due 2/12/2019      Done 2/12/2018 BASIC METABOLIC PANEL      Patient has more history with this topic...    IMM DTaP/Tdap/Td Vaccine Next Due 4/8/2023      Done 4/8/2013 Imm Admin: Tdap Vaccine          Patient Care Team:  Zahra Yañez M.D. as PCP - General (Family Medicine)  Stew Damon M.D. (Inactive) as Consulting Physician (Ophthalmology)  Gertrudis Monte D.N.P. as Consulting Physician (Family Medicine)  [facility] as Consulting Physician  [facility] as Home Health Provider  [facility] as Home Health Provider  Myke Perkins R.N. as Care Coordinator    Social History   Substance Use Topics   • Smoking status: Never Smoker   • Smokeless tobacco: Never Used   • Alcohol use No      Comment: hasnt drank since 1979     Family History   Problem Relation Age of Onset   • Heart Disease Father    • Diabetes Father    • Alcohol/Drug Father    • Cancer Sister      ovarian   • Stroke Brother      age 90     He  has a past medical history of Acute kidney failure, unspecified (9/27/2013); Arrhythmia; Arthritis; Backpain; Carotid artery stenosis (2/18/2014); CATARACT; Diabetes; Glaucoma; Heart burn; Hypertension; Indigestion; NSTEMI (non-ST elevated myocardial infarction) (CMS-HCC) (10/24/2017); Occlusion and stenosis of carotid artery without mention of cerebral infarction  "(3/11/2014); Pneumonia; Pyelonephritis (October 2010); Pyelonephritis; Renal cyst (9/19/2013); Sepsis (9/27/2013); Urinary tract infection, site not specified (9/27/2013); and UTI (lower urinary tract infection) (9/1/2012). He also has no past medical history of Angina; ASTHMA; Bronchitis; Dialysis; Fall; Heart valve disease; Jaundice; Pacemaker; Personal history of venous thrombosis and embolism; Psychiatric problem; Rheumatic fever; Unspecified disorder of thyroid; Unspecified hemorrhagic conditions; or Unspecified urinary incontinence.   Past Surgical History:   Procedure Laterality Date   • CAROTID ENDARTERECTOMY  3/11/2014    Performed by Bob Antonio M.D. at SURGERY [facility]S   • ERCP W/REMOVAL CALCULUS  2009   • COLONOSCOPY  1999   • CHOLECYSTECTOMY  1999   • EYE SURGERY  1980's    cataracts OU           Exam:     Blood pressure 118/58, pulse 74, temperature 37.5 °C (99.5 °F), height 1.727 m (5' 8\"), weight 72.6 kg (160 lb), SpO2 95 %. Body mass index is 24.33 kg/m².    Hearing fair.    Dentition fair  Alert, oriented in no acute distress.  Eye contact is good, speech goal directed, affect calm      Assessment and Plan. The following treatment and monitoring plan is recommended:    1. Essential hypertension  Controlled, continue amlodipine 5 mg, lisinopril 2.5 mg   Check labs in next 2-3 weeks.   - lisinopril (PRINIVIL) 2.5 MG Tab; Take 1 Tab by mouth every day.  Dispense: 90 Tab; Refill: 3  - BASIC METABOLIC PANEL; Future  - Annual Wellness Visit - Includes PPPS Subsequent ()    2. B12 deficiency  b12 today.   - cyanocobalamin (VITAMIN B-12) injection 1,000 mcg; 1 mL by Intramuscular route every 30 days.  - Annual Wellness Visit - Includes PPPS Subsequent ()    3. Advanced directives, counseling/discussion  - Annual Wellness Visit - Includes PPPS Subsequent ()    4. Paroxysmal atrial fibrillation (CMS-HCC)  Continue ASA, rate has been controlled.   - Annual Wellness Visit - " Includes PPPS Subsequent ()    5. Benign prostatic hyperplasia with lower urinary tract symptoms, symptom details unspecified  Continue with D Mannose.  Was on finasteride but did not tolerate.  Follow up with urology  - Annual Wellness Visit - Includes PPPS Subsequent ()    6. Coronary artery disease due to calcified coronary lesion  Continue statin, blood pressure control, high risk for fall therefore did not continue the plavix.   - Annual Wellness Visit - Includes PPPS Subsequent ()    7. Stenosis of carotid artery, unspecified laterality  Continue statin, ASA   - Annual Wellness Visit - Includes PPPS Subsequent ()    8. Chronic low back pain, unspecified back pain laterality, with sciatica presence unspecified  Stable. Continue use of motorized scooter.   - Annual Wellness Visit - Includes PPPS Subsequent ()    9. Glaucoma, unspecified glaucoma type, unspecified laterality  Continue drops and follow up with ophthalmology   - Annual Wellness Visit - Includes PPPS Subsequent ()    10. Hernia of abdominal wall  Noted, stable, non-surgical interventions.   - Annual Wellness Visit - Includes PPPS Subsequent ()    11. Mixed hyperlipidemia  Continue statin therapy   - Annual Wellness Visit - Includes PPPS Subsequent ()    12. Non-STEMI (non-ST elevated myocardial infarction) (CMS-HCC)  Continue asa and statin, patient reports not comfortable with taking plavix due to risk of bleeding and his unsteady gait.   - Annual Wellness Visit - Includes PPPS Subsequent ()    13. Renal cyst  Noted.   - Annual Wellness Visit - Includes PPPS Subsequent ()    14. Spinal stenosis of lumbosacral region  Non-surgical intervention.   - Annual Wellness Visit - Includes PPPS Subsequent ()    15. Thrombocytopenia (HCC)  Stable.   - Annual Wellness Visit - Includes PPPS Subsequent ()    16. Type 2 diabetes mellitus without complication, without long-term current use of insulin  (CMS-HCC)  Controlled with low dose metformin, goal A1c <9  - Annual Wellness Visit - Includes PPPS Subsequent ()    17. Vitamin D deficiency  Continue vitmain D supplementation  - Annual Wellness Visit - Includes PPPS Subsequent ()    18. Atherosclerosis of aorta (CMS-HCC)  Noted. Continue statin therapy and asa  - Annual Wellness Visit - Includes PPPS Subsequent ()    19. Hyponatremia  Recheck levels in 2-3 weeks.   - BASIC METABOLIC PANEL; Future  - Annual Wellness Visit - Includes PPPS Subsequent ()    20. Dementia without behavioral disturbance, unspecified dementia type  Noted on exam today.  Slightly worsening from last AWV screen.    Med management by daughter and with help of home health.   - Annual Wellness Visit - Includes PPPS Subsequent ()      Services suggested: Referral to Home Health  Health Care Screening recommendations as per orders if indicated.  Referrals offered: PT/OT/Nutrition counseling/Behavioral Health/Smoking cessation as per orders if indicated.    Discussion today about general wellness and lifestyle habits:    · Prevent falls and reduce trip hazards; Cautioned about securing or removing rugs.  · Have a working fire alarm and carbon monoxide detector;   · Engage in regular physical activity and social activities       Follow-up: 1 month follow up

## 2018-03-04 LAB — EKG IMPRESSION: NORMAL

## 2018-03-07 LAB — EKG IMPRESSION: NORMAL

## 2018-03-12 ENCOUNTER — HOSPITAL ENCOUNTER (OUTPATIENT)
Dept: LAB | Facility: MEDICAL CENTER | Age: 83
End: 2018-03-12
Attending: FAMILY MEDICINE
Payer: MEDICARE

## 2018-03-12 DIAGNOSIS — E87.1 HYPONATREMIA: ICD-10-CM

## 2018-03-12 LAB
ANION GAP SERPL CALC-SCNC: 6 MMOL/L (ref 0–11.9)
BUN SERPL-MCNC: 17 MG/DL (ref 8–22)
CALCIUM SERPL-MCNC: 8.7 MG/DL (ref 8.5–10.5)
CHLORIDE SERPL-SCNC: 101 MMOL/L (ref 96–112)
CO2 SERPL-SCNC: 25 MMOL/L (ref 20–33)
CREAT SERPL-MCNC: 1.08 MG/DL (ref 0.5–1.4)
GLUCOSE SERPL-MCNC: 345 MG/DL (ref 65–99)
POTASSIUM SERPL-SCNC: 4.2 MMOL/L (ref 3.6–5.5)
SODIUM SERPL-SCNC: 132 MMOL/L (ref 135–145)

## 2018-03-12 PROCEDURE — 36415 COLL VENOUS BLD VENIPUNCTURE: CPT

## 2018-03-12 PROCEDURE — 80048 BASIC METABOLIC PNL TOTAL CA: CPT

## 2018-03-20 ENCOUNTER — TELEPHONE (OUTPATIENT)
Dept: MEDICAL GROUP | Facility: PHYSICIAN GROUP | Age: 83
End: 2018-03-20

## 2018-03-20 DIAGNOSIS — E11.9 TYPE 2 DIABETES MELLITUS WITHOUT COMPLICATION, WITHOUT LONG-TERM CURRENT USE OF INSULIN (HCC): ICD-10-CM

## 2018-03-20 RX ORDER — LANCETS 30 GAUGE
EACH MISCELLANEOUS
Qty: 100 EACH | Refills: 3 | Status: SHIPPED | OUTPATIENT
Start: 2018-03-20 | End: 2018-03-28

## 2018-03-20 NOTE — TELEPHONE ENCOUNTER
1. Caller Name: Christiano/son                                         Call Back Number: [phone]      Patient approves a detailed voicemail message: N\A    2. What supplies does the patient need? Glucometer and Test strips    3. What brand of meter does the patient use? What insurance will pay for    4. How many times a day is the patient testing? twice    Dx: E11.65, is not using insulin. Last A1c =   Lab Results   Component Value Date/Time    HBA1C 8.6 (H) 02/09/2018 09:05 PM    HBA1C 7.2 (H) 11/25/2017 01:50 PM   .

## 2018-03-28 ENCOUNTER — RESOLUTE PROFESSIONAL BILLING HOSPITAL PROF FEE (OUTPATIENT)
Dept: HOSPITALIST | Facility: MEDICAL CENTER | Age: 83
End: 2018-03-28
Payer: MEDICARE

## 2018-03-28 ENCOUNTER — APPOINTMENT (OUTPATIENT)
Dept: RADIOLOGY | Facility: MEDICAL CENTER | Age: 83
DRG: 470 | End: 2018-03-28
Attending: EMERGENCY MEDICINE
Payer: MEDICARE

## 2018-03-28 ENCOUNTER — HOSPITAL ENCOUNTER (INPATIENT)
Facility: MEDICAL CENTER | Age: 83
LOS: 2 days | DRG: 470 | End: 2018-03-30
Attending: EMERGENCY MEDICINE | Admitting: INTERNAL MEDICINE
Payer: MEDICARE

## 2018-03-28 DIAGNOSIS — S72.002A CLOSED FRACTURE OF LEFT HIP, INITIAL ENCOUNTER (HCC): ICD-10-CM

## 2018-03-28 LAB
ALBUMIN SERPL BCP-MCNC: 3.9 G/DL (ref 3.2–4.9)
ALBUMIN/GLOB SERPL: 1.5 G/DL
ALP SERPL-CCNC: 88 U/L (ref 30–99)
ALT SERPL-CCNC: 13 U/L (ref 2–50)
ANION GAP SERPL CALC-SCNC: 9 MMOL/L (ref 0–11.9)
APTT PPP: 29.8 SEC (ref 24.7–36)
AST SERPL-CCNC: 20 U/L (ref 12–45)
BASOPHILS # BLD AUTO: 0.3 % (ref 0–1.8)
BASOPHILS # BLD: 0.04 K/UL (ref 0–0.12)
BILIRUB SERPL-MCNC: 0.6 MG/DL (ref 0.1–1.5)
BNP SERPL-MCNC: 146 PG/ML (ref 0–100)
BUN SERPL-MCNC: 15 MG/DL (ref 8–22)
CALCIUM SERPL-MCNC: 9.5 MG/DL (ref 8.5–10.5)
CHLORIDE SERPL-SCNC: 98 MMOL/L (ref 96–112)
CO2 SERPL-SCNC: 23 MMOL/L (ref 20–33)
CREAT SERPL-MCNC: 0.81 MG/DL (ref 0.5–1.4)
EKG IMPRESSION: NORMAL
EOSINOPHIL # BLD AUTO: 0.1 K/UL (ref 0–0.51)
EOSINOPHIL NFR BLD: 0.8 % (ref 0–6.9)
ERYTHROCYTE [DISTWIDTH] IN BLOOD BY AUTOMATED COUNT: 42.2 FL (ref 35.9–50)
GLOBULIN SER CALC-MCNC: 2.6 G/DL (ref 1.9–3.5)
GLUCOSE BLD-MCNC: 179 MG/DL (ref 65–99)
GLUCOSE SERPL-MCNC: 257 MG/DL (ref 65–99)
HCT VFR BLD AUTO: 37.3 % (ref 42–52)
HGB BLD-MCNC: 12.4 G/DL (ref 14–18)
IMM GRANULOCYTES # BLD AUTO: 0.06 K/UL (ref 0–0.11)
IMM GRANULOCYTES NFR BLD AUTO: 0.5 % (ref 0–0.9)
INR PPP: 1.07 (ref 0.87–1.13)
LYMPHOCYTES # BLD AUTO: 1.21 K/UL (ref 1–4.8)
LYMPHOCYTES NFR BLD: 9.1 % (ref 22–41)
MCH RBC QN AUTO: 30.3 PG (ref 27–33)
MCHC RBC AUTO-ENTMCNC: 33.2 G/DL (ref 33.7–35.3)
MCV RBC AUTO: 91.2 FL (ref 81.4–97.8)
MONOCYTES # BLD AUTO: 0.72 K/UL (ref 0–0.85)
MONOCYTES NFR BLD AUTO: 5.4 % (ref 0–13.4)
NEUTROPHILS # BLD AUTO: 11.11 K/UL (ref 1.82–7.42)
NEUTROPHILS NFR BLD: 83.9 % (ref 44–72)
NRBC # BLD AUTO: 0 K/UL
NRBC BLD-RTO: 0 /100 WBC
PLATELET # BLD AUTO: 185 K/UL (ref 164–446)
PMV BLD AUTO: 10.1 FL (ref 9–12.9)
POTASSIUM SERPL-SCNC: 4.6 MMOL/L (ref 3.6–5.5)
PROT SERPL-MCNC: 6.5 G/DL (ref 6–8.2)
PROTHROMBIN TIME: 13.6 SEC (ref 12–14.6)
RBC # BLD AUTO: 4.09 M/UL (ref 4.7–6.1)
SODIUM SERPL-SCNC: 130 MMOL/L (ref 135–145)
TROPONIN I SERPL-MCNC: <0.01 NG/ML (ref 0–0.04)
WBC # BLD AUTO: 13.2 K/UL (ref 4.8–10.8)

## 2018-03-28 PROCEDURE — 700105 HCHG RX REV CODE 258: Performed by: EMERGENCY MEDICINE

## 2018-03-28 PROCEDURE — 501838 HCHG SUTURE GENERAL: Performed by: ORTHOPAEDIC SURGERY

## 2018-03-28 PROCEDURE — 160031 HCHG SURGERY MINUTES - 1ST 30 MINS LEVEL 5: Performed by: ORTHOPAEDIC SURGERY

## 2018-03-28 PROCEDURE — 160036 HCHG PACU - EA ADDL 30 MINS PHASE I: Performed by: ORTHOPAEDIC SURGERY

## 2018-03-28 PROCEDURE — 82962 GLUCOSE BLOOD TEST: CPT

## 2018-03-28 PROCEDURE — 93005 ELECTROCARDIOGRAM TRACING: CPT | Performed by: EMERGENCY MEDICINE

## 2018-03-28 PROCEDURE — 700105 HCHG RX REV CODE 258: Performed by: INTERNAL MEDICINE

## 2018-03-28 PROCEDURE — 160042 HCHG SURGERY MINUTES - EA ADDL 1 MIN LEVEL 5: Performed by: ORTHOPAEDIC SURGERY

## 2018-03-28 PROCEDURE — 700111 HCHG RX REV CODE 636 W/ 250 OVERRIDE (IP)

## 2018-03-28 PROCEDURE — 160009 HCHG ANES TIME/MIN: Performed by: ORTHOPAEDIC SURGERY

## 2018-03-28 PROCEDURE — 700111 HCHG RX REV CODE 636 W/ 250 OVERRIDE (IP): Performed by: EMERGENCY MEDICINE

## 2018-03-28 PROCEDURE — 770006 HCHG ROOM/CARE - MED/SURG/GYN SEMI*

## 2018-03-28 PROCEDURE — 99223 1ST HOSP IP/OBS HIGH 75: CPT | Mod: AI | Performed by: INTERNAL MEDICINE

## 2018-03-28 PROCEDURE — 83880 ASSAY OF NATRIURETIC PEPTIDE: CPT

## 2018-03-28 PROCEDURE — 96375 TX/PRO/DX INJ NEW DRUG ADDON: CPT

## 2018-03-28 PROCEDURE — 72170 X-RAY EXAM OF PELVIS: CPT

## 2018-03-28 PROCEDURE — 304561 HCHG STAT O2

## 2018-03-28 PROCEDURE — 502000 HCHG MISC OR IMPLANTS RC 0278: Performed by: ORTHOPAEDIC SURGERY

## 2018-03-28 PROCEDURE — 96361 HYDRATE IV INFUSION ADD-ON: CPT

## 2018-03-28 PROCEDURE — L8699 PROSTHETIC IMPLANT NOS: HCPCS | Performed by: ORTHOPAEDIC SURGERY

## 2018-03-28 PROCEDURE — 502578 HCHG PACK, TOTAL HIP: Performed by: ORTHOPAEDIC SURGERY

## 2018-03-28 PROCEDURE — 160035 HCHG PACU - 1ST 60 MINS PHASE I: Performed by: ORTHOPAEDIC SURGERY

## 2018-03-28 PROCEDURE — 160048 HCHG OR STATISTICAL LEVEL 1-5: Performed by: ORTHOPAEDIC SURGERY

## 2018-03-28 PROCEDURE — 700101 HCHG RX REV CODE 250

## 2018-03-28 PROCEDURE — 80053 COMPREHEN METABOLIC PANEL: CPT

## 2018-03-28 PROCEDURE — 36415 COLL VENOUS BLD VENIPUNCTURE: CPT

## 2018-03-28 PROCEDURE — 84484 ASSAY OF TROPONIN QUANT: CPT

## 2018-03-28 PROCEDURE — 85610 PROTHROMBIN TIME: CPT

## 2018-03-28 PROCEDURE — 96374 THER/PROPH/DIAG INJ IV PUSH: CPT

## 2018-03-28 PROCEDURE — 71045 X-RAY EXAM CHEST 1 VIEW: CPT

## 2018-03-28 PROCEDURE — 85025 COMPLETE CBC W/AUTO DIFF WBC: CPT

## 2018-03-28 PROCEDURE — 0SRS019 REPLACEMENT OF LEFT HIP JOINT, FEMORAL SURFACE WITH METAL SYNTHETIC SUBSTITUTE, CEMENTED, OPEN APPROACH: ICD-10-PCS | Performed by: ORTHOPAEDIC SURGERY

## 2018-03-28 PROCEDURE — 99285 EMERGENCY DEPT VISIT HI MDM: CPT

## 2018-03-28 PROCEDURE — 160002 HCHG RECOVERY MINUTES (STAT): Performed by: ORTHOPAEDIC SURGERY

## 2018-03-28 PROCEDURE — 73552 X-RAY EXAM OF FEMUR 2/>: CPT | Mod: LT

## 2018-03-28 PROCEDURE — 96376 TX/PRO/DX INJ SAME DRUG ADON: CPT

## 2018-03-28 PROCEDURE — 85730 THROMBOPLASTIN TIME PARTIAL: CPT

## 2018-03-28 DEVICE — IMPLANTABLE DEVICE: Type: IMPLANTABLE DEVICE | Status: FUNCTIONAL

## 2018-03-28 DEVICE — IMPLANT COCR 12/14 FEM HEAD 28 +0: Type: IMPLANTABLE DEVICE | Status: FUNCTIONAL

## 2018-03-28 DEVICE — BONE CEMENT SIMPLEX ANTIBIO - (10/PK): Type: IMPLANTABLE DEVICE | Status: FUNCTIONAL

## 2018-03-28 DEVICE — IMPLANT TANDEM BIPOLAR COCR 53OD 28ID: Type: IMPLANTABLE DEVICE | Status: FUNCTIONAL

## 2018-03-28 DEVICE — DISTAL INVIS CENT SZ 10MM: Type: IMPLANTABLE DEVICE | Status: FUNCTIONAL

## 2018-03-28 DEVICE — IMPLANT CONQ FX FEM COMP SZ 10: Type: IMPLANTABLE DEVICE | Status: FUNCTIONAL

## 2018-03-28 RX ORDER — AMOXICILLIN 250 MG
1 CAPSULE ORAL NIGHTLY
Status: DISCONTINUED | OUTPATIENT
Start: 2018-03-29 | End: 2018-03-30 | Stop reason: HOSPADM

## 2018-03-28 RX ORDER — OXYCODONE HYDROCHLORIDE 5 MG/1
5 TABLET ORAL
Status: DISCONTINUED | OUTPATIENT
Start: 2018-03-28 | End: 2018-03-30 | Stop reason: HOSPADM

## 2018-03-28 RX ORDER — SODIUM BICARBONATE 650 MG/1
650 TABLET ORAL 4 TIMES DAILY
Status: DISCONTINUED | OUTPATIENT
Start: 2018-03-28 | End: 2018-03-30 | Stop reason: HOSPADM

## 2018-03-28 RX ORDER — SODIUM CHLORIDE 9 MG/ML
INJECTION, SOLUTION INTRAVENOUS CONTINUOUS
Status: DISCONTINUED | OUTPATIENT
Start: 2018-03-28 | End: 2018-03-30 | Stop reason: HOSPADM

## 2018-03-28 RX ORDER — AMLODIPINE BESYLATE 5 MG/1
5 TABLET ORAL DAILY
Status: DISCONTINUED | OUTPATIENT
Start: 2018-03-29 | End: 2018-03-30 | Stop reason: HOSPADM

## 2018-03-28 RX ORDER — HALOPERIDOL 5 MG/ML
1 INJECTION INTRAMUSCULAR EVERY 6 HOURS PRN
Status: DISCONTINUED | OUTPATIENT
Start: 2018-03-28 | End: 2018-03-30 | Stop reason: HOSPADM

## 2018-03-28 RX ORDER — DEXAMETHASONE SODIUM PHOSPHATE 4 MG/ML
4 INJECTION, SOLUTION INTRA-ARTICULAR; INTRALESIONAL; INTRAMUSCULAR; INTRAVENOUS; SOFT TISSUE
Status: DISCONTINUED | OUTPATIENT
Start: 2018-03-28 | End: 2018-03-30 | Stop reason: HOSPADM

## 2018-03-28 RX ORDER — DOCUSATE SODIUM 100 MG/1
100 CAPSULE, LIQUID FILLED ORAL 2 TIMES DAILY
Status: DISCONTINUED | OUTPATIENT
Start: 2018-03-29 | End: 2018-03-30 | Stop reason: HOSPADM

## 2018-03-28 RX ORDER — KETOROLAC TROMETHAMINE 30 MG/ML
15 INJECTION, SOLUTION INTRAMUSCULAR; INTRAVENOUS EVERY 6 HOURS
Status: DISCONTINUED | OUTPATIENT
Start: 2018-03-29 | End: 2018-03-30 | Stop reason: HOSPADM

## 2018-03-28 RX ORDER — LATANOPROST 50 UG/ML
1 SOLUTION/ DROPS OPHTHALMIC NIGHTLY
COMMUNITY

## 2018-03-28 RX ORDER — OXYCODONE HYDROCHLORIDE 5 MG/1
5 TABLET ORAL
Status: DISCONTINUED | OUTPATIENT
Start: 2018-03-28 | End: 2018-03-29

## 2018-03-28 RX ORDER — OXYCODONE HYDROCHLORIDE 10 MG/1
10 TABLET ORAL
Status: DISCONTINUED | OUTPATIENT
Start: 2018-03-28 | End: 2018-03-30 | Stop reason: HOSPADM

## 2018-03-28 RX ORDER — AMOXICILLIN 250 MG
2 CAPSULE ORAL 2 TIMES DAILY
Status: DISCONTINUED | OUTPATIENT
Start: 2018-03-28 | End: 2018-03-29

## 2018-03-28 RX ORDER — ONDANSETRON 2 MG/ML
4 INJECTION INTRAMUSCULAR; INTRAVENOUS EVERY 4 HOURS PRN
Status: DISCONTINUED | OUTPATIENT
Start: 2018-03-28 | End: 2018-03-30 | Stop reason: HOSPADM

## 2018-03-28 RX ORDER — M-VIT,TX,IRON,MINS/CALC/FOLIC 27MG-0.4MG
1 TABLET ORAL DAILY
Status: DISCONTINUED | OUTPATIENT
Start: 2018-03-29 | End: 2018-03-30 | Stop reason: HOSPADM

## 2018-03-28 RX ORDER — CEFAZOLIN SODIUM 2 G/100ML
2 INJECTION, SOLUTION INTRAVENOUS EVERY 8 HOURS
Status: COMPLETED | OUTPATIENT
Start: 2018-03-29 | End: 2018-03-29

## 2018-03-28 RX ORDER — MORPHINE SULFATE 4 MG/ML
4 INJECTION, SOLUTION INTRAMUSCULAR; INTRAVENOUS ONCE
Status: COMPLETED | OUTPATIENT
Start: 2018-03-28 | End: 2018-03-28

## 2018-03-28 RX ORDER — BISACODYL 10 MG
10 SUPPOSITORY, RECTAL RECTAL
Status: DISCONTINUED | OUTPATIENT
Start: 2018-03-28 | End: 2018-03-29

## 2018-03-28 RX ORDER — POLYETHYLENE GLYCOL 3350 17 G/17G
1 POWDER, FOR SOLUTION ORAL
Status: DISCONTINUED | OUTPATIENT
Start: 2018-03-28 | End: 2018-03-29

## 2018-03-28 RX ORDER — POLYETHYLENE GLYCOL 3350 17 G/17G
1 POWDER, FOR SOLUTION ORAL 2 TIMES DAILY PRN
Status: DISCONTINUED | OUTPATIENT
Start: 2018-03-28 | End: 2018-03-30 | Stop reason: HOSPADM

## 2018-03-28 RX ORDER — DIPHENHYDRAMINE HYDROCHLORIDE 50 MG/ML
25 INJECTION INTRAMUSCULAR; INTRAVENOUS EVERY 6 HOURS PRN
Status: DISCONTINUED | OUTPATIENT
Start: 2018-03-28 | End: 2018-03-30 | Stop reason: HOSPADM

## 2018-03-28 RX ORDER — SODIUM CHLORIDE 9 MG/ML
500 INJECTION, SOLUTION INTRAVENOUS ONCE
Status: COMPLETED | OUTPATIENT
Start: 2018-03-28 | End: 2018-03-28

## 2018-03-28 RX ORDER — UBIDECARENONE 75 MG
100 CAPSULE ORAL DAILY
Status: DISCONTINUED | OUTPATIENT
Start: 2018-03-29 | End: 2018-03-30 | Stop reason: HOSPADM

## 2018-03-28 RX ORDER — AMOXICILLIN 250 MG
1 CAPSULE ORAL
Status: DISCONTINUED | OUTPATIENT
Start: 2018-03-28 | End: 2018-03-30 | Stop reason: HOSPADM

## 2018-03-28 RX ORDER — SCOLOPAMINE TRANSDERMAL SYSTEM 1 MG/1
1 PATCH, EXTENDED RELEASE TRANSDERMAL
Status: DISCONTINUED | OUTPATIENT
Start: 2018-03-28 | End: 2018-03-30 | Stop reason: HOSPADM

## 2018-03-28 RX ORDER — LISINOPRIL 2.5 MG/1
2.5 TABLET ORAL DAILY
Status: DISCONTINUED | OUTPATIENT
Start: 2018-03-29 | End: 2018-03-30 | Stop reason: HOSPADM

## 2018-03-28 RX ORDER — ACETAMINOPHEN 325 MG/1
650 TABLET ORAL EVERY 6 HOURS
Status: DISCONTINUED | OUTPATIENT
Start: 2018-03-29 | End: 2018-03-30 | Stop reason: HOSPADM

## 2018-03-28 RX ORDER — B-COMPLEX WITH VITAMIN C
1 TABLET ORAL DAILY
Status: DISCONTINUED | OUTPATIENT
Start: 2018-03-28 | End: 2018-03-28

## 2018-03-28 RX ORDER — OXYCODONE HYDROCHLORIDE 5 MG/1
2.5 TABLET ORAL
Status: DISCONTINUED | OUTPATIENT
Start: 2018-03-28 | End: 2018-03-29

## 2018-03-28 RX ORDER — UBIDECARENONE 75 MG
100 CAPSULE ORAL DAILY
COMMUNITY

## 2018-03-28 RX ORDER — MORPHINE SULFATE 4 MG/ML
4 INJECTION, SOLUTION INTRAMUSCULAR; INTRAVENOUS
Status: COMPLETED | OUTPATIENT
Start: 2018-03-28 | End: 2018-03-28

## 2018-03-28 RX ORDER — ATORVASTATIN CALCIUM 10 MG/1
5 TABLET, FILM COATED ORAL DAILY
Status: DISCONTINUED | OUTPATIENT
Start: 2018-03-29 | End: 2018-03-30 | Stop reason: HOSPADM

## 2018-03-28 RX ORDER — ONDANSETRON 2 MG/ML
4 INJECTION INTRAMUSCULAR; INTRAVENOUS ONCE
Status: COMPLETED | OUTPATIENT
Start: 2018-03-28 | End: 2018-03-28

## 2018-03-28 RX ORDER — ACETAMINOPHEN 325 MG/1
650 TABLET ORAL EVERY 6 HOURS PRN
Status: DISCONTINUED | OUTPATIENT
Start: 2018-03-28 | End: 2018-03-30 | Stop reason: HOSPADM

## 2018-03-28 RX ORDER — BISACODYL 10 MG
10 SUPPOSITORY, RECTAL RECTAL
Status: DISCONTINUED | OUTPATIENT
Start: 2018-03-28 | End: 2018-03-30 | Stop reason: HOSPADM

## 2018-03-28 RX ORDER — LATANOPROST 50 UG/ML
1 SOLUTION/ DROPS OPHTHALMIC NIGHTLY
Status: DISCONTINUED | OUTPATIENT
Start: 2018-03-28 | End: 2018-03-30 | Stop reason: HOSPADM

## 2018-03-28 RX ORDER — ENEMA 19; 7 G/133ML; G/133ML
1 ENEMA RECTAL
Status: DISCONTINUED | OUTPATIENT
Start: 2018-03-28 | End: 2018-03-30 | Stop reason: HOSPADM

## 2018-03-28 RX ADMIN — MORPHINE SULFATE 4 MG: 4 INJECTION INTRAVENOUS at 17:48

## 2018-03-28 RX ADMIN — MORPHINE SULFATE 4 MG: 4 INJECTION INTRAVENOUS at 15:03

## 2018-03-28 RX ADMIN — SODIUM CHLORIDE 500 ML: 9 INJECTION, SOLUTION INTRAVENOUS at 15:03

## 2018-03-28 RX ADMIN — ONDANSETRON HYDROCHLORIDE 4 MG: 2 INJECTION, SOLUTION INTRAMUSCULAR; INTRAVENOUS at 15:03

## 2018-03-28 RX ADMIN — MORPHINE SULFATE 4 MG: 4 INJECTION INTRAVENOUS at 18:33

## 2018-03-28 RX ADMIN — MORPHINE SULFATE 4 MG: 4 INJECTION INTRAVENOUS at 15:35

## 2018-03-28 RX ADMIN — SODIUM CHLORIDE: 9 INJECTION, SOLUTION INTRAVENOUS at 19:50

## 2018-03-28 ASSESSMENT — PAIN SCALES - GENERAL
PAINLEVEL_OUTOF10: 0
PAINLEVEL_OUTOF10: 8
PAINLEVEL_OUTOF10: 0
PAINLEVEL_OUTOF10: 0

## 2018-03-28 ASSESSMENT — ENCOUNTER SYMPTOMS
FALLS: 1
HEADACHES: 0

## 2018-03-28 NOTE — ED TRIAGE NOTES
Patient arrives via [facility] from [facility] of LEFT hip pain s/p mechanical ground level fall at 0930 this morning. Patient states he was walking and tripped and fell, was able to get into his motorized wheelchair w/ assistance, and went to urgent care. Patient states he was unable to bear weight on LEFT leg due to pain. Increased pain w/ ROM. CMS intact. Patient denies head trauma. Denies LOC. Denies head/neck/back pain.

## 2018-03-28 NOTE — ED NOTES
Med rec completed by interview with patient at bedside  No abx in past 30 days  NKDA confirmed

## 2018-03-28 NOTE — ED PROVIDER NOTES
"ED Provider Note    Scribed for Karl Almazan M.D. by Fabienne Ruiz. 3/28/2018, 2:54 PM.    Primary care provider: Zahra Yañez M.D.  Means of arrival: Ambulance  History obtained from: Patient  History limited by: None    CHIEF COMPLAINT  Chief Complaint   Patient presents with   • T-5000 FALL     mechanical GLF at 0930   • Hip Injury     LEFT       HPI  Bulmaro Wheeler is a 89 y.o. male who presents to the Emergency Department via ambulance for evaluation of \"excruciating\" left hip pain sustained today following a GLF on the sidewalk. He initially went to an Urgent Care who then contacted EMS to take him to the ER. No complaints of head, chest, or left knee pain. Patient has never previously broken this hip. He is currently taking Aspirin.        REVIEW OF SYSTEMS  Review of Systems   Cardiovascular: Negative for chest pain.   Musculoskeletal: Positive for falls and joint pain (Left hip).        No left knee pain.   Neurological: Negative for headaches.   All other systems reviewed and are negative.  C.    PAST MEDICAL HISTORY   has a past medical history of Acute kidney failure, unspecified (9/27/2013); Arrhythmia; Arthritis; Backpain; Carotid artery stenosis (2/18/2014); CATARACT; Dementia without behavioral disturbance (2/22/2018); Diabetes; Glaucoma; Heart burn; Hypertension; Indigestion; NSTEMI (non-ST elevated myocardial infarction) (CMS-[facility]) (10/24/2017); Occlusion and stenosis of carotid artery without mention of cerebral infarction (3/11/2014); Pneumonia; Pyelonephritis (October 2010); Pyelonephritis; Renal cyst (9/19/2013); Sepsis (9/27/2013); Urinary tract infection, site not specified (9/27/2013); and UTI (lower urinary tract infection) (9/1/2012).    SURGICAL HISTORY   has a past surgical history that includes ercp w/removal calculus (2009); eye surgery (1980's); colonoscopy (1999); cholecystectomy (1999); and carotid endarterectomy (3/11/2014).    SOCIAL HISTORY  Social History "   Substance Use Topics   • Smoking status: Never Smoker   • Smokeless tobacco: Never Used   • Alcohol use No      Comment: hasnt drank since 1979      History   Drug Use No       FAMILY HISTORY  Family History   Problem Relation Age of Onset   • Heart Disease Father    • Diabetes Father    • Alcohol/Drug Father    • Cancer Sister      ovarian   • Stroke Brother      age 90       CURRENT MEDICATIONS    Current Facility-Administered Medications:   •  morphine (pf) 4 mg/ml injection 4 mg, 4 mg, Intravenous, Q30 MIN PRN, Karl Almazan M.D., 4 mg at 03/28/18 1748  •  [START ON 3/29/2018] amLODIPine (NORVASC) tablet 5 mg, 5 mg, Oral, DAILY, Carlos Taylor M.D.  •  [START ON 3/29/2018] aspirin EC (ECOTRIN) tablet 81 mg, 81 mg, Oral, Q EVENING, Carlos Taylor M.D.  •  [START ON 3/29/2018] atorvastatin (LIPITOR) tablet 5 mg, 5 mg, Oral, DAILY, Carlos Taylor M.D.  •  [START ON 3/29/2018] cyanocobalamin (VITAMIN B-12) tablet 100 mcg, 100 mcg, Oral, DAILY, Carlos Taylor M.D.  •  latanoprost (XALATAN) 0.005 % ophthalmic solution 1 Drop, 1 Drop, Both Eyes, Nightly, Carlos Taylor M.D.  •  [START ON 3/29/2018] lisinopril (PRINIVIL) tablet 2.5 mg, 2.5 mg, Oral, DAILY, Carlos Taylor M.D.  •  [START ON 3/29/2018] therapeutic multivitamin-minerals (THERAGRAN-M) tablet 1 Tab, 1 Tab, Oral, DAILY, Carlos Taylor M.D.  •  sodium bicarbonate (SODIUM BICARBONATE) tablet 650 mg, 650 mg, Oral, 4X/DAY, Carlos Taylor M.D.  •  [START ON 3/29/2018] vitamin D (cholecalciferol) tablet 1,000 Units, 1,000 Units, Oral, DAILY, Carlos Taylor M.D.  •  senna-docusate (PERICOLACE or SENOKOT S) 8.6-50 MG per tablet 2 Tab, 2 Tab, Oral, BID **AND** polyethylene glycol/lytes (MIRALAX) PACKET 1 Packet, 1 Packet, Oral, QDAY PRN **AND** magnesium hydroxide (MILK OF MAGNESIA) suspension 30 mL, 30 mL, Oral, QDAY PRN **AND** bisacodyl (DULCOLAX) suppository 10 mg, 10 mg, Rectal, QDAY PRN, Carlos Taylor M.D.  •  NS infusion, ,  "Intravenous, Continuous, Carlos Taylor M.D.  •  [START ON 3/29/2018] enoxaparin (LOVENOX) inj 40 mg, 40 mg, Subcutaneous, DAILY, Carlos Taylor M.D.  •  acetaminophen (TYLENOL) tablet 650 mg, 650 mg, Oral, Q6HRS PRN, Carlos Taylor M.D.  •  Notify provider if pain remains uncontrolled, , , CONTINUOUS **AND** Use the numeric rating scale (NRS-11) on regular floors and Critical-Care Pain Observation Tool (CPOT) on ICUs/Trauma to assess pain, , , CONTINUOUS **AND** Pulse Ox (Oximetry), , , CONTINUOUS **AND** Pharmacy Consult Request ...Pain Management Review, , Other, PRN **AND** If patient difficult to arouse and/or has respiratory depression, stop any opiates that are currently infusing and call a Rapid Response., , , CONTINUOUS **AND** oxyCODONE immediate-release (ROXICODONE) tablet 2.5 mg, 2.5 mg, Oral, Q3HRS PRN **AND** oxyCODONE immediate-release (ROXICODONE) tablet 5 mg, 5 mg, Oral, Q3HRS PRN **AND** HYDROmorphone (DILAUDID) injection 0.25 mg, 0.25 mg, Intravenous, Q3HRS PRN, Carlos Taylor M.D.    ALLERGIES  No Known Allergies    PHYSICAL EXAM  VITAL SIGNS: /53   Pulse 77   Temp 36.5 °C (97.7 °F)   Resp 16   Ht 1.727 m (5' 8\")   Wt 77 kg (169 lb 12.1 oz)   SpO2 92%   BMI 25.81 kg/m²     Constitutional: Elderly, No acute distress, Non-toxic appearance.   HENT: Normocephalic, Atraumatic, Bilateral external ears normal, oropharynx dry, No oral exudates, Nose normal.   Eyes:conjunctiva is normal, there are no signs of exudate.   Neck: Supple, no meningeal signs.  Cardiovascular: Diminished but regular rate and rhythm without murmurs gallops or rubs.   Thorax & Lungs: No respiratory distress. Breathing comfortably. Lungs are diminished but clear to auscultation bilaterally, there are no wheezes no rales. Chest wall is nontender.  Abdomen: Soft, nontender, nondistended. Bowel sounds are present.   Skin: Warm, Dry, No erythema,   Back: No tenderness, No CVA tenderness.  Musculoskeletal: Left " hip held in flexion. Exquisite tenderness to left hip with external rotation. No knee tenderness. Pulses intact.  Neurologic: Alert & oriented x 3, Moving all extremities. No gross abnormalities.    Psychiatric: Affect normal, Judgment normal, Mood normal.       LABS  Results for orders placed or performed during the hospital encounter of 03/28/18   CBC w/ Differential   Result Value Ref Range    WBC 13.2 (H) 4.8 - 10.8 K/uL    RBC 4.09 (L) 4.70 - 6.10 M/uL    Hemoglobin 12.4 (L) 14.0 - 18.0 g/dL    Hematocrit 37.3 (L) 42.0 - 52.0 %    MCV 91.2 81.4 - 97.8 fL    MCH 30.3 27.0 - 33.0 pg    MCHC 33.2 (L) 33.7 - 35.3 g/dL    RDW 42.2 35.9 - 50.0 fL    Platelet Count 185 164 - 446 K/uL    MPV 10.1 9.0 - 12.9 fL    Neutrophils-Polys 83.90 (H) 44.00 - 72.00 %    Lymphocytes 9.10 (L) 22.00 - 41.00 %    Monocytes 5.40 0.00 - 13.40 %    Eosinophils 0.80 0.00 - 6.90 %    Basophils 0.30 0.00 - 1.80 %    Immature Granulocytes 0.50 0.00 - 0.90 %    Nucleated RBC 0.00 /100 WBC    Neutrophils (Absolute) 11.11 (H) 1.82 - 7.42 K/uL    Lymphs (Absolute) 1.21 1.00 - 4.80 K/uL    Monos (Absolute) 0.72 0.00 - 0.85 K/uL    Eos (Absolute) 0.10 0.00 - 0.51 K/uL    Baso (Absolute) 0.04 0.00 - 0.12 K/uL    Immature Granulocytes (abs) 0.06 0.00 - 0.11 K/uL    NRBC (Absolute) 0.00 K/uL   Complete Metabolic Panel (CMP)   Result Value Ref Range    Sodium 130 (L) 135 - 145 mmol/L    Potassium 4.6 3.6 - 5.5 mmol/L    Chloride 98 96 - 112 mmol/L    Co2 23 20 - 33 mmol/L    Anion Gap 9.0 0.0 - 11.9    Glucose 257 (H) 65 - 99 mg/dL    Bun 15 8 - 22 mg/dL    Creatinine 0.81 0.50 - 1.40 mg/dL    Calcium 9.5 8.5 - 10.5 mg/dL    AST(SGOT) 20 12 - 45 U/L    ALT(SGPT) 13 2 - 50 U/L    Alkaline Phosphatase 88 30 - 99 U/L    Total Bilirubin 0.6 0.1 - 1.5 mg/dL    Albumin 3.9 3.2 - 4.9 g/dL    Total Protein 6.5 6.0 - 8.2 g/dL    Globulin 2.6 1.9 - 3.5 g/dL    A-G Ratio 1.5 g/dL   Troponin   Result Value Ref Range    Troponin I <0.01 0.00 - 0.04 ng/mL   Btype  Natriuretic Peptide (BNP)   Result Value Ref Range    B Natriuretic Peptide 146 (H) 0 - 100 pg/mL   Prothrombin (PT/INR)   Result Value Ref Range    PT 13.6 12.0 - 14.6 sec    INR 1.07 0.87 - 1.13   APTT   Result Value Ref Range    APTT 29.8 24.7 - 36.0 sec   ESTIMATED GFR   Result Value Ref Range    GFR If African American >60 >60 mL/min/1.73 m 2    GFR If Non African American >60 >60 mL/min/1.73 m 2   EKG (NOW)   Result Value Ref Range    Report       [facility] Emergency Dept.    Test Date:  2018  Pt Name:    JT MARIA               Department: ER  MRN:        [id number]                      Room:       [facility]  Gender:     Male                         Technician: [id number]  :        [id number]                   Requested By:IWONA DUENAS  Order #:    [id number]                    Reading MD: IWONA DUENAS MD    Measurements  Intervals                                Axis  Rate:       71                           P:          70  RI:         172                          QRS:        47  QRSD:       88                           T:          43  QT:         408  QTc:        444    Interpretive Statements  SINUS RHYTHM  Compared to ECG 2018 20:47:56  Atrial premature complex(es) no longer present  normal appearing ECG no acute changes  Electronically Signed On 3- 15:20:48 PDT by IWONA DUENAS MD     All labs reviewed by me.    EKG  Interpreted by me as above.    RADIOLOGY  DX-FEMUR-2+ LEFT   Final Result      Displaced left femoral neck fracture.      DX-PELVIS-1 OR 2 VIEWS   Final Result      Left femoral neck fracture with mild impaction and angulation.      DX-CHEST-LIMITED (1 VIEW)   Final Result      No pneumothorax. Lung base linear atelectasis.      The radiologist's interpretation of all radiological studies have been reviewed by me.    COURSE & MEDICAL DECISION MAKING  Pertinent Labs & Imaging studies reviewed. (See chart for details)    2:54 PM - Patient seen and  "examined at bedside. Patient will be treated with Morphine 4 mg/mL, Zofran 4 mg. Will resuscitate with IV fluids 500 mL given clinical indications for dehydration. Ordered DX Chest, DX Left Hip, CBC, CMP, Troponin, BNP, Prothombin Time, APTT, EKG to evaluate his symptoms. The differential diagnoses include but are not limited to: Hip Fracture, Hip contusion    4:26 PM - Discussed lab results as noted above with patient. He does have a femoral neck fracture. Informed patient that he will be admitted for surgery. Patient understands and agrees to plan. He last ate this morning and endorses \"slight pain\" to his left hip.    4:29 PM - Paging Ortho and Hospitalist.    4:38 PM I discussed the patient's case and the above findings with Dr. Taylor (Hospitalist) who agreed to admit patient.    4:40 PM Discussed the patient's case and above findings with Dr. Beck, Ortho General/Trauma, who agreed to see patient.    Decision Making:  Patient does have a left femoral neck fracture as described on the x-ray as above. I spoken to Dr. Beck, who will see the patient, as well as the hospitalist will admit the patient. Currently the patient is nothing by mouth, IV given him a half a liter bolus of normal saline to millie for dehydration because of his nothing by mouth status. As well as pain medications.    DISPOSITION:  Patient will be admitted to Dr. Taylor, Hospitalist, in guarded condition.    FINAL IMPRESSION  1. Closed fracture of left hip, initial encounter (CMS-[facility])          Fabienne MANSFIELD (Joel), am scribing for, and in the presence of, Karl Almazan M.D..    Electronically signed by: Fabienne Ruiz (Joel), 3/28/2018    IKarl M.D. personally performed the services described in this documentation, as scribed by Fabienne Ruiz in my presence, and it is both accurate and complete.    The note accurately reflects work and decisions made by me.  Karl Almazan  3/28/2018  6:30 PM      "

## 2018-03-29 LAB
ANION GAP SERPL CALC-SCNC: 9 MMOL/L (ref 0–11.9)
BUN SERPL-MCNC: 15 MG/DL (ref 8–22)
CALCIUM SERPL-MCNC: 8.4 MG/DL (ref 8.5–10.5)
CHLORIDE SERPL-SCNC: 100 MMOL/L (ref 96–112)
CO2 SERPL-SCNC: 23 MMOL/L (ref 20–33)
CREAT SERPL-MCNC: 0.86 MG/DL (ref 0.5–1.4)
ERYTHROCYTE [DISTWIDTH] IN BLOOD BY AUTOMATED COUNT: 44.5 FL (ref 35.9–50)
EST. AVERAGE GLUCOSE BLD GHB EST-MCNC: 214 MG/DL
GLUCOSE BLD-MCNC: 304 MG/DL (ref 65–99)
GLUCOSE BLD-MCNC: 364 MG/DL (ref 65–99)
GLUCOSE BLD-MCNC: 365 MG/DL (ref 65–99)
GLUCOSE BLD-MCNC: 404 MG/DL (ref 65–99)
GLUCOSE SERPL-MCNC: 324 MG/DL (ref 65–99)
HBA1C MFR BLD: 9.1 % (ref 0–5.6)
HCT VFR BLD AUTO: 35.5 % (ref 42–52)
HGB BLD-MCNC: 11.8 G/DL (ref 14–18)
MCH RBC QN AUTO: 31 PG (ref 27–33)
MCHC RBC AUTO-ENTMCNC: 33.2 G/DL (ref 33.7–35.3)
MCV RBC AUTO: 93.2 FL (ref 81.4–97.8)
PLATELET # BLD AUTO: 160 K/UL (ref 164–446)
PMV BLD AUTO: 10.3 FL (ref 9–12.9)
POTASSIUM SERPL-SCNC: 5.3 MMOL/L (ref 3.6–5.5)
RBC # BLD AUTO: 3.81 M/UL (ref 4.7–6.1)
SODIUM SERPL-SCNC: 132 MMOL/L (ref 135–145)
WBC # BLD AUTO: 18 K/UL (ref 4.8–10.8)

## 2018-03-29 PROCEDURE — 700102 HCHG RX REV CODE 250 W/ 637 OVERRIDE(OP): Performed by: HOSPITALIST

## 2018-03-29 PROCEDURE — A9270 NON-COVERED ITEM OR SERVICE: HCPCS | Performed by: ORTHOPAEDIC SURGERY

## 2018-03-29 PROCEDURE — 83036 HEMOGLOBIN GLYCOSYLATED A1C: CPT

## 2018-03-29 PROCEDURE — 700111 HCHG RX REV CODE 636 W/ 250 OVERRIDE (IP): Performed by: ORTHOPAEDIC SURGERY

## 2018-03-29 PROCEDURE — 82962 GLUCOSE BLOOD TEST: CPT

## 2018-03-29 PROCEDURE — 36415 COLL VENOUS BLD VENIPUNCTURE: CPT

## 2018-03-29 PROCEDURE — 700102 HCHG RX REV CODE 250 W/ 637 OVERRIDE(OP): Performed by: ORTHOPAEDIC SURGERY

## 2018-03-29 PROCEDURE — A9270 NON-COVERED ITEM OR SERVICE: HCPCS | Performed by: INTERNAL MEDICINE

## 2018-03-29 PROCEDURE — 700102 HCHG RX REV CODE 250 W/ 637 OVERRIDE(OP): Performed by: INTERNAL MEDICINE

## 2018-03-29 PROCEDURE — G8979 MOBILITY GOAL STATUS: HCPCS | Mod: CI

## 2018-03-29 PROCEDURE — G8988 SELF CARE GOAL STATUS: HCPCS | Mod: CI

## 2018-03-29 PROCEDURE — 51798 US URINE CAPACITY MEASURE: CPT

## 2018-03-29 PROCEDURE — 700112 HCHG RX REV CODE 229: Performed by: ORTHOPAEDIC SURGERY

## 2018-03-29 PROCEDURE — 700102 HCHG RX REV CODE 250 W/ 637 OVERRIDE(OP): Performed by: STUDENT IN AN ORGANIZED HEALTH CARE EDUCATION/TRAINING PROGRAM

## 2018-03-29 PROCEDURE — 770006 HCHG ROOM/CARE - MED/SURG/GYN SEMI*

## 2018-03-29 PROCEDURE — 99232 SBSQ HOSP IP/OBS MODERATE 35: CPT | Performed by: HOSPITALIST

## 2018-03-29 PROCEDURE — G8987 SELF CARE CURRENT STATUS: HCPCS | Mod: CK

## 2018-03-29 PROCEDURE — 700105 HCHG RX REV CODE 258: Performed by: INTERNAL MEDICINE

## 2018-03-29 PROCEDURE — 80048 BASIC METABOLIC PNL TOTAL CA: CPT

## 2018-03-29 PROCEDURE — 97162 PT EVAL MOD COMPLEX 30 MIN: CPT

## 2018-03-29 PROCEDURE — 85027 COMPLETE CBC AUTOMATED: CPT

## 2018-03-29 PROCEDURE — 97166 OT EVAL MOD COMPLEX 45 MIN: CPT

## 2018-03-29 PROCEDURE — G8978 MOBILITY CURRENT STATUS: HCPCS | Mod: CL

## 2018-03-29 RX ORDER — DEXTROSE MONOHYDRATE 25 G/50ML
25 INJECTION, SOLUTION INTRAVENOUS
Status: DISCONTINUED | OUTPATIENT
Start: 2018-03-29 | End: 2018-03-29

## 2018-03-29 RX ORDER — INSULIN GLARGINE 100 [IU]/ML
10 INJECTION, SOLUTION SUBCUTANEOUS EVERY EVENING
Status: DISCONTINUED | OUTPATIENT
Start: 2018-03-29 | End: 2018-03-30 | Stop reason: HOSPADM

## 2018-03-29 RX ORDER — DEXTROSE MONOHYDRATE 25 G/50ML
25 INJECTION, SOLUTION INTRAVENOUS
Status: DISCONTINUED | OUTPATIENT
Start: 2018-03-29 | End: 2018-03-30 | Stop reason: HOSPADM

## 2018-03-29 RX ADMIN — LISINOPRIL 2.5 MG: 2.5 TABLET ORAL at 09:51

## 2018-03-29 RX ADMIN — CEFAZOLIN SODIUM 2 G: 2 INJECTION, SOLUTION INTRAVENOUS at 04:27

## 2018-03-29 RX ADMIN — ACETAMINOPHEN 650 MG: 325 TABLET, FILM COATED ORAL at 06:22

## 2018-03-29 RX ADMIN — SODIUM BICARBONATE 650 MG: 650 TABLET ORAL at 09:51

## 2018-03-29 RX ADMIN — VITAMIN B12 0.1 MG ORAL TABLET 100 MCG: 0.1 TABLET ORAL at 09:56

## 2018-03-29 RX ADMIN — STANDARDIZED SENNA CONCENTRATE AND DOCUSATE SODIUM 1 TABLET: 8.6; 5 TABLET, FILM COATED ORAL at 20:00

## 2018-03-29 RX ADMIN — KETOROLAC TROMETHAMINE 15 MG: 30 INJECTION, SOLUTION INTRAMUSCULAR; INTRAVENOUS at 18:31

## 2018-03-29 RX ADMIN — AMLODIPINE BESYLATE 5 MG: 5 TABLET ORAL at 09:41

## 2018-03-29 RX ADMIN — OXYCODONE HYDROCHLORIDE 5 MG: 5 TABLET ORAL at 06:47

## 2018-03-29 RX ADMIN — MULTIPLE VITAMINS W/ MINERALS TAB 1 TABLET: TAB at 09:41

## 2018-03-29 RX ADMIN — ACETAMINOPHEN 650 MG: 325 TABLET, FILM COATED ORAL at 11:01

## 2018-03-29 RX ADMIN — SODIUM CHLORIDE: 9 INJECTION, SOLUTION INTRAVENOUS at 20:20

## 2018-03-29 RX ADMIN — INSULIN HUMAN 5 UNITS: 100 INJECTION, SOLUTION PARENTERAL at 10:58

## 2018-03-29 RX ADMIN — SODIUM BICARBONATE 650 MG: 650 TABLET ORAL at 14:06

## 2018-03-29 RX ADMIN — ACETAMINOPHEN 650 MG: 325 TABLET, FILM COATED ORAL at 17:04

## 2018-03-29 RX ADMIN — KETOROLAC TROMETHAMINE 15 MG: 30 INJECTION, SOLUTION INTRAMUSCULAR; INTRAVENOUS at 00:19

## 2018-03-29 RX ADMIN — INSULIN HUMAN 4 UNITS: 100 INJECTION, SOLUTION PARENTERAL at 06:47

## 2018-03-29 RX ADMIN — SODIUM BICARBONATE 650 MG: 650 TABLET ORAL at 20:00

## 2018-03-29 RX ADMIN — KETOROLAC TROMETHAMINE 15 MG: 30 INJECTION, SOLUTION INTRAMUSCULAR; INTRAVENOUS at 06:22

## 2018-03-29 RX ADMIN — LATANOPROST 1 DROP: 50 SOLUTION OPHTHALMIC at 20:01

## 2018-03-29 RX ADMIN — OXYCODONE HYDROCHLORIDE 5 MG: 5 TABLET ORAL at 09:41

## 2018-03-29 RX ADMIN — VITAMIN D, TAB 1000IU (100/BT) 1000 UNITS: 25 TAB at 09:41

## 2018-03-29 RX ADMIN — DOCUSATE SODIUM 100 MG: 100 CAPSULE ORAL at 20:00

## 2018-03-29 RX ADMIN — ATORVASTATIN CALCIUM 5 MG: 10 TABLET, FILM COATED ORAL at 09:40

## 2018-03-29 RX ADMIN — CEFAZOLIN SODIUM 2 G: 2 INJECTION, SOLUTION INTRAVENOUS at 14:06

## 2018-03-29 RX ADMIN — INSULIN GLARGINE 10 UNITS: 100 INJECTION, SOLUTION SUBCUTANEOUS at 20:04

## 2018-03-29 RX ADMIN — STANDARDIZED SENNA CONCENTRATE AND DOCUSATE SODIUM 1 TABLET: 8.6; 5 TABLET, FILM COATED ORAL at 00:18

## 2018-03-29 RX ADMIN — ASPIRIN 81 MG: 81 TABLET, COATED ORAL at 20:00

## 2018-03-29 RX ADMIN — DOCUSATE SODIUM 100 MG: 100 CAPSULE ORAL at 09:41

## 2018-03-29 RX ADMIN — OXYCODONE HYDROCHLORIDE 5 MG: 5 TABLET ORAL at 20:00

## 2018-03-29 RX ADMIN — ACETAMINOPHEN 650 MG: 325 TABLET, FILM COATED ORAL at 00:19

## 2018-03-29 RX ADMIN — KETOROLAC TROMETHAMINE 15 MG: 30 INJECTION, SOLUTION INTRAMUSCULAR; INTRAVENOUS at 14:06

## 2018-03-29 RX ADMIN — SODIUM BICARBONATE 650 MG: 650 TABLET ORAL at 17:05

## 2018-03-29 RX ADMIN — ENOXAPARIN SODIUM 40 MG: 100 INJECTION SUBCUTANEOUS at 09:42

## 2018-03-29 ASSESSMENT — PATIENT HEALTH QUESTIONNAIRE - PHQ9
1. LITTLE INTEREST OR PLEASURE IN DOING THINGS: NOT AT ALL
2. FEELING DOWN, DEPRESSED, IRRITABLE, OR HOPELESS: NOT AT ALL
SUM OF ALL RESPONSES TO PHQ9 QUESTIONS 1 AND 2: 0

## 2018-03-29 ASSESSMENT — ENCOUNTER SYMPTOMS
VOMITING: 0
INSOMNIA: 0
DIARRHEA: 0
CHILLS: 0
EYE PAIN: 0
ABDOMINAL PAIN: 0
FALLS: 1
HEADACHES: 0
EYE REDNESS: 0
NERVOUS/ANXIOUS: 0
HEMOPTYSIS: 0
TREMORS: 0
SENSORY CHANGE: 0
DIZZINESS: 0
WHEEZING: 0
NAUSEA: 0
COUGH: 0
PALPITATIONS: 0
FEVER: 0
FOCAL WEAKNESS: 0
BLOOD IN STOOL: 0
SEIZURES: 0
MYALGIAS: 1
WEAKNESS: 1
WEAKNESS: 0
SHORTNESS OF BREATH: 0
LOSS OF CONSCIOUSNESS: 0
CONSTIPATION: 0

## 2018-03-29 ASSESSMENT — ACTIVITIES OF DAILY LIVING (ADL): TOILETING: INDEPENDENT

## 2018-03-29 ASSESSMENT — COGNITIVE AND FUNCTIONAL STATUS - GENERAL
PERSONAL GROOMING: A LITTLE
MOVING TO AND FROM BED TO CHAIR: UNABLE
TURNING FROM BACK TO SIDE WHILE IN FLAT BAD: UNABLE
TOILETING: A LOT
DRESSING REGULAR UPPER BODY CLOTHING: A LITTLE
EATING MEALS: A LITTLE
MOVING FROM LYING ON BACK TO SITTING ON SIDE OF FLAT BED: UNABLE
DAILY ACTIVITIY SCORE: 15
DRESSING REGULAR LOWER BODY CLOTHING: A LOT
HELP NEEDED FOR BATHING: A LOT
MOBILITY SCORE: 9
WALKING IN HOSPITAL ROOM: A LOT
SUGGESTED CMS G CODE MODIFIER MOBILITY: CM
CLIMB 3 TO 5 STEPS WITH RAILING: TOTAL
STANDING UP FROM CHAIR USING ARMS: A LITTLE
SUGGESTED CMS G CODE MODIFIER DAILY ACTIVITY: CK

## 2018-03-29 ASSESSMENT — PAIN SCALES - GENERAL
PAINLEVEL_OUTOF10: 5
PAINLEVEL_OUTOF10: 4
PAINLEVEL_OUTOF10: 8
PAINLEVEL_OUTOF10: 3
PAINLEVEL_OUTOF10: 8
PAINLEVEL_OUTOF10: 8
PAINLEVEL_OUTOF10: 3
PAINLEVEL_OUTOF10: ASSUMED PAIN PRESENT
PAINLEVEL_OUTOF10: 3
PAINLEVEL_OUTOF10: 3

## 2018-03-29 ASSESSMENT — GAIT ASSESSMENTS
ASSISTIVE DEVICE: FRONT WHEEL WALKER
DISTANCE (FEET): 5
DEVIATION: BRADYKINETIC;SHUFFLED GAIT
GAIT LEVEL OF ASSIST: MINIMAL ASSIST

## 2018-03-29 ASSESSMENT — LIFESTYLE VARIABLES
EVER_SMOKED: NEVER
ALCOHOL_USE: NO

## 2018-03-29 NOTE — CONSULTS
3/28/2018    Bulmaro Wheeler is a 89 y.o. male who presents after a fall with a left hip fracture and is here for operative management. Patient denies numbness, parasthesias, loss of concousness or other trauma    Past Medical History:   Diagnosis Date   • Acute kidney failure, unspecified 9/27/2013   • Arrhythmia     in 80's, not recent   • Arthritis    • Backpain    • Carotid artery stenosis 2/18/2014   • CATARACT     surgery in 1985   • Dementia without behavioral disturbance 2/22/2018   • Diabetes    • Glaucoma     Interocular lenses placed in 1985   • Heart burn    • Hypertension    • Indigestion    • NSTEMI (non-ST elevated myocardial infarction) (CMS-[facility]) 10/24/2017   • Occlusion and stenosis of carotid artery without mention of cerebral infarction 3/11/2014   • Pneumonia    • Pyelonephritis October 2010   • Pyelonephritis    • Renal cyst 9/19/2013   • Sepsis 9/27/2013   • Urinary tract infection, site not specified 9/27/2013   • UTI (lower urinary tract infection) 9/1/2012       Past Surgical History:   Procedure Laterality Date   • CAROTID ENDARTERECTOMY  3/11/2014    Performed by Bob Antonio M.D. at SURGERY [facility] ORS   • ERCP W/REMOVAL CALCULUS  2009   • COLONOSCOPY  1999   • CHOLECYSTECTOMY  1999   • EYE SURGERY  1980's    cataracts OU       Medications  No current facility-administered medications on file prior to encounter.      Current Outpatient Prescriptions on File Prior to Encounter   Medication Sig Dispense Refill   • amLODIPine (NORVASC) 5 MG Tab Take 5 mg by mouth every day.     • lisinopril (PRINIVIL) 2.5 MG Tab Take 1 Tab by mouth every day. 90 Tab 3   • sodium bicarbonate (SODIUM BICARBONATE) 650 MG Tab Take 1 Tab by mouth 4 times a day. 540 Tab 3   • atorvastatin (LIPITOR) 10 MG Tab Take 0.5 Tabs by mouth every day. 45 Tab 3   • aspirin EC (ECOTRIN) 81 MG Tablet Delayed Response Take 81 mg by mouth every evening.     • B Complex Vitamins (VITAMIN B COMPLEX PO) Take 1 Tab  "by mouth every day.     • Multiple Vitamins-Minerals (CENTRUM SILVER PO) Take 1 Tab by mouth every day.     • vitamin D (CHOLECALCIFEROL) 1000 UNIT Tab Take 1,000 Units by mouth every day.         Allergies  Patient has no known allergies.    ROS  Left hip pain. All other systems were reviewed and found to be negative    Family History   Problem Relation Age of Onset   • Heart Disease Father    • Diabetes Father    • Alcohol/Drug Father    • Cancer Sister      ovarian   • Stroke Brother      age 90       Social History     Social History   • Marital status:      Spouse name: N/A   • Number of children: N/A   • Years of education: N/A     Social History Main Topics   • Smoking status: Never Smoker   • Smokeless tobacco: Never Used   • Alcohol use No      Comment: hasnt drank since 1979   • Drug use: No   • Sexual activity: No      Comment:  retired     Other Topics Concern   • Not on file     Social History Narrative   • No narrative on file       Physical Exam  Vitals  Blood pressure 133/53, pulse 79, temperature 36.5 °C (97.7 °F), resp. rate 14, height 1.727 m (5' 8\"), weight 77 kg (169 lb 12.1 oz), SpO2 93 %.  General: Well Developed, Well Nourished, no acute distress  HEENT: Normocephalic, atraumatic  Eyes: Anicteric, PERRLA, EOMI  Neck: Supple, nontender, no masses  Lungs: CTA, no wheezes or crackles  Heart: RRR, no murmurs, rubs or gallops  Abdomen: Soft, NT, ND  Pelvis: Stable to AP and Lateral Compression  Skin: Intact, no open wounds  Extremities: Left hip pain and deformity  Neuro: NVI  Vascular: 2+DP/PT, Capillary refill <2 seconds    Radiographs:  DX-FEMUR-2+ LEFT   Final Result      Displaced left femoral neck fracture.      DX-PELVIS-1 OR 2 VIEWS   Final Result      Left femoral neck fracture with mild impaction and angulation.      DX-CHEST-LIMITED (1 VIEW)   Final Result      No pneumothorax. Lung base linear atelectasis.          Laboratory Values  Recent Labs      03/28/18   1439   WBC "  13.2*   RBC  4.09*   HEMOGLOBIN  12.4*   HEMATOCRIT  37.3*   MCV  91.2   MCH  30.3   MCHC  33.2*   RDW  42.2   PLATELETCT  185   MPV  10.1     Recent Labs      03/28/18   1439   SODIUM  130*   POTASSIUM  4.6   CHLORIDE  98   CO2  23   GLUCOSE  257*   BUN  15     Recent Labs      03/28/18   1439   APTT  29.8   INR  1.07         Impression: Left femoral neck fracture    Plan:Operative intervention. Risks and benefits of surgery were discussed which include but are not limited to bleeding, infection, neurovascular damage, malunion, nonunion, instability, limb length discrepancy, DVT, PE, MI, Stroke and death. They understand these risks and wish to proceed.

## 2018-03-29 NOTE — ASSESSMENT & PLAN NOTE
Poorly controlled with hyperglycemia. Only on metformin at home. A1C 9%  - Correctional insulin  - start lantus 10U qHS

## 2018-03-29 NOTE — PROGRESS NOTES
Pt received from transport.  Pt will be going into OR within the next 2 hours, per ER RN.  VS taken.  2 RN skin check completed. No skin breakdown present. Sacrum red, blanchable.  Medicated for pain.  Family at bedside.  POC discussed.  Pt kept NPO.

## 2018-03-29 NOTE — PROGRESS NOTES
Pt arrived to unit via bed with transport. Dressing to left hip CDI. On 3 L oxygen via NC, O2 96%. CSMT intact. SCDs on. Call light within reach. Hourly rounding in place.

## 2018-03-29 NOTE — OR NURSING
Pt's airway out, apnea of 10 -15 seconds apparent but saturation staying in mid to high 90s with 4 liters of oxygen.

## 2018-03-29 NOTE — OR NURSING
Called Dr. Muñoz to let him know pt does have some periods of apnea with breathing down to 6-8 intermittently but holding saturation in mid to high 90s on 3 liters.  He maintained pt OK to go to floor.

## 2018-03-29 NOTE — THERAPY
"Physical Therapy Evaluation completed.   Bed Mobility:  Supine to Sit: Minimal Assist  Transfers: Sit to Stand: Contact Guard Assist  Gait: Level Of Assist: Minimal Assist with Front-Wheel Walker       Plan of Care: Will benefit from Physical Therapy 5 times per week  Discharge Recommendations: Equipment: Will Continue to Assess for Equipment Needs.     Mr. Wheeler is a 89 y/o male who presents to acute secondary to ground level fall that resulted in displaced L femoral neck fracture and is s/p L hip hemiarthroplasty.  He presents with significant lower extremity weakness, gross motor coordination deficits, and dynamic balance impairments that negatively impact his ability to perform gait, transfers, and bed mobility at his prior level of function and prevent his safe discharge home. He struggled most with gait, festinating gait pattern and poor foot clearance. He is participatory in therapy services and at this point would benefit from post acute rehabiliation. If he can achieve a superivsion level for all mobility and ambulate household distances, then his son can care for him. He would benefit from continued acute phyiscal therapy services to improve his mobility and decrease risk for falls.     See \"Rehab Therapy-Acute\" Patient Summary Report for complete documentation.     "

## 2018-03-29 NOTE — PROGRESS NOTES
Pt is diabetic and takes metformin at home. Blood sugar this am 324. Hospitalist paged. Order for regular Insulin scale low, received.

## 2018-03-29 NOTE — H&P
Hospital Medicine History and Physical    Date of Service  3/28/2018    Chief Complaint  Chief Complaint   Patient presents with   • T-5000 FALL     mechanical GLF at 0930   • Hip Injury     LEFT       History of Presenting Illness  89 y.o. male who presented 3/28/2018 with T-5000 FALL (mechanical GLF at 0930) and Hip Injury (LEFT)  Usually walks with a cane. He mentioned he accidentally slipped and fell, injuring his left hip. He did not hit his head. Prior to the fall he denied lightheadedness, chest pain, shortness of breath or palpitations. He denied heart disease or stroke to me although it appears on his history that he has a history of stroke and CEA, along with NSTEMI. He does have diabetes and is on metformin. He does not smoke. Overall he mentioned his functional status is good.  At the ED, he is afebrile, hemodynamically stable. Xrays showed L femoral neck fracture. EKG sinus rhythm. Dr. Beck, Orthopedics consulted.  When I saw him at ED he is in no acute distress. Auscultation clear to me. L hip soreness.    Primary Care Physician  Zahra Yañez M.D.    Consultants  Orthopedics    Code Status  full    Review of Systems  Review of Systems   Constitutional: Negative for chills and fever.   HENT: Negative for congestion, hearing loss and nosebleeds.    Eyes: Negative for pain and redness.   Respiratory: Negative for cough, hemoptysis, shortness of breath and wheezing.    Cardiovascular: Negative for chest pain and palpitations.   Gastrointestinal: Negative for abdominal pain, blood in stool, constipation, diarrhea, nausea and vomiting.   Genitourinary: Negative for dysuria, frequency and hematuria.   Musculoskeletal: Positive for falls, joint pain and myalgias.   Skin: Negative for rash.   Neurological: Negative for dizziness, tremors, focal weakness, seizures, loss of consciousness, weakness and headaches.   Psychiatric/Behavioral: The patient is not nervous/anxious and does not have insomnia.    All  other systems reviewed and are negative.       Past Medical History  Past Medical History:   Diagnosis Date   • Dementia without behavioral disturbance 2/22/2018   • NSTEMI (non-ST elevated myocardial infarction) (CMS-[facility]) 10/24/2017   • Occlusion and stenosis of carotid artery without mention of cerebral infarction 3/11/2014   • Carotid artery stenosis 2/18/2014   • Urinary tract infection, site not specified 9/27/2013   • Sepsis 9/27/2013   • Acute kidney failure, unspecified 9/27/2013   • Renal cyst 9/19/2013   • UTI (lower urinary tract infection) 9/1/2012   • Pyelonephritis October 2010   • Arrhythmia     in 80's, not recent   • Arthritis    • Backpain    • CATARACT     surgery in 1985   • Diabetes    • Glaucoma     Interocular lenses placed in 1985   • Heart burn    • Hypertension    • Indigestion    • Pneumonia    • Pyelonephritis        Surgical History  Past Surgical History:   Procedure Laterality Date   • CAROTID ENDARTERECTOMY  3/11/2014    Performed by Bob Antonio M.D. at SURGERY [facility]   • ERCP W/REMOVAL CALCULUS  2009   • COLONOSCOPY  1999   • CHOLECYSTECTOMY  1999   • EYE SURGERY  1980's    cataracts OU       Medications  No current facility-administered medications on file prior to encounter.      Current Outpatient Prescriptions on File Prior to Encounter   Medication Sig Dispense Refill   • amLODIPine (NORVASC) 5 MG Tab Take 5 mg by mouth every day.     • lisinopril (PRINIVIL) 2.5 MG Tab Take 1 Tab by mouth every day. 90 Tab 3   • sodium bicarbonate (SODIUM BICARBONATE) 650 MG Tab Take 1 Tab by mouth 4 times a day. 540 Tab 3   • atorvastatin (LIPITOR) 10 MG Tab Take 0.5 Tabs by mouth every day. 45 Tab 3   • aspirin EC (ECOTRIN) 81 MG Tablet Delayed Response Take 81 mg by mouth every evening.     • B Complex Vitamins (VITAMIN B COMPLEX PO) Take 1 Tab by mouth every day.     • Multiple Vitamins-Minerals (CENTRUM SILVER PO) Take 1 Tab by mouth every day.     • vitamin D  (CHOLECALCIFEROL) 1000 UNIT Tab Take 1,000 Units by mouth every day.         Family History  Family History   Problem Relation Age of Onset   • Heart Disease Father    • Diabetes Father    • Alcohol/Drug Father    • Cancer Sister      ovarian   • Stroke Brother      age 90       Social History  Social History   Substance Use Topics   • Smoking status: Never Smoker   • Smokeless tobacco: Never Used   • Alcohol use No      Comment: hasnt drank since        Allergies  No Known Allergies     Physical Exam  Laboratory   Hemodynamics  Temp (24hrs), Av.7 °C (98 °F), Min:36.3 °C (97.4 °F), Max:37.2 °C (98.9 °F)   Temperature: 36.3 °C (97.4 °F)  Pulse  Av.2  Min: 70  Max: 98 Heart Rate (Monitored): 77  Blood Pressure : 133/69, NIBP: 139/64      Respiratory      Respiration: 17, Pulse Oximetry: 95 %        RUL Breath Sounds: Clear, RML Breath Sounds: Diminished, RLL Breath Sounds: Diminished, HUNTER Breath Sounds: Clear, LLL Breath Sounds: Diminished    Physical Exam   Constitutional:   Elderly   Musculoskeletal: He exhibits tenderness (Mild, L hip).       Recent Labs      18   1439   WBC  13.2*   RBC  4.09*   HEMOGLOBIN  12.4*   HEMATOCRIT  37.3*   MCV  91.2   MCH  30.3   MCHC  33.2*   RDW  42.2   PLATELETCT  185   MPV  10.1     Recent Labs      18   1439   SODIUM  130*   POTASSIUM  4.6   CHLORIDE  98   CO2  23   GLUCOSE  257*   BUN  15   CREATININE  0.81   CALCIUM  9.5     Recent Labs      18   1439   ALTSGPT  13   ASTSGOT  20   ALKPHOSPHAT  88   TBILIRUBIN  0.6   GLUCOSE  257*     Recent Labs      18   1439   APTT  29.8   INR  1.07     Recent Labs      18   1439   BNPBTYPENAT  146*         Lab Results   Component Value Date    TROPONINI <0.01 2018     Urinalysis:    Lab Results  Component Value Date/Time   SPECGRAVITY 1.021 2018 2320   GLUCOSEUR 250 (A) 2018 2320   KETONES Trace (A) 2018 2320   NITRITE Negative 2018 2320   WBCURINE 2-5 (A) 2018  2320   RBCURINE 10-20 (A) 02/09/2018 2320   BACTERIA Negative 02/09/2018 2320   EPITHELCELL Few 02/09/2018 2320        Imaging  Dx-chest-limited (1 View)    Result Date: 3/28/2018  3/28/2018 3:36 PM HISTORY/REASON FOR EXAM: Injury after fall TECHNIQUE/EXAM DESCRIPTION AND NUMBER OF VIEWS: Single AP view of the chest. COMPARISON: 2/10/2018 FINDINGS: No pneumothorax. Bilateral lung base linear atelectasis. There is no pleural effusion. Normal size heart. Marked arthritic changes right shoulder.     No pneumothorax. Lung base linear atelectasis.    Dx-pelvis-1 Or 2 Views    Result Date: 3/28/2018  3/28/2018 3:36 PM HISTORY/REASON FOR EXAM:  Fall, left hip pain. TECHNIQUE/EXAM DESCRIPTION AND NUMBER OF VIEWS:  1 view(s) of the pelvis. COMPARISON:  None. FINDINGS: There is age-appropriate bone mineralization. There is a left femoral neck fracture with mild impaction and angulation. There are severe bilateral degenerative changes of the hips with joint space narrowing and osteophytic spurring.     Left femoral neck fracture with mild impaction and angulation.    Dx-femur-2+ Left    Result Date: 3/28/2018  3/28/2018 3:36 PM HISTORY/REASON FOR EXAM:  Pain/Deformity Following Trauma. . TECHNIQUE/EXAM DESCRIPTION AND NUMBER OF VIEWS:  4 views of the LEFT femur. COMPARISON: None FINDINGS: Displaced left femoral neck fracture. No distal fracture identified. No femoral head dislocation. Mild osteopenia. Arterial calcifications within the soft tissues.     Displaced left femoral neck fracture.     Assessment/Plan     I anticipate this patient will require at least two midnights for appropriate medical management, necessitating inpatient admission.    * Fracture of femoral neck, left (CMS-[facility])   Assessment & Plan    Dr. Beck Orthopedics consulted  Not in ACS, respiratory failure, malignant arrhythmia, decompensated CHF or syncope that would postpone surgery  EKG sinus  Normal EF, no valve disease on 2017 echo  Pretty functional  prior to fall, moderate cardiac risk  IVF, pain control, bowel regimen        Hyperlipidemia- (present on admission)   Assessment & Plan    Continue statin        Type 2 diabetes mellitus (CMS-HCC)- (present on admission)   Assessment & Plan    Hold metformin  Correctional insulin        Hypertension- (present on admission)   Assessment & Plan    Stable, restart blood pressure medicines postprocedure            VTE prophylaxis: lovenox, postprocedure or as per Orthopedics.    I spent 72 minutes, reviewing the chart, notes, vitals, labs, imaging, ordering labs, evaluating Bulmaro Wheeler for assessment, enacting the plan above. 50% of the time was spent in counseling Bulmaro Wheeler and answering questions. Discussed with ED physician. Discussed with Dr. Beck for clearance. Time was devoted to counseling and coordinating care including review of records, pertinent lab data and studies, as well as discussing diagnostic evaluation and work up, planned therapeutic interventions and future disposition of care. Where indicated, the assessment and plan reflect discussion of patient with consultants, other healthcare providers, family members, and additional research needed to obtain further information in formulating the plan of care for Bulmaro Wheeler.

## 2018-03-29 NOTE — DISCHARGE PLANNING
Transitional Care Navigator:    PT/OT evaluations pending for transitional care recommendations. Pt was recently discharged to [facility] 2/13/18.  Pt has also utilized [facility]  in the past.

## 2018-03-29 NOTE — THERAPY
"Occupational Therapy Evaluation completed.   Functional Status:  Min A supine to sit.  Max A LB dressing.  Pt took a few steps w/FWW, pt fatigued very quickly.  Pt brushed teeth EOB with min A.  Pt and pt's son educated on posterior hip precautions.  Pt returned to supine with min A.  Plan of Care: Will benefit from Occupational Therapy 3 times per week  Discharge Recommendations:  Equipment: Will Continue to Assess for Equipment Needs. Pt will benefit from further therapy at a post acute facility prior to DC home.    See \"Rehab Therapy-Acute\" Patient Summary Report for complete documentation.    "

## 2018-03-29 NOTE — CARE PLAN
Problem: Safety  Goal: Will remain free from injury  Outcome: PROGRESSING AS EXPECTED  Bed alarm on; call light within reach; bed in the lowest position; treaded socks on; hourly rounding in place.     Problem: Venous Thromboembolism (VTW)/Deep Vein Thrombosis (DVT) Prevention:  Goal: Patient will participate in Venous Thrombosis (VTE)/Deep Vein Thrombosis (DVT)Prevention Measures  Outcome: PROGRESSING AS EXPECTED  SCDs to BLE on.     Problem: Pain Management  Goal: Pain level will decrease to patient's comfort goal  Outcome: PROGRESSING AS EXPECTED  Pain medication provided per MAR; ice pack applied to surgical incision area.

## 2018-03-29 NOTE — ASSESSMENT & PLAN NOTE
Mechanical fall with no LOC or other trauma. Now s/p left hip hemiarthroplasty  - Ortho following, greatly appreciate  - pain control  - PT/OT evaluation

## 2018-03-29 NOTE — DISCHARGE PLANNING
Anticipated DC Disposition: SNF    Action: SNF order received; referred to DREW Flores for choice.   Pt has PASRR on file, #: [id number]     Barriers to Discharge: None known    Plan: Await SNF choice and acceptance.

## 2018-03-29 NOTE — DISCHARGE PLANNING
Transitional Care Navigator:    TCN met with patient and son to discuss transitional care services for discharge planning.  Pt lives with son who provides close to 24/7 assist/SPV at home.  Pt recently discharged from [facility] after a short rehab stay.  PT/OT finishing up session when TCN arrived and recommended short stay at SNF.  Pt in agreement with SNF but does not want to return to [facility].  Choice is [facility].  Choice form completed and faxed to [facility]. SW aware.  TCN will follow-up as needed.

## 2018-03-29 NOTE — OP REPORT
DATE OF SERVICE:  03/28/2018    PREOPERATIVE DIAGNOSIS:  Displaced left femoral neck fracture.    POSTOPERATIVE DIAGNOSIS:  Displaced left femoral neck fracture.    PROCEDURE:  Left hip hemiarthroplasty.    SURGEON:  Jim De Souza MD    ASSISTANT:  Fredis Smith PA-C.     ESTIMATED BLOOD LOSS:  50 mL.    INDICATIONS:  This 89-year-old gentleman who is status post fall during which   he sustained a displaced left femoral neck fracture.  Risks and benefits of   hemiarthroplasty were discussed at length which include but not limited to   bleeding, infection, neurovascular damage, pain, stiffness, leg length   discrepancy, instability, DVT, PE, MI, stroke, and death.  Understands all   these risks and wished to proceed.    DESCRIPTION OF PROCEDURE:  Patient was sedated with LMA anesthesia and   administered preoperative antibiotics.  The left hip was prepped and draped in   the usual sterile fashion.  Standard posterior approach to the hip was   performed with care taken to avoid all neurovascular structures.  The L-shaped   capsulotomy was performed and tagged with #5 suture for future repair.  The   femoral head was removed without difficulty.  Femoral neck was cut one   fingerbreadth above the lesser trochanter and the canal was broached to a size   12.  A [employer] size 10 Conquest stem was cemented using third   generation cement techniques in the appropriate version.  Once cement had   dried completely, a 28+0 head and a 53 mm shell were inserted.  Hip was   reduced to a stable in full extension, external rotation is stable to 90   degrees internal rotation and 90 degrees forward flexion.  Wounds were   irrigated.  Capsule and external rotators were closed to greater trochanter   via drill holes with #5 sutures.  Fascia was closed with #1 Vicryl suture.    Skin was closed with 2-0 Vicryl suture and staples.  Sterile dressings were   applied.  Patient tolerated the procedure well.    PREOPERATIVE  PLAN:  The patient will be admitted by the medicine service for   perioperative antibiotics, DVT prophylaxis, pain control, weightbearing as   tolerated and physical therapy.       ____________________________________     BOBBY KUHN MD PLA / ALLAN    DD:  03/28/2018 22:42:26  DT:  03/29/2018 02:07:33    D#:  [id number]  Job#:  [id number]

## 2018-03-29 NOTE — DISCHARGE PLANNING
Received phone call from Brooklynn at [facility], referral has been accepted. Refaxed the face sheet per Brooklynn's request. Amita LUZ notified.

## 2018-03-30 VITALS
WEIGHT: 169.75 LBS | BODY MASS INDEX: 25.73 KG/M2 | HEART RATE: 80 BPM | TEMPERATURE: 97.4 F | OXYGEN SATURATION: 91 % | HEIGHT: 68 IN | RESPIRATION RATE: 16 BRPM | DIASTOLIC BLOOD PRESSURE: 58 MMHG | SYSTOLIC BLOOD PRESSURE: 128 MMHG

## 2018-03-30 PROBLEM — S72.002A FRACTURE OF FEMORAL NECK, LEFT (HCC): Status: RESOLVED | Noted: 2018-03-28 | Resolved: 2018-03-30

## 2018-03-30 LAB
ANION GAP SERPL CALC-SCNC: 6 MMOL/L (ref 0–11.9)
BUN SERPL-MCNC: 30 MG/DL (ref 8–22)
CALCIUM SERPL-MCNC: 8.3 MG/DL (ref 8.5–10.5)
CHLORIDE SERPL-SCNC: 97 MMOL/L (ref 96–112)
CO2 SERPL-SCNC: 25 MMOL/L (ref 20–33)
CREAT SERPL-MCNC: 1.18 MG/DL (ref 0.5–1.4)
ERYTHROCYTE [DISTWIDTH] IN BLOOD BY AUTOMATED COUNT: 42.9 FL (ref 35.9–50)
GLUCOSE BLD-MCNC: 203 MG/DL (ref 65–99)
GLUCOSE SERPL-MCNC: 206 MG/DL (ref 65–99)
HCT VFR BLD AUTO: 25.6 % (ref 42–52)
HGB BLD-MCNC: 8.7 G/DL (ref 14–18)
MCH RBC QN AUTO: 31.4 PG (ref 27–33)
MCHC RBC AUTO-ENTMCNC: 34.7 G/DL (ref 33.7–35.3)
MCV RBC AUTO: 90.6 FL (ref 81.4–97.8)
PLATELET # BLD AUTO: 125 K/UL (ref 164–446)
PMV BLD AUTO: 10 FL (ref 9–12.9)
POTASSIUM SERPL-SCNC: 4.5 MMOL/L (ref 3.6–5.5)
RBC # BLD AUTO: 2.77 M/UL (ref 4.7–6.1)
SODIUM SERPL-SCNC: 128 MMOL/L (ref 135–145)
WBC # BLD AUTO: 15.7 K/UL (ref 4.8–10.8)

## 2018-03-30 PROCEDURE — 36415 COLL VENOUS BLD VENIPUNCTURE: CPT

## 2018-03-30 PROCEDURE — 85027 COMPLETE CBC AUTOMATED: CPT

## 2018-03-30 PROCEDURE — 700102 HCHG RX REV CODE 250 W/ 637 OVERRIDE(OP): Performed by: ORTHOPAEDIC SURGERY

## 2018-03-30 PROCEDURE — 700111 HCHG RX REV CODE 636 W/ 250 OVERRIDE (IP): Performed by: ORTHOPAEDIC SURGERY

## 2018-03-30 PROCEDURE — 82962 GLUCOSE BLOOD TEST: CPT

## 2018-03-30 PROCEDURE — 99239 HOSP IP/OBS DSCHRG MGMT >30: CPT | Performed by: HOSPITALIST

## 2018-03-30 PROCEDURE — A9270 NON-COVERED ITEM OR SERVICE: HCPCS | Performed by: ORTHOPAEDIC SURGERY

## 2018-03-30 PROCEDURE — 700102 HCHG RX REV CODE 250 W/ 637 OVERRIDE(OP): Performed by: INTERNAL MEDICINE

## 2018-03-30 PROCEDURE — 80048 BASIC METABOLIC PNL TOTAL CA: CPT

## 2018-03-30 PROCEDURE — 700112 HCHG RX REV CODE 229: Performed by: ORTHOPAEDIC SURGERY

## 2018-03-30 PROCEDURE — A9270 NON-COVERED ITEM OR SERVICE: HCPCS | Performed by: INTERNAL MEDICINE

## 2018-03-30 RX ORDER — SCOLOPAMINE TRANSDERMAL SYSTEM 1 MG/1
1 PATCH, EXTENDED RELEASE TRANSDERMAL
Qty: 4 PATCH | Refills: 3
Start: 2018-03-30 | End: 2018-05-05

## 2018-03-30 RX ORDER — OXYCODONE HYDROCHLORIDE 5 MG/1
2.5 TABLET ORAL EVERY 4 HOURS PRN
Qty: 10 TAB | Refills: 0 | Status: SHIPPED | OUTPATIENT
Start: 2018-03-30 | End: 2018-04-02

## 2018-03-30 RX ORDER — ACETAMINOPHEN 325 MG/1
650 TABLET ORAL EVERY 6 HOURS
Qty: 30 TAB | Refills: 0
Start: 2018-03-30

## 2018-03-30 RX ORDER — AMOXICILLIN 250 MG
1 CAPSULE ORAL
Qty: 30 TAB | Refills: 0
Start: 2018-03-30 | End: 2018-05-05

## 2018-03-30 RX ORDER — POLYETHYLENE GLYCOL 3350 17 G/17G
17 POWDER, FOR SOLUTION ORAL 2 TIMES DAILY PRN
Refills: 3
Start: 2018-03-30 | End: 2018-05-05

## 2018-03-30 RX ORDER — INSULIN GLARGINE 100 [IU]/ML
10 INJECTION, SOLUTION SUBCUTANEOUS EVERY EVENING
Qty: 10 ML
Start: 2018-03-30 | End: 2018-05-05

## 2018-03-30 RX ADMIN — KETOROLAC TROMETHAMINE 15 MG: 30 INJECTION, SOLUTION INTRAMUSCULAR; INTRAVENOUS at 00:44

## 2018-03-30 RX ADMIN — KETOROLAC TROMETHAMINE 15 MG: 30 INJECTION, SOLUTION INTRAMUSCULAR; INTRAVENOUS at 06:01

## 2018-03-30 RX ADMIN — ACETAMINOPHEN 650 MG: 325 TABLET, FILM COATED ORAL at 05:54

## 2018-03-30 RX ADMIN — MULTIPLE VITAMINS W/ MINERALS TAB 1 TABLET: TAB at 08:53

## 2018-03-30 RX ADMIN — SODIUM BICARBONATE 650 MG: 650 TABLET ORAL at 08:55

## 2018-03-30 RX ADMIN — VITAMIN B12 0.1 MG ORAL TABLET 100 MCG: 0.1 TABLET ORAL at 08:53

## 2018-03-30 RX ADMIN — KETOROLAC TROMETHAMINE 15 MG: 30 INJECTION, SOLUTION INTRAMUSCULAR; INTRAVENOUS at 12:34

## 2018-03-30 RX ADMIN — VITAMIN D, TAB 1000IU (100/BT) 1000 UNITS: 25 TAB at 08:55

## 2018-03-30 RX ADMIN — ACETAMINOPHEN 650 MG: 325 TABLET, FILM COATED ORAL at 12:34

## 2018-03-30 RX ADMIN — ENOXAPARIN SODIUM 40 MG: 100 INJECTION SUBCUTANEOUS at 08:55

## 2018-03-30 RX ADMIN — SODIUM BICARBONATE 650 MG: 650 TABLET ORAL at 12:34

## 2018-03-30 RX ADMIN — DOCUSATE SODIUM 100 MG: 100 CAPSULE ORAL at 08:53

## 2018-03-30 RX ADMIN — ATORVASTATIN CALCIUM 5 MG: 10 TABLET, FILM COATED ORAL at 08:55

## 2018-03-30 RX ADMIN — ACETAMINOPHEN 650 MG: 325 TABLET, FILM COATED ORAL at 00:44

## 2018-03-30 ASSESSMENT — PAIN SCALES - GENERAL
PAINLEVEL_OUTOF10: 9
PAINLEVEL_OUTOF10: 0
PAINLEVEL_OUTOF10: ASSUMED PAIN PRESENT
PAINLEVEL_OUTOF10: ASSUMED PAIN PRESENT
PAINLEVEL_OUTOF10: 7

## 2018-03-30 NOTE — DISCHARGE PLANNING
Received phone call from Brooklynn at [facility], requesting PT/OT eval. [facility] faxed them to [facility].

## 2018-03-30 NOTE — DISCHARGE PLANNING
Spoke to Brooklynn at [facility], Patient set up for transportation at 1330 via [email or web address]. Amita LUZ notified.

## 2018-03-30 NOTE — CARE PLAN
Problem: Discharge Barriers/Planning  Goal: Patient's continuum of care needs will be met  Outcome: PROGRESSING AS EXPECTED  Current plan is that the patient will be discharged to a skilled nursing facility today, everything is in order. Discharge education will be provided. Family involved in care, and informed.

## 2018-03-30 NOTE — ASSESSMENT & PLAN NOTE
Leukocytosis to 18 but no evidence of infection. VSS, afebrile. Appears to be more consistent with stress response given his clinical picture.  - repeat CBC  - monitor clinically

## 2018-03-30 NOTE — ASSESSMENT & PLAN NOTE
Mild hyponatremia on admission with Na 130, improvement with fluids to 132. Asymptomatic  - repeat in AM

## 2018-03-30 NOTE — PROGRESS NOTES
"Patient seen and examined  Doing well    Blood pressure (!) 91/49, pulse 78, temperature 36.4 °C (97.5 °F), resp. rate 16, height 1.727 m (5' 8\"), weight 77 kg (169 lb 12.1 oz), SpO2 93 %.    Recent Labs      03/28/18   1439  03/29/18   0346  03/30/18   0340   WBC  13.2*  18.0*  15.7*   RBC  4.09*  3.81*  2.77*   HEMOGLOBIN  12.4*  11.8*  8.7*   HEMATOCRIT  37.3*  35.5*  25.6*   MCV  91.2  93.2  90.6   MCH  30.3  31.0  31.4   MCHC  33.2*  33.2*  34.7   RDW  42.2  44.5  42.9   PLATELETCT  185  160*  125*   MPV  10.1  10.3  10.0       No acute distress  Dressing clean dry and intact  Neurovascularly intact    POD#2    Plan:  DVT Prophylaxis- TEDS/SCDs, lovenox  Weight Bearing Status-BAT  PT/OT  Antibiotics: None  Case Coordination for DC today          "

## 2018-03-30 NOTE — PROGRESS NOTES
Pt A/O x 4 with periods of confusion. Reports pain to left hip, medicated per MAR. On 1L oxygen via NC, O2 sat above 93%. PIV to right forearm patent, dressing CDI. Surgical dressing to left hip CDI. SCDs to bilateral calf on. Bed alarm on. Call light within reach. Bed in the lowest position and wheels locked. Hourly rounding in place.

## 2018-03-30 NOTE — PROGRESS NOTES
Patient was discharged with medical transport to [facility]. IV was removed prior to discharge, and all pt belongings are with pt. Discharge education provided, and all questions answered.

## 2018-03-30 NOTE — DISCHARGE PLANNING
Anticipated DC Disposition: SNF    Action: LSW notified pt of transfer, obtained signature on COBRA and IMM. LSW left voicemail for pt's son informing him of transfer as requested by pt. Transfer packet placed with pt's chart.     Barriers to Discharge: None.     Plan: Pt to transfer to [facility] today at 1:30

## 2018-03-30 NOTE — DISCHARGE INSTRUCTIONS
Discharge Instructions    Discharged to other by medical transportation with escort. Discharged via wheelchair, hospital escort: Yes.  Special equipment needed: Not Applicable    Be sure to schedule a follow-up appointment with your primary care doctor or any specialists as instructed.     Discharge Plan:   Diet Plan: Discussed  Activity Level: Discussed  Confirmed Follow up Appointment: Patient to Call and Schedule Appointment  Confirmed Symptoms Management: Discussed  Medication Reconciliation Updated: Yes  Influenza Vaccine Indication: Not indicated: Previously immunized this influenza season and > 8 years of age    I understand that a diet low in cholesterol, fat, and sodium is recommended for good health. Unless I have been given specific instructions below for another diet, I accept this instruction as my diet prescription.   Other diet: Diabetic    Special Instructions: Discharge instructions for the Orthopedic Patient    Follow up with Primary Care Physician within 2 weeks of discharge to home, regarding:  Review of medications and diagnostic testing.  Surveillance for medical complications.  Workup and treatment of osteoporosis, if appropriate.     -Is this a Joint Replacement patient? No    -Is this patient being discharged with medication to prevent blood clots?  Yes, Lovenox Enoxaparin injection  What is this medicine?  ENOXAPARIN (ee nox a PA rin) is used after knee, hip, or abdominal surgeries to prevent blood clotting. It is also used to treat existing blood clots in the lungs or in the veins.  This medicine may be used for other purposes; ask your health care provider or pharmacist if you have questions.  COMMON BRAND NAME(S): Lovenox  What should I tell my health care provider before I take this medicine?  They need to know if you have any of these conditions:  -bleeding disorders, hemorrhage, or hemophilia  -infection of the heart or heart valves  -kidney or liver disease  -previous  stroke  -prosthetic heart valve  -recent surgery or delivery of a baby  -ulcer in the stomach or intestine, diverticulitis, or other bowel disease  -an unusual or allergic reaction to enoxaparin, heparin, pork or pork products, other medicines, foods, dyes, or preservatives  -pregnant or trying to get pregnant  -breast-feeding  How should I use this medicine?  This medicine is for injection under the skin. It is usually given by a health-care professional. You or a family member may be trained on how to give the injections. If you are to give yourself injections, make sure you understand how to use the syringe, measure the dose if necessary, and give the injection. To avoid bruising, do not rub the site where this medicine has been injected. Do not take your medicine more often than directed. Do not stop taking except on the advice of your doctor or health care professional.  Make sure you receive a puncture-resistant container to dispose of the needles and syringes once you have finished with them. Do not reuse these items. Return the container to your doctor or health care professional for proper disposal.  Talk to your pediatrician regarding the use of this medicine in children. Special care may be needed.  Overdosage: If you think you have taken too much of this medicine contact a poison control center or emergency room at once.  NOTE: This medicine is only for you. Do not share this medicine with others.  What if I miss a dose?  If you miss a dose, take it as soon as you can. If it is almost time for your next dose, take only that dose. Do not take double or extra doses.  What may interact with this medicine?  -aspirin and aspirin-like medicines  -certain medicines that treat or prevent blood clots  -dipyridamole  -NSAIDs, medicines for pain and inflammation, like ibuprofen or naproxen  This list may not describe all possible interactions. Give your health care provider a list of all the medicines, herbs,  non-prescription drugs, or dietary supplements you use. Also tell them if you smoke, drink alcohol, or use illegal drugs. Some items may interact with your medicine.  What should I watch for while using this medicine?  Visit your doctor or health care professional for regular checks on your progress. Your condition will be monitored carefully while you are receiving this medicine.  Notify your doctor or health care professional and seek emergency treatment if you develop breathing problems; changes in vision; chest pain; severe, sudden headache; pain, swelling, warmth in the leg; trouble speaking; sudden numbness or weakness of the face, arm, or leg. These can be signs that your condition has gotten worse.  If you are going to have surgery, tell your doctor or health care professional that you are taking this medicine.  Do not stop taking this medicine without first talking to your doctor. Be sure to refill your prescription before you run out of medicine.  Avoid sports and activities that might cause injury while you are using this medicine. Severe falls or injuries can cause unseen bleeding. Be careful when using sharp tools or knives. Consider using an electric razor. Take special care brushing or flossing your teeth. Report any injuries, bruising, or red spots on the skin to your doctor or health care professional.  What side effects may I notice from receiving this medicine?  Side effects that you should report to your doctor or health care professional as soon as possible:  -allergic reactions like skin rash, itching or hives, swelling of the face, lips, or tongue  -feeling faint or lightheaded, falls  -signs and symptoms of bleeding such as bloody or black, tarry stools; red or dark-brown urine; spitting up blood or brown material that looks like coffee grounds; red spots on the skin; unusual bruising or bleeding from the eye, gums, or nose  Side effects that usually do not require medical attention (report  to your doctor or health care professional if they continue or are bothersome):  -pain, redness, or irritation at site where injected  This list may not describe all possible side effects. Call your doctor for medical advice about side effects. You may report side effects to FDA at 4-594-MZY-1805.  Where should I keep my medicine?  Keep out of the reach of children.  Store at room temperature between 15 and 30 degrees C (59 and 86 degrees F). Do not freeze. If your injections have been specially prepared, you may need to store them in the refrigerator. Ask your pharmacist. Throw away any unused medicine after the expiration date.  NOTE: This sheet is a summary. It may not cover all possible information. If you have questions about this medicine, talk to your doctor, pharmacist, or health care provider.  © 2018 Elsevier/Gold Standard (2015-04-21 16:06:21)      · Is patient discharged on Warfarin / Coumadin?   No     Depression / Suicide Risk    As you are discharged from this [facility] facility, it is important to learn how to keep safe from harming yourself.    Recognize the warning signs:  · Abrupt changes in personality, positive or negative- including increase in energy   · Giving away possessions  · Change in eating patterns- significant weight changes-  positive or negative  · Change in sleeping patterns- unable to sleep or sleeping all the time   · Unwillingness or inability to communicate  · Depression  · Unusual sadness, discouragement and loneliness  · Talk of wanting to die  · Neglect of personal appearance   · Rebelliousness- reckless behavior  · Withdrawal from people/activities they love  · Confusion- inability to concentrate     If you or a loved one observes any of these behaviors or has concerns about self-harm, here's what you can do:  · Talk about it- your feelings and reasons for harming yourself  · Remove any means that you might use to hurt yourself (examples: pills, rope, extension cords,  firearm)  · Get professional help from the community (Mental Health, Substance Abuse, psychological counseling)  · Do not be alone:Call your Safe Contact- someone whom you trust who will be there for you.  · Call your local CRISIS HOTLINE 773-6655 or 832-303-5236  · Call your local Children's Mobile Crisis Response Team [place] (644) 419-8179 or www.[employer]  · Call the toll free National Suicide Prevention Hotlines   · National Suicide Prevention Lifeline 713-072-VPAZ (2073)  · [employer] Line Network 800-SUICIDE (310-8046)        Hip Fracture  A hip fracture is a fracture of the upper part of your thigh bone (femur).  What are the causes?  A hip fracture is caused by a direct blow to the side of your hip. This is usually the result of a fall but can occur in other circumstances, such as an automobile accident.  What increases the risk?  There is an increased risk of hip fractures in people with:  · An unsteady walking pattern (gait) and those with conditions that contribute to poor balance, such as Parkinson's disease or dementia.  · Osteopenia and osteoporosis.  · Cancer that spreads to the leg bones.  · Certain metabolic diseases.  What are the signs or symptoms?  Symptoms of hip fracture include:  · Pain over the injured hip.  · Inability to put weight on the leg in which the fracture occurred (although, some patients are able to walk after a hip fracture).  · Toes and foot of the affected leg point outward when you lie down.  How is this diagnosed?  A physical exam can determine if a hip fracture is likely to have occurred. X-ray exams are needed to confirm the fracture and to look for other injuries. The X-ray exam can help to determine the type of hip fracture. Rarely, the fracture is not visible on an X-ray image and a CT scan or MRI will have to be done.  How is this treated?  The treatment for a fracture is usually surgery. This means using a screw, nail, or yusef to hold the bones in  "place.  Follow these instructions at home:  Take all medicines as directed by your health care provider.  Contact a health care provider if:  Pain continues, even after taking pain medicine.  This information is not intended to replace advice given to you by your health care provider. Make sure you discuss any questions you have with your health care provider.  Document Released: 12/18/2006 Document Revised: 05/25/2017 Document Reviewed: 07/30/2014  [employer] Interactive Patient Education © 2017 [employer]        Hip Hemiarthroplasty  The hip joint is located where the upper end of the femur meets the pelvis socket (acetabulum). The femur, or thigh bone, looks like a long stem with a ball on the end. The acetabulum is a socket or cup-like structure in the pelvis, or hip bone. This \"ball and socket\" allows your hip to move.  During Hip hemiarthroplasty, your surgeon removes the diseased bone tissue and cartilage from the hip joint. The healthy parts of the hip are left intact. The head of the femur (the ball) and the socket (acetabulum) is replaced with new, artificial parts. The new hip is made of materials that allow a normal movement of the joint. This surgery usually lasts 2 to 3 hours.   The purpose of this surgery is to reduce pain and improve range of motion. It is one of the most successful joint replacement surgeries. It most often greatly improves the quality of life.  LET YOUR CAREGIVERS KNOW ABOUT:  · Allergies  · Medications taken including herbs, eye drops, over the counter medications, and creams  · Use of steroids (by mouth or creams)  · Previous problems with anesthetics or Novocaine  · Possibility of pregnancy, if this applies  · History of blood clots (thrombophlebitis)  · History of bleeding or blood problems  · Previous surgery  · Other health problems  BEFORE THE PROCEDURE  · Do not eat or drink anything for as long as directed by your caregiver prior to surgery.  · You should be present 60 " minutes prior to your procedure or as directed.  Prior to surgery an IV (intravenous line for giving fluids) may be started. You may be given an anesthetic (medications and gas to help you sleep) during the procedure.   AFTER THE PROCEDURE  After surgery, you will be taken to the recovery area. There a nurse will watch and check your progress. You may have a catheter (a long, narrow, hollow tube) in your bladder following surgery. The catheter helps you pass your water. Once you're awake, stable, and taking fluids well, barring other problems you'll be returned to your room. You will receive physical therapy until you are doing well and your caregiver feels it is safe for you to be transferred home or to an extended care facility.  HOME CARE INSTRUCTIONS   · You may resume normal diet and activities as directed or allowed.  · Change dressings if necessary or as directed.  · Only take over-the-counter or prescription medicines for pain, discomfort, or fever as directed by your caregiver.  SEEK IMMEDIATE MEDICAL CARE IF:  You develop:  · Swelling of your calf or leg or develop shortness of breath or chest pain.  · Redness, swelling, or increasing pain in the wound.  · Pus coming from wound.  · An unexplained oral temperature over 102° F (38.9° C) develops.  · A foul smell coming from the wound or dressing.  · A breaking open of the wound (edges not staying together) after sutures or staples have been removed.  MAKE SURE YOU:   · Understand these instructions.  · Will watch your condition.  · Will get help right away if you are not doing well or get worse.  Document Released: 01/02/2008 Document Revised: 03/11/2013 Document Reviewed: 01/29/2008  [employer]® Patient Information ©2014 [employer].

## 2018-03-30 NOTE — PROGRESS NOTES
"   Orthopaedic PA Progress Note    Interval changes:Did well overnight    ROS - Patient denies any new issues. No chest pain, dyspnea, or fever.  Pain well controlled.    Blood pressure 106/56, pulse 80, temperature 36.7 °C (98.1 °F), resp. rate 18, height 1.727 m (5' 8\"), weight 77 kg (169 lb 12.1 oz), SpO2 90 %.    Patient seen and examined  No acute distress  Breathing non labored  RRR  Surgical dressing is clean, dry, and intact. Patient clearly fires tibialis anterior, EHL, and gastrocnemius/soleus. Sensation is intact to light touch throughout superficial peroneal, deep peroneal, tibial, saphenous, and sural nerve distributions. Strong and palpable 2+ dorsalis pedis and posterior tibial pulses with capillary refill less than 2 seconds. No lower leg tenderness or discomfort.      Recent Labs      03/28/18   1439  03/29/18   0346   WBC  13.2*  18.0*   RBC  4.09*  3.81*   HEMOGLOBIN  12.4*  11.8*   HEMATOCRIT  37.3*  35.5*   MCV  91.2  93.2   MCH  30.3  31.0   MCHC  33.2*  33.2*   RDW  42.2  44.5   PLATELETCT  185  160*   MPV  10.1  10.3       Active Hospital Problems    Diagnosis   • Fracture of femoral neck, left (CMS-[facility]) [S72.002A]     Priority: High   • Hyperlipidemia [E78.5]     Priority: Low   • Type 2 diabetes mellitus (CMS-HCC) [E11.9]     Priority: Low   • Hypertension [I10]     Priority: Low       Assessment/Plan:  POD#1 S/P L Rich    Wt bearing status - AT  PT/OT-initiated  Wound care:dressing left in place  Drains - no  Aj-no  Sutures/Staples out- 10-14 days post operatively  Antibiotics: complete  DVT Prophylaxis- TEDS/SCDs/Foot pumps.   Lovenox: Start today, Duration-until ambulatory > 150'  Future Procedures - not anticipated  Case Coordination for Discharge Planning - Disposition per med team, OK for DC from Ortho standpoint when cleared by OT/PT      "

## 2018-03-30 NOTE — DISCHARGE PLANNING
Received call from Min at [facility], referral has been accepted. Transportation has been set up for 1600 via [facility]. Amita LUZ notified. DC summary faxed to Min.

## 2018-03-30 NOTE — DISCHARGE SUMMARY
CHIEF COMPLAINT ON ADMISSION  Chief Complaint   Patient presents with   • T-5000 FALL     mechanical GLF at 0930   • Hip Injury     LEFT       CODE STATUS  Full Code    HPI & HOSPITAL COURSE  This is a 89 y.o. male here with mechanical ground level fall that resulted in left hip pain. On evaluation he was found to have a left femoral neck fracture. Orthopedic surgery was consulted and he was admitted for surgery on the orthopedic floor. He underwent a left hemiarthroplasty, tolerated it well and worked with PT/OT on POD #1. His leukocytosis to 18, 2/2 stress response was trending down to 15.4 prior to discharge. No evidence of active infection. Acute blood loss anemia post op with hemoglobin trending from 12.4 on admission to 8.7 at discharge. He had no evidence of active bleeding and was asymptomatic. This should be checked in the next few days after discharge to ensure improvement.     He was also hyperglycemic and noted to have an A1C of 9%, given his age and co-morbidities his goal is best around 8% to help prevent hypoglycemic episodes. He was started on lantus and insulin sliding scale while in the hospital and believe his blood glucose was exacerbated by the stress of the surgery. Expect that he will likely be able to wean off the insulin over the coming weeks and could hopefully resume just oral medications.     The patient met 2-midnight criteria for an inpatient stay at the time of discharge.    Therefore, he is discharged in good and stable condition with close outpatient follow-up.    SPECIFIC OUTPATIENT FOLLOW-UP  Follow up on A1C in 3 months and CBC within 3 days of discharge.    DISCHARGE PROBLEM LIST  Principal Problem (Resolved):    Fracture of femoral neck, left (CMS-[facility]) POA: Yes  Active Problems:    Type 2 diabetes mellitus (CMS-[facility]) POA: Yes    Hypertension POA: Yes    Hyponatremia POA: Yes    Hyperlipidemia POA: Yes    Leukocytosis POA: Yes      FOLLOW UP  [facility] ([facility]  POS)  [address] [place] [address]  [phone]          MEDICATIONS ON DISCHARGE   Bulmaro Wheeler   Home Medication Instructions SELVIN:[id number]    Printed on:03/30/18 1377   Medication Information                      acetaminophen (TYLENOL) 325 MG Tab  Take 2 Tabs by mouth every 6 hours.             amLODIPine (NORVASC) 5 MG Tab  Take 5 mg by mouth every day.             aspirin EC (ECOTRIN) 81 MG Tablet Delayed Response  Take 81 mg by mouth every evening.             atorvastatin (LIPITOR) 10 MG Tab  Take 0.5 Tabs by mouth every day.             B Complex Vitamins (VITAMIN B COMPLEX PO)  Take 1 Tab by mouth every day.             cyanocobalamin (VITAMIN B-12) 100 MCG Tab  Take 100 mcg by mouth every day.             enoxaparin (LOVENOX) 40 MG/0.4ML Solution inj  Inject 40 mg as instructed every day.  Until able to ambulate > 150           glucose 4 g 4 g Chew Tab  Take 4 Tabs by mouth as needed for Low Blood Sugar (If FSBG is less than or equal to 70 mg/dL and patient able to eat or drink).             insulin glargine (LANTUS) 100 UNIT/ML Solution  Inject 10 Units as instructed every evening.             insulin regular (HUMULIN R) 100 Unit/mL Solution  Inject 2-9 Units as instructed 4 Times a Day,Before Meals and at Bedtime.             latanoprost (XALATAN) 0.005 % Solution  Place 1 Drop in both eyes every evening.             lisinopril (PRINIVIL) 2.5 MG Tab  Take 1 Tab by mouth every day.             Multiple Vitamins-Minerals (CENTRUM SILVER PO)  Take 1 Tab by mouth every day.             oxyCODONE immediate-release (ROXICODONE) 5 MG Tab  Take 0.5 Tabs by mouth every four hours as needed for Severe Pain for up to 3 days.             polyethylene glycol/lytes (MIRALAX) Pack  Take 1 Packet by mouth 2 times a day as needed (if sennosides and/or docusate ineffective or not ordered).             scopolamine (TRANSDERM-SCOP) 1 MG/3DAYS PATCH 72 HR  Apply 1 Patch to skin as directed every 72  hours as needed (Nausea/Vomiting if ondansetron, dexamethasone, diphenhydramine, and/or haloperidol ineffective or not ordered).             senna-docusate (PERICOLACE OR SENOKOT S) 8.6-50 MG Tab  Take 1 Tab by mouth every 24 hours as needed for Constipation.             sodium bicarbonate (SODIUM BICARBONATE) 650 MG Tab  Take 1 Tab by mouth 4 times a day.             vitamin D (CHOLECALCIFEROL) 1000 UNIT Tab  Take 1,000 Units by mouth every day.                 DIET  Orders Placed This Encounter   Procedures   • DIET ORDER     Standing Status:   Standing     Number of Occurrences:   1     Order Specific Question:   Diet:     Answer:   Diabetic [3]       ACTIVITY  As tolerated and directed by skilled nursing.  Weight bearing as tolerated      CONSULTATIONS  Ortho    PROCEDURES  CXR-    No pneumothorax. Lung base linear atelectasis.    XR femur-_    Displaced left femoral neck fracture.    XR pelvis-  Left femoral neck fracture with mild impaction and angulation.    Surgery 3/28-   Left hip hemiarthroplasty.    LABORATORY  Lab Results   Component Value Date/Time    SODIUM 128 (L) 03/30/2018 03:40 AM    POTASSIUM 4.5 03/30/2018 03:40 AM    CHLORIDE 97 03/30/2018 03:40 AM    CO2 25 03/30/2018 03:40 AM    GLUCOSE 206 (H) 03/30/2018 03:40 AM    BUN 30 (H) 03/30/2018 03:40 AM    CREATININE 1.18 03/30/2018 03:40 AM    CREATININE 1.0 02/23/2009 02:34 PM        Lab Results   Component Value Date/Time    WBC 15.7 (H) 03/30/2018 03:40 AM    HEMOGLOBIN 8.7 (L) 03/30/2018 03:40 AM    HEMATOCRIT 25.6 (L) 03/30/2018 03:40 AM    PLATELETCT 125 (L) 03/30/2018 03:40 AM        Total time of the discharge process exceeds 38 minutes

## 2018-03-30 NOTE — DISCHARGE PLANNING
Per SUKH Levi, referral has been sent to [facility]. Transfer to [facility] has been put on hold- spoke with Brooklynn.

## 2018-03-30 NOTE — DISCHARGE PLANNING
Anticipated DC Disposition: SNF    Action: LSW received phone call from pt's daughter Ursula stating that she did not want pt to go to [facility] and would rather he go to [facility]. LSW explained that pt and his son made choice for [facility]. LSW spoke to pt at bedside, he reports that he is fine with whatever his children decide. Pt's son is now stating he does not want the pt to go to [facility]. LSW requested [facility] Deepa send referral to [facility]. [facility] accepted and transport is arranged for 4:00. LSW notified bedside RN, charge RN, and pt's son Christiano. Christiano stated that he would notify his sister Ursula. LSW corrected Cobra.    Barriers to Discharge: None.    Plan: Pt to transfer to [facility] at 4:00 today.

## 2018-03-30 NOTE — PROGRESS NOTES
"Patient seen and examined  No complaints    Blood pressure (!) 91/49, pulse 78, temperature 36.4 °C (97.5 °F), resp. rate 16, height 1.727 m (5' 8\"), weight 77 kg (169 lb 12.1 oz), SpO2 93 %.    Recent Labs      03/28/18   1439  03/29/18   0346  03/30/18   0340   WBC  13.2*  18.0*  15.7*   RBC  4.09*  3.81*  2.77*   HEMOGLOBIN  12.4*  11.8*  8.7*   HEMATOCRIT  37.3*  35.5*  25.6*   MCV  91.2  93.2  90.6   MCH  30.3  31.0  31.4   MCHC  33.2*  33.2*  34.7   RDW  42.2  44.5  42.9   PLATELETCT  185  160*  125*   MPV  10.1  10.3  10.0       No acute distress  Dressing clean dry and intact  Neurovascularly intact    POD# 2 left donte    Plan:  DVT Prophylaxis- TEDS/SCDs, lmwh  Weight Bearing Status- wbat  PT/OT  Antibiotics: periop complete  Case Coordination          "

## 2018-03-30 NOTE — CARE PLAN
Problem: Bowel/Gastric:  Goal: Normal bowel function is maintained or improved  Outcome: PROGRESSING AS EXPECTED  Patient accepted his stool softeners, and had a bowel movement today 1200.

## 2018-03-30 NOTE — CARE PLAN
Problem: Communication  Goal: The ability to communicate needs accurately and effectively will improve  Outcome: PROGRESSING AS EXPECTED  POC discussed; all questions answered at this time.     Problem: Infection  Goal: Will remain free from infection  Outcome: PROGRESSING AS EXPECTED  Pt remains free of any s/sx of infection.     Problem: Respiratory:  Goal: Respiratory status will improve  Outcome: PROGRESSING AS EXPECTED  TCDb and IS encouraged.

## 2018-03-30 NOTE — DISCHARGE PLANNING
Anticipated DC Disposition: SNF    Action: Per MD, pt is cleared for transfer today. LSW faxed transport form to [facility]; transport has been arranged for 1:30 today. LSW notified bedside RN and charge RN of transport.     Barriers to Discharge: None.    Plan: Await DC Summary. Pt to transfer to [facility] at 1:30 today.

## 2018-03-30 NOTE — PROGRESS NOTES
[facility] Hospitalist Progress Note    Date of Service: 3/29/2018    Chief Complaint  89 y.o. male admitted 3/28/2018 with mechanical fall and subsequent left hip pain. Found to have a left femoral neck fracture.    Interval Problem Update  With some hip pain. No questions or concerns at this time. No acute overnight events.    Consultants/Specialty  Orthopedics    Disposition  Pending medical clearance. Anticipate SNF.        Review of Systems   Constitutional: Negative for chills and fever.   Respiratory: Negative for shortness of breath.    Cardiovascular: Negative for chest pain, palpitations and leg swelling.   Gastrointestinal: Negative for abdominal pain, nausea and vomiting.   Genitourinary: Negative for dysuria.   Musculoskeletal: Positive for falls and joint pain.   Neurological: Positive for weakness. Negative for sensory change, loss of consciousness and headaches.   All other systems reviewed and are negative.     Physical Exam  Laboratory/Imaging   Hemodynamics  Temp (24hrs), Av.5 °C (97.7 °F), Min:36.2 °C (97.1 °F), Max:37.2 °C (98.9 °F)   Temperature: 36.7 °C (98.1 °F)  Pulse  Av.6  Min: 70  Max: 98 Heart Rate (Monitored): 77  Blood Pressure : 106/56, NIBP: 139/64      Respiratory      Respiration: 18, Pulse Oximetry: 90 %        RUL Breath Sounds: Clear, RML Breath Sounds: Diminished, RLL Breath Sounds: Diminished, HUNTER Breath Sounds: Clear, LLL Breath Sounds: Diminished    Fluids    Intake/Output Summary (Last 24 hours) at 18 1947  Last data filed at 18 0415   Gross per 24 hour   Intake             1275 ml   Output              500 ml   Net              775 ml       Nutrition  Orders Placed This Encounter   Procedures   • DIET ORDER     Standing Status:   Standing     Number of Occurrences:   1     Order Specific Question:   Diet:     Answer:   Diabetic [3]     Physical Exam   Constitutional: He appears well-developed. No distress.   HENT:   Head: Normocephalic.   Mouth/Throat:  Oropharynx is clear and moist.   Eyes: EOM are normal. No scleral icterus.   Neck: Normal range of motion. Neck supple.   Cardiovascular: Normal rate, regular rhythm and intact distal pulses.    Pulmonary/Chest: Breath sounds normal. No respiratory distress. He has no rales.   Abdominal: Soft. He exhibits no distension. There is no tenderness.   Musculoskeletal: He exhibits tenderness. He exhibits no edema or deformity.   Neurological: He is alert. No cranial nerve deficit.   Skin: Skin is warm and dry.   Psychiatric: He has a normal mood and affect. His behavior is normal.   Vitals reviewed.      Recent Labs      03/28/18   1439  03/29/18   0346   WBC  13.2*  18.0*   RBC  4.09*  3.81*   HEMOGLOBIN  12.4*  11.8*   HEMATOCRIT  37.3*  35.5*   MCV  91.2  93.2   MCH  30.3  31.0   MCHC  33.2*  33.2*   RDW  42.2  44.5   PLATELETCT  185  160*   MPV  10.1  10.3     Recent Labs      03/28/18   1439  03/29/18   0346   SODIUM  130*  132*   POTASSIUM  4.6  5.3   CHLORIDE  98  100   CO2  23  23   GLUCOSE  257*  324*   BUN  15  15   CREATININE  0.81  0.86   CALCIUM  9.5  8.4*     Recent Labs      03/28/18   1439   APTT  29.8   INR  1.07     Recent Labs      03/28/18   1439   BNPBTYPENAT  146*              Assessment/Plan     * Fracture of femoral neck, left (CMS-HCC)- (present on admission)   Assessment & Plan    Mechanical fall with no LOC or other trauma. Now s/p left hip hemiarthroplasty  - Ortho following, greatly appreciate  - pain control  - PT/OT evaluation        Type 2 diabetes mellitus (CMS-HCC)- (present on admission)   Assessment & Plan    Poorly controlled with hyperglycemia. Only on metformin at home. A1C 9%  - Correctional insulin  - start lantus 10U qHS        Leukocytosis- (present on admission)   Assessment & Plan    Leukocytosis to 18 but no evidence of infection. VSS, afebrile. Appears to be more consistent with stress response given his clinical picture.  - repeat CBC  - monitor clinically         Hyperlipidemia- (present on admission)   Assessment & Plan    Continue statin        Hyponatremia- (present on admission)   Assessment & Plan    Mild hyponatremia on admission with Na 130, improvement with fluids to 132. Asymptomatic  - repeat in AM        Hypertension- (present on admission)   Assessment & Plan    Stable  - resume amlodipine and lisinopril          Quality-Core Measures   Reviewed items::  EKG reviewed, Radiology images reviewed, Labs reviewed and Medications reviewed  Aj catheter::  No Aj  DVT prophylaxis pharmacological::  Enoxaparin (Lovenox)  DVT prophylaxis - mechanical:  SCDs  Ulcer Prophylaxis::  Not indicated

## 2018-03-31 LAB — GLUCOSE BLD-MCNC: 210 MG/DL (ref 65–99)

## 2018-05-05 ENCOUNTER — APPOINTMENT (OUTPATIENT)
Dept: RADIOLOGY | Facility: MEDICAL CENTER | Age: 83
DRG: 872 | End: 2018-05-05
Attending: EMERGENCY MEDICINE
Payer: MEDICARE

## 2018-05-05 ENCOUNTER — HOSPITAL ENCOUNTER (INPATIENT)
Facility: MEDICAL CENTER | Age: 83
LOS: 4 days | DRG: 872 | End: 2018-05-09
Attending: EMERGENCY MEDICINE | Admitting: INTERNAL MEDICINE
Payer: MEDICARE

## 2018-05-05 DIAGNOSIS — I25.10 CORONARY ARTERY DISEASE DUE TO CALCIFIED CORONARY LESION: ICD-10-CM

## 2018-05-05 DIAGNOSIS — G89.29 CHRONIC LOW BACK PAIN, UNSPECIFIED BACK PAIN LATERALITY, WITH SCIATICA PRESENCE UNSPECIFIED: ICD-10-CM

## 2018-05-05 DIAGNOSIS — R53.81 PHYSICAL DECONDITIONING: ICD-10-CM

## 2018-05-05 DIAGNOSIS — E87.1 HYPONATREMIA: ICD-10-CM

## 2018-05-05 DIAGNOSIS — A41.9 SEPSIS, DUE TO UNSPECIFIED ORGANISM: ICD-10-CM

## 2018-05-05 DIAGNOSIS — I10 ESSENTIAL HYPERTENSION: ICD-10-CM

## 2018-05-05 DIAGNOSIS — E53.8 B12 DEFICIENCY: ICD-10-CM

## 2018-05-05 DIAGNOSIS — M54.5 CHRONIC LEFT-SIDED LOW BACK PAIN, WITH SCIATICA PRESENCE UNSPECIFIED: ICD-10-CM

## 2018-05-05 DIAGNOSIS — F03.90 DEMENTIA WITHOUT BEHAVIORAL DISTURBANCE, UNSPECIFIED DEMENTIA TYPE: ICD-10-CM

## 2018-05-05 DIAGNOSIS — I25.84 CORONARY ARTERY DISEASE DUE TO CALCIFIED CORONARY LESION: ICD-10-CM

## 2018-05-05 DIAGNOSIS — N40.1 BENIGN PROSTATIC HYPERPLASIA WITH LOWER URINARY TRACT SYMPTOMS, SYMPTOM DETAILS UNSPECIFIED: ICD-10-CM

## 2018-05-05 DIAGNOSIS — N12 PYELONEPHRITIS: ICD-10-CM

## 2018-05-05 DIAGNOSIS — E11.9 TYPE 2 DIABETES MELLITUS WITHOUT COMPLICATION, WITHOUT LONG-TERM CURRENT USE OF INSULIN (HCC): ICD-10-CM

## 2018-05-05 DIAGNOSIS — G30.9 ALZHEIMER'S DEMENTIA WITHOUT BEHAVIORAL DISTURBANCE, UNSPECIFIED TIMING OF DEMENTIA ONSET: ICD-10-CM

## 2018-05-05 DIAGNOSIS — G89.29 CHRONIC LEFT-SIDED LOW BACK PAIN, WITH SCIATICA PRESENCE UNSPECIFIED: ICD-10-CM

## 2018-05-05 DIAGNOSIS — M48.07 SPINAL STENOSIS OF LUMBOSACRAL REGION: ICD-10-CM

## 2018-05-05 DIAGNOSIS — M54.5 CHRONIC LOW BACK PAIN, UNSPECIFIED BACK PAIN LATERALITY, WITH SCIATICA PRESENCE UNSPECIFIED: ICD-10-CM

## 2018-05-05 DIAGNOSIS — F02.80 ALZHEIMER'S DEMENTIA WITHOUT BEHAVIORAL DISTURBANCE, UNSPECIFIED TIMING OF DEMENTIA ONSET: ICD-10-CM

## 2018-05-05 DIAGNOSIS — N39.0 ACUTE UTI: ICD-10-CM

## 2018-05-05 LAB
ALBUMIN SERPL BCP-MCNC: 3.8 G/DL (ref 3.2–4.9)
ALBUMIN/GLOB SERPL: 1.1 G/DL
ALP SERPL-CCNC: 85 U/L (ref 30–99)
ALT SERPL-CCNC: 13 U/L (ref 2–50)
ANION GAP SERPL CALC-SCNC: 8 MMOL/L (ref 0–11.9)
APPEARANCE UR: ABNORMAL
AST SERPL-CCNC: 24 U/L (ref 12–45)
BACTERIA #/AREA URNS HPF: ABNORMAL /HPF
BASOPHILS # BLD AUTO: 0.1 % (ref 0–1.8)
BASOPHILS # BLD: 0.01 K/UL (ref 0–0.12)
BILIRUB SERPL-MCNC: 0.6 MG/DL (ref 0.1–1.5)
BILIRUB UR QL STRIP.AUTO: NEGATIVE
BUN SERPL-MCNC: 18 MG/DL (ref 8–22)
CALCIUM SERPL-MCNC: 9.3 MG/DL (ref 8.5–10.5)
CHLORIDE SERPL-SCNC: 95 MMOL/L (ref 96–112)
CO2 SERPL-SCNC: 23 MMOL/L (ref 20–33)
COLOR UR: ABNORMAL
CREAT SERPL-MCNC: 0.76 MG/DL (ref 0.5–1.4)
EOSINOPHIL # BLD AUTO: 0.01 K/UL (ref 0–0.51)
EOSINOPHIL NFR BLD: 0.1 % (ref 0–6.9)
EPI CELLS #/AREA URNS HPF: NEGATIVE /HPF
ERYTHROCYTE [DISTWIDTH] IN BLOOD BY AUTOMATED COUNT: 44.9 FL (ref 35.9–50)
GLOBULIN SER CALC-MCNC: 3.4 G/DL (ref 1.9–3.5)
GLUCOSE SERPL-MCNC: 245 MG/DL (ref 65–99)
GLUCOSE UR STRIP.AUTO-MCNC: 500 MG/DL
HCT VFR BLD AUTO: 35.2 % (ref 42–52)
HGB BLD-MCNC: 11.9 G/DL (ref 14–18)
HYALINE CASTS #/AREA URNS LPF: ABNORMAL /LPF
IMM GRANULOCYTES # BLD AUTO: 0.08 K/UL (ref 0–0.11)
IMM GRANULOCYTES NFR BLD AUTO: 0.5 % (ref 0–0.9)
KETONES UR STRIP.AUTO-MCNC: ABNORMAL MG/DL
LACTATE BLD-SCNC: 1.2 MMOL/L (ref 0.5–2)
LACTATE BLD-SCNC: 1.5 MMOL/L (ref 0.5–2)
LEUKOCYTE ESTERASE UR QL STRIP.AUTO: ABNORMAL
LYMPHOCYTES # BLD AUTO: 0.75 K/UL (ref 1–4.8)
LYMPHOCYTES NFR BLD: 4.8 % (ref 22–41)
MCH RBC QN AUTO: 30 PG (ref 27–33)
MCHC RBC AUTO-ENTMCNC: 33.8 G/DL (ref 33.7–35.3)
MCV RBC AUTO: 88.7 FL (ref 81.4–97.8)
MICRO URNS: ABNORMAL
MONOCYTES # BLD AUTO: 0.63 K/UL (ref 0–0.85)
MONOCYTES NFR BLD AUTO: 4.1 % (ref 0–13.4)
NEUTROPHILS # BLD AUTO: 14.04 K/UL (ref 1.82–7.42)
NEUTROPHILS NFR BLD: 90.4 % (ref 44–72)
NITRITE UR QL STRIP.AUTO: NEGATIVE
NRBC # BLD AUTO: 0 K/UL
NRBC BLD-RTO: 0 /100 WBC
PH UR STRIP.AUTO: 7 [PH]
PLATELET # BLD AUTO: 232 K/UL (ref 164–446)
PMV BLD AUTO: 10.1 FL (ref 9–12.9)
POTASSIUM SERPL-SCNC: 4 MMOL/L (ref 3.6–5.5)
PROT SERPL-MCNC: 7.2 G/DL (ref 6–8.2)
PROT UR QL STRIP: 100 MG/DL
RBC # BLD AUTO: 3.97 M/UL (ref 4.7–6.1)
RBC # URNS HPF: ABNORMAL /HPF
RBC UR QL AUTO: ABNORMAL
SODIUM SERPL-SCNC: 126 MMOL/L (ref 135–145)
SP GR UR STRIP.AUTO: 1.02
UROBILINOGEN UR STRIP.AUTO-MCNC: 1 MG/DL
WBC # BLD AUTO: 15.5 K/UL (ref 4.8–10.8)
WBC #/AREA URNS HPF: ABNORMAL /HPF

## 2018-05-05 PROCEDURE — 85025 COMPLETE CBC W/AUTO DIFF WBC: CPT

## 2018-05-05 PROCEDURE — 81001 URINALYSIS AUTO W/SCOPE: CPT

## 2018-05-05 PROCEDURE — 83605 ASSAY OF LACTIC ACID: CPT

## 2018-05-05 PROCEDURE — 87040 BLOOD CULTURE FOR BACTERIA: CPT | Mod: 91

## 2018-05-05 PROCEDURE — 99285 EMERGENCY DEPT VISIT HI MDM: CPT

## 2018-05-05 PROCEDURE — 770006 HCHG ROOM/CARE - MED/SURG/GYN SEMI*

## 2018-05-05 PROCEDURE — 700111 HCHG RX REV CODE 636 W/ 250 OVERRIDE (IP): Performed by: EMERGENCY MEDICINE

## 2018-05-05 PROCEDURE — 71045 X-RAY EXAM CHEST 1 VIEW: CPT

## 2018-05-05 PROCEDURE — 36415 COLL VENOUS BLD VENIPUNCTURE: CPT

## 2018-05-05 PROCEDURE — 96374 THER/PROPH/DIAG INJ IV PUSH: CPT

## 2018-05-05 PROCEDURE — 80053 COMPREHEN METABOLIC PANEL: CPT

## 2018-05-05 PROCEDURE — 87077 CULTURE AEROBIC IDENTIFY: CPT

## 2018-05-05 PROCEDURE — 99223 1ST HOSP IP/OBS HIGH 75: CPT | Performed by: INTERNAL MEDICINE

## 2018-05-05 PROCEDURE — 87186 SC STD MICRODIL/AGAR DIL: CPT

## 2018-05-05 PROCEDURE — 87086 URINE CULTURE/COLONY COUNT: CPT

## 2018-05-05 RX ORDER — CEFTRIAXONE 2 G/1
2 INJECTION, POWDER, FOR SOLUTION INTRAMUSCULAR; INTRAVENOUS ONCE
Status: COMPLETED | OUTPATIENT
Start: 2018-05-05 | End: 2018-05-05

## 2018-05-05 RX ORDER — SODIUM CHLORIDE 9 MG/ML
30 INJECTION, SOLUTION INTRAVENOUS
Status: DISCONTINUED | OUTPATIENT
Start: 2018-05-05 | End: 2018-05-09 | Stop reason: HOSPADM

## 2018-05-05 RX ORDER — ONDANSETRON 4 MG/1
4 TABLET, ORALLY DISINTEGRATING ORAL EVERY 4 HOURS PRN
Status: DISCONTINUED | OUTPATIENT
Start: 2018-05-05 | End: 2018-05-09 | Stop reason: HOSPADM

## 2018-05-05 RX ORDER — ONDANSETRON 2 MG/ML
4 INJECTION INTRAMUSCULAR; INTRAVENOUS EVERY 4 HOURS PRN
Status: DISCONTINUED | OUTPATIENT
Start: 2018-05-05 | End: 2018-05-09 | Stop reason: HOSPADM

## 2018-05-05 RX ORDER — SODIUM CHLORIDE 9 MG/ML
INJECTION, SOLUTION INTRAVENOUS CONTINUOUS
Status: DISCONTINUED | OUTPATIENT
Start: 2018-05-05 | End: 2018-05-08

## 2018-05-05 RX ORDER — AMOXICILLIN 250 MG
2 CAPSULE ORAL 2 TIMES DAILY
Status: DISCONTINUED | OUTPATIENT
Start: 2018-05-05 | End: 2018-05-09 | Stop reason: HOSPADM

## 2018-05-05 RX ORDER — BISACODYL 10 MG
10 SUPPOSITORY, RECTAL RECTAL
Status: DISCONTINUED | OUTPATIENT
Start: 2018-05-05 | End: 2018-05-09 | Stop reason: HOSPADM

## 2018-05-05 RX ORDER — POLYETHYLENE GLYCOL 3350 17 G/17G
1 POWDER, FOR SOLUTION ORAL
Status: DISCONTINUED | OUTPATIENT
Start: 2018-05-05 | End: 2018-05-09 | Stop reason: HOSPADM

## 2018-05-05 RX ORDER — SODIUM CHLORIDE 9 MG/ML
500 INJECTION, SOLUTION INTRAVENOUS
Status: DISCONTINUED | OUTPATIENT
Start: 2018-05-05 | End: 2018-05-09 | Stop reason: HOSPADM

## 2018-05-05 RX ADMIN — CEFTRIAXONE SODIUM 2 G: 2 INJECTION, POWDER, FOR SOLUTION INTRAMUSCULAR; INTRAVENOUS at 21:58

## 2018-05-05 ASSESSMENT — LIFESTYLE VARIABLES: EVER_SMOKED: NEVER

## 2018-05-06 ENCOUNTER — APPOINTMENT (OUTPATIENT)
Dept: RADIOLOGY | Facility: MEDICAL CENTER | Age: 83
DRG: 872 | End: 2018-05-06
Attending: INTERNAL MEDICINE
Payer: MEDICARE

## 2018-05-06 LAB
GLUCOSE BLD-MCNC: 134 MG/DL (ref 65–99)
GLUCOSE BLD-MCNC: 161 MG/DL (ref 65–99)
GLUCOSE BLD-MCNC: 172 MG/DL (ref 65–99)
GLUCOSE BLD-MCNC: 173 MG/DL (ref 65–99)

## 2018-05-06 PROCEDURE — A9270 NON-COVERED ITEM OR SERVICE: HCPCS | Performed by: HOSPITALIST

## 2018-05-06 PROCEDURE — 82962 GLUCOSE BLOOD TEST: CPT | Mod: 91

## 2018-05-06 PROCEDURE — 700102 HCHG RX REV CODE 250 W/ 637 OVERRIDE(OP): Performed by: INTERNAL MEDICINE

## 2018-05-06 PROCEDURE — A9270 NON-COVERED ITEM OR SERVICE: HCPCS | Performed by: INTERNAL MEDICINE

## 2018-05-06 PROCEDURE — 700111 HCHG RX REV CODE 636 W/ 250 OVERRIDE (IP): Performed by: INTERNAL MEDICINE

## 2018-05-06 PROCEDURE — 700105 HCHG RX REV CODE 258: Performed by: INTERNAL MEDICINE

## 2018-05-06 PROCEDURE — 76775 US EXAM ABDO BACK WALL LIM: CPT

## 2018-05-06 PROCEDURE — 700102 HCHG RX REV CODE 250 W/ 637 OVERRIDE(OP): Performed by: HOSPITALIST

## 2018-05-06 PROCEDURE — 770006 HCHG ROOM/CARE - MED/SURG/GYN SEMI*

## 2018-05-06 PROCEDURE — 99233 SBSQ HOSP IP/OBS HIGH 50: CPT | Performed by: HOSPITALIST

## 2018-05-06 RX ORDER — DEXTROSE MONOHYDRATE 25 G/50ML
25 INJECTION, SOLUTION INTRAVENOUS
Status: DISCONTINUED | OUTPATIENT
Start: 2018-05-06 | End: 2018-05-09 | Stop reason: HOSPADM

## 2018-05-06 RX ORDER — LISINOPRIL 5 MG/1
2.5 TABLET ORAL DAILY
Status: DISCONTINUED | OUTPATIENT
Start: 2018-05-06 | End: 2018-05-09 | Stop reason: HOSPADM

## 2018-05-06 RX ORDER — LATANOPROST 50 UG/ML
1 SOLUTION/ DROPS OPHTHALMIC NIGHTLY
Status: DISCONTINUED | OUTPATIENT
Start: 2018-05-06 | End: 2018-05-09 | Stop reason: HOSPADM

## 2018-05-06 RX ORDER — TRAMADOL HYDROCHLORIDE 50 MG/1
50-100 TABLET ORAL EVERY 6 HOURS PRN
Status: DISCONTINUED | OUTPATIENT
Start: 2018-05-06 | End: 2018-05-09 | Stop reason: HOSPADM

## 2018-05-06 RX ORDER — AMLODIPINE BESYLATE 5 MG/1
5 TABLET ORAL DAILY
Status: DISCONTINUED | OUTPATIENT
Start: 2018-05-06 | End: 2018-05-09 | Stop reason: HOSPADM

## 2018-05-06 RX ORDER — ATORVASTATIN CALCIUM 10 MG/1
5 TABLET, FILM COATED ORAL DAILY
Status: DISCONTINUED | OUTPATIENT
Start: 2018-05-06 | End: 2018-05-09 | Stop reason: HOSPADM

## 2018-05-06 RX ADMIN — ONDANSETRON HYDROCHLORIDE 4 MG: 2 INJECTION, SOLUTION INTRAMUSCULAR; INTRAVENOUS at 16:28

## 2018-05-06 RX ADMIN — STANDARDIZED SENNA CONCENTRATE AND DOCUSATE SODIUM 2 TABLET: 8.6; 5 TABLET, FILM COATED ORAL at 09:40

## 2018-05-06 RX ADMIN — AMLODIPINE BESYLATE 5 MG: 5 TABLET ORAL at 09:39

## 2018-05-06 RX ADMIN — ASPIRIN 81 MG: 81 TABLET, COATED ORAL at 09:40

## 2018-05-06 RX ADMIN — SODIUM CHLORIDE: 9 INJECTION, SOLUTION INTRAVENOUS at 12:54

## 2018-05-06 RX ADMIN — TRAMADOL HYDROCHLORIDE 50 MG: 50 TABLET, COATED ORAL at 09:40

## 2018-05-06 RX ADMIN — LISINOPRIL 2.5 MG: 5 TABLET ORAL at 09:40

## 2018-05-06 RX ADMIN — INSULIN HUMAN 1 UNITS: 100 INJECTION, SOLUTION PARENTERAL at 21:28

## 2018-05-06 RX ADMIN — INSULIN HUMAN 1 UNITS: 100 INJECTION, SOLUTION PARENTERAL at 12:51

## 2018-05-06 RX ADMIN — STANDARDIZED SENNA CONCENTRATE AND DOCUSATE SODIUM 2 TABLET: 8.6; 5 TABLET, FILM COATED ORAL at 01:08

## 2018-05-06 RX ADMIN — LATANOPROST 1 DROP: 50 SOLUTION OPHTHALMIC at 21:26

## 2018-05-06 RX ADMIN — ATORVASTATIN CALCIUM 5 MG: 10 TABLET, FILM COATED ORAL at 09:00

## 2018-05-06 RX ADMIN — CEFTRIAXONE 2 G: 2 INJECTION, POWDER, FOR SOLUTION INTRAMUSCULAR; INTRAVENOUS at 21:32

## 2018-05-06 RX ADMIN — INSULIN HUMAN 1 UNITS: 100 INJECTION, SOLUTION PARENTERAL at 17:15

## 2018-05-06 RX ADMIN — SODIUM CHLORIDE: 9 INJECTION, SOLUTION INTRAVENOUS at 01:02

## 2018-05-06 RX ADMIN — ASPIRIN 81 MG: 81 TABLET, COATED ORAL at 21:25

## 2018-05-06 RX ADMIN — STANDARDIZED SENNA CONCENTRATE AND DOCUSATE SODIUM 2 TABLET: 8.6; 5 TABLET, FILM COATED ORAL at 21:26

## 2018-05-06 ASSESSMENT — ENCOUNTER SYMPTOMS
SPEECH CHANGE: 0
WHEEZING: 0
DEPRESSION: 0
EYE PAIN: 0
DIARRHEA: 0
EYE DISCHARGE: 0
LOSS OF CONSCIOUSNESS: 0
SHORTNESS OF BREATH: 0
FLANK PAIN: 1
SPUTUM PRODUCTION: 0
NAUSEA: 0
BACK PAIN: 0
BRUISES/BLEEDS EASILY: 0
COUGH: 0
CHILLS: 1
VOMITING: 0
DIZZINESS: 0
MYALGIAS: 0
CLAUDICATION: 0
CHILLS: 0
NAUSEA: 1
FEVER: 0
HEADACHES: 0
FOCAL WEAKNESS: 0
WEAKNESS: 1
PALPITATIONS: 0
HEMOPTYSIS: 0
ABDOMINAL PAIN: 0
SORE THROAT: 0
VOMITING: 1
NECK PAIN: 0
FEVER: 1
CONSTIPATION: 0
DIAPHORESIS: 0
SENSORY CHANGE: 0

## 2018-05-06 ASSESSMENT — PATIENT HEALTH QUESTIONNAIRE - PHQ9
SUM OF ALL RESPONSES TO PHQ9 QUESTIONS 1 AND 2: 0
1. LITTLE INTEREST OR PLEASURE IN DOING THINGS: NOT AT ALL
SUM OF ALL RESPONSES TO PHQ9 QUESTIONS 1 AND 2: 0
2. FEELING DOWN, DEPRESSED, IRRITABLE, OR HOPELESS: NOT AT ALL
1. LITTLE INTEREST OR PLEASURE IN DOING THINGS: NOT AT ALL
2. FEELING DOWN, DEPRESSED, IRRITABLE, OR HOPELESS: NOT AT ALL

## 2018-05-06 ASSESSMENT — PAIN SCALES - GENERAL
PAINLEVEL_OUTOF10: 3
PAINLEVEL_OUTOF10: 8

## 2018-05-06 ASSESSMENT — LIFESTYLE VARIABLES
EVER_SMOKED: NEVER
ALCOHOL_USE: NO
SUBSTANCE_ABUSE: 0

## 2018-05-06 NOTE — PROGRESS NOTES
2 RN skin check completed w/ 2nd RN Mohsen, pt has no signs of pressure ulcers, heels have dry calluses bilaterally, sacrum has redness that's blanching, photos taken and mepilex applied, pt has surgical scar from stitches on left hip that's pink and intact, pt able to turn self w/ encouragement and reminder.

## 2018-05-06 NOTE — ASSESSMENT & PLAN NOTE
Acute on chronic  Baseline sodium appears to be 128 - 130  Na is 130, avoid overloading with fluids  Dc iv fluids

## 2018-05-06 NOTE — CARE PLAN
Problem: Safety  Goal: Will remain free from falls    Intervention: Assess risk factors for falls  Fall measures in place. Call light within reach, personal belongings close-by, bed in lowest position, IV pole on same side of bathroom, upper bed rails up, hourly rounding in place. Will continue to monitor pt for safety.       Problem: Skin Integrity  Goal: Risk for impaired skin integrity will decrease    Intervention: Assess risk factors for impaired skin integrity and/or pressure ulcers  Pt able to turn self w/ encouragement and reminder, 2 RN skin check completed, pt has redness on sacrum that's blanching, photo taken and uploaded in pt chart.

## 2018-05-06 NOTE — ASSESSMENT & PLAN NOTE
Patient was started on IV ceftriaxone  Reviewed urine cx--positive for morganella morganii  Will switch to cipro bid x 10 days  Add lidoderm patch to left flank area

## 2018-05-06 NOTE — ED NOTES
Pt's daughter Ursula ([phone]) called to update with pt's permission, phone given to pt and spoke with Ursula after.

## 2018-05-06 NOTE — ED PROVIDER NOTES
"ED Provider Note    Scribed for Dr. Dinh Clemente M.D. by Malvin Yang. 5/5/2018  7:34 PM    Primary care provider: Zahra Yañez M.D.  Means of arrival: EMS  History obtained from: Patient  History limited by: None    CHIEF COMPLAINT  Chief Complaint   Patient presents with   • Painful Urination     Burning urination x3-4 days, denies N/V and chills. occompanied by umbilical pain    • Flank Pain     \"hurts where my kidnies are kidneys\"       HPI  Bulmaro Wheeler is a 89 y.o. male who presents to the Emergency Department for painful urination onset 4 days ago with associated left flank pain and mild fever. He describes his painful urination as a burning and notes pain radiation from his left flank to his left abdomen and back. History is a little uncertain the patient does have some dementia  Patient denies nausea, vomiting.     REVIEW OF SYSTEMS  Pertinent positives include painful urination, left flank pain, left abdominal pain, left back pain. Pertinent negatives include no nausea, vomiting. As above, all other systems reviewed and are negative. C.    See HPI for further details.     PAST MEDICAL HISTORY   has a past medical history of Acute kidney failure, unspecified (HCC) (9/27/2013); Arrhythmia; Arthritis; Backpain; Carotid artery stenosis (2/18/2014); CATARACT; Dementia without behavioral disturbance (2/22/2018); Diabetes; Glaucoma; Heart burn; Hypertension; Indigestion; NSTEMI (non-ST elevated myocardial infarction) (CMS-HCC) (HCC) (10/24/2017); Occlusion and stenosis of carotid artery without mention of cerebral infarction (3/11/2014); Pneumonia; Pyelonephritis (October 2010); Pyelonephritis; Renal cyst (9/19/2013); Sepsis (9/27/2013); Urinary tract infection, site not specified (9/27/2013); and UTI (lower urinary tract infection) (9/1/2012).    SURGICAL HISTORY   has a past surgical history that includes ercp w/removal calculus (2009); eye surgery (1980's); colonoscopy (1999); cholecystectomy " "(1999); carotid endarterectomy (3/11/2014); and hip hemiarthroplasty (Left, 3/28/2018).    SOCIAL HISTORY  Social History   Substance Use Topics   • Smoking status: Never Smoker   • Smokeless tobacco: Never Used   • Alcohol use No      Comment: hasnt drank since 1979      History   Drug Use No       FAMILY HISTORY  Family History   Problem Relation Age of Onset   • Heart Disease Father    • Diabetes Father    • Alcohol/Drug Father    • Cancer Sister      ovarian   • Stroke Brother      age 90       CURRENT MEDICATIONS  Home Medications     Reviewed by Lon Green R.N. (Registered Nurse) on 05/05/18 at 1923  Med List Status: Not Addressed   Medication Last Dose Status   acetaminophen (TYLENOL) 325 MG Tab  Active   amLODIPine (NORVASC) 5 MG Tab 3/28/2018 Active   aspirin EC (ECOTRIN) 81 MG Tablet Delayed Response 3/27/2018 Active   atorvastatin (LIPITOR) 10 MG Tab 3/28/2018 Active   B Complex Vitamins (VITAMIN B COMPLEX PO) 3/28/2018 Active   cyanocobalamin (VITAMIN B-12) 100 MCG Tab 3/28/2018 Active   enoxaparin (LOVENOX) 40 MG/0.4ML Solution inj  Active   glucose 4 g 4 g Chew Tab  Active   insulin glargine (LANTUS) 100 UNIT/ML Solution  Active   insulin regular (HUMULIN R) 100 Unit/mL Solution  Active   latanoprost (XALATAN) 0.005 % Solution 3/27/2018 Active   lisinopril (PRINIVIL) 2.5 MG Tab 3/28/2018 Active   Multiple Vitamins-Minerals (CENTRUM SILVER PO) 3/28/2018 Active   polyethylene glycol/lytes (MIRALAX) Pack  Active   scopolamine (TRANSDERM-SCOP) 1 MG/3DAYS PATCH 72 HR  Active   senna-docusate (PERICOLACE OR SENOKOT S) 8.6-50 MG Tab  Active   sodium bicarbonate (SODIUM BICARBONATE) 650 MG Tab 3/28/2018 Active   vitamin D (CHOLECALCIFEROL) 1000 UNIT Tab 3/28/2018 Active                ALLERGIES  No Known Allergies    PHYSICAL EXAM  VITAL SIGNS: /86   Pulse 72   Temp (!) 38.1 °C (100.6 °F)   Resp 18   Ht 1.727 m (5' 8\")   Wt 76.7 kg (169 lb)   SpO2 95%   BMI 25.70 kg/m²     Constitutional: " Well developed, Elderly, Frail  HENT: Normocephalic, Atraumatic, Bilateral external ears normal, Oropharynx moist, No oral exudates.   Eyes: PERRLA, EOMI, Conjunctiva normal, No discharge.   Neck: No tenderness, Supple, No stridor.   Lymphatic: No lymphadenopathy noted.   Cardiovascular: Normal heart rate, Normal rhythm.   Thorax & Lungs: Clear to auscultation bilaterally, No respiratory distress, No wheezing, No crackles.   Abdomen: Soft, Supra pubic tenderness, No masses, No pulsatile masses.   Skin: Warm, Dry, No erythema, No rash.   Extremities:, No edema No cyanosis.   Musculoskeletal: CVA tenderness. No major deformities noted.  Intact distal pulses  Neurologic: Awake, slightly confused. Moves all extremities spontaneously.  Psychiatric: Affect normal, slightly confused    LABS  Results for orders placed or performed during the hospital encounter of 05/05/18   CBC WITH DIFFERENTIAL   Result Value Ref Range    WBC 15.5 (H) 4.8 - 10.8 K/uL    RBC 3.97 (L) 4.70 - 6.10 M/uL    Hemoglobin 11.9 (L) 14.0 - 18.0 g/dL    Hematocrit 35.2 (L) 42.0 - 52.0 %    MCV 88.7 81.4 - 97.8 fL    MCH 30.0 27.0 - 33.0 pg    MCHC 33.8 33.7 - 35.3 g/dL    RDW 44.9 35.9 - 50.0 fL    Platelet Count 232 164 - 446 K/uL    MPV 10.1 9.0 - 12.9 fL    Neutrophils-Polys 90.40 (H) 44.00 - 72.00 %    Lymphocytes 4.80 (L) 22.00 - 41.00 %    Monocytes 4.10 0.00 - 13.40 %    Eosinophils 0.10 0.00 - 6.90 %    Basophils 0.10 0.00 - 1.80 %    Immature Granulocytes 0.50 0.00 - 0.90 %    Nucleated RBC 0.00 /100 WBC    Neutrophils (Absolute) 14.04 (H) 1.82 - 7.42 K/uL    Lymphs (Absolute) 0.75 (L) 1.00 - 4.80 K/uL    Monos (Absolute) 0.63 0.00 - 0.85 K/uL    Eos (Absolute) 0.01 0.00 - 0.51 K/uL    Baso (Absolute) 0.01 0.00 - 0.12 K/uL    Immature Granulocytes (abs) 0.08 0.00 - 0.11 K/uL    NRBC (Absolute) 0.00 K/uL   COMP METABOLIC PANEL   Result Value Ref Range    Sodium 126 (L) 135 - 145 mmol/L    Potassium 4.0 3.6 - 5.5 mmol/L    Chloride 95 (L) 96 -  112 mmol/L    Co2 23 20 - 33 mmol/L    Anion Gap 8.0 0.0 - 11.9    Glucose 245 (H) 65 - 99 mg/dL    Bun 18 8 - 22 mg/dL    Creatinine 0.76 0.50 - 1.40 mg/dL    Calcium 9.3 8.5 - 10.5 mg/dL    AST(SGOT) 24 12 - 45 U/L    ALT(SGPT) 13 2 - 50 U/L    Alkaline Phosphatase 85 30 - 99 U/L    Total Bilirubin 0.6 0.1 - 1.5 mg/dL    Albumin 3.8 3.2 - 4.9 g/dL    Total Protein 7.2 6.0 - 8.2 g/dL    Globulin 3.4 1.9 - 3.5 g/dL    A-G Ratio 1.1 g/dL   LACTIC ACID   Result Value Ref Range    Lactic Acid 1.5 0.5 - 2.0 mmol/L   LACTIC ACID   Result Value Ref Range    Lactic Acid 1.2 0.5 - 2.0 mmol/L   URINALYSIS   Result Value Ref Range    Color DK Yellow     Character Turbid (A)     Specific Gravity 1.025 <1.035    Ph 7.0 5.0 - 8.0    Glucose 500 (A) Negative mg/dL    Ketones Trace (A) Negative mg/dL    Protein 100 (A) Negative mg/dL    Bilirubin Negative Negative    Urobilinogen, Urine 1.0 Negative    Nitrite Negative Negative    Leukocyte Esterase Large (A) Negative    Occult Blood Moderate (A) Negative    Micro Urine Req Microscopic    ESTIMATED GFR   Result Value Ref Range    GFR If African American >60 >60 mL/min/1.73 m 2    GFR If Non African American >60 >60 mL/min/1.73 m 2   URINE MICROSCOPIC (W/UA)   Result Value Ref Range    WBC Packed (A) /hpf    RBC 20-50 (A) /hpf    Bacteria Many (A) None /hpf    Epithelial Cells Negative /hpf    Hyaline Cast 0-2 /lpf      All labs reviewed by me.    RADIOLOGY  DX-CHEST-PORTABLE (1 VIEW)   Final Result         1.  No acute cardiopulmonary disease.   2.  Atherosclerosis        The radiologist's interpretation of all radiological studies have been reviewed by me.    COURSE & MEDICAL DECISION MAKING  Pertinent Labs & Imaging studies reviewed. (See chart for details)    7:34 PM - Patient seen and examined at bedside. Informed patient that labs and radiology would be ordered to assess. Ordered CBC, CMP, lactate, blood culture, urinalysis, and DX chest to evaluate his symptoms. The  differential diagnoses include but are not limited to: UTI, urosepsis    9:43 PM Spoke with Dr. Moser, hospitalist, regarding the above case findings. He agrees to admit at this time.     Decision Making:  Patient has urinary tract infection with elevated white blood cell count possibly some mental status change although does appear definitely septic. Feel he should be admitted for IV antibiotics. Has had Rocephin in the ER after appropriate cultures    DISPOSITION:  Patient will be admitted to Dr. Moser in hospitalist condition.     FINAL IMPRESSION  1. Acute UTI          Malvin MANSFIELD (Joel), am scribing for, and in the presence of, Dinh Clemente M.D..    Electronically signed by: Malvin Yang (Joel), 5/5/2018    Dinh MANSFIELD M.D. personally performed the services described in this documentation, as scribed by Malvin Yang in my presence, and it is both accurate and complete.    The note accurately reflects work and decisions made by me.  Dinh Clemente  5/5/2018  11:15 PM

## 2018-05-06 NOTE — ED TRIAGE NOTES
"Chief Complaint   Patient presents with   • Painful Urination     Burning urination x3-4 days, denies N/V and chills.    • Flank Pain     \"hurts where my kidnies are kidneys\"       /86   Pulse 72   Temp (!) 38.1 °C (100.6 °F)   Resp 18   Ht 1.727 m (5' 8\")   Wt 76.7 kg (169 lb)   SpO2 95%   BMI 25.70 kg/m²     BIB EMS from home. Has been having burning urination x3-4 days as well as umbilical and flank pain. Denies N/V, dizziness and fever/chills.   "

## 2018-05-06 NOTE — PROGRESS NOTES
[facility] Hospitalist Progress Note    Date of Service: 5/6/2018    Chief Complaint  89 y.o. male admitted 5/5/2018  DM2 presented with dysuria and left flank pain for the past 3 days. This pain radiates from his left flank down to his abdomen. Pain is constant, he denies any relieving or exacerbating factors. Patient also reports fevers and chills. His pain is associated with nausea. He denies any chest pain, shortness of breath, headache or diarrhea. In the ER he was afebrile. Initial workup revealed WBC 15.5, sodium 126, glucose 245, lactic acid 1.5. UA was positive for infection.   Started on Rocephin IV and IVFs 100/hr.    Interval Problem Update  5/6:  Feeling weak overall with left leg pain, states pain entire left leg.  Cut back NS to 75/hr since taking po in.  BCs and UC pending.  Started on diabetic diet, dc'd 2 gram sodium cardiac diet, unclear indication of low sodium diet.  No h/o CHF.  Us/ renal negative, CXR negative.  Wbc 15.5.    Consultants/Specialty  none    Disposition  TBD, lives at home with son, has walker and cane at home.          Review of Systems   Constitutional: Positive for malaise/fatigue. Negative for chills, diaphoresis and fever.        Elderly male, talking full sentences.   HENT: Negative for congestion and sore throat.    Eyes: Negative for pain and discharge.   Respiratory: Negative for cough, hemoptysis, sputum production, shortness of breath and wheezing.    Cardiovascular: Negative for chest pain, palpitations, claudication and leg swelling.   Gastrointestinal: Negative for abdominal pain, constipation, diarrhea, melena, nausea and vomiting.   Genitourinary: Positive for dysuria, flank pain and frequency. Negative for urgency.   Musculoskeletal: Negative for back pain, joint pain, myalgias and neck pain.   Skin: Negative for itching and rash.   Neurological: Positive for weakness. Negative for dizziness, sensory change, speech change, focal weakness, loss of consciousness and  headaches.   Endo/Heme/Allergies: Does not bruise/bleed easily.   Psychiatric/Behavioral: Negative for depression, substance abuse and suicidal ideas.      Physical Exam  Laboratory/Imaging   Hemodynamics  Temp (24hrs), Av.7 °C (98.1 °F), Min:36.1 °C (96.9 °F), Max:38.1 °C (100.6 °F)   Temperature: 36.3 °C (97.3 °F)  Pulse  Av.4  Min: 65  Max: 88 Heart Rate (Monitored): 73  Blood Pressure : 126/52, NIBP: 123/44      Respiratory      Respiration: 12, Pulse Oximetry: 98 %, O2 Daily Delivery Respiratory : Room Air with O2 Available        RUL Breath Sounds: Diminished, RML Breath Sounds: Diminished, RLL Breath Sounds: Diminished, HUNTER Breath Sounds: Diminished, LLL Breath Sounds: Diminished    Fluids    Intake/Output Summary (Last 24 hours) at 18 1816  Last data filed at 18 1800   Gross per 24 hour   Intake              475 ml   Output              850 ml   Net             -375 ml       Nutrition  Orders Placed This Encounter   Procedures   • Diet Order     Standing Status:   Standing     Number of Occurrences:   1     Order Specific Question:   Diet:     Answer:   Diabetic [3]     Physical Exam   Constitutional: He is oriented to person, place, and time. He appears well-developed and well-nourished. No distress.   HENT:   Head: Normocephalic and atraumatic.   Mouth/Throat: Oropharynx is clear and moist. No oropharyngeal exudate.   Eyes: Conjunctivae and EOM are normal. Pupils are equal, round, and reactive to light. Right eye exhibits no discharge. Left eye exhibits no discharge. No scleral icterus.   Neck: Normal range of motion. Neck supple. No JVD present. No tracheal deviation present. No thyromegaly present.   Cardiovascular: Normal rate, regular rhythm and normal heart sounds.  Exam reveals no gallop and no friction rub.    No murmur heard.  Pulmonary/Chest: Effort normal and breath sounds normal. No respiratory distress. He has no wheezes. He has no rales. He exhibits no tenderness.    Abdominal: Soft. Bowel sounds are normal. He exhibits no distension and no mass. There is no tenderness. There is no rebound and no guarding.   Genitourinary:   Genitourinary Comments: No catheters   Musculoskeletal: Normal range of motion. He exhibits tenderness (normal appearance to left leg, no point tenderness with palpation, no edema or deformities.). He exhibits no edema.   Lymphadenopathy:     He has no cervical adenopathy.   Neurological: He is alert and oriented to person, place, and time. No cranial nerve deficit. He exhibits normal muscle tone.   Skin: Skin is warm and dry. No rash noted. He is not diaphoretic. No erythema.   Psychiatric: He has a normal mood and affect. His behavior is normal. Judgment and thought content normal.   Nursing note and vitals reviewed.      Recent Labs      05/05/18 1923   WBC  15.5*   RBC  3.97*   HEMOGLOBIN  11.9*   HEMATOCRIT  35.2*   MCV  88.7   MCH  30.0   MCHC  33.8   RDW  44.9   PLATELETCT  232   MPV  10.1     Recent Labs      05/05/18 1923   SODIUM  126*   POTASSIUM  4.0   CHLORIDE  95*   CO2  23   GLUCOSE  245*   BUN  18   CREATININE  0.76   CALCIUM  9.3                      Assessment/Plan     Sepsis (CMS-HCC)- (present on admission)   Assessment & Plan    This is sepsis (without associated acute organ dysfunction).   Likely source is pyelonephritis  Patient has been started on IV fluids per septic protocol  Lactate is within normal limits  Patient was restarted on IV ceftriaxone  Follow blood and urine cultures        Pyelonephritis- (present on admission)   Assessment & Plan    Patient was started on IV ceftriaxone  Follow urine cultures  Check renal ultrasound        Type 2 diabetes mellitus (HCC)- (present on admission)   Assessment & Plan    Uncontrolled  Start on insulin sliding scale with serial Accu-Cheks  Check hemoglobin A1c  Hypoglycemic protocol and place  Hold metformin            Hyponatremia- (present on admission)   Assessment & Plan    Acute  on chronic  Baseline sodium appears to be 128 - 130  Patient appears dehydrated at this time  IV fluid hydration with normal saline and assess response  Monitor BMP  5/6:  Was placed on low sodium cardiac diabetic diet on admission, changed to diabetic diet.          Hypertension- (present on admission)   Assessment & Plan    Well-controlled  Continue Norvasc and lisinopril          Quality-Core Measures

## 2018-05-06 NOTE — ASSESSMENT & PLAN NOTE
Uncontrolled  Start on insulin sliding scale with serial Accu-Cheks  Check hemoglobin A1c  Hypoglycemic protocol and place  Hold metformin

## 2018-05-06 NOTE — PROGRESS NOTES
Pt up to floor via gurney, sliding board used to transfer pt to hospital bed, VSS, pt is AAOx4 forgetful @ times per report, pt has no co of pain at the moment, admit profile completed, 2 RN skin check completed, fluids running, pt medicated per MAR, updated on POC. Fall measures in place. Call light within reach, personal belongings close-by, bed in lowest position, IV pole on same side of bathroom, upper bed rails up, hourly rounding in place. Will continue to monitor pt for safety.

## 2018-05-06 NOTE — CARE PLAN
Problem: Safety  Goal: Will remain free from injury  Outcome: PROGRESSING AS EXPECTED  Patient expresses needs to clinical staff

## 2018-05-06 NOTE — H&P
" Hospital Medicine History and Physical    Date of Service  5/5/2018    Chief Complaint  Chief Complaint   Patient presents with   • Painful Urination     Burning urination x3-4 days, denies N/V and chills. occompanied by umbilical pain    • Flank Pain     \"hurts where my kidnies are kidneys\"       History of Presenting Illness  89 y.o. male who presented 5/5/2018 with dysuria and left flank pain for the past 3 days. This pain radiates from his left flank down to his abdomen. Pain is constant, he denies any relieving or exacerbating factors. Patient also reports fevers and chills. His pain is associated with nausea. He denies any chest pain, shortness of breath, headache or diarrhea. In the ER he was afebrile. Initial workup revealed WBC 15.5, sodium 126, glucose 245, lactic acid 1.5. UA was positive for infection.       Primary Care Physician  Zahra Yañez M.D.    Consultants  None    Code Status  Full Code    Review of Systems  Review of Systems   Constitutional: Positive for chills, fever and malaise/fatigue.   Respiratory: Negative for cough and shortness of breath.    Cardiovascular: Negative for chest pain.   Gastrointestinal: Positive for nausea and vomiting.   Genitourinary: Positive for dysuria and flank pain.   All other systems reviewed and are negative.       Past Medical History  Past Medical History:   Diagnosis Date   • Dementia without behavioral disturbance 2/22/2018   • NSTEMI (non-ST elevated myocardial infarction) (CMS-HCC) ([facility]) 10/24/2017   • Occlusion and stenosis of carotid artery without mention of cerebral infarction 3/11/2014   • Carotid artery stenosis 2/18/2014   • Urinary tract infection, site not specified 9/27/2013   • Sepsis 9/27/2013   • Acute kidney failure, unspecified ([facility]) 9/27/2013   • Renal cyst 9/19/2013   • UTI (lower urinary tract infection) 9/1/2012   • Pyelonephritis October 2010   • Arrhythmia     in 80's, not recent   • Arthritis    • Backpain    • CATARACT     " surgery in 1985   • Diabetes    • Glaucoma     Interocular lenses placed in 1985   • Heart burn    • Hypertension    • Indigestion    • Pneumonia    • Pyelonephritis        Surgical History  Past Surgical History:   Procedure Laterality Date   • HIP HEMIARTHROPLASTY Left 3/28/2018    Procedure: HIP HEMIARTHROPLASTY;  Surgeon: Jim De Souza M.D.;  Location: SURGERY [facility];  Service: Orthopedics   • CAROTID ENDARTERECTOMY  3/11/2014    Performed by Bob Antonio M.D. at SURGERY [facility]   • ERCP W/REMOVAL CALCULUS  2009   • COLONOSCOPY  1999   • CHOLECYSTECTOMY  1999   • EYE SURGERY  1980's    cataracts OU       Medications  No current facility-administered medications on file prior to encounter.      Current Outpatient Prescriptions on File Prior to Encounter   Medication Sig Dispense Refill   • acetaminophen (TYLENOL) 325 MG Tab Take 2 Tabs by mouth every 6 hours. 30 Tab 0   • cyanocobalamin (VITAMIN B-12) 100 MCG Tab Take 100 mcg by mouth every day.     • latanoprost (XALATAN) 0.005 % Solution Place 1 Drop in both eyes every evening.     • amLODIPine (NORVASC) 5 MG Tab Take 5 mg by mouth every day.     • lisinopril (PRINIVIL) 2.5 MG Tab Take 1 Tab by mouth every day. 90 Tab 3   • sodium bicarbonate (SODIUM BICARBONATE) 650 MG Tab Take 1 Tab by mouth 4 times a day. 540 Tab 3   • atorvastatin (LIPITOR) 10 MG Tab Take 0.5 Tabs by mouth every day. 45 Tab 3   • aspirin EC (ECOTRIN) 81 MG Tablet Delayed Response Take 81 mg by mouth every evening.     • B Complex Vitamins (VITAMIN B COMPLEX PO) Take 1 Tab by mouth every day.     • Multiple Vitamins-Minerals (CENTRUM SILVER PO) Take 1 Tab by mouth every day.     • vitamin D (CHOLECALCIFEROL) 1000 UNIT Tab Take 1,000 Units by mouth every day.         Family History  Family History   Problem Relation Age of Onset   • Heart Disease Father    • Diabetes Father    • Alcohol/Drug Father    • Cancer Sister      ovarian   • Stroke Brother      age 90        Social History  Social History   Substance Use Topics   • Smoking status: Never Smoker   • Smokeless tobacco: Never Used   • Alcohol use No      Comment: hasnt drank since        Allergies  No Known Allergies     Physical Exam  Laboratory   Hemodynamics  Temp (24hrs), Av.1 °C (100.6 °F), Min:38.1 °C (100.6 °F), Max:38.1 °C (100.6 °F)   Temperature: (!) 38.1 °C (100.6 °F)  Pulse  Av.3  Min: 72  Max: 84 Heart Rate (Monitored): 84  Blood Pressure : 114/86, NIBP: 114/86      Respiratory      Respiration: 18, Pulse Oximetry: 95 %             Physical Exam   Constitutional: He is oriented to person, place, and time. He appears well-developed and well-nourished. No distress.   HENT:   Head: Normocephalic and atraumatic.   Mouth/Throat: Oropharynx is clear and moist.   Eyes: Conjunctivae are normal. Pupils are equal, round, and reactive to light.   Neck: Neck supple.   Cardiovascular: Normal rate, regular rhythm and normal heart sounds.    Pulmonary/Chest: Effort normal and breath sounds normal. No respiratory distress. He has no wheezes.   Abdominal: Soft. Bowel sounds are normal. He exhibits no distension. There is no tenderness. There is no rebound.   Genitourinary:   Genitourinary Comments: Left CVA tenderness   Musculoskeletal: Normal range of motion. He exhibits no edema.   Neurological: He is alert and oriented to person, place, and time. No cranial nerve deficit. Coordination normal.   Skin: Skin is warm.   Psychiatric: He has a normal mood and affect. His behavior is normal.   Nursing note and vitals reviewed.      Recent Labs      18   WBC  15.5*   RBC  3.97*   HEMOGLOBIN  11.9*   HEMATOCRIT  35.2*   MCV  88.7   MCH  30.0   MCHC  33.8   RDW  44.9   PLATELETCT  232   MPV  10.1     Recent Labs      18   SODIUM  126*   POTASSIUM  4.0   CHLORIDE  95*   CO2  23   GLUCOSE  245*   BUN  18   CREATININE  0.76   CALCIUM  9.3     Recent Labs      18   ALTSGPT  13    ASTSGOT  24   ALKPHOSPHAT  85   TBILIRUBIN  0.6   GLUCOSE  245*                 Lab Results   Component Value Date    TROPONINI <0.01 03/28/2018     Urinalysis:    Lab Results  Component Value Date/Time   SPECGRAVITY 1.025 05/05/2018 2021   GLUCOSEUR 500 (A) 05/05/2018 2021   KETONES Trace (A) 05/05/2018 2021   NITRITE Negative 05/05/2018 2021   WBCURINE Packed (A) 05/05/2018 2021   RBCURINE 20-50 (A) 05/05/2018 2021   BACTERIA Many (A) 05/05/2018 2021   EPITHELCELL Negative 05/05/2018 2021        Imaging  DX-CHEST-PORTABLE (1 VIEW)   Final Result         1.  No acute cardiopulmonary disease.   2.  Atherosclerosis      US-RENAL    (Results Pending)        Assessment/Plan     I anticipate this patient will require at least two midnights for appropriate medical management, necessitating inpatient admission.    Sepsis (CMS-HCC)- (present on admission)   Assessment & Plan    This is sepsis (without associated acute organ dysfunction).   Likely source is pyelonephritis  Patient has been started on IV fluids per septic protocol  Lactate is within normal limits  Patient was restarted on IV ceftriaxone  Follow blood and urine cultures        Pyelonephritis- (present on admission)   Assessment & Plan    Patient was started on IV ceftriaxone  Follow urine cultures  Check renal ultrasound        Type 2 diabetes mellitus (HCC)- (present on admission)   Assessment & Plan    Uncontrolled  Start on insulin sliding scale with serial Accu-Cheks  Check hemoglobin A1c  Hypoglycemic protocol and place  Hold metformin            Hyponatremia- (present on admission)   Assessment & Plan    Acute on chronic  Baseline sodium appears to be 128 - 130  Patient appears dehydrated at this time  I will start him on IV fluid hydration with normal saline and assess response  Monitor BMP          Hypertension- (present on admission)   Assessment & Plan    Well-controlled  Continue Norvasc and lisinopril            VTE prophylaxis: SCD.

## 2018-05-06 NOTE — ASSESSMENT & PLAN NOTE
This is sepsis (without associated acute organ dysfunction).   Likely source is pyelonephritis  Patient has been started on IV fluids per septic protocol  Lactate is within normal limits  Patient was restarted on IV ceftriaxone  Follow blood and urine cultures

## 2018-05-06 NOTE — CARE PLAN
Problem: Infection  Goal: Will remain free from infection  Outcome: PROGRESSING AS EXPECTED  Patient performs hand hygiene

## 2018-05-06 NOTE — ED NOTES
Med rec complete per pt at bedside  Allergies reviewed - NKDA  Pt reports he may have been given some ABX while in hospital, discharged 2 days ago  Per our records, pt was discharged to [facility] on 3/30/18

## 2018-05-07 ENCOUNTER — APPOINTMENT (OUTPATIENT)
Dept: RADIOLOGY | Facility: MEDICAL CENTER | Age: 83
DRG: 872 | End: 2018-05-07
Attending: HOSPITALIST
Payer: MEDICARE

## 2018-05-07 PROBLEM — D64.9 ANEMIA: Status: ACTIVE | Noted: 2018-05-07

## 2018-05-07 LAB
ANION GAP SERPL CALC-SCNC: 8 MMOL/L (ref 0–11.9)
BASOPHILS # BLD AUTO: 0.1 % (ref 0–1.8)
BASOPHILS # BLD: 0.01 K/UL (ref 0–0.12)
BUN SERPL-MCNC: 14 MG/DL (ref 8–22)
CALCIUM SERPL-MCNC: 8.5 MG/DL (ref 8.5–10.5)
CHLORIDE SERPL-SCNC: 99 MMOL/L (ref 96–112)
CO2 SERPL-SCNC: 23 MMOL/L (ref 20–33)
CREAT SERPL-MCNC: 0.7 MG/DL (ref 0.5–1.4)
EOSINOPHIL # BLD AUTO: 0.04 K/UL (ref 0–0.51)
EOSINOPHIL NFR BLD: 0.3 % (ref 0–6.9)
ERYTHROCYTE [DISTWIDTH] IN BLOOD BY AUTOMATED COUNT: 44.1 FL (ref 35.9–50)
GLUCOSE BLD-MCNC: 116 MG/DL (ref 65–99)
GLUCOSE BLD-MCNC: 170 MG/DL (ref 65–99)
GLUCOSE BLD-MCNC: 198 MG/DL (ref 65–99)
GLUCOSE SERPL-MCNC: 126 MG/DL (ref 65–99)
HCT VFR BLD AUTO: 26.8 % (ref 42–52)
HGB BLD-MCNC: 9 G/DL (ref 14–18)
IMM GRANULOCYTES # BLD AUTO: 0.07 K/UL (ref 0–0.11)
IMM GRANULOCYTES NFR BLD AUTO: 0.5 % (ref 0–0.9)
IRON SATN MFR SERPL: 6 % (ref 15–55)
IRON SERPL-MCNC: 13 UG/DL (ref 50–180)
LYMPHOCYTES # BLD AUTO: 1.16 K/UL (ref 1–4.8)
LYMPHOCYTES NFR BLD: 7.9 % (ref 22–41)
MCH RBC QN AUTO: 29.5 PG (ref 27–33)
MCHC RBC AUTO-ENTMCNC: 33.6 G/DL (ref 33.7–35.3)
MCV RBC AUTO: 87.9 FL (ref 81.4–97.8)
MONOCYTES # BLD AUTO: 0.78 K/UL (ref 0–0.85)
MONOCYTES NFR BLD AUTO: 5.3 % (ref 0–13.4)
NEUTROPHILS # BLD AUTO: 12.69 K/UL (ref 1.82–7.42)
NEUTROPHILS NFR BLD: 85.9 % (ref 44–72)
NRBC # BLD AUTO: 0 K/UL
NRBC BLD-RTO: 0 /100 WBC
PLATELET # BLD AUTO: 156 K/UL (ref 164–446)
PMV BLD AUTO: 9.8 FL (ref 9–12.9)
POTASSIUM SERPL-SCNC: 3.7 MMOL/L (ref 3.6–5.5)
RBC # BLD AUTO: 3.05 M/UL (ref 4.7–6.1)
SODIUM SERPL-SCNC: 130 MMOL/L (ref 135–145)
TIBC SERPL-MCNC: 234 UG/DL (ref 250–450)
WBC # BLD AUTO: 14.8 K/UL (ref 4.8–10.8)

## 2018-05-07 PROCEDURE — A9270 NON-COVERED ITEM OR SERVICE: HCPCS | Performed by: HOSPITALIST

## 2018-05-07 PROCEDURE — G8988 SELF CARE GOAL STATUS: HCPCS | Mod: CJ

## 2018-05-07 PROCEDURE — 700102 HCHG RX REV CODE 250 W/ 637 OVERRIDE(OP): Performed by: HOSPITALIST

## 2018-05-07 PROCEDURE — 97165 OT EVAL LOW COMPLEX 30 MIN: CPT

## 2018-05-07 PROCEDURE — 700105 HCHG RX REV CODE 258: Performed by: HOSPITALIST

## 2018-05-07 PROCEDURE — 82962 GLUCOSE BLOOD TEST: CPT | Mod: 91

## 2018-05-07 PROCEDURE — 700111 HCHG RX REV CODE 636 W/ 250 OVERRIDE (IP): Performed by: INTERNAL MEDICINE

## 2018-05-07 PROCEDURE — 770006 HCHG ROOM/CARE - MED/SURG/GYN SEMI*

## 2018-05-07 PROCEDURE — 99232 SBSQ HOSP IP/OBS MODERATE 35: CPT | Performed by: HOSPITALIST

## 2018-05-07 PROCEDURE — G8987 SELF CARE CURRENT STATUS: HCPCS | Mod: CL

## 2018-05-07 PROCEDURE — 85025 COMPLETE CBC W/AUTO DIFF WBC: CPT

## 2018-05-07 PROCEDURE — 80048 BASIC METABOLIC PNL TOTAL CA: CPT

## 2018-05-07 PROCEDURE — 36415 COLL VENOUS BLD VENIPUNCTURE: CPT

## 2018-05-07 PROCEDURE — 73610 X-RAY EXAM OF ANKLE: CPT | Mod: LT

## 2018-05-07 PROCEDURE — 83550 IRON BINDING TEST: CPT

## 2018-05-07 PROCEDURE — 83540 ASSAY OF IRON: CPT

## 2018-05-07 PROCEDURE — 700102 HCHG RX REV CODE 250 W/ 637 OVERRIDE(OP): Performed by: INTERNAL MEDICINE

## 2018-05-07 PROCEDURE — A9270 NON-COVERED ITEM OR SERVICE: HCPCS | Performed by: INTERNAL MEDICINE

## 2018-05-07 RX ORDER — GABAPENTIN 100 MG/1
100 CAPSULE ORAL 3 TIMES DAILY
Status: DISCONTINUED | OUTPATIENT
Start: 2018-05-07 | End: 2018-05-09 | Stop reason: HOSPADM

## 2018-05-07 RX ADMIN — INSULIN HUMAN 1 UNITS: 100 INJECTION, SOLUTION PARENTERAL at 17:29

## 2018-05-07 RX ADMIN — GABAPENTIN 100 MG: 100 CAPSULE ORAL at 20:12

## 2018-05-07 RX ADMIN — TRAMADOL HYDROCHLORIDE 50 MG: 50 TABLET, COATED ORAL at 08:34

## 2018-05-07 RX ADMIN — GABAPENTIN 100 MG: 100 CAPSULE ORAL at 15:19

## 2018-05-07 RX ADMIN — LATANOPROST 1 DROP: 50 SOLUTION OPHTHALMIC at 20:12

## 2018-05-07 RX ADMIN — ATORVASTATIN CALCIUM 5 MG: 10 TABLET, FILM COATED ORAL at 08:39

## 2018-05-07 RX ADMIN — LISINOPRIL 2.5 MG: 5 TABLET ORAL at 08:38

## 2018-05-07 RX ADMIN — TRAMADOL HYDROCHLORIDE 50 MG: 50 TABLET, COATED ORAL at 15:19

## 2018-05-07 RX ADMIN — CEFTRIAXONE 2 G: 2 INJECTION, POWDER, FOR SOLUTION INTRAMUSCULAR; INTRAVENOUS at 20:15

## 2018-05-07 RX ADMIN — STANDARDIZED SENNA CONCENTRATE AND DOCUSATE SODIUM 2 TABLET: 8.6; 5 TABLET, FILM COATED ORAL at 08:36

## 2018-05-07 RX ADMIN — SODIUM CHLORIDE: 9 INJECTION, SOLUTION INTRAVENOUS at 17:20

## 2018-05-07 RX ADMIN — ASPIRIN 81 MG: 81 TABLET, COATED ORAL at 20:12

## 2018-05-07 RX ADMIN — STANDARDIZED SENNA CONCENTRATE AND DOCUSATE SODIUM 2 TABLET: 8.6; 5 TABLET, FILM COATED ORAL at 20:11

## 2018-05-07 RX ADMIN — INSULIN HUMAN 1 UNITS: 100 INJECTION, SOLUTION PARENTERAL at 20:21

## 2018-05-07 RX ADMIN — AMLODIPINE BESYLATE 5 MG: 5 TABLET ORAL at 08:35

## 2018-05-07 ASSESSMENT — ENCOUNTER SYMPTOMS
CHILLS: 0
NECK PAIN: 0
FLANK PAIN: 1
SPEECH CHANGE: 0
CONSTIPATION: 0
EYE DISCHARGE: 0
PALPITATIONS: 0
WEAKNESS: 1
BACK PAIN: 0
VOMITING: 0
WHEEZING: 0
SPUTUM PRODUCTION: 0
NAUSEA: 0
FOCAL WEAKNESS: 0
ABDOMINAL PAIN: 0
COUGH: 0
CLAUDICATION: 0
FEVER: 0
LOSS OF CONSCIOUSNESS: 0
DIARRHEA: 0
EYE PAIN: 0
MYALGIAS: 0
DIAPHORESIS: 0
HEADACHES: 0
HEMOPTYSIS: 0
SENSORY CHANGE: 0
SHORTNESS OF BREATH: 0
BRUISES/BLEEDS EASILY: 0
SORE THROAT: 0
DIZZINESS: 0
DEPRESSION: 0

## 2018-05-07 ASSESSMENT — COGNITIVE AND FUNCTIONAL STATUS - GENERAL
PERSONAL GROOMING: A LITTLE
DRESSING REGULAR LOWER BODY CLOTHING: A LOT
TOILETING: A LOT
DRESSING REGULAR UPPER BODY CLOTHING: A LOT
EATING MEALS: TOTAL
DAILY ACTIVITIY SCORE: 12
SUGGESTED CMS G CODE MODIFIER DAILY ACTIVITY: CL
HELP NEEDED FOR BATHING: A LOT

## 2018-05-07 ASSESSMENT — PAIN SCALES - GENERAL
PAINLEVEL_OUTOF10: 3
PAINLEVEL_OUTOF10: 3
PAINLEVEL_OUTOF10: 5
PAINLEVEL_OUTOF10: 7
PAINLEVEL_OUTOF10: 4
PAINLEVEL_OUTOF10: 5

## 2018-05-07 ASSESSMENT — LIFESTYLE VARIABLES: SUBSTANCE_ABUSE: 0

## 2018-05-07 NOTE — DISCHARGE PLANNING
Transitional Care Navigator:    PT/OT pending.  SNF ordered.  Pt has utilized SNF services at [facility] and [place] recently. Pt has also used [facility]  in the past.

## 2018-05-07 NOTE — CARE PLAN
Problem: Knowledge Deficit  Goal: Knowledge of disease process/condition, treatment plan, diagnostic tests, and medications will improve    Intervention: Explain information regarding disease process/condition, treatment plan, diagnostic tests, and medications and document in education  Patient educated about medications and given rationale for prescribed medication.      Problem: Skin Integrity  Goal: Risk for impaired skin integrity will decrease    Intervention: Implement precautions to protect skin integrity in collaboration with the interdisciplinary team  Mipelex in place on sacrum to prevent skin breakdown.

## 2018-05-07 NOTE — PROGRESS NOTES
Pt is A&OX4, forgetful at times, VSS, afebrile, sats 97% on room air. C/o left leg pain, declined prns. Uses urinal independently to void. NS infusing at 75 ml/hr. Call light in reach, bed locked and lowered, nonskid socks applied. Pt resting comfortably, will continue to monitor.

## 2018-05-07 NOTE — THERAPY
"Occupational Therapy Evaluation completed.   Functional Status:  Mod/max A with ADLs and txfs  Plan of Care: Will benefit from Occupational Therapy 3 times per week  Discharge Recommendations:  Equipment: Will Continue to Assess for Equipment Needs. Post-acute therapy Discharge to a transitional care facility for continued therapy services.    See \"Rehab Therapy-Acute\" Patient Summary Report for complete documentation.    "

## 2018-05-07 NOTE — DISCHARGE PLANNING
Anticipated DC Disposition: SNF     Action: LSW met w/ pt at bedside and explained SNF Choice and pt was NOT agreeable to SNF. Pt stated that he has been to [facility] and [facility] and pt said he felt like he was in snf. LSW explained SNF/Rehab services and pt states he wants to go home w/ HH.      Barriers to Discharge: Pt refusing SNF.      Plan: LSW will consult MD and discuss Home w/ HH vs SNF.

## 2018-05-07 NOTE — PROGRESS NOTES
Received report from night RN, assumed care at 0700. Pt A&OX4, can be forgetful at at times. Pt resting in bed. Pt with complaints of pain to left lower leg, medicated per MAR. Pt takes pills floated in applesauce. PIV patent, running IVF per MAR. Pt states all needs are met at this time. Bed alarm in place, bed in lowest and locked position, non-skid socks in place. POC discussed, communication board updated. Call light in reach, hourly rounding in place.

## 2018-05-07 NOTE — CARE PLAN
Problem: Infection  Goal: Will remain free from infection  Outcome: PROGRESSING AS EXPECTED  Continue to assess for s/s of infection, continue to administer abx as ordered    Problem: Skin Integrity  Goal: Risk for impaired skin integrity will decrease  Outcome: PROGRESSING AS EXPECTED  Continue to assess skin integrity, monitor for skin breakdown, encourage frequent turning/repositioning

## 2018-05-08 PROBLEM — R53.81 PHYSICAL DECONDITIONING: Status: ACTIVE | Noted: 2018-05-08

## 2018-05-08 LAB
ANION GAP SERPL CALC-SCNC: 8 MMOL/L (ref 0–11.9)
BACTERIA UR CULT: ABNORMAL
BACTERIA UR CULT: ABNORMAL
BASOPHILS # BLD AUTO: 0.3 % (ref 0–1.8)
BASOPHILS # BLD: 0.03 K/UL (ref 0–0.12)
BUN SERPL-MCNC: 11 MG/DL (ref 8–22)
CALCIUM SERPL-MCNC: 8.2 MG/DL (ref 8.5–10.5)
CHLORIDE SERPL-SCNC: 99 MMOL/L (ref 96–112)
CO2 SERPL-SCNC: 23 MMOL/L (ref 20–33)
CREAT SERPL-MCNC: 0.67 MG/DL (ref 0.5–1.4)
EOSINOPHIL # BLD AUTO: 0.07 K/UL (ref 0–0.51)
EOSINOPHIL NFR BLD: 0.6 % (ref 0–6.9)
ERYTHROCYTE [DISTWIDTH] IN BLOOD BY AUTOMATED COUNT: 43.9 FL (ref 35.9–50)
GLUCOSE BLD-MCNC: 119 MG/DL (ref 65–99)
GLUCOSE BLD-MCNC: 140 MG/DL (ref 65–99)
GLUCOSE BLD-MCNC: 171 MG/DL (ref 65–99)
GLUCOSE BLD-MCNC: 179 MG/DL (ref 65–99)
GLUCOSE BLD-MCNC: 202 MG/DL (ref 65–99)
GLUCOSE SERPL-MCNC: 98 MG/DL (ref 65–99)
HCT VFR BLD AUTO: 26.8 % (ref 42–52)
HGB BLD-MCNC: 8.8 G/DL (ref 14–18)
IMM GRANULOCYTES # BLD AUTO: 0.05 K/UL (ref 0–0.11)
IMM GRANULOCYTES NFR BLD AUTO: 0.4 % (ref 0–0.9)
LYMPHOCYTES # BLD AUTO: 1.13 K/UL (ref 1–4.8)
LYMPHOCYTES NFR BLD: 10 % (ref 22–41)
MCH RBC QN AUTO: 28.9 PG (ref 27–33)
MCHC RBC AUTO-ENTMCNC: 32.8 G/DL (ref 33.7–35.3)
MCV RBC AUTO: 88.2 FL (ref 81.4–97.8)
MONOCYTES # BLD AUTO: 0.71 K/UL (ref 0–0.85)
MONOCYTES NFR BLD AUTO: 6.3 % (ref 0–13.4)
NEUTROPHILS # BLD AUTO: 9.28 K/UL (ref 1.82–7.42)
NEUTROPHILS NFR BLD: 82.4 % (ref 44–72)
NRBC # BLD AUTO: 0 K/UL
NRBC BLD-RTO: 0 /100 WBC
PLATELET # BLD AUTO: 168 K/UL (ref 164–446)
PMV BLD AUTO: 9.7 FL (ref 9–12.9)
POTASSIUM SERPL-SCNC: 3.7 MMOL/L (ref 3.6–5.5)
RBC # BLD AUTO: 3.04 M/UL (ref 4.7–6.1)
SIGNIFICANT IND 70042: ABNORMAL
SITE SITE: ABNORMAL
SODIUM SERPL-SCNC: 130 MMOL/L (ref 135–145)
SOURCE SOURCE: ABNORMAL
WBC # BLD AUTO: 11.3 K/UL (ref 4.8–10.8)

## 2018-05-08 PROCEDURE — 85025 COMPLETE CBC W/AUTO DIFF WBC: CPT

## 2018-05-08 PROCEDURE — 700101 HCHG RX REV CODE 250: Performed by: INTERNAL MEDICINE

## 2018-05-08 PROCEDURE — 97162 PT EVAL MOD COMPLEX 30 MIN: CPT

## 2018-05-08 PROCEDURE — 99233 SBSQ HOSP IP/OBS HIGH 50: CPT | Performed by: INTERNAL MEDICINE

## 2018-05-08 PROCEDURE — 700102 HCHG RX REV CODE 250 W/ 637 OVERRIDE(OP): Performed by: HOSPITALIST

## 2018-05-08 PROCEDURE — A9270 NON-COVERED ITEM OR SERVICE: HCPCS | Performed by: INTERNAL MEDICINE

## 2018-05-08 PROCEDURE — 770006 HCHG ROOM/CARE - MED/SURG/GYN SEMI*

## 2018-05-08 PROCEDURE — G8979 MOBILITY GOAL STATUS: HCPCS | Mod: CJ

## 2018-05-08 PROCEDURE — 700102 HCHG RX REV CODE 250 W/ 637 OVERRIDE(OP): Performed by: INTERNAL MEDICINE

## 2018-05-08 PROCEDURE — A9270 NON-COVERED ITEM OR SERVICE: HCPCS | Performed by: HOSPITALIST

## 2018-05-08 PROCEDURE — 82962 GLUCOSE BLOOD TEST: CPT

## 2018-05-08 PROCEDURE — G8978 MOBILITY CURRENT STATUS: HCPCS | Mod: CL

## 2018-05-08 PROCEDURE — 80048 BASIC METABOLIC PNL TOTAL CA: CPT

## 2018-05-08 PROCEDURE — 36415 COLL VENOUS BLD VENIPUNCTURE: CPT

## 2018-05-08 RX ORDER — LIDOCAINE 50 MG/G
1 PATCH TOPICAL EVERY 24 HOURS
Status: DISCONTINUED | OUTPATIENT
Start: 2018-05-08 | End: 2018-05-09 | Stop reason: HOSPADM

## 2018-05-08 RX ORDER — CIPROFLOXACIN 500 MG/1
500 TABLET, FILM COATED ORAL EVERY 12 HOURS
Status: DISCONTINUED | OUTPATIENT
Start: 2018-05-08 | End: 2018-05-09 | Stop reason: HOSPADM

## 2018-05-08 RX ADMIN — LISINOPRIL 2.5 MG: 5 TABLET ORAL at 08:23

## 2018-05-08 RX ADMIN — TRAMADOL HYDROCHLORIDE 50 MG: 50 TABLET, COATED ORAL at 12:39

## 2018-05-08 RX ADMIN — STANDARDIZED SENNA CONCENTRATE AND DOCUSATE SODIUM 2 TABLET: 8.6; 5 TABLET, FILM COATED ORAL at 20:30

## 2018-05-08 RX ADMIN — TRAMADOL HYDROCHLORIDE 50 MG: 50 TABLET, COATED ORAL at 00:49

## 2018-05-08 RX ADMIN — STANDARDIZED SENNA CONCENTRATE AND DOCUSATE SODIUM 2 TABLET: 8.6; 5 TABLET, FILM COATED ORAL at 08:22

## 2018-05-08 RX ADMIN — ASPIRIN 81 MG: 81 TABLET, COATED ORAL at 20:31

## 2018-05-08 RX ADMIN — ATORVASTATIN CALCIUM 5 MG: 10 TABLET, FILM COATED ORAL at 08:23

## 2018-05-08 RX ADMIN — CIPROFLOXACIN HYDROCHLORIDE 500 MG: 500 TABLET, FILM COATED ORAL at 16:04

## 2018-05-08 RX ADMIN — GABAPENTIN 100 MG: 100 CAPSULE ORAL at 16:04

## 2018-05-08 RX ADMIN — LIDOCAINE 1 PATCH: 50 PATCH TOPICAL at 18:13

## 2018-05-08 RX ADMIN — AMLODIPINE BESYLATE 5 MG: 5 TABLET ORAL at 08:23

## 2018-05-08 RX ADMIN — LATANOPROST 1 DROP: 50 SOLUTION OPHTHALMIC at 20:32

## 2018-05-08 RX ADMIN — INSULIN HUMAN 2 UNITS: 100 INJECTION, SOLUTION PARENTERAL at 18:06

## 2018-05-08 RX ADMIN — GABAPENTIN 100 MG: 100 CAPSULE ORAL at 20:31

## 2018-05-08 RX ADMIN — INSULIN HUMAN 1 UNITS: 100 INJECTION, SOLUTION PARENTERAL at 12:43

## 2018-05-08 RX ADMIN — GABAPENTIN 100 MG: 100 CAPSULE ORAL at 08:23

## 2018-05-08 RX ADMIN — INSULIN HUMAN 1 UNITS: 100 INJECTION, SOLUTION PARENTERAL at 20:35

## 2018-05-08 ASSESSMENT — COGNITIVE AND FUNCTIONAL STATUS - GENERAL
SUGGESTED CMS G CODE MODIFIER MOBILITY: CL
STANDING UP FROM CHAIR USING ARMS: A LOT
MOBILITY SCORE: 11
MOVING FROM LYING ON BACK TO SITTING ON SIDE OF FLAT BED: A LOT
TURNING FROM BACK TO SIDE WHILE IN FLAT BAD: A LOT
WALKING IN HOSPITAL ROOM: A LOT
MOVING TO AND FROM BED TO CHAIR: A LOT
CLIMB 3 TO 5 STEPS WITH RAILING: TOTAL

## 2018-05-08 ASSESSMENT — ENCOUNTER SYMPTOMS
MEMORY LOSS: 1
SHORTNESS OF BREATH: 0
FEVER: 0
BACK PAIN: 1
DIZZINESS: 0
FLANK PAIN: 1

## 2018-05-08 ASSESSMENT — GAIT ASSESSMENTS
DISTANCE (FEET): 3
ASSISTIVE DEVICE: FRONT WHEEL WALKER
GAIT LEVEL OF ASSIST: MODERATE ASSIST
DEVIATION: DECREASED BASE OF SUPPORT;BRADYKINETIC;SHUFFLED GAIT;DECREASED HEEL STRIKE;DECREASED TOE OFF

## 2018-05-08 ASSESSMENT — PAIN SCALES - GENERAL
PAINLEVEL_OUTOF10: 6
PAINLEVEL_OUTOF10: 8
PAINLEVEL_OUTOF10: 5
PAINLEVEL_OUTOF10: 5
PAINLEVEL_OUTOF10: 0

## 2018-05-08 NOTE — DISCHARGE PLANNING
"Pt's son, Christiano Wheeler, notified of discharge plan for tomorrow and became very upset on the phone, stating we are not to move his dad until we verify that his dad's insurance will cover his stay at [facility]. I then phoned Evgeny with [facility] and she said she will follow-up with [facility]. I phoned pt's son back, told him this and he stated with a raised voice,\" you are not sending my father anywhere. I will be there tomorrow at 2 to get him myself\". Discussed with team.  "

## 2018-05-08 NOTE — PROGRESS NOTES
[facility] Hospitalist Progress Note    Date of Service: 5/7/2018    Chief Complaint  89 y.o. male admitted 5/5/2018  DM2 presented with dysuria and left flank pain for the past 3 days. This pain radiates from his left flank down to his abdomen. Pain is constant, he denies any relieving or exacerbating factors. Patient also reports fevers and chills. His pain is associated with nausea. He denies any chest pain, shortness of breath, headache or diarrhea. In the ER he was afebrile. Initial workup revealed WBC 15.5, sodium 126, glucose 245, lactic acid 1.5. UA was positive for infection.   Started on Rocephin IV and IVFs 100/hr.    Interval Problem Update  5/6:  Feeling weak overall with left leg pain, states pain entire left leg.  Cut back NS to 75/hr since taking po in.  BCs and UC pending.  Started on diabetic diet, dc'd 2 gram sodium cardiac diet, unclear indication of low sodium diet.  No h/o CHF.  Us/ renal negative, CXR negative.  Wbc 15.5.  5/7:  OT eval stating only able to step 3-4 steps, recommending SNF.  SNF order placed.  Overall, more alert, UC with LFGNR, BCs negative.  Still c/o rt ankle pain, but no deformity seen.  Likely radicular pain from back.  Xrays rt ankle ordered.    Consultants/Specialty  none    Disposition  OT rec SNF, SNF order placed 5/7, lives at home with son, has walker and cane at home.          Review of Systems   Constitutional: Positive for malaise/fatigue. Negative for chills, diaphoresis and fever.        Elderly male, talking full sentences.   HENT: Negative for congestion and sore throat.    Eyes: Negative for pain and discharge.   Respiratory: Negative for cough, hemoptysis, sputum production, shortness of breath and wheezing.    Cardiovascular: Negative for chest pain, palpitations, claudication and leg swelling.   Gastrointestinal: Negative for abdominal pain, constipation, diarrhea, melena, nausea and vomiting.   Genitourinary: Positive for dysuria, flank pain and frequency.  Negative for urgency.   Musculoskeletal: Negative for back pain, joint pain, myalgias and neck pain.   Skin: Negative for itching and rash.   Neurological: Positive for weakness. Negative for dizziness, sensory change, speech change, focal weakness, loss of consciousness and headaches.   Endo/Heme/Allergies: Does not bruise/bleed easily.   Psychiatric/Behavioral: Negative for depression, substance abuse and suicidal ideas.      Physical Exam  Laboratory/Imaging   Hemodynamics  Temp (24hrs), Av.5 °C (97.7 °F), Min:36.4 °C (97.5 °F), Max:36.6 °C (97.9 °F)   Temperature: 36.6 °C (97.9 °F)  Pulse  Av.8  Min: 65  Max: 88    Blood Pressure : 120/102      Respiratory      Respiration: 12, Pulse Oximetry: 99 %        RUL Breath Sounds: Diminished, RML Breath Sounds: Diminished, RLL Breath Sounds: Diminished, HUNTER Breath Sounds: Diminished, LLL Breath Sounds: Diminished    Fluids    Intake/Output Summary (Last 24 hours) at 18 1914  Last data filed at 18 0643   Gross per 24 hour   Intake              990 ml   Output              500 ml   Net              490 ml       Nutrition  Orders Placed This Encounter   Procedures   • Diet Order     Standing Status:   Standing     Number of Occurrences:   1     Order Specific Question:   Diet:     Answer:   Diabetic [3]     Physical Exam   Constitutional: He is oriented to person, place, and time. He appears well-developed and well-nourished. No distress.   HENT:   Head: Normocephalic and atraumatic.   Mouth/Throat: Oropharynx is clear and moist. No oropharyngeal exudate.   Eyes: Conjunctivae and EOM are normal. Pupils are equal, round, and reactive to light. Right eye exhibits no discharge. Left eye exhibits no discharge. No scleral icterus.   Neck: Normal range of motion. Neck supple. No JVD present. No tracheal deviation present. No thyromegaly present.   Cardiovascular: Normal rate, regular rhythm and normal heart sounds.  Exam reveals no gallop and no friction  rub.    No murmur heard.  Pulmonary/Chest: Effort normal and breath sounds normal. No respiratory distress. He has no wheezes. He has no rales. He exhibits no tenderness.   Abdominal: Soft. Bowel sounds are normal. He exhibits no distension and no mass. There is no tenderness. There is no rebound and no guarding.   Genitourinary:   Genitourinary Comments: No catheters   Musculoskeletal: Normal range of motion. He exhibits tenderness (normal appearance to left leg, no point tenderness with palpation, no edema or deformities.). He exhibits no edema.   Lymphadenopathy:     He has no cervical adenopathy.   Neurological: He is alert and oriented to person, place, and time. No cranial nerve deficit. He exhibits normal muscle tone.   Skin: Skin is warm and dry. No rash noted. He is not diaphoretic. No erythema.   Psychiatric: He has a normal mood and affect. His behavior is normal. Judgment and thought content normal.   Nursing note and vitals reviewed.      Recent Labs      05/05/18 1923 05/07/18   0245   WBC  15.5*  14.8*   RBC  3.97*  3.05*   HEMOGLOBIN  11.9*  9.0*   HEMATOCRIT  35.2*  26.8*   MCV  88.7  87.9   MCH  30.0  29.5   MCHC  33.8  33.6*   RDW  44.9  44.1   PLATELETCT  232  156*   MPV  10.1  9.8     Recent Labs      05/05/18   1923  05/07/18   0245   SODIUM  126*  130*   POTASSIUM  4.0  3.7   CHLORIDE  95*  99   CO2  23  23   GLUCOSE  245*  126*   BUN  18  14   CREATININE  0.76  0.70   CALCIUM  9.3  8.5                      Assessment/Plan     Sepsis (CMS-[facility])- (present on admission)   Assessment & Plan    This is sepsis (without associated acute organ dysfunction).   Likely source is pyelonephritis  Patient has been started on IV fluids per septic protocol  Lactate is within normal limits  Patient was restarted on IV ceftriaxone  Follow blood and urine cultures        Pyelonephritis- (present on admission)   Assessment & Plan    Patient was started on IV ceftriaxone  Follow urine cultures   renal  ultrasound negative.        Anemia   Assessment & Plan    Check iron levels.  Stool occult blood.  Monitor cbc daily, no obvious blood loss.        Type 2 diabetes mellitus (HCC)- (present on admission)   Assessment & Plan    Uncontrolled  Start on insulin sliding scale with serial Accu-Cheks  Check hemoglobin A1c  Hypoglycemic protocol and place  Hold metformin            Hyponatremia- (present on admission)   Assessment & Plan    Acute on chronic  Baseline sodium appears to be 128 - 130  Patient appears dehydrated at this time  IV fluid hydration with normal saline and assess response  Monitor BMP  5/6:  Was placed on low sodium cardiac diabetic diet on admission, changed to diabetic diet.          Hypertension- (present on admission)   Assessment & Plan    Well-controlled  Continue Norvasc and lisinopril          Quality-Core Measures

## 2018-05-08 NOTE — THERAPY
"Physical Therapy Evaluation completed.   Bed Mobility:  Supine to Sit: Moderate Assist  Transfers: Sit to Stand: Moderate Assist  Gait: Level Of Assist: Moderate Assist with Front-Wheel Walker       Plan of Care: Will benefit from Physical Therapy 3 times per week  Discharge Recommendations: Equipment: Front-Wheel Walker. Post-acute therapy Discharge to a transitional care facility for continued skilled therapy services.    See \"Rehab Therapy-Acute\" Patient Summary Report for complete documentation.   89 year old male with history of MARLI, dementia, diabetes mellitus, NSTEMI presents with dysuria, left flank pain, nausea. He is positive for a UTI. He is significantly debilitated, requires max assist for most mobility and does not demonstrate strength within functional limits, especially considering his homebound status. He will require post acute therapy interventions to optimize functional gains prior to return to home. He has been refusing SNF placement. Patient is a high fall risk and is very deconditioned. He will benefit from further physical therapy interventions during the course of his stay. He is encouraged to be out of bed for every meal. Provided he complies with this education, he may be able to return to home when his UTI clears up. If he is unable to meet goals, he will need SNF placement prior to return to home.   "

## 2018-05-08 NOTE — CARE PLAN
Problem: Safety  Goal: Will remain free from injury    Intervention: Provide assistance with mobility  Patient is unable to bear weight for long time.  Educated patient on importance of calling for assistance prior to getting up, patient verbalized understanding

## 2018-05-08 NOTE — DISCHARGE PLANNING
ATTN: Case Management  RE: Referral for Home Health    Reason for referral denial: Patient is deemed clinically unsafe per the nursing supervisor and we are unable to accept patient onto service.                  We would like to take this opportunity to thank you for submitting a referral for your patient to continue the journey to recovery with [facility]. We hope to facilitate continued referrals from your facility as our goal is to provide quality, skilled care to as many patients as possible.            Unfortunately, we are not able to accept your patient into our service for the reason listed above. If further clarity is needed, our Intake Coordinators are available to discuss any barriers to service.            If you have any questions or concerns regarding this or future patients’ transition to Home Health, please do not hesitate to contact us. We are open for referrals 7 days a week from 8AM to 5PM at [phone].      We look forward to collaborating with you in the future,  [facility] Team

## 2018-05-08 NOTE — DISCHARGE PLANNING
[facility] has received your referral and it is currently under clinical review by a nursing supervisor. Once a decision has been made in regards to  [facility] Deepa will be notified.    Thank you

## 2018-05-08 NOTE — DISCHARGE PLANNING
note:  Rounding done.   Pt said he lives with his disabled son. However, both are independent and they both help themselves. We talked to pt about the choices for SNF vs Homehealth. Pt said he will refuse SNF but agreed on Homehealth.   Choice made for [facility]. Faxed to [facility].    Addendum:  Talked to MD and he recommends SNF also. As well as OT, will talk to pt again.

## 2018-05-08 NOTE — FACE TO FACE
Face to Face Supporting Documentation - Home Health    The encounter with this patient was in whole or in part the primary reason for home health admission.    Date of encounter:   Patient:                    MRN:                       YOB: 2018  Bulmaro Wheeler  [id number]  4/16/1927     Home health to see patient for:  Skilled Nursing care for assessment, interventions & education, Physical Therapy evaluation and treatment and Occupational therapy evaluation and treatment    Skilled need for:  New Onset Medical Diagnosis pyelonephritis, general deconditioning    Skilled nursing interventions to include:  Comment: Improve with ADL's, transfers    Homebound status evidenced by:  Needs the assistance of another person in order to leave the home. Leaving home requires a considerable and taxing effort. There is a normal inability to leave the home.    Community Physician to provide follow up care: Zahra Yañez M.D.     Optional Interventions? No      I certify the face to face encounter for this home health care referral meets the CMS requirements and the encounter/clinical assessment with the patient was, in whole, or in part, for the medical condition(s) listed above, which is the primary reason for home health care. Based on my clinical findings: the service(s) are medically necessary, support the need for home health care, and the homebound criteria are met.  I certify that this patient has had a face to face encounter by myself.  Patrick Freeman M.D. - NPI: [id number]

## 2018-05-08 NOTE — CARE PLAN
Problem: Discharge Barriers/Planning  Goal: Patient's continuum of care needs will be met    Intervention: Collaborate with Transitional Care Team and Interdisciplinary Team to meet discharge needs  Pt expected to DC home with family and home health       Problem: Pain Management  Goal: Pain level will decrease to patient's comfort goal    Intervention: Follow pain managment plan developed in collaboration with patient and Interdisciplinary Team  Ankle pain managed with prn tramadol and heat packs

## 2018-05-08 NOTE — DISCHARGE PLANNING
note:  Accepted by [facility]. Danica from [facility] said that pt was just dc from them. They will  tomorrow at 2pm.

## 2018-05-08 NOTE — CARE PLAN
Problem: Skin Integrity  Goal: Risk for impaired skin integrity will decrease    Intervention: Assess risk factors for impaired skin integrity and/or pressure ulcers  Educated patient on importance of q2h turns to prevent further skin breakdown, patient verbalized understanding.

## 2018-05-08 NOTE — PROGRESS NOTES
Received report from KATINA Preston.  Patient is alert, awake, and oriented to self and place.  Non labored breathing on room air, no signs of acute distress noted at this time.  No complaints of chest pain or shortness of breath.  Patient assisted to the edge of bed for breakfast.  Educated patient to call for assistance prior to getting up, patient verbalized understanding. Will continue to monitor.

## 2018-05-08 NOTE — DISCHARGE PLANNING
[facility], [facility] and [facility] have accepted the patient.

## 2018-05-08 NOTE — DISCHARGE PLANNING
" note:  Per PT, recommendation is for SNF since pt was unable to walk safely.     I talked to pt about choice for SNF. Pt again said he wants to go home but also confirmed that he knows that he cannot walk yet and is too weak to go home. I asked him if its ok to send referrals to all the facilities to find out which would accept. He agreed but said \" I wish noone would accept me\". He signed choice form and I faxed to [facility].  "

## 2018-05-09 ENCOUNTER — PATIENT OUTREACH (OUTPATIENT)
Dept: HEALTH INFORMATION MANAGEMENT | Facility: OTHER | Age: 83
End: 2018-05-09

## 2018-05-09 VITALS
OXYGEN SATURATION: 98 % | HEIGHT: 68 IN | RESPIRATION RATE: 16 BRPM | DIASTOLIC BLOOD PRESSURE: 47 MMHG | TEMPERATURE: 98 F | BODY MASS INDEX: 25.61 KG/M2 | SYSTOLIC BLOOD PRESSURE: 120 MMHG | WEIGHT: 169 LBS | HEART RATE: 72 BPM

## 2018-05-09 LAB
ANION GAP SERPL CALC-SCNC: 6 MMOL/L (ref 0–11.9)
BASOPHILS # BLD AUTO: 0.2 % (ref 0–1.8)
BASOPHILS # BLD: 0.02 K/UL (ref 0–0.12)
BUN SERPL-MCNC: 15 MG/DL (ref 8–22)
CALCIUM SERPL-MCNC: 8.4 MG/DL (ref 8.5–10.5)
CHLORIDE SERPL-SCNC: 101 MMOL/L (ref 96–112)
CO2 SERPL-SCNC: 23 MMOL/L (ref 20–33)
CREAT SERPL-MCNC: 0.69 MG/DL (ref 0.5–1.4)
EOSINOPHIL # BLD AUTO: 0.09 K/UL (ref 0–0.51)
EOSINOPHIL NFR BLD: 1 % (ref 0–6.9)
ERYTHROCYTE [DISTWIDTH] IN BLOOD BY AUTOMATED COUNT: 44.1 FL (ref 35.9–50)
GLUCOSE BLD-MCNC: 119 MG/DL (ref 65–99)
GLUCOSE BLD-MCNC: 177 MG/DL (ref 65–99)
GLUCOSE SERPL-MCNC: 101 MG/DL (ref 65–99)
HCT VFR BLD AUTO: 25.9 % (ref 42–52)
HGB BLD-MCNC: 8.6 G/DL (ref 14–18)
IMM GRANULOCYTES # BLD AUTO: 0.03 K/UL (ref 0–0.11)
IMM GRANULOCYTES NFR BLD AUTO: 0.3 % (ref 0–0.9)
LYMPHOCYTES # BLD AUTO: 1.06 K/UL (ref 1–4.8)
LYMPHOCYTES NFR BLD: 12.2 % (ref 22–41)
MCH RBC QN AUTO: 29.7 PG (ref 27–33)
MCHC RBC AUTO-ENTMCNC: 33.2 G/DL (ref 33.7–35.3)
MCV RBC AUTO: 89.3 FL (ref 81.4–97.8)
MONOCYTES # BLD AUTO: 0.67 K/UL (ref 0–0.85)
MONOCYTES NFR BLD AUTO: 7.7 % (ref 0–13.4)
NEUTROPHILS # BLD AUTO: 6.85 K/UL (ref 1.82–7.42)
NEUTROPHILS NFR BLD: 78.6 % (ref 44–72)
NRBC # BLD AUTO: 0 K/UL
NRBC BLD-RTO: 0 /100 WBC
PLATELET # BLD AUTO: 176 K/UL (ref 164–446)
PMV BLD AUTO: 9.8 FL (ref 9–12.9)
POTASSIUM SERPL-SCNC: 4.1 MMOL/L (ref 3.6–5.5)
RBC # BLD AUTO: 2.9 M/UL (ref 4.7–6.1)
SODIUM SERPL-SCNC: 130 MMOL/L (ref 135–145)
WBC # BLD AUTO: 8.7 K/UL (ref 4.8–10.8)

## 2018-05-09 PROCEDURE — 80048 BASIC METABOLIC PNL TOTAL CA: CPT

## 2018-05-09 PROCEDURE — 82962 GLUCOSE BLOOD TEST: CPT | Mod: 91

## 2018-05-09 PROCEDURE — A9270 NON-COVERED ITEM OR SERVICE: HCPCS | Performed by: INTERNAL MEDICINE

## 2018-05-09 PROCEDURE — 700102 HCHG RX REV CODE 250 W/ 637 OVERRIDE(OP): Performed by: INTERNAL MEDICINE

## 2018-05-09 PROCEDURE — 99239 HOSP IP/OBS DSCHRG MGMT >30: CPT | Performed by: INTERNAL MEDICINE

## 2018-05-09 PROCEDURE — A9270 NON-COVERED ITEM OR SERVICE: HCPCS | Performed by: HOSPITALIST

## 2018-05-09 PROCEDURE — 36415 COLL VENOUS BLD VENIPUNCTURE: CPT

## 2018-05-09 PROCEDURE — 85025 COMPLETE CBC W/AUTO DIFF WBC: CPT

## 2018-05-09 PROCEDURE — 700101 HCHG RX REV CODE 250: Performed by: INTERNAL MEDICINE

## 2018-05-09 PROCEDURE — 700102 HCHG RX REV CODE 250 W/ 637 OVERRIDE(OP): Performed by: HOSPITALIST

## 2018-05-09 RX ORDER — CIPROFLOXACIN 500 MG/1
500 TABLET, FILM COATED ORAL EVERY 12 HOURS
Qty: 20 TAB | Refills: 0 | Status: SHIPPED | OUTPATIENT
Start: 2018-05-09

## 2018-05-09 RX ORDER — TRAMADOL HYDROCHLORIDE 50 MG/1
50 TABLET ORAL EVERY 6 HOURS PRN
Qty: 28 TAB | Refills: 0 | Status: SHIPPED | OUTPATIENT
Start: 2018-05-09 | End: 2018-05-16

## 2018-05-09 RX ORDER — LIDOCAINE 50 MG/G
1 PATCH TOPICAL EVERY 24 HOURS
Qty: 10 PATCH | Refills: 0 | Status: SHIPPED | OUTPATIENT
Start: 2018-05-09 | End: 2018-05-18 | Stop reason: SDUPTHER

## 2018-05-09 RX ADMIN — CIPROFLOXACIN HYDROCHLORIDE 500 MG: 500 TABLET, FILM COATED ORAL at 17:05

## 2018-05-09 RX ADMIN — GABAPENTIN 100 MG: 100 CAPSULE ORAL at 17:05

## 2018-05-09 RX ADMIN — ATORVASTATIN CALCIUM 5 MG: 10 TABLET, FILM COATED ORAL at 08:36

## 2018-05-09 RX ADMIN — LISINOPRIL 2.5 MG: 5 TABLET ORAL at 08:35

## 2018-05-09 RX ADMIN — LIDOCAINE 1 PATCH: 50 PATCH TOPICAL at 17:06

## 2018-05-09 RX ADMIN — STANDARDIZED SENNA CONCENTRATE AND DOCUSATE SODIUM 2 TABLET: 8.6; 5 TABLET, FILM COATED ORAL at 08:35

## 2018-05-09 RX ADMIN — GABAPENTIN 100 MG: 100 CAPSULE ORAL at 08:36

## 2018-05-09 RX ADMIN — CIPROFLOXACIN HYDROCHLORIDE 500 MG: 500 TABLET, FILM COATED ORAL at 04:16

## 2018-05-09 RX ADMIN — AMLODIPINE BESYLATE 5 MG: 5 TABLET ORAL at 08:35

## 2018-05-09 RX ADMIN — INSULIN HUMAN 1 UNITS: 100 INJECTION, SOLUTION PARENTERAL at 12:40

## 2018-05-09 RX ADMIN — TRAMADOL HYDROCHLORIDE 50 MG: 50 TABLET, COATED ORAL at 01:51

## 2018-05-09 ASSESSMENT — PAIN SCALES - GENERAL
PAINLEVEL_OUTOF10: 5
PAINLEVEL_OUTOF10: 6

## 2018-05-09 NOTE — PROGRESS NOTES
Spoke to patient's daughter Ursula Murillo at [phone], per patient's daughter, refusing for their father to be sent to [facility], states they will take him home after home health care has been set up, relayed information to Alanis,  and Mere, social work-states will be speaking to the daughter soon.

## 2018-05-09 NOTE — DISCHARGE PLANNING
Anticipated DC Disposition: Home w/ HH vs SNF    Action: LSW notified to call pt's dtr, Ursula Murillo ([phone]). LSW called pt's dtr and Ursula reports that she does NOT want pt to return to [facility] because pt was there for 35 days and developed a sore on his bottom and after dc'd from [facility] was in the ER 4 days later due to a UTI. Ursula says they specifically asked [facility] to test pt for UTI and they were told the pt was fine. Ursula says that she will be here in a few minutes to  the pt and take the pt home. Ursula says they have hired private caregivers and additionally pt lives w/ family who can help. Ursula says that [facility] set the pt up w/ [facility] but since being admitted to the hospital, Home Health needs to be set up again. Ursula requests that LSW send a referral to [facility]. Ursula is aware that Therapy is recommending pt goes to SNF due to pt not being able to ambulate. Ursula will be here to talk w/ MD but Ursula is adamant that pt is not going back to [facility].     [facility] @ 1300 per family request. LSW notified Min at [facility].     Update 5230: MD spoke w/ pt and pt's dtr and MD is okay w/ pt going home w/ HH. HH choice faxed to [facility] ([phone]).     Barriers to discharge: Family upset with care pt received at last SNF visit and family is now adamant pt goes home w/ home health and caregivers.      Plan: Family to speak w/ MD and LSW will remain available for dc planning needs.

## 2018-05-09 NOTE — CARE PLAN
Problem: Communication  Goal: The ability to communicate needs accurately and effectively will improve  Outcome: NOT MET  Patient reinforced call light, educated patient on plan of care and calling for needs.     Problem: Safety  Goal: Will remain free from falls  Outcome: NOT MET  Patient with call light at bedside, bed locked and in lowest position, treaded socks in place.

## 2018-05-09 NOTE — PROGRESS NOTES
Patient received from day nurse. Patient alert and oriented at time. Patient reports pain is well controlled currently and that lidocaine patch is working well. Patient with call light at bedside. Bed alarm in place, locked an in lowest position. Reinforced fall precautions, and communication of needs.

## 2018-05-09 NOTE — PROGRESS NOTES
Received report from KATINA Ramirez.  Patient is alert, awake, and oriented to self, time, and place.  Patient is not oriented to situation or date.  Non labored breathing on room air, no signs of acute distress noted at this time.  Educated patient on importance of calling for help prior to getting up, patient verbalized understanding.  Bed alarm functional and set.  Bed in lowest position. Call light and belongings within reach. Will continue to monitor.

## 2018-05-09 NOTE — DISCHARGE SUMMARY
"CHIEF COMPLAINT ON ADMISSION  Chief Complaint   Patient presents with   • Painful Urination     Burning urination x3-4 days, denies N/V and chills. occompanied by umbilical pain    • Flank Pain     \"hurts where my kidnies are kidneys\"       CODE STATUS  Full Code    HPI & HOSPITAL COURSE  This is a 89 y.o. male here with dysuria, left sided flank pain. UA was concerning for infection. Had associated lactic acidosis, leukocytosis.  He was diagnosed with pyelonephritis and started on rocephin, fluids. During hospitalization patient received iv fluids, pt/ot eval and treatment. Per pt patient was recommended skilled PT, however patient refused snf placement, he remains high risk for falls, and given background dementia he is dependent on assistance with ADL's. 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  Of note urine culture was positive for morganella morganii, based on c&s, we switched his antibiotic regimen to cipro twice daily.    Today I discussed ongoing care with daughter, patient. They both express strong desire to go home, telling me \" they have established caregiver services, family is willing to assist as well\". I spoke to my staff and advised to request for [place] home health for pt/ot, and continue support via family, caregiver service. Patient is currently stable, denies pain, says \" I feel better and motivated to improve\". He is expressing failure with care at rehabs in the past and feels he can do much better at home. Denies cp, sob, n/v, dysuria.         The patient met 2-midnight criteria for an inpatient stay at the time of discharge.    Therefore, he is discharged in fair and stable condition with close outpatient follow-up.    SPECIFIC OUTPATIENT FOLLOW-UP  Complete ciprofloxacin 500mg po bid x 10 days  Recommend scheduled voiding three times a day, 30 minutes prior to bedtime  Stay hydrated with gatorade  Do not limit salt " intake  Use walker for ambulation  See pcp in 3-5 days for routine post hospital discharge f/u, consider referral to neurosurgery for evaluation of hydrocephalus  Continue pt/ot, will request for home health  Will prescribe tramadol for pain, limit use to every 6 hours as needed x 7 days    DISCHARGE PROBLEM LIST  Active Problems:    Pyelonephritis POA: Yes      Overview:      Sepsis (CMS-HCC) POA: Yes    Hypertension POA: Yes    Hyponatremia POA: Yes    Type 2 diabetes mellitus (HCC) POA: Yes    Anemia POA: Unknown    Physical deconditioning POA: Unknown  Resolved Problems:    * No resolved hospital problems. *      FOLLOW UP  Future Appointments  Date Time Provider Department Center   5/22/2018 2:20 PM [facility] ([facility])  [address] [facility] [place] [address]  [phone]          MEDICATIONS ON DISCHARGE            Medication List      START taking these medications      Instructions   ciprofloxacin 500 MG Tabs  Commonly known as:  CIPRO   Take 1 Tab by mouth every 12 hours.  Dose:  500 mg     lidocaine 5 % Ptch  Commonly known as:  LIDODERM   Apply 1 Patch to skin as directed every 24 hours.  Dose:  1 Patch     tramadol 50 MG Tabs  Commonly known as:  ULTRAM   Take 1 Tab by mouth every 6 hours as needed for Moderate Pain for up to 7 days.  Dose:  50 mg        CONTINUE taking these medications      Instructions   acetaminophen 325 MG Tabs  Commonly known as:  TYLENOL   Take 2 Tabs by mouth every 6 hours.  Dose:  650 mg     amLODIPine 5 MG Tabs  Commonly known as:  NORVASC   Take 5 mg by mouth every day.  Dose:  5 mg     aspirin EC 81 MG Tbec  Commonly known as:  ECOTRIN   Take 81 mg by mouth every evening.  Dose:  81 mg     atorvastatin 10 MG Tabs  Commonly known as:  LIPITOR   Doctor's comments:  Please cut for patient  Take 0.5 Tabs by mouth every day.  Dose:  5 mg     CENTRUM SILVER PO   Take 1 Tab by mouth every day.  Dose:  1 Tab      cyanocobalamin 100 MCG Tabs  Commonly known as:  VITAMIN B-12   Take 100 mcg by mouth every day.  Dose:  100 mcg     latanoprost 0.005 % Soln  Commonly known as:  XALATAN   Place 1 Drop in both eyes every evening.  Dose:  1 Drop     lisinopril 2.5 MG Tabs  Commonly known as:  PRINIVIL   Take 1 Tab by mouth every day.  Dose:  2.5 mg     metFORMIN 500 MG Tabs  Commonly known as:  GLUCOPHAGE   Take 500 mg by mouth 2 times a day, with meals.  Dose:  500 mg     VITAMIN B COMPLEX PO   Take 1 Tab by mouth every day.  Dose:  1 Tab     vitamin D 1000 UNIT Tabs  Commonly known as:  cholecalciferol   Take 1,000 Units by mouth every day.  Dose:  1000 Units        STOP taking these medications    sodium bicarbonate 650 MG Tabs  Commonly known as:  SODIUM BICARBONATE            DIET  Orders Placed This Encounter   Procedures   • Diet Order     Standing Status:   Standing     Number of Occurrences:   1     Order Specific Question:   Diet:     Answer:   Diabetic [3]       ACTIVITY  As tolerated. Use walker with ambulation  Weight bearing as tolerated      CONSULTATIONS  none    PROCEDURES  none    LABORATORY  Lab Results   Component Value Date/Time    SODIUM 130 (L) 05/09/2018 02:43 AM    POTASSIUM 4.1 05/09/2018 02:43 AM    CHLORIDE 101 05/09/2018 02:43 AM    CO2 23 05/09/2018 02:43 AM    GLUCOSE 101 (H) 05/09/2018 02:43 AM    BUN 15 05/09/2018 02:43 AM    CREATININE 0.69 05/09/2018 02:43 AM    CREATININE 1.0 02/23/2009 02:34 PM        Lab Results   Component Value Date/Time    WBC 8.7 05/09/2018 02:43 AM    HEMOGLOBIN 8.6 (L) 05/09/2018 02:43 AM    HEMATOCRIT 25.9 (L) 05/09/2018 02:43 AM    PLATELETCT 176 05/09/2018 02:43 AM        Total time of the discharge process exceeds 45 minutes

## 2018-05-09 NOTE — DISCHARGE INSTRUCTIONS
Discharge Instructions    Discharged to other by car with relative. Discharged via wheelchair, hospital escort: Yes.  Special equipment needed: Not Applicable    Be sure to schedule a follow-up appointment with your primary care doctor or any specialists as instructed.     Discharge Plan:   Diet Plan: Discussed  Activity Level: Discussed  Confirmed Symptoms Management: Discussed  Medication Reconciliation Updated: Yes  Influenza Vaccine Indication: Not indicated: Previously immunized this influenza season and > 8 years of age    I understand that a diet low in cholesterol, fat, and sodium is recommended for good health. Unless I have been given specific instructions below for another diet, I accept this instruction as my diet prescription.   Other diet: Diabetic      Pyelonephritis, Adult  Introduction  Pyelonephritis is a kidney infection. The kidneys are organs that help clean your blood by moving waste out of your blood and into your pee (urine). This infection can happen quickly, or it can last for a long time. In most cases, it clears up with treatment and does not cause other problems.  Follow these instructions at home:  Medicines  · Take over-the-counter and prescription medicines only as told by your doctor.  · Take your antibiotic medicine as told by your doctor. Do not stop taking the medicine even if you start to feel better.  General instructions  · Drink enough fluid to keep your pee clear or pale yellow.  · Avoid caffeine, tea, and carbonated drinks.  · Pee (urinate) often. Avoid holding in pee for long periods of time.  · Pee before and after sex.  · After pooping (having a bowel movement), women should wipe from front to back. Use each tissue only once.  · Keep all follow-up visits as told by your doctor. This is important.  Contact a doctor if:  · You do not feel better after 2 days.  · Your symptoms get worse.  · You have a fever.  Get help right away if:  · You cannot take your medicine or drink  fluids as told.  · You have chills and shaking.  · You throw up (vomit).  · You have very bad pain in your side (flank) or back.  · You feel very weak or you pass out (faint).  This information is not intended to replace advice given to you by your health care provider. Make sure you discuss any questions you have with your health care provider.  Document Released: 01/25/2006 Document Revised: 05/25/2017 Document Reviewed: 04/11/2016  © 2017 [employer]      Fall Prevention in the Home  Introduction  Falls can cause injuries. They can happen to people of all ages. There are many things you can do to make your home safe and to help prevent falls.  What can I do on the outside of my home?  · Regularly fix the edges of walkways and driveways and fix any cracks.  · Remove anything that might make you trip as you walk through a door, such as a raised step or threshold.  · Trim any bushes or trees on the path to your home.  · Use bright outdoor lighting.  · Clear any walking paths of anything that might make someone trip, such as rocks or tools.  · Regularly check to see if handrails are loose or broken. Make sure that both sides of any steps have handrails.  · Any raised decks and porches should have guardrails on the edges.  · Have any leaves, snow, or ice cleared regularly.  · Use sand or salt on walking paths during winter.  · Clean up any spills in your garage right away. This includes oil or grease spills.  What can I do in the bathroom?  · Use night lights.  · Install grab bars by the toilet and in the tub and shower. Do not use towel bars as grab bars.  · Use non-skid mats or decals in the tub or shower.  · If you need to sit down in the shower, use a plastic, non-slip stool.  · Keep the floor dry. Clean up any water that spills on the floor as soon as it happens.  · Remove soap buildup in the tub or shower regularly.  · Attach bath mats securely with double-sided non-slip rug tape.  · Do not have throw rugs and  other things on the floor that can make you trip.  What can I do in the bedroom?  · Use night lights.  · Make sure that you have a light by your bed that is easy to reach.  · Do not use any sheets or blankets that are too big for your bed. They should not hang down onto the floor.  · Have a firm chair that has side arms. You can use this for support while you get dressed.  · Do not have throw rugs and other things on the floor that can make you trip.  What can I do in the kitchen?  · Clean up any spills right away.  · Avoid walking on wet floors.  · Keep items that you use a lot in easy-to-reach places.  · If you need to reach something above you, use a strong step stool that has a grab bar.  · Keep electrical cords out of the way.  · Do not use floor polish or wax that makes floors slippery. If you must use wax, use non-skid floor wax.  · Do not have throw rugs and other things on the floor that can make you trip.  What can I do with my stairs?  · Do not leave any items on the stairs.  · Make sure that there are handrails on both sides of the stairs and use them. Fix handrails that are broken or loose. Make sure that handrails are as long as the stairways.  · Check any carpeting to make sure that it is firmly attached to the stairs. Fix any carpet that is loose or worn.  · Avoid having throw rugs at the top or bottom of the stairs. If you do have throw rugs, attach them to the floor with carpet tape.  · Make sure that you have a light switch at the top of the stairs and the bottom of the stairs. If you do not have them, ask someone to add them for you.  What else can I do to help prevent falls?  · Wear shoes that:  ¨ Do not have high heels.  ¨ Have rubber bottoms.  ¨ Are comfortable and fit you well.  ¨ Are closed at the toe. Do not wear sandals.  · If you use a stepladder:  ¨ Make sure that it is fully opened. Do not climb a closed stepladder.  ¨ Make sure that both sides of the stepladder are locked into  place.  ¨ Ask someone to hold it for you, if possible.  · Clearly josé and make sure that you can see:  ¨ Any grab bars or handrails.  ¨ First and last steps.  ¨ Where the edge of each step is.  · Use tools that help you move around (mobility aids) if they are needed. These include:  ¨ Canes.  ¨ Walkers.  ¨ Scooters.  ¨ Crutches.  · Turn on the lights when you go into a dark area. Replace any light bulbs as soon as they burn out.  · Set up your furniture so you have a clear path. Avoid moving your furniture around.  · If any of your floors are uneven, fix them.  · If there are any pets around you, be aware of where they are.  · Review your medicines with your doctor. Some medicines can make you feel dizzy. This can increase your chance of falling.  Ask your doctor what other things that you can do to help prevent falls.  This information is not intended to replace advice given to you by your health care provider. Make sure you discuss any questions you have with your health care provider.  Document Released: 10/14/2010 Document Revised: 05/25/2017 Document Reviewed: 01/22/2016  © 2017 Elsevier    Special Instructions: None      Hyponatremia  Introduction  Hyponatremia is when the amount of salt (sodium) in your blood is too low. When salt levels are low, your cells absorb extra water and they swell. The swelling happens throughout the body, but it mostly affects the brain.  Follow these instructions at home:  · Take medicines only as told by your doctor. Many medicines can make this condition worse. Talk with your doctor about any medicines that you are currently taking.  · Carefully follow a recommended diet as told by your doctor.  · Carefully follow instructions from your doctor about fluid restrictions.  · Keep all follow-up visits as told by your doctor. This is important.  · Do not drink alcohol.  Contact a doctor if:  · You feel sicker to your stomach (nauseous).  · You feel more confused.  · You feel more  "tired (fatigued).  · Your headache gets worse.  · You feel weaker.  · Your symptoms go away and then they come back.  · You have trouble following the diet instructions.  Get help right away if:  · You start to twitch and shake (have a seizure).  · You pass out (faint).  · You keep having watery poop (diarrhea).  · You keep throwing up (vomiting).  This information is not intended to replace advice given to you by your health care provider. Make sure you discuss any questions you have with your health care provider.  Document Released: 08/29/2012 Document Revised: 05/25/2017 Document Reviewed: 12/14/2015  © 2017 [employer]vier    Normal-Pressure Hydrocephalus  Normal-pressure hydrocephalus (NPH) is a buildup of fluid (cerebrospinal fluid [CSF]) inside the brain. CSF is normal within the brain, but too much fluid can affect your brain's function. NPH commonly occurs in people older than 60 years of age.   CAUSES   The cause of NPH is not always known. It can be caused by anything that blocks the flow of CSF.   SIGNS AND SYMPTOMS   Since many symptoms of NPH are also associated with conditions sometimes seen in older people, taking care to note changes in behavior and mental function is important. Symptoms of NPH may include:   · Difficulty walking, such as:  ¨ Problems when beginning to walk.  ¨ Feet being \"frozen\" to the floor.  ¨ Shuffling feet when walking.  ¨ Unsteadiness.  · Problems with bowel and bladder control.  · Memory problems such as forgetfulness, lack of concentration, dull mood, or confusion.  DIAGNOSIS   · A medical history and physical exam will be done. A physical exam can reveal walking (gait) changes. Reflexes may be increased in the lower legs.  · Tests can include:  ¨ Lumbar puncture (spinal tap).  ¨ CT scan of your head.  ¨ MRI scan of your head.  TREATMENT   NPH is treated by having surgery to place a ventriculoperitoneal shunt in the brain. The ventriculoperitoneal shunt drains the excess CSF. "   This information is not intended to replace advice given to you by your health care provider. Make sure you discuss any questions you have with your health care provider.  Document Released: 05/04/2015 Document Reviewed: 05/04/2015  Verified Identity Pass Interactive Patient Education © 2017 [employer]    Ciprofloxacin tablets  What is this medicine?  CIPROFLOXACIN (sip bassem FLOX a sin) is a quinolone antibiotic. It is used to treat certain kinds of bacterial infections. It will not work for colds, flu, or other viral infections.  This medicine may be used for other purposes; ask your health care provider or pharmacist if you have questions.  COMMON BRAND NAME(S): Cipro  What should I tell my health care provider before I take this medicine?  They need to know if you have any of these conditions:  -bone problems  -history of low levels of potassium in the blood  -joint problems  -irregular heartbeat  -kidney disease  -myasthenia gravis  -seizures  -tendon problems  -tingling of the fingers or toes, or other nerve disorder  -an unusual or allergic reaction to ciprofloxacin, other antibiotics or medicines, foods, dyes, or preservatives  -pregnant or trying to get pregnant  -breast-feeding  How should I use this medicine?  Take this medicine by mouth with a glass of water. Follow the directions on the prescription label. Take your medicine at regular intervals. Do not take your medicine more often than directed. Take all of your medicine as directed even if you think your are better. Do not skip doses or stop your medicine early.  You can take this medicine with food or on an empty stomach. It can be taken with a meal that contains dairy or calcium, but do not take it alone with a dairy product, like milk or yogurt or calcium-fortified juice.  A special MedGuide will be given to you by the pharmacist with each prescription and refill. Be sure to read this information carefully each time.  Talk to your pediatrician regarding the  use of this medicine in children. Special care may be needed.  Overdosage: If you think you have taken too much of this medicine contact a poison control center or emergency room at once.  NOTE: This medicine is only for you. Do not share this medicine with others.  What if I miss a dose?  If you miss a dose, take it as soon as you can. If it is almost time for your next dose, take only that dose. Do not take double or extra doses.  What may interact with this medicine?  Do not take this medicine with any of the following medications:  -cisapride  -dofetilide  -dronedarone  -flibanserin  -lomitapide  -pimozide  -thioridazine  -tizanidine  -ziprasidone  This medicine may also interact with the following medications:  -antacids  -birth control pills  -caffeine  -certain medicines for diabetes, like glipizide or glyburide  -certain medicines that treat or prevent blood clots like warfarin  -clozapine  -cyclosporine  -didanosine (ddI) buffered tablets or powder  -duloxetine  -lanthanum carbonate  -lidocaine  -methotrexate  -multivitamins  -NSAIDS, medicines for pain and inflammation, like ibuprofen or naproxen  -olanzapine  -omeprazole  -other medicines that prolong the QT interval (cause an abnormal heart rhythm)  -phenytoin  -probenecid  -ropinirole  -sevelamer  -sildenafil  -sucralfate  -theophylline  -zolpidem  This list may not describe all possible interactions. Give your health care provider a list of all the medicines, herbs, non-prescription drugs, or dietary supplements you use. Also tell them if you smoke, drink alcohol, or use illegal drugs. Some items may interact with your medicine.  What should I watch for while using this medicine?  Tell your doctor or health care professional if your symptoms do not improve.  Do not treat diarrhea with over the counter products. Contact your doctor if you have diarrhea that lasts more than 2 days or if it is severe and watery.  You may get drowsy or dizzy. Do not drive,  use machinery, or do anything that needs mental alertness until you know how this medicine affects you. Do not stand or sit up quickly, especially if you are an older patient. This reduces the risk of dizzy or fainting spells.  This medicine can make you more sensitive to the sun. Keep out of the sun. If you cannot avoid being in the sun, wear protective clothing and use sunscreen. Do not use sun lamps or tanning beds/booths.  Avoid antacids, aluminum, calcium, iron, magnesium, and zinc products for 6 hours before and 2 hours after taking a dose of this medicine.  What side effects may I notice from receiving this medicine?  Side effects that you should report to your doctor or health care professional as soon as possible:  -allergic reactions like skin rash or hives, swelling of the face, lips, or tongue  -anxious  -confusion  -depressed mood  -diarrhea  -fast, irregular heartbeat  -hallucination, loss of contact with reality  -joint, muscle, or tendon pain or swelling  -pain, tingling, numbness in the hands or feet  -suicidal thoughts or other mood changes  -sunburn  -unusually weak or tired  Side effects that usually do not require medical attention (report to your doctor or health care professional if they continue or are bothersome):  -dry mouth  -headache  -nausea  -trouble sleeping  This list may not describe all possible side effects. Call your doctor for medical advice about side effects. You may report side effects to FDA at 3-993-FDA-6593.  Where should I keep my medicine?  Keep out of the reach of children.  Store at room temperature below 30 degrees C (86 degrees F). Keep container tightly closed. Throw away any unused medicine after the expiration date.  NOTE: This sheet is a summary. It may not cover all possible information. If you have questions about this medicine, talk to your doctor, pharmacist, or health care provider.  © 2018 [employer] Standard (2017-07-28 14:42:02)    Lidocaine dermal  patch  What is this medicine?  LIDOCAINE (LYE barrera cartagena) causes loss of feeling in the skin and surrounding area. The medicine helps treat pain, including nerve pain.  This medicine may be used for other purposes; ask your health care provider or pharmacist if you have questions.  COMMON BRAND NAME(S): Lidocare, Lidoderm  What should I tell my health care provider before I take this medicine?  They need to know if you have any of these conditions:  -heart disease  -history of irregular heart beat  -liver disease  -skin conditions or sensitivity  -skin infection  -an unusual or allergic reaction to lidocaine, parabens, other medicines, foods, dyes, or preservatives  -pregnant or trying to get pregnant  -breast-feeding  How should I use this medicine?  This medicine is for external use only. Follow the directions on the prescription label or package.  Talk to your pediatrician regarding the use of this medicine in children. While this drug may be prescribed for children as young as 12 years for selected conditions, precautions do apply.  Overdosage: If you think you have taken too much of this medicine contact a poison control center or emergency room at once.  NOTE: This medicine is only for you. Do not share this medicine with others.  What if I miss a dose?  Apply the patches as needed for pain.  What may interact with this medicine?  Do not take this medicine with any of the following medications:  -certain medicines for irregular heart beat  -MAOIs like Carbex, Eldepryl, Marplan, Nardil, and Parnate  This medicine may also interact with the following medications:  -other local anesthetics like pramoxine, tetracaine  Do not use any other skin products on the affected area without asking your doctor or health care professional.  This list may not describe all possible interactions. Give your health care provider a list of all the medicines, herbs, non-prescription drugs, or dietary supplements you use. Also tell  them if you smoke, drink alcohol, or use illegal drugs. Some items may interact with your medicine.  What should I watch for while using this medicine?  Tell your doctor or healthcare professional if your symptoms do not start to get better or if they get worse.  Be careful to avoid injury while the area is numb from the medicine, and you are not aware of pain.  If you are going to need surgery, a MRI, CT scan, or other procedure, tell your doctor that you are using this medicine. You may need to remove this patch before the procedure.  Do not get this medicine in your eyes. If you do, rinse out with plenty of cool tap water.  This medicine can make certain skin conditions worse. Only use it for conditions for which your doctor or health care professional has prescribed.  What side effects may I notice from receiving this medicine?  Side effects that you should report to your doctor or health care professional as soon as possible:  -allergic reactions like skin rash, itching or hives, swelling of the face, lips, or tongue  -breathing problems  -chest pain or chest tightness  -dizzines  Side effects that usually do not require medical attention (report to your doctor or health care professional if they continue or are bothersome):  -tingling, numbness at site where applied  This list may not describe all possible side effects. Call your doctor for medical advice about side effects. You may report side effects to FDA at 7-348-FDA-1308.  Where should I keep my medicine?  Keep out of the reach of children.  See product for storage instructions. Each product may have different instructions.  NOTE: This sheet is a summary. It may not cover all possible information. If you have questions about this medicine, talk to your doctor, pharmacist, or health care provider.  © 2018 [employer] Standard (2016-10-28 11:05:19)      Tramadol tablets  What is this medicine?  TRAMADOL (TRA ma dole) is a pain reliever. It is used to  treat moderate to severe pain in adults.  This medicine may be used for other purposes; ask your health care provider or pharmacist if you have questions.  COMMON BRAND NAME(S): Ultram  What should I tell my health care provider before I take this medicine?  They need to know if you have any of these conditions:  -brain tumor  -depression  -drug abuse or addiction  -head injury  -if you frequently drink alcohol containing drinks  -kidney disease or trouble passing urine  -liver disease  -lung disease, asthma, or breathing problems  -seizures or epilepsy  -suicidal thoughts, plans, or attempt; a previous suicide attempt by you or a family member  -an unusual or allergic reaction to tramadol, codeine, other medicines, foods, dyes, or preservatives  -pregnant or trying to get pregnant  -breast-feeding  How should I use this medicine?  Take this medicine by mouth with a full glass of water. Follow the directions on the prescription label. You can take it with or without food. If it upsets your stomach, take it with food. Do not take your medicine more often than directed.  A special MedGuide will be given to you by the pharmacist with each prescription and refill. Be sure to read this information carefully each time.  Talk to your pediatrician regarding the use of this medicine in children. Special care may be needed.  Overdosage: If you think you have taken too much of this medicine contact a poison control center or emergency room at once.  NOTE: This medicine is only for you. Do not share this medicine with others.  What if I miss a dose?  If you miss a dose, take it as soon as you can. If it is almost time for your next dose, take only that dose. Do not take double or extra doses.  What may interact with this medicine?  Do not take this medication with any of the following medicines:  -MAOIs like Carbex, Eldepryl, Marplan, Nardil, and Parnate  This medicine may also interact with the following  medications:  -alcohol  -antihistamines for allergy, cough and cold  -certain medicines for anxiety or sleep  -certain medicines for depression like amitriptyline, fluoxetine, sertraline  -certain medicines for migraine headache like almotriptan, eletriptan, frovatriptan, naratriptan, rizatriptan, sumatriptan, zolmitriptan  -certain medicines for seizures like carbamazepine, oxcarbazepine, phenobarbital, primidone  -certain medicines that treat or prevent blood clots like warfarin  -digoxin  -furazolidone  -general anesthetics like halothane, isoflurane, methoxyflurane, propofol  -linezolid  -local anesthetics like lidocaine, pramoxine, tetracaine  -medicines that relax muscles for surgery  -other narcotic medicines for pain or cough  -phenothiazines like chlorpromazine, mesoridazine, prochlorperazine, thioridazine  -procarbazine  This list may not describe all possible interactions. Give your health care provider a list of all the medicines, herbs, non-prescription drugs, or dietary supplements you use. Also tell them if you smoke, drink alcohol, or use illegal drugs. Some items may interact with your medicine.  What should I watch for while using this medicine?  Tell your doctor or health care professional if your pain does not go away, if it gets worse, or if you have new or a different type of pain. You may develop tolerance to the medicine. Tolerance means that you will need a higher dose of the medicine for pain relief. Tolerance is normal and is expected if you take this medicine for a long time.  Do not suddenly stop taking your medicine because you may develop a severe reaction. Your body becomes used to the medicine. This does NOT mean you are addicted. Addiction is a behavior related to getting and using a drug for a non-medical reason. If you have pain, you have a medical reason to take pain medicine. Your doctor will tell you how much medicine to take. If your doctor wants you to stop the medicine, the  dose will be slowly lowered over time to avoid any side effects.  There are different types of narcotic medicines (opiates). If you take more than one type at the same time or if you are taking another medicine that also causes drowsiness, you may have more side effects. Give your health care provider a list of all medicines you use. Your doctor will tell you how much medicine to take. Do not take more medicine than directed. Call emergency for help if you have problems breathing or unusual sleepiness.  You may get drowsy or dizzy. Do not drive, use machinery, or do anything that needs mental alertness until you know how this medicine affects you. Do not stand or sit up quickly, especially if you are an older patient. This reduces the risk of dizzy or fainting spells. Alcohol can increase or decrease the effects of this medicine. Avoid alcoholic drinks.  You may have constipation. Try to have a bowel movement at least every 2 to 3 days. If you do not have a bowel movement for 3 days, call your doctor or health care professional.  Your mouth may get dry. Chewing sugarless gum or sucking hard candy, and drinking plenty of water may help. Contact your doctor if the problem does not go away or is severe.  What side effects may I notice from receiving this medicine?  Side effects that you should report to your doctor or health care professional as soon as possible:  -allergic reactions like skin rash, itching or hives, swelling of the face, lips, or tongue  -breathing problems  -confusion  -seizures  -signs and symptoms of low blood pressure like dizziness; feeling faint or lightheaded, falls; unusually weak or tired  -trouble passing urine or change in the amount of urine  Side effects that usually do not require medical attention (report to your doctor or health care professional if they continue or are bothersome):  -constipation  -dry mouth  -nausea, vomiting  -tiredness  This list may not describe all possible side  effects. Call your doctor for medical advice about side effects. You may report side effects to FDA at 5-462-NYE-6663.  Where should I keep my medicine?  Keep out of the reach of children.  This medicine may cause accidental overdose and death if it taken by other adults, children, or pets. Mix any unused medicine with a substance like cat litter or coffee grounds. Then throw the medicine away in a sealed container like a sealed bag or a coffee can with a lid. Do not use the medicine after the expiration date.  Store at room temperature between 15 and 30 degrees C (59 and 86 degrees F).  NOTE: This sheet is a summary. It may not cover all possible information. If you have questions about this medicine, talk to your doctor, pharmacist, or health care provider.  © 2018 Elsevier/Gold Standard (2016-09-11 09:00:04)      · Is patient discharged on Warfarin / Coumadin?   No         Depression / Suicide Risk    As you are discharged from this [facility] facility, it is important to learn how to keep safe from harming yourself.    Recognize the warning signs:  · Abrupt changes in personality, positive or negative- including increase in energy   · Giving away possessions  · Change in eating patterns- significant weight changes-  positive or negative  · Change in sleeping patterns- unable to sleep or sleeping all the time   · Unwillingness or inability to communicate  · Depression  · Unusual sadness, discouragement and loneliness  · Talk of wanting to die  · Neglect of personal appearance   · Rebelliousness- reckless behavior  · Withdrawal from people/activities they love  · Confusion- inability to concentrate     If you or a loved one observes any of these behaviors or has concerns about self-harm, here's what you can do:  · Talk about it- your feelings and reasons for harming yourself  · Remove any means that you might use to hurt yourself (examples: pills, rope, extension cords, firearm)  · Get professional help from  the community (Mental Health, Substance Abuse, psychological counseling)  · Do not be alone:Call your Safe Contact- someone whom you trust who will be there for you.  · Call your local CRISIS HOTLINE 995-3858 or 618-946-1469  · Call your local Children's Mobile Crisis Response Team [place] (176) 130-5208 or www.[employer]  · Call the toll free National Suicide Prevention Hotlines   · National Suicide Prevention Lifeline 303-171-QJBZ (9546)  · National Hope Line Network 800-SUICIDE (301-1354)

## 2018-05-09 NOTE — DISCHARGE PLANNING
Received voicemail from Min at [facility], transportation set up for 1300 via [employer]. KATINA Thapa notified.

## 2018-05-09 NOTE — DISCHARGE PLANNING
note:  Daughter and son refused SNF.   Homehealth has been set up with [facility].  IMM letter given. Explained to pt, pt signed, copy to pt and to chart.

## 2018-05-09 NOTE — PROGRESS NOTES
[facility] Hospitalist Progress Note    Date of Service: 5/8/2018    Chief Complaint  89 y.o. male admitted 5/5/2018 with DM2 presented with dysuria and left flank pain for the past 3 days. This pain radiates from his left flank down to his abdomen. Pain is constant, he denies any relieving or exacerbating factors. Patient also reports fevers and chills. His pain is associated with nausea. He denies any chest pain, shortness of breath, headache or diarrhea. In the ER he was afebrile. Initial workup revealed WBC 15.5, sodium 126, glucose 245, lactic acid 1.5. UA was positive for infection.   Started on Rocephin IV and IVFs 100/hr.       Interval Problem Update  5/6:  Feeling weak overall with left leg pain, states pain entire left leg.  Cut back NS to 75/hr since taking po in.  BCs and UC pending.  Started on diabetic diet, dc'd 2 gram sodium cardiac diet, unclear indication of low sodium diet.  No h/o CHF.  Us/ renal negative, CXR negative.  Wbc 15.5.  5/7:  OT eval stating only able to step 3-4 steps, recommending SNF.  SNF order placed.  Overall, more alert, UC with LFGNR, BCs negative.  Still c/o rt ankle pain, but no deformity seen.  Likely radicular pain from back.  Xrays rt ankle ordered.  5/8: accepted by [facility]. He is in nad, but reporting left flank pain 8/10. No nausea, fevers, reviewed urine cx growing Morganella morganii , reviewed sensitivities, can treat with cipro.     Consultants/Specialty  none    Disposition  Anticipating dc to [facility] tomorrow.         Review of Systems   Reason unable to perform ROS: limited due to dementia.   Constitutional: Negative for fever.   HENT: Negative for congestion.    Respiratory: Negative for shortness of breath.    Cardiovascular: Negative for chest pain.   Genitourinary: Positive for flank pain.   Musculoskeletal: Positive for back pain.   Skin: Negative for rash.   Neurological: Negative for dizziness.   Psychiatric/Behavioral: Positive for memory loss.       Physical Exam  Laboratory/Imaging   Hemodynamics  Temp (24hrs), Av.6 °C (97.9 °F), Min:36.2 °C (97.2 °F), Max:37.1 °C (98.7 °F)   Temperature: 36.2 °C (97.2 °F)  Pulse  Av.3  Min: 65  Max: 88    Blood Pressure : 122/54      Respiratory      Respiration: 17, Pulse Oximetry: 96 %        RUL Breath Sounds: Clear, RML Breath Sounds: Clear, RLL Breath Sounds: Diminished, HUNTER Breath Sounds: Clear, LLL Breath Sounds: Diminished    Fluids    Intake/Output Summary (Last 24 hours) at 18 1741  Last data filed at 18 1509   Gross per 24 hour   Intake             1380 ml   Output              750 ml   Net              630 ml       Nutrition  Orders Placed This Encounter   Procedures   • Diet Order     Standing Status:   Standing     Number of Occurrences:   1     Order Specific Question:   Diet:     Answer:   Diabetic [3]     Physical Exam   Constitutional:   Elderly white male, nad, laying in bed   HENT:   Head: Atraumatic.   Neck: Neck supple.   Cardiovascular: Normal rate, regular rhythm, normal heart sounds and intact distal pulses.  Exam reveals no friction rub.    Pulmonary/Chest: Effort normal and breath sounds normal.   Abdominal: Soft. Bowel sounds are normal. There is tenderness.   ttp flank area   Musculoskeletal: Normal range of motion. He exhibits no edema.   Neurological:   Demented patient, oriented to name, no focal deficits   Skin: Skin is warm and dry.       Recent Labs      18   1923  18   0245  18   0302   WBC  15.5*  14.8*  11.3*   RBC  3.97*  3.05*  3.04*   HEMOGLOBIN  11.9*  9.0*  8.8*   HEMATOCRIT  35.2*  26.8*  26.8*   MCV  88.7  87.9  88.2   MCH  30.0  29.5  28.9   MCHC  33.8  33.6*  32.8*   RDW  44.9  44.1  43.9   PLATELETCT  232  156*  168   MPV  10.1  9.8  9.7     Recent Labs      18   1923  18   0245  18   0302   SODIUM  126*  130*  130*   POTASSIUM  4.0  3.7  3.7   CHLORIDE  95*  99  99   CO2  23  23  23   GLUCOSE  245*  126*  98   BUN  18   14  11   CREATININE  0.76  0.70  0.67   CALCIUM  9.3  8.5  8.2*                      Assessment/Plan     Sepsis (CMS-HCC)- (present on admission)   Assessment & Plan    This is sepsis (without associated acute organ dysfunction).   Likely source is pyelonephritis  Patient has been started on IV fluids per septic protocol  Lactate is within normal limits  Patient was restarted on IV ceftriaxone  Follow blood and urine cultures        Pyelonephritis- (present on admission)   Assessment & Plan    Patient was started on IV ceftriaxone  Reviewed urine cx--positive for morganella morganii  Will switch to cipro bid x 10 days  Add lidoderm patch to left flank area        Anemia   Assessment & Plan    Check iron levels.  Stool occult blood.  Monitor cbc daily, no obvious blood loss.        Type 2 diabetes mellitus (HCC)- (present on admission)   Assessment & Plan    Uncontrolled  Start on insulin sliding scale with serial Accu-Cheks  Check hemoglobin A1c  Hypoglycemic protocol and place  Hold metformin            Hyponatremia- (present on admission)   Assessment & Plan    Acute on chronic  Baseline sodium appears to be 128 - 130  Na is 130, avoid overloading with fluids  Dc iv fluids        Hypertension- (present on admission)   Assessment & Plan    Well-controlled  Continue Norvasc and lisinopril          Quality-Core Measures   Reviewed items::  Labs reviewed, Radiology images reviewed and Medications reviewed  Aj catheter::  No Aj  DVT prophylaxis - mechanical:  SCDs  Assessed for rehabilitation services:  Patient was assess for and/or received rehabilitation services during this hospitalization

## 2018-05-10 ENCOUNTER — PATIENT OUTREACH (OUTPATIENT)
Dept: HEALTH INFORMATION MANAGEMENT | Facility: OTHER | Age: 83
End: 2018-05-10

## 2018-05-10 LAB
BACTERIA BLD CULT: NORMAL
BACTERIA BLD CULT: NORMAL
SIGNIFICANT IND 70042: NORMAL
SIGNIFICANT IND 70042: NORMAL
SITE SITE: NORMAL
SITE SITE: NORMAL
SOURCE SOURCE: NORMAL
SOURCE SOURCE: NORMAL

## 2018-05-10 NOTE — CARE PLAN
Problem: Urinary Elimination:  Goal: Ability to reestablish a normal urinary elimination pattern will improve    Intervention: Encourage scheduled voiding  Patient is incontinent at times, educated patient and daughter of importance of voiding at least 3-4 times per day to completely empty bladder and importance of hydration, patient verbalized understanding.

## 2018-05-10 NOTE — CARE PLAN
Problem: Infection  Goal: Will remain free from infection    Intervention: Give CDC handouts for infection prevention (infection prevention/hand washing, disease specific, and device specific) and document in Education  Educated patient and daughter about importance of keeping skin tear dry and clean, proper handwashing to prevent worsening open wound.  Will continue to monitor.

## 2018-05-10 NOTE — PROGRESS NOTES
Educated patient and daughter Ursula follow up appointment, prescribed medications, importance of fall safety precautions, voiding schedule, and hydration.  Patient and daughter verbalized understanding. PIV removed with tip intact. Patient wheeled down unit in stable condition with all of patient's belongings. Will continue to monitor.

## 2018-05-11 ENCOUNTER — PATIENT OUTREACH (OUTPATIENT)
Dept: HEALTH INFORMATION MANAGEMENT | Facility: OTHER | Age: 83
End: 2018-05-11

## 2018-05-11 ENCOUNTER — TELEPHONE (OUTPATIENT)
Dept: MEDICAL GROUP | Facility: PHYSICIAN GROUP | Age: 83
End: 2018-05-11

## 2018-05-11 NOTE — TELEPHONE ENCOUNTER
FINAL PRIOR AUTHORIZATION STATUS:    1.  Name of Medication & Dose: lidocaine 5% patch     2. Prior Auth Status: Approved through 05/10/19     3. Action Taken: Pharmacy Notified: yes Patient Notified: no

## 2018-05-11 NOTE — PROGRESS NOTES
Received voice mail message from Yris at [facility], she reports patient has not been able to urinate since last night and is having pain.    Outbound call to patient's son, Chirstiano, who states that his father is not complaining of pain and he actually did urinate this morning.  Christiano states a RN from [facility] will be coming on 5/12/18 for SOC visit.      CC RN explained the role of care coordination and informed Christiano I would call again when patient is discharged from home health services to discuss enrollment in the care coordination program.

## 2018-05-11 NOTE — PROGRESS NOTES
Referral from Yris at [facility]. Per patient advocate, patient was unable to  Lidoderm patches at [facility]. Outbound call to pharmacy to clarify. Technician states that prescription claim was approved and patient's copay will be $35.70. Outbound call to patient who gave me permission to speak with his son, Christiano. Notified Christiano that patient's prescription is ready at [facility].

## 2018-05-11 NOTE — PROGRESS NOTES
"Outgoing voicemail left for patient's Care Coordinator Myke to introduce myself to the case.     Outreach call made 5/11 and briefly spoke with the patient who gave me permission to speak with his son Christiano.   Christiano stated that his dad has not been able to pee since yesterday later afternoon and has a small amount of pain from Kidneys.     [facility] contacted [facility] who said they received the referral and they could not call the patient today as there systems are down and they cannot look up the patient's information or nurse.   [facility] left a VM for the MA of Zahra Yañez requesting a consult.    Christiano stated that he also was unable to  the Lidocaine at the pharmacy because, \"A hospitalist sent it and the pharmacy said they can only fill it from his regular doctor\"  [facility] reached out to [facility] Pharmacist for a consult on medications.    Patient's son said that he used to have a Urology physician but was unsure of the name and when the last time he saw him was.     [facility] will continue to follow up on patient's needs and update EPIC as needed.   "

## 2018-05-15 ENCOUNTER — HOSPITAL ENCOUNTER (EMERGENCY)
Facility: MEDICAL CENTER | Age: 83
End: 2018-05-15
Attending: EMERGENCY MEDICINE
Payer: MEDICARE

## 2018-05-15 VITALS
HEIGHT: 68 IN | BODY MASS INDEX: 25.63 KG/M2 | HEART RATE: 68 BPM | OXYGEN SATURATION: 96 % | WEIGHT: 169.09 LBS | SYSTOLIC BLOOD PRESSURE: 135 MMHG | DIASTOLIC BLOOD PRESSURE: 61 MMHG | RESPIRATION RATE: 18 BRPM | TEMPERATURE: 98.7 F

## 2018-05-15 DIAGNOSIS — K59.00 CONSTIPATION, UNSPECIFIED CONSTIPATION TYPE: ICD-10-CM

## 2018-05-15 PROCEDURE — 700101 HCHG RX REV CODE 250: Performed by: EMERGENCY MEDICINE

## 2018-05-15 PROCEDURE — A9270 NON-COVERED ITEM OR SERVICE: HCPCS | Performed by: EMERGENCY MEDICINE

## 2018-05-15 PROCEDURE — 700102 HCHG RX REV CODE 250 W/ 637 OVERRIDE(OP): Performed by: EMERGENCY MEDICINE

## 2018-05-15 PROCEDURE — 99284 EMERGENCY DEPT VISIT MOD MDM: CPT

## 2018-05-15 RX ORDER — ENEMA 19; 7 G/133ML; G/133ML
1 ENEMA RECTAL ONCE
Status: COMPLETED | OUTPATIENT
Start: 2018-05-15 | End: 2018-05-15

## 2018-05-15 RX ORDER — POLYETHYLENE GLYCOL 3350 17 G/17G
1 POWDER, FOR SOLUTION ORAL ONCE
Status: COMPLETED | OUTPATIENT
Start: 2018-05-15 | End: 2018-05-15

## 2018-05-15 RX ORDER — POLYETHYLENE GLYCOL 3350 17 G/17G
17 POWDER, FOR SOLUTION ORAL
Qty: 10 EACH | Refills: 0 | Status: SHIPPED | OUTPATIENT
Start: 2018-05-15

## 2018-05-15 RX ADMIN — SODIUM PHOSPHATE, DIBASIC AND SODIUM PHOSPHATE, MONOBASIC 133 ML: 7; 19 ENEMA RECTAL at 04:14

## 2018-05-15 RX ADMIN — POLYETHYLENE GLYCOL 3350 1 PACKET: 17 POWDER, FOR SOLUTION ORAL at 03:02

## 2018-05-15 ASSESSMENT — PAIN SCALES - GENERAL
PAINLEVEL_OUTOF10: 0
PAINLEVEL_OUTOF10: 0

## 2018-05-15 ASSESSMENT — LIFESTYLE VARIABLES: DO YOU DRINK ALCOHOL: NO

## 2018-05-15 NOTE — ED PROVIDER NOTES
CHIEF COMPLAINT  Chief Complaint   Patient presents with   • Constipation     LBM 5 days ago       HPI  Bulmaro Wheeler is a 89 y.o. male who presents constipation for the past 5 days.  Has a history of the same.  Has been taking stool softeners.  No vomiting.  No significant abdominal pain symptoms however he does note that over this time he has had more frequent urination as well.  No fevers.  No dysuria.    No chest pain or shortness of breath.    REVIEW OF SYSTEMS  See HPI for further details. All other systems are negative.     PAST MEDICAL HISTORY   has a past medical history of Acute kidney failure, unspecified ([facility]) (9/27/2013); Arrhythmia; Arthritis; Backpain; Carotid artery stenosis (2/18/2014); CATARACT; Dementia without behavioral disturbance (2/22/2018); Diabetes; Glaucoma; Heart burn; Hypertension; Indigestion; NSTEMI (non-ST elevated myocardial infarction) ([facility]) (10/24/2017); Occlusion and stenosis of carotid artery without mention of cerebral infarction (3/11/2014); Pneumonia; Pyelonephritis (October 2010); Pyelonephritis; Renal cyst (9/19/2013); Sepsis (9/27/2013); Urinary tract infection, site not specified (9/27/2013); and UTI (lower urinary tract infection) (9/1/2012).    SOCIAL HISTORY  Social History     Social History Main Topics   • Smoking status: Never Smoker   • Smokeless tobacco: Never Used   • Alcohol use No      Comment: hasnt drank since 1979   • Drug use: No   • Sexual activity: No      Comment:  retired       SURGICAL HISTORY   has a past surgical history that includes ercp w/removal calculus (2009); eye surgery (1980's); colonoscopy (1999); cholecystectomy (1999); carotid endarterectomy (3/11/2014); and hip hemiarthroplasty (Left, 3/28/2018).    CURRENT MEDICATIONS  Home Medications     Reviewed by Fatou Dorsey R.N. (Registered Nurse) on 05/15/18 at 0242  Med List Status: Partial   Medication Last Dose Status   acetaminophen (TYLENOL) 325 MG Tab >2 days Active  "  amLODIPine (NORVASC) 5 MG Tab 5/5/2018 Active   aspirin EC (ECOTRIN) 81 MG Tablet Delayed Response 5/4/2018 Active   atorvastatin (LIPITOR) 10 MG Tab 5/5/2018 Active   B Complex Vitamins (VITAMIN B COMPLEX PO) 5/5/2018 Active   ciprofloxacin (CIPRO) 500 MG Tab  Active   cyanocobalamin (VITAMIN B-12) 100 MCG Tab 5/5/2018 Active   latanoprost (XALATAN) 0.005 % Solution 5/4/2018 Active   lidocaine (LIDODERM) 5 % Patch  Active   lisinopril (PRINIVIL) 2.5 MG Tab 5/5/2018 Active   metFORMIN (GLUCOPHAGE) 500 MG Tab 5/5/2018 Active   Multiple Vitamins-Minerals (CENTRUM SILVER PO) 5/5/2018 Active   tramadol (ULTRAM) 50 MG Tab  Active   vitamin D (CHOLECALCIFEROL) 1000 UNIT Tab 5/5/2018 Active                ALLERGIES  No Known Allergies    PHYSICAL EXAM  VITAL SIGNS: /57   Pulse 69   Temp 37.1 °C (98.7 °F)   Resp 16   Ht 1.727 m (5' 8\")   Wt 76.7 kg (169 lb 1.5 oz)   SpO2 94%   BMI 25.71 kg/m²   Pulse ox interpretation: I interpret this pulse ox as normal.  Constitutional: Alert in no apparent distress.  HENT: No signs of trauma, Bilateral external ears normal, Nose normal.   Eyes: Pupils are equal and reactive, Conjunctiva normal, Non-icteric.   Cardiovascular: Regular rate and rhythm, no murmurs.   Thorax & Lungs: Normal breath sounds, No respiratory distress, No wheezing  Abdomen: Bowel sounds normal, Soft, No tenderness, No masses, No pulsatile masses. No peritoneal signs.  Rectal: Hemorrhoid present.  No active bleeding.  Not thrombosed.  Rectal examination following enema and bowel movement did not palpate any hard stool in the rectal vault.  Skin: Warm, Dry, No erythema, No rash.   Back: No bony tenderness, No CVA tenderness.   Extremities: Intact distal pulses, No edema, No tenderness, No cyanosis  Neurologic: Alert , Normal motor function and gait, Normal sensory function, No focal deficits noted.       DIAGNOSTIC STUDIES / PROCEDURES    LABS  Labs Reviewed   URINALYSIS       RADIOLOGY  No orders to " "display       COURSE & MEDICAL DECISION MAKING  Pertinent Labs & Imaging studies reviewed. (See chart for details)  89 y.o. M presenting with reports of constipation.  No abdominal pain, nausea, vomiting.  Last bowel movement was approximately 5 or 6 days ago.  Takes stool softeners.  Reviewed the patient's prior CT imaging of the abdominal region in February of this year.  No obvious acute abnormalities at that time.  Patient does not have abdominal tenderness or reports of abdominal pain symptoms currently.  No fevers.  No tachycardia.  Nontoxic in appearance and benign abdominal exam without signs of peritonitis.  Patient most likely with simple constipation.  He was given MiraLAX.  Enema was given as well.  Probably had a bowel movement.  It was hard and rather large.  Patient will be discharged home with MiraLAX and instructions regarding adequate oral hydration.  To follow-up with primary care physician for further management.  Continues to have benign abdominal examination with unremarkable vital signs.    To return to the emergency department for any worsening of his symptoms or development of any other concerning signs or symptoms.    The patient will return for worsening symptoms or failure of improvement and is stable at the time of discharge. The patient verbalizes understanding in their own words.    /57   Pulse (!) 53   Temp 37.1 °C (98.7 °F)   Resp 16   Ht 1.727 m (5' 8\")   Wt 76.7 kg (169 lb 1.5 oz)   SpO2 96%   BMI 25.71 kg/m²     The patient was referred to primary care where they will receive further BP management.      Zahra Yañez M.D.  [address] [facility]  [address] [place] [id number] [phone]    In 2 days      [facility], Emergency Dept  [address] [facility] [address] [phone]    As needed, If symptoms worsen      FINAL IMPRESSION  1. Constipation, unspecified constipation type            Electronically signed by: Dinh Hidalgo, 5/15/2018 2:46 " AM

## 2018-05-15 NOTE — ED NOTES
Son at bedside.  Discharge instructions given, questions answered.  Left ER in WC escorted by tech. Pt states all belongings in possession. Rx x1 given.

## 2018-05-15 NOTE — DISCHARGE INSTRUCTIONS
Constipation, Adult  Constipation is when a person has fewer bowel movements in a week than normal, has difficulty having a bowel movement, or has stools that are dry, hard, or larger than normal. Constipation may be caused by an underlying condition. It may become worse with age if a person takes certain medicines and does not take in enough fluids.  Follow these instructions at home:  Eating and drinking  · Eat foods that have a lot of fiber, such as fresh fruits and vegetables, whole grains, and beans.  · Limit foods that are high in fat, low in fiber, or overly processed, such as french fries, hamburgers, cookies, candies, and soda.  · Drink enough fluid to keep your urine clear or pale yellow.  General instructions  · Exercise regularly or as told by your health care provider.  · Go to the restroom when you have the urge to go. Do not hold it in.  · Take over-the-counter and prescription medicines only as told by your health care provider. These include any fiber supplements.  · Practice pelvic floor retraining exercises, such as deep breathing while relaxing the lower abdomen and pelvic floor relaxation during bowel movements.  · Watch your condition for any changes.  · Keep all follow-up visits as told by your health care provider. This is important.  Contact a health care provider if:  · You have pain that gets worse.  · You have a fever.  · You do not have a bowel movement after 4 days.  · You vomit.  · You are not hungry.  · You lose weight.  · You are bleeding from the anus.  · You have thin, pencil-like stools.  Get help right away if:  · You have a fever and your symptoms suddenly get worse.  · You leak stool or have blood in your stool.  · Your abdomen is bloated.  · You have severe pain in your abdomen.  · You feel dizzy or you faint.  This information is not intended to replace advice given to you by your health care provider. Make sure you discuss any questions you have with your health care  provider.  Document Released: 09/15/2005 Document Revised: 07/07/2017 Document Reviewed: 06/07/2017  [employer] Interactive Patient Education © 2017 Elsevier Inc.

## 2018-05-15 NOTE — ED TRIAGE NOTES
Bulmaro Rosales  89 y.o.  Chief Complaint   Patient presents with   • Constipation     LBM 5 days ago     BIB EMS for above. Patient seen here last week for similar complaint.    Patient states that he took stool softener pills at home without relief.    Abdomen soft, nontender, + bowel sounds in all quadrants. + passing flatus. Denies abdominal pain/nausea/vomiting.    Chart up for ERP.

## 2018-05-15 NOTE — ED NOTES
Karthik fall assessment completed. Pt is High risk for fall. Interventions complete. Pt placed in yellow non slip socks, wrist band placed, green sign on door. Bed locked in low position, call light in place. Personal possessions in place.  Personal needs assessed. Charge nurse notified to move pt to a more visible room if available. Safety assessed. Will monitor frequently

## 2018-05-18 ENCOUNTER — OFFICE VISIT (OUTPATIENT)
Dept: MEDICAL GROUP | Facility: PHYSICIAN GROUP | Age: 83
End: 2018-05-18
Payer: MEDICARE

## 2018-05-18 VITALS
TEMPERATURE: 98.2 F | HEART RATE: 56 BPM | SYSTOLIC BLOOD PRESSURE: 104 MMHG | RESPIRATION RATE: 16 BRPM | OXYGEN SATURATION: 97 % | DIASTOLIC BLOOD PRESSURE: 58 MMHG | HEIGHT: 68 IN

## 2018-05-18 DIAGNOSIS — G89.29 CHRONIC LEFT-SIDED LOW BACK PAIN, WITH SCIATICA PRESENCE UNSPECIFIED: ICD-10-CM

## 2018-05-18 DIAGNOSIS — G47.00 INSOMNIA, UNSPECIFIED TYPE: ICD-10-CM

## 2018-05-18 DIAGNOSIS — R35.0 URINARY FREQUENCY: ICD-10-CM

## 2018-05-18 DIAGNOSIS — M54.5 CHRONIC LEFT-SIDED LOW BACK PAIN, WITH SCIATICA PRESENCE UNSPECIFIED: ICD-10-CM

## 2018-05-18 PROBLEM — K59.00 CONSTIPATION: Status: ACTIVE | Noted: 2018-05-18

## 2018-05-18 PROCEDURE — 99214 OFFICE O/P EST MOD 30 MIN: CPT | Performed by: INTERNAL MEDICINE

## 2018-05-18 RX ORDER — LIDOCAINE 50 MG/G
1 PATCH TOPICAL EVERY 24 HOURS
Qty: 10 PATCH | Refills: 0 | Status: SHIPPED | OUTPATIENT
Start: 2018-05-18

## 2018-05-18 RX ORDER — TRAZODONE HYDROCHLORIDE 50 MG/1
50 TABLET ORAL
Qty: 30 TAB | Refills: 0 | Status: SHIPPED | OUTPATIENT
Start: 2018-05-18 | End: 2018-05-18 | Stop reason: SDUPTHER

## 2018-05-18 NOTE — ASSESSMENT & PLAN NOTE
He was taking tamsulosin and is now on just finasteride. His family does note that he has hydrocephalus and has been having dizziness and is inquiring about resuming the tamsulosin.

## 2018-05-18 NOTE — ASSESSMENT & PLAN NOTE
Bulmaro was seen in the ED on 5/15/2018 for constipation, given miralax and enema and had a bowel movement. He has a history of constipation. The ED provider prescribed miralax and counseled on adequate hydration. He also noted urinary frequency at that ED visit. His last BM was this morning. He has been taking miralax. He's moving to [place], [place] early next week and family was told that home health orders would be valid for 30 days until he sees a new PCP provider there.

## 2018-05-18 NOTE — ASSESSMENT & PLAN NOTE
Family says that he has required trazodone in the past, is working on weaning off of tramadol, to help him sleep at night. Currently he is up at night with frequent urination and then sleeps all day and they are trying to reverse his sleep schedule.

## 2018-05-18 NOTE — PROGRESS NOTES
PRIMARY CARE CLINIC FOLLOW UP VISIT  Chief Complaint   Patient presents with   • Constipation     Hospital follow-up    • Cystitis     Hospital follow-up    • Orders Needed     PT/OT/Home Health/ Catheter Cover    • Urinary Frequency     questions on Tamsulosin        History of Present Illness     Bulmaro is a 89 yo established patient of Dr. Yañez presenting for the following with his family:     Constipation  Bulmaro was seen in the ED on 5/15/2018 for constipation, given miralax and enema and had a bowel movement. He has a history of constipation. The ED provider prescribed miralax and counseled on adequate hydration. He also noted urinary frequency at that ED visit. His last BM was this morning. He has been taking miralax. He's moving to [place], [place] early next week and family was told that home health orders would be valid for 30 days until he sees a new PCP provider there.     Urinary frequency  He was taking tamsulosin and is now on just finasteride. His family does note that he has hydrocephalus and has been having dizziness and is inquiring about resuming the tamsulosin.     Insomnia  Family says that he has required trazodone in the past, is working on weaning off of tramadol, to help him sleep at night. Currently he is up at night with frequent urination and then sleeps all day and they are trying to reverse his sleep schedule.       Current Outpatient Prescriptions   Medication Sig Dispense Refill   • lidocaine (LIDODERM) 5 % Patch Apply 1 Patch to skin as directed every 24 hours. 10 Patch 0   • Diclofenac Sodium 1 % Gel Apply 1 Application to skin as directed 1 time daily as needed. 1 Tube 2   • traZODone (DESYREL) 50 MG Tab Take 1 Tab by mouth at bedtime as needed for Sleep. 30 Tab 0   • polyethylene glycol/lytes (MIRALAX) Pack Take 1 Packet by mouth 1 time daily as needed. 10 Each 0   • ciprofloxacin (CIPRO) 500 MG Tab Take 1 Tab by mouth every 12 hours. 20 Tab 0   • metFORMIN (GLUCOPHAGE) 500 MG  "Tab Take 500 mg by mouth 2 times a day, with meals.     • acetaminophen (TYLENOL) 325 MG Tab Take 2 Tabs by mouth every 6 hours. 30 Tab 0   • cyanocobalamin (VITAMIN B-12) 100 MCG Tab Take 100 mcg by mouth every day.     • latanoprost (XALATAN) 0.005 % Solution Place 1 Drop in both eyes every evening.     • amLODIPine (NORVASC) 5 MG Tab Take 5 mg by mouth every day.     • lisinopril (PRINIVIL) 2.5 MG Tab Take 1 Tab by mouth every day. 90 Tab 3   • atorvastatin (LIPITOR) 10 MG Tab Take 0.5 Tabs by mouth every day. 45 Tab 3   • aspirin EC (ECOTRIN) 81 MG Tablet Delayed Response Take 81 mg by mouth every evening.     • B Complex Vitamins (VITAMIN B COMPLEX PO) Take 1 Tab by mouth every day.     • Multiple Vitamins-Minerals (CENTRUM SILVER PO) Take 1 Tab by mouth every day.     • vitamin D (CHOLECALCIFEROL) 1000 UNIT Tab Take 1,000 Units by mouth every day.       No current facility-administered medications for this visit.        ROS  As per HPI above. All other systems reviewed and negative.        Objective   Blood pressure 104/58, pulse (!) 56, temperature 36.8 °C (98.2 °F), resp. rate 16, height 1.727 m (5' 8\"), SpO2 97 %. There is no height or weight on file to calculate BMI.    General: alert and oriented, pleasant, cooperative. In wheelchair, appears stated age. Hard of hearing   HEENT: Normocephalic, atraumatic.   Cardiovascular: regular rate and rhythm, normal S1/S2  Pulmonary: lungs clear to auscultation bilaterally  Lymphatics: no cervical or supraclavicular lymphadenopathy   Skin: scattered ecchymoses of extremities   Psychiatric: appropriate mood and affect. Good insight and appropriate judgment     Assessment and Plan   The following treatment plan was discussed     1. Chronic left-sided low back pain, with sciatica presence unspecified  Provided refills, he is moving to [place] early next week.   - REFERRAL TO HOME HEALTH  - lidocaine (LIDODERM) 5 % Patch; Apply 1 Patch to skin as directed every " 24 hours.  Dispense: 10 Patch; Refill: 0  - Diclofenac Sodium 1 % Gel; Apply 1 Application to skin as directed 1 time daily as needed.  Dispense: 1 Tube; Refill: 2    2. Urinary frequency  Advised to remain on finasteride as resuming tamsulosin could contribute to his dizziness.     3. Insomnia, unspecified type  Did warn about the deleterious affects of sleep aids and how the underlying cause (frequent urination, in Bulmaro's case) needs to be addressed. He is moving to [place], [place] early next week and will be establishing with a new PCP there.    - traZODone (DESYREL) 50 MG Tab; Take 1 Tab by mouth at bedtime as needed for Sleep.  Dispense: 30 Tab; Refill: 0    Return if symptoms worsen or fail to improve.    Carlos Giron MD  Internal Medicine  [facility]

## 2018-05-21 RX ORDER — TRAZODONE HYDROCHLORIDE 50 MG/1
TABLET ORAL
Qty: 90 TAB | Refills: 0 | Status: SHIPPED | OUTPATIENT
Start: 2018-05-21

## 2018-05-31 ENCOUNTER — PATIENT OUTREACH (OUTPATIENT)
Dept: HEALTH INFORMATION MANAGEMENT | Facility: OTHER | Age: 83
End: 2018-05-31

## 2018-05-31 NOTE — PROGRESS NOTES
"Outbound call to patient and spoke wit his son, Christiano.  Christiano states patient has moved to [place] for \"better medical care\".  He also states patient has started the process to change from [employer] to Medicare.  Christiano requested no further telephone calls due to patient moving to [place].     Plan:  CC RN will close referral at this time, as patient will not be enrolled in the Care Coordination program.  "

## 2018-06-29 ENCOUNTER — PATIENT OUTREACH (OUTPATIENT)
Dept: HEALTH INFORMATION MANAGEMENT | Facility: OTHER | Age: 83
End: 2018-06-29

## 2018-06-29 NOTE — PROGRESS NOTES
Bulmaro Wheeler was admitted on 5/5/18 for pyelonephritis, once treated he was discharged home on 5/9/18. [facility] patient advocate assisted with discharge needs including 1 appointments, 1 primary care physician appointment. Of the 1 appointments the patient kept 1. [facility] also assisted with discharge orders for [facility] which the patient currently utilizes 2 times a week. PPS Screening was conducted and the patient scored at a 60%.

## 2018-08-17 DIAGNOSIS — G47.00 INSOMNIA, UNSPECIFIED TYPE: ICD-10-CM

## 2018-08-19 RX ORDER — TRAZODONE HYDROCHLORIDE 50 MG/1
TABLET ORAL
Refills: 0 | OUTPATIENT
Start: 2018-08-19

## 2021-01-11 DIAGNOSIS — Z23 NEED FOR VACCINATION: ICD-10-CM

## 2021-05-28 NOTE — PROGRESS NOTES
Candis notified.    Report received. Assumed care, assessment complete. Pt is A & O x 3.  Pt given heat pack for ankle pain. Fall precautions and appropriate signs in place. Pt oriented to unit routine, call light/phone system and RN extension number provided. Pt educated regarding fall precautions. Bed alarm in use. Pt denies any additional needs at this time. Call light within reach.

## 2021-09-22 NOTE — ED NOTES
Pt BIB [facility], son called for pt, pt c/o weakness, N/V/D, per son pt not acting like himself, pt drowsy, answers A&O questions appropriately, pt VSS, pt in gown, on monitor, chart up for ERP    unknown

## 2023-11-03 NOTE — ED NOTES
Can a MA please update me on where we are with this?    Chief Complaint   Patient presents with   • Syncope     BIB EMS, per EMS pt stood up from his chair this morning and had a witnessed syncope. has had diarrhea last few days. initially hypotensive on scene.    • Diarrhea     Pt EMS pt has Fluid restriction due to renal fx.  Pt AAO. VSS. Denies symptoms at this time.   Chart up for ERP.

## (undated) DEVICE — SUTURE GENERAL

## (undated) DEVICE — ELECTRODE DUAL RETURN W/ CORD - (50/PK)

## (undated) DEVICE — DRESSING POST OP BORDER 4 X 10 (5EA/BX)

## (undated) DEVICE — MIXER BONE CEMENT REVOLUTION - W/FEMORAL PRESSURIZER (6/CA)

## (undated) DEVICE — SET EXTENSION WITH 2 PORTS (48EA/CA) ***PART #2C8610 IS A SUBSTITUTE*****

## (undated) DEVICE — TUBING CLEARLINK DUO-VENT - C-FLO (48EA/CA)

## (undated) DEVICE — SUTURE 2-0 VICRYL PLUS CT-1 - 8 X 18 INCH(12/BX)

## (undated) DEVICE — BLADE SAGITTAL SAW DUAL CUT 75.0 X 25.0MM (1/EA)

## (undated) DEVICE — NEPTUNE 4 PORT MANIFOLD - (20/PK)

## (undated) DEVICE — GLOVE BIOGEL SZ 7.5 SURGICAL PF LTX - (50PR/BX 4BX/CA)

## (undated) DEVICE — CHLORAPREP 26 ML APPLICATOR - ORANGE TINT(25/CA)

## (undated) DEVICE — SUTURE 1 VICRYL PLUS CTX - 8 X 18 INCH (12/BX)

## (undated) DEVICE — SODIUM CHL IRRIGATION 0.9% 1000ML (12EA/CA)

## (undated) DEVICE — ELECTRODE 850 FOAM ADHESIVE - HYDROGEL RADIOTRNSPRNT (50/PK)

## (undated) DEVICE — SLEEVE, VASO, THIGH, MED

## (undated) DEVICE — GLOVE BIOGEL SZ 8 SURGICAL PF LTX - (50PR/BX 4BX/CA)

## (undated) DEVICE — GOWN WARMING STANDARD FLEX - (30/CA)

## (undated) DEVICE — CONNECTOR 5-IN-1 STERILE - (25EA/BX)

## (undated) DEVICE — SUTURE 2-0 MONOCRYL CT-1

## (undated) DEVICE — LACTATED RINGERS INJ 1000 ML - (14EA/CA 60CA/PF)

## (undated) DEVICE — SET LEADWIRE 5 LEAD BEDSIDE DISPOSABLE ECG (1SET OF 5/EA)

## (undated) DEVICE — GLOVE BIOGEL PI INDICATOR SZ 7.0 SURGICAL PF LF - (50/BX 4BX/CA)

## (undated) DEVICE — PACK TOTAL HIP - (1/CA)

## (undated) DEVICE — KIT ANESTHESIA W/CIRCUIT & 3/LT BAG W/FILTER (20EA/CA)

## (undated) DEVICE — GLOVE BIOGEL PI ORTHO SZ 6 1/2 SURGICAL PF LF (40PR/BX)

## (undated) DEVICE — SUCTION INSTRUMENT YANKAUER BULBOUS TIP W/O VENT (50EA/CA)

## (undated) DEVICE — SUTURE 5 TI-CRON HOS-14 - (36/BX)

## (undated) DEVICE — DRAPE STRLE REG TOWEL 18X24 - (10/BX 4BX/CA)"

## (undated) DEVICE — SENSOR SPO2 NEO LNCS ADHESIVE (20/BX) SEE USER NOTES

## (undated) DEVICE — MASK ANESTHESIA ADULT  - (100/CA)

## (undated) DEVICE — HEAD HOLDER JUNIOR/ADULT

## (undated) DEVICE — PROTECTOR ULNA NERVE - (36PR/CA)

## (undated) DEVICE — SUTURE 1 VICRYL PLUS CTX - 36 INCH (36/BX)

## (undated) DEVICE — KIT ROOM DECONTAMINATION

## (undated) DEVICE — CANISTER SUCTION 3000ML MECHANICAL FILTER AUTO SHUTOFF MEDI-VAC NONSTERILE LF DISP  (40EA/CA)

## (undated) DEVICE — GLOVE BIOGEL INDICATOR SZ 8 SURGICAL PF LTX - (50/BX 4BX/CA)